# Patient Record
Sex: FEMALE | Race: WHITE | NOT HISPANIC OR LATINO | Employment: FULL TIME | URBAN - METROPOLITAN AREA
[De-identification: names, ages, dates, MRNs, and addresses within clinical notes are randomized per-mention and may not be internally consistent; named-entity substitution may affect disease eponyms.]

---

## 2017-01-23 ENCOUNTER — OFFICE VISIT (OUTPATIENT)
Dept: INTERNAL MEDICINE | Facility: CLINIC | Age: 57
End: 2017-01-23
Attending: INTERNAL MEDICINE
Payer: COMMERCIAL

## 2017-01-23 VITALS
DIASTOLIC BLOOD PRESSURE: 72 MMHG | SYSTOLIC BLOOD PRESSURE: 100 MMHG | OXYGEN SATURATION: 99 % | WEIGHT: 138 LBS | HEART RATE: 75 BPM | HEIGHT: 63 IN | BODY MASS INDEX: 24.45 KG/M2

## 2017-01-23 DIAGNOSIS — J30.9 ALLERGIC RHINITIS, UNSPECIFIED ALLERGIC RHINITIS TRIGGER, UNSPECIFIED RHINITIS SEASONALITY: Primary | ICD-10-CM

## 2017-01-23 DIAGNOSIS — M25.811 SHOULDER IMPINGEMENT, RIGHT: ICD-10-CM

## 2017-01-23 DIAGNOSIS — E78.5 HYPERLIPIDEMIA, UNSPECIFIED HYPERLIPIDEMIA TYPE: ICD-10-CM

## 2017-01-23 PROCEDURE — G0442 ANNUAL ALCOHOL SCREEN 15 MIN: HCPCS | Mod: S$GLB,,, | Performed by: INTERNAL MEDICINE

## 2017-01-23 PROCEDURE — G0444 DEPRESSION SCREEN ANNUAL: HCPCS | Mod: S$GLB,,, | Performed by: INTERNAL MEDICINE

## 2017-01-23 PROCEDURE — 99205 OFFICE O/P NEW HI 60 MIN: CPT | Mod: 25,S$GLB,, | Performed by: INTERNAL MEDICINE

## 2017-01-23 PROCEDURE — 1159F MED LIST DOCD IN RCRD: CPT | Mod: S$GLB,,, | Performed by: INTERNAL MEDICINE

## 2017-01-23 NOTE — MR AVS SNAPSHOT
"[facility]  [address] [place] [address], [address]  [facility] [address]  Phone: [phone]  Fax: [phone]                  Quyen Moody   2017 10:00 AM   Office Visit    Description:  Female : 1960   Provider:  LORIN Rodríguez MD   Department:  [facility]           Reason for Visit     Annual Exam                To Do List           Future Appointments        Provider Department Dept Phone    2018 9:00 AM MD [facility] Tolbert [phone]      Goals (5 Years of Data)     None      Follow-Up and Disposition     Return in about 1 year (around 2018).      [facility] On Call     [facility] On Call Nurse Care Line -  Assistance  Registered nurses in the [facility] On Call Center provide clinical advisement, health education, appointment booking, and other advisory services.  Call for this free service at [phone].             Medications           Message regarding Medications     Verify the changes and/or additions to your medication regime listed below are the same as discussed with your clinician today.  If any of these changes or additions are incorrect, please notify your healthcare provider.             Verify that the below list of medications is an accurate representation of the medications you are currently taking.  If none reported, the list may be blank. If incorrect, please contact your healthcare provider. Carry this list with you in case of emergency.           Current Medications     fenofibrate 160 MG Tab Take 1 tablet (160 mg total) by mouth once daily.    MIMVEY 1-0.5 mg per tablet            Clinical Reference Information           Vital Signs - Last Recorded  Most recent update: 2017  9:58 AM by Carmina Richards MA    BP Pulse Ht Wt SpO2 BMI    100/72 75 5' 2.5" (1.588 m) 62.6 kg (138 lb) 99% 24.84 kg/m2      Blood Pressure          Most Recent Value    BP  100/72      Allergies as of 2017     Sulfa (Sulfonamide Antibiotics)      Immunizations Administered on " Date of Encounter - 1/23/2017     None      Instructions    Tips for Healthy Living and Routine Preventative Care - 2017                                                             (These guidelines are intended for healthy adults)      1. Exercise  Exercise aerobically with a target heart rate of (220-age) x 0.8  Exercise 30-45 minutes on most days of the week    2. Diet and Supplements- All supplements can be obtained through a varied, healthy diet   Calcium: 1,000 - 1,200 mg each day   8 oz milk, Calcium fortified O.J., or Yogurt = 300 mg, 1oz of cheese =100-200 mg  Vitamin D: 1,000 iu each day- Can probably be obtained by 30 min. of direct sunlight    each day             3 oz. [place] = 800 iu,  3 oz. Tuna =150 iu, Milk or fortified O.J. = 120 iu  Fish oil: 1-2 grams each day or about 840 mg of EPA and DHA (Omega-3 fatty acids) each day             3 oz. [place]=2 grams,  3 oz. Tuna=1.3 grams,  3 oz. drained light Tuna= 0.25 grams  Folic acid 800 mcg each day for all women planning or capable of pregnancy  Fat intake: Not to exceed 30% of total daily calories    3. Lifestyle  Alcohol: 1 drink = 12 oz. domestic beer, 4 oz. wine, or 1 oz. hard (80 proof) liquor             Males: </= 14 drinks per week with no more than 4 in any one day             Females: </= 7 drinks per week with no more than 3 in any one day  Salt: 1.2 - 3 grams of Sodium each day.  Tobacco: Dont smoke, or quit smoking (discuss with your doctor)  Depression: If you feel depressed discuss with your doctor  Weight: Maintain a healthy body weight. Stay within 10% of:             Males: 106 lbs. + 6 lbs per inch height above 5 feet             Females: 100 lbs + 5 lbs per inch height above 5 feet    4. Routine tests  Blood pressure check at each visit, or at least once each year  HIV screening (one time) if less than 65 years old  Hepatitis C screen (one time) if born between 1945 and 1965  Cholesterol screening every 3 years starting at age  21  Glucose check every 2-3 years starting at age 45  TSH (thyroid screen) every 2 years starting at age 50  Colonoscopy at age 50, and repeat every 10 years until age 75  Vision screen at age 65    Females:  Pap smear every three years starting at age 21                  Stop screening at age 65 if past 3 pap smears were normal                  No screening for women who have had a hysterectomy with removal of cervix  Mammogram every 1-2 years starting at age 40 until age 75 (yearly from age 45-54).  Bone density scan at about age 65      Males:  Consider PSA screening annually at age 50, age 45 for  Americans, until age 75                 5. Immunizations  Influenza vaccine every year in the fall, especially if >50 or with a chronic disease  Tetnus/Diptheria/Pertusis (Tdap) vaccine once, then Tetnus/Diptheria (Td) vaccine every 10 years  Shingles (Zoster) vaccine at age 60  Pneumonia vaccine at age 65. 2nd Pneumonia vaccine at least 1 year later (1st should be Prevnar-13, and 2nd should be Pneumovax-23).

## 2017-01-23 NOTE — PROGRESS NOTES
Subjective:       Patient ID: Quyen Moody is a 56 y.o. female.    Chief Complaint: Annual Exam    HPI Comments: Establish. Prev Dr. Richards.  Takes Finofibrate for high cholest.  See Dr. Carney for shoulder pain.    Review of Systems   Constitutional: Negative.    HENT: Positive for congestion and postnasal drip.    Eyes: Negative.    Respiratory: Negative.    Cardiovascular: Negative.    Gastrointestinal: Negative.    Musculoskeletal: Positive for arthralgias.   Skin: Negative.    Neurological: Negative.    Psychiatric/Behavioral: Negative.  Negative for dysphoric mood.       Objective:      Physical Exam   Constitutional: She is oriented to person, place, and time. She appears well-developed and well-nourished.   HENT:   Head: Normocephalic and atraumatic.   Right Ear: External ear normal.   Left Ear: External ear normal.   Nose: Nose normal.   Mouth/Throat: Oropharynx is clear and moist. No oropharyngeal exudate.   Eyes: EOM are normal. Pupils are equal, round, and reactive to light. Right eye exhibits no discharge. Left eye exhibits no discharge.   Neck: Normal range of motion. Neck supple. No JVD present. No thyromegaly present.   Cardiovascular: Normal rate, regular rhythm and intact distal pulses.  Exam reveals no gallop and no friction rub.    No murmur heard.  Pulmonary/Chest: Effort normal and breath sounds normal. No respiratory distress. She has no rales. She exhibits no tenderness.   Abdominal: Soft. Bowel sounds are normal. There is no tenderness. There is no rebound.   Musculoskeletal: Normal range of motion. She exhibits no edema.   Neurological: She is alert and oriented to person, place, and time.   Skin: Skin is warm. She is not diaphoretic.   Psychiatric: She has a normal mood and affect.       Assessment:       No diagnosis found.    Plan:       Per orders and D/C instructions.  Continue meds/diet for AR, high cholest., and shoulder pain, which are stable.    Screening: The patient was screened for  depression with the PHQ2 questionnaire and possible health consequences were discussed with the patient, who understands (15 minutes spent). The patient was screened for the misuse of alcohol, by asking the number of drinks per average week, and if pt has had more than 4 drinks (more than 3 for women and elderly) in 1 day within the past year. The health and legal consequences of misuse were discussed (15 minutes spent). The patient was screened for obesity (BMI>30), If the current BMI > 30, then the possible consequences of obesity, as well as the benefits of diet, exercise, and weight loss were discussed (15 minutes spent).

## 2017-01-23 NOTE — PATIENT INSTRUCTIONS
Tips for Healthy Living and Routine Preventative Care - 2017                                                             (These guidelines are intended for healthy adults)      1. Exercise  Exercise aerobically with a target heart rate of (220-age) x 0.8  Exercise 30-45 minutes on most days of the week    2. Diet and Supplements- All supplements can be obtained through a varied, healthy diet   Calcium: 1,000 - 1,200 mg each day   8 oz milk, Calcium fortified O.J., or Yogurt = 300 mg, 1oz of cheese =100-200 mg  Vitamin D: 1,000 iu each day- Can probably be obtained by 30 min. of direct sunlight    each day             3 oz. [place] = 800 iu,  3 oz. Tuna =150 iu, Milk or fortified O.J. = 120 iu  Fish oil: 1-2 grams each day or about 840 mg of EPA and DHA (Omega-3 fatty acids) each day             3 oz. [place]=2 grams,  3 oz. Tuna=1.3 grams,  3 oz. drained light Tuna= 0.25 grams  Folic acid 800 mcg each day for all women planning or capable of pregnancy  Fat intake: Not to exceed 30% of total daily calories    3. Lifestyle  Alcohol: 1 drink = 12 oz. domestic beer, 4 oz. wine, or 1 oz. hard (80 proof) liquor             Males: </= 14 drinks per week with no more than 4 in any one day             Females: </= 7 drinks per week with no more than 3 in any one day  Salt: 1.2 - 3 grams of Sodium each day.  Tobacco: Dont smoke, or quit smoking (discuss with your doctor)  Depression: If you feel depressed discuss with your doctor  Weight: Maintain a healthy body weight. Stay within 10% of:             Males: 106 lbs. + 6 lbs per inch height above 5 feet             Females: 100 lbs + 5 lbs per inch height above 5 feet    4. Routine tests  Blood pressure check at each visit, or at least once each year  HIV screening (one time) if less than 65 years old  Hepatitis C screen (one time) if born between 1945 and 1965  Cholesterol screening every 3 years starting at age 21  Glucose check every 2-3 years starting at age 45  TSH  (thyroid screen) every 2 years starting at age 50  Colonoscopy at age 50, and repeat every 10 years until age 75  Vision screen at age 65    Females:  Pap smear every three years starting at age 21                  Stop screening at age 65 if past 3 pap smears were normal                  No screening for women who have had a hysterectomy with removal of cervix  Mammogram every 1-2 years starting at age 40 until age 75 (yearly from age 45-54).  Bone density scan at about age 65      Males:  Consider PSA screening annually at age 50, age 45 for  Americans, until age 75                 5. Immunizations  Influenza vaccine every year in the fall, especially if >50 or with a chronic disease  Tetnus/Diptheria/Pertusis (Tdap) vaccine once, then Tetnus/Diptheria (Td) vaccine every 10 years  Shingles (Zoster) vaccine at age 60  Pneumonia vaccine at age 65. 2nd Pneumonia vaccine at least 1 year later (1st should be Prevnar-13, and 2nd should be Pneumovax-23).

## 2017-01-23 NOTE — LETTER
January 23, 2017      Bernadette Nelson, NINO  [address] [address]           [facility]  [address] [place] [address]  Phone: [phone]  Fax: [phone]          Patient: Quyen Moody   MR Number: [id number]   YOB: 1960   Date of Visit: 1/23/2017       Dear Bernadette Nelson:    Thank you for referring Quyen Moody to me for evaluation. Attached you will find relevant portions of my assessment and plan of care.    If you have questions, please do not hesitate to call me. I look forward to following Quyen Moody along with you.    Sincerely,    LORIN Rodríguez MD    Enclosure  CC:  No Recipients    If you would like to receive this communication electronically, please contact [email or web address] or [phone] to request more information on [employer] access.    For providers and/or their staff who would like to refer a patient to [facility], please contact us through our one-stop-shop provider referral line, [facility], at [phone].    If you feel you have received this communication in error or would no longer like to receive these types of communications, please e-mail [email or web address]

## 2018-01-22 ENCOUNTER — OFFICE VISIT (OUTPATIENT)
Dept: INTERNAL MEDICINE | Facility: CLINIC | Age: 58
End: 2018-01-22
Attending: INTERNAL MEDICINE
Payer: COMMERCIAL

## 2018-01-22 ENCOUNTER — PATIENT MESSAGE (OUTPATIENT)
Dept: INTERNAL MEDICINE | Facility: CLINIC | Age: 58
End: 2018-01-22

## 2018-01-22 ENCOUNTER — LAB VISIT (OUTPATIENT)
Dept: LAB | Facility: OTHER | Age: 58
End: 2018-01-22
Attending: INTERNAL MEDICINE
Payer: COMMERCIAL

## 2018-01-22 VITALS
HEART RATE: 80 BPM | HEIGHT: 63 IN | BODY MASS INDEX: 24.8 KG/M2 | DIASTOLIC BLOOD PRESSURE: 64 MMHG | WEIGHT: 140 LBS | OXYGEN SATURATION: 99 % | SYSTOLIC BLOOD PRESSURE: 100 MMHG

## 2018-01-22 DIAGNOSIS — Z00.00 ROUTINE ADULT HEALTH MAINTENANCE: ICD-10-CM

## 2018-01-22 DIAGNOSIS — E78.9 DISORDER OF LIPID METABOLISM: ICD-10-CM

## 2018-01-22 DIAGNOSIS — E78.5 HYPERLIPIDEMIA, UNSPECIFIED HYPERLIPIDEMIA TYPE: Primary | ICD-10-CM

## 2018-01-22 DIAGNOSIS — D51.0 PERNICIOUS ANEMIA: ICD-10-CM

## 2018-01-22 DIAGNOSIS — D50.8 OTHER IRON DEFICIENCY ANEMIA: ICD-10-CM

## 2018-01-22 DIAGNOSIS — Z00.00 ROUTINE ADULT HEALTH MAINTENANCE: Primary | ICD-10-CM

## 2018-01-22 DIAGNOSIS — R79.89 OTHER SPECIFIED ABNORMAL FINDINGS OF BLOOD CHEMISTRY: ICD-10-CM

## 2018-01-22 DIAGNOSIS — E03.9 HYPOTHYROIDISM, UNSPECIFIED TYPE: ICD-10-CM

## 2018-01-22 DIAGNOSIS — E55.9 VITAMIN D DEFICIENCY: ICD-10-CM

## 2018-01-22 LAB
ALBUMIN SERPL BCP-MCNC: 3.8 G/DL
ALP SERPL-CCNC: 50 U/L
ALT SERPL W/O P-5'-P-CCNC: 27 U/L
ANION GAP SERPL CALC-SCNC: 12 MMOL/L
AST SERPL-CCNC: 29 U/L
BASOPHILS # BLD AUTO: 0.03 K/UL
BASOPHILS NFR BLD: 0.4 %
BILIRUB SERPL-MCNC: 0.6 MG/DL
BUN SERPL-MCNC: 17 MG/DL
CALCIUM SERPL-MCNC: 10.5 MG/DL
CHLORIDE SERPL-SCNC: 99 MMOL/L
CHOLEST SERPL-MCNC: 169 MG/DL
CHOLEST/HDLC SERPL: 2.3 {RATIO}
CO2 SERPL-SCNC: 27 MMOL/L
CREAT SERPL-MCNC: 1.2 MG/DL
DIFFERENTIAL METHOD: NORMAL
EOSINOPHIL # BLD AUTO: 0.3 K/UL
EOSINOPHIL NFR BLD: 3.5 %
ERYTHROCYTE [DISTWIDTH] IN BLOOD BY AUTOMATED COUNT: 12.9 %
EST. GFR  (AFRICAN AMERICAN): 58 ML/MIN/1.73 M^2
EST. GFR  (NON AFRICAN AMERICAN): 50 ML/MIN/1.73 M^2
ESTIMATED AVG GLUCOSE: 97 MG/DL
GLUCOSE SERPL-MCNC: 87 MG/DL
HBA1C MFR BLD HPLC: 5 %
HCT VFR BLD AUTO: 40 %
HDLC SERPL-MCNC: 72 MG/DL
HDLC SERPL: 42.6 %
HGB BLD-MCNC: 13.2 G/DL
LDLC SERPL CALC-MCNC: 73.6 MG/DL
LYMPHOCYTES # BLD AUTO: 2.3 K/UL
LYMPHOCYTES NFR BLD: 32.2 %
MCH RBC QN AUTO: 28.8 PG
MCHC RBC AUTO-ENTMCNC: 33 G/DL
MCV RBC AUTO: 87 FL
MONOCYTES # BLD AUTO: 0.6 K/UL
MONOCYTES NFR BLD: 8.6 %
NEUTROPHILS # BLD AUTO: 3.9 K/UL
NEUTROPHILS NFR BLD: 55.2 %
NONHDLC SERPL-MCNC: 97 MG/DL
PLATELET # BLD AUTO: 227 K/UL
PMV BLD AUTO: 10.7 FL
POTASSIUM SERPL-SCNC: 4.2 MMOL/L
PROT SERPL-MCNC: 7.6 G/DL
RBC # BLD AUTO: 4.58 M/UL
SODIUM SERPL-SCNC: 138 MMOL/L
TRIGL SERPL-MCNC: 117 MG/DL
TSH SERPL DL<=0.005 MIU/L-ACNC: 0.86 UIU/ML
VIT B12 SERPL-MCNC: >2000 PG/ML
WBC # BLD AUTO: 7.08 K/UL

## 2018-01-22 PROCEDURE — 80061 LIPID PANEL: CPT

## 2018-01-22 PROCEDURE — 99396 PREV VISIT EST AGE 40-64: CPT | Mod: 25,S$GLB,, | Performed by: INTERNAL MEDICINE

## 2018-01-22 PROCEDURE — 82607 VITAMIN B-12: CPT

## 2018-01-22 PROCEDURE — G0442 ANNUAL ALCOHOL SCREEN 15 MIN: HCPCS | Mod: S$GLB,,, | Performed by: INTERNAL MEDICINE

## 2018-01-22 PROCEDURE — 36415 COLL VENOUS BLD VENIPUNCTURE: CPT

## 2018-01-22 PROCEDURE — 84443 ASSAY THYROID STIM HORMONE: CPT

## 2018-01-22 PROCEDURE — 85025 COMPLETE CBC W/AUTO DIFF WBC: CPT

## 2018-01-22 PROCEDURE — 83036 HEMOGLOBIN GLYCOSYLATED A1C: CPT

## 2018-01-22 PROCEDURE — 86803 HEPATITIS C AB TEST: CPT

## 2018-01-22 PROCEDURE — G0444 DEPRESSION SCREEN ANNUAL: HCPCS | Mod: S$GLB,,, | Performed by: INTERNAL MEDICINE

## 2018-01-22 PROCEDURE — 80053 COMPREHEN METABOLIC PANEL: CPT

## 2018-01-22 RX ORDER — LANOLIN ALCOHOL/MO/W.PET/CERES
1000 CREAM (GRAM) TOPICAL EVERY OTHER DAY
COMMUNITY
End: 2019-07-15

## 2018-01-22 RX ORDER — MULTIVITAMIN
1 TABLET ORAL DAILY
COMMUNITY
End: 2019-07-15

## 2018-01-22 RX ORDER — GLUCOSAMINE/CHONDRO SU A 500-400 MG
1 TABLET ORAL 3 TIMES DAILY
COMMUNITY
End: 2019-07-15

## 2018-01-22 RX ORDER — AMOXICILLIN 500 MG
2 CAPSULE ORAL DAILY
COMMUNITY
End: 2019-07-15

## 2018-01-22 NOTE — PROGRESS NOTES
Subjective:       Patient ID: Quyen Moody is a 57 y.o. female.    Chief Complaint: Annual Exam    No complaints.      Review of Systems   Constitutional: Negative.    Respiratory: Negative for shortness of breath.    Cardiovascular: Negative for chest pain.   Psychiatric/Behavioral: Negative for dysphoric mood.       Objective:      Physical Exam   Constitutional: She appears well-developed and well-nourished.   HENT:   Head: Normocephalic.   Eyes: Pupils are equal, round, and reactive to light.   Cardiovascular: Normal rate, regular rhythm and normal heart sounds.  Exam reveals no friction rub.    Pulmonary/Chest: Effort normal.   Neurological: She is alert.       Assessment:       1. Hyperlipidemia, unspecified hyperlipidemia type    2. Routine adult health maintenance        Plan:       Per orders and D/C instructions.  Check labs.  Consider stopping Fenofibrate.    Screening: The patient was screened for depression with the PHQ2 questionnaire and possible health consequences were discussed with the patient, who understands (15 minutes spent). The patient was screened for the misuse of alcohol, by asking the number of drinks per average week, and if pt has had more than 4 drinks (more than 3 for women and elderly) in 1 day within the past year. The health and legal consequences of misuse were discussed (15 minutes spent). The patient was screened for obesity (BMI>30), If the current BMI > 30, then the possible consequences of obesity, as well as the benefits of diet, exercise, and weight loss were discussed (15 minutes spent).

## 2018-01-23 LAB — HCV AB SERPL QL IA: NEGATIVE

## 2018-01-24 ENCOUNTER — PATIENT MESSAGE (OUTPATIENT)
Dept: INTERNAL MEDICINE | Facility: CLINIC | Age: 58
End: 2018-01-24

## 2019-01-28 ENCOUNTER — LAB VISIT (OUTPATIENT)
Dept: LAB | Facility: OTHER | Age: 59
End: 2019-01-28
Attending: INTERNAL MEDICINE
Payer: COMMERCIAL

## 2019-01-28 ENCOUNTER — OFFICE VISIT (OUTPATIENT)
Dept: INTERNAL MEDICINE | Facility: CLINIC | Age: 59
End: 2019-01-28
Attending: INTERNAL MEDICINE
Payer: COMMERCIAL

## 2019-01-28 VITALS
OXYGEN SATURATION: 99 % | BODY MASS INDEX: 24.98 KG/M2 | HEART RATE: 91 BPM | SYSTOLIC BLOOD PRESSURE: 110 MMHG | HEIGHT: 63 IN | DIASTOLIC BLOOD PRESSURE: 68 MMHG | WEIGHT: 141 LBS

## 2019-01-28 DIAGNOSIS — Z00.00 ROUTINE ADULT HEALTH MAINTENANCE: Primary | ICD-10-CM

## 2019-01-28 DIAGNOSIS — D51.0 PERNICIOUS ANEMIA: ICD-10-CM

## 2019-01-28 DIAGNOSIS — E78.9 DISORDER OF LIPID METABOLISM: ICD-10-CM

## 2019-01-28 DIAGNOSIS — Z13.31 SCREENING FOR DEPRESSION: ICD-10-CM

## 2019-01-28 DIAGNOSIS — R79.89 OTHER SPECIFIED ABNORMAL FINDINGS OF BLOOD CHEMISTRY: ICD-10-CM

## 2019-01-28 DIAGNOSIS — Z00.00 ROUTINE ADULT HEALTH MAINTENANCE: ICD-10-CM

## 2019-01-28 DIAGNOSIS — D50.8 OTHER IRON DEFICIENCY ANEMIA: ICD-10-CM

## 2019-01-28 DIAGNOSIS — E55.9 VITAMIN D DEFICIENCY: ICD-10-CM

## 2019-01-28 DIAGNOSIS — E03.9 HYPOTHYROIDISM, UNSPECIFIED TYPE: ICD-10-CM

## 2019-01-28 DIAGNOSIS — E78.5 HYPERLIPIDEMIA, UNSPECIFIED HYPERLIPIDEMIA TYPE: Primary | ICD-10-CM

## 2019-01-28 DIAGNOSIS — Z13.39 SCREENING FOR ALCOHOLISM: ICD-10-CM

## 2019-01-28 LAB
25(OH)D3+25(OH)D2 SERPL-MCNC: 60 NG/ML
ALBUMIN SERPL BCP-MCNC: 3.6 G/DL
ALP SERPL-CCNC: 59 U/L
ALT SERPL W/O P-5'-P-CCNC: 28 U/L
ANION GAP SERPL CALC-SCNC: 10 MMOL/L
AST SERPL-CCNC: 28 U/L
BASOPHILS # BLD AUTO: 0.04 K/UL
BASOPHILS NFR BLD: 0.5 %
BILIRUB SERPL-MCNC: 0.8 MG/DL
BUN SERPL-MCNC: 13 MG/DL
CALCIUM SERPL-MCNC: 10.3 MG/DL
CHLORIDE SERPL-SCNC: 99 MMOL/L
CHOLEST SERPL-MCNC: 219 MG/DL
CHOLEST/HDLC SERPL: 3 {RATIO}
CO2 SERPL-SCNC: 29 MMOL/L
CREAT SERPL-MCNC: 0.9 MG/DL
DIFFERENTIAL METHOD: NORMAL
EOSINOPHIL # BLD AUTO: 0.2 K/UL
EOSINOPHIL NFR BLD: 2.2 %
ERYTHROCYTE [DISTWIDTH] IN BLOOD BY AUTOMATED COUNT: 13.4 %
EST. GFR  (AFRICAN AMERICAN): >60 ML/MIN/1.73 M^2
EST. GFR  (NON AFRICAN AMERICAN): >60 ML/MIN/1.73 M^2
ESTIMATED AVG GLUCOSE: 100 MG/DL
GLUCOSE SERPL-MCNC: 84 MG/DL
HBA1C MFR BLD HPLC: 5.1 %
HCT VFR BLD AUTO: 42.6 %
HDLC SERPL-MCNC: 72 MG/DL
HDLC SERPL: 32.9 %
HGB BLD-MCNC: 14 G/DL
LDLC SERPL CALC-MCNC: 97.8 MG/DL
LYMPHOCYTES # BLD AUTO: 2.4 K/UL
LYMPHOCYTES NFR BLD: 28.7 %
MCH RBC QN AUTO: 28.6 PG
MCHC RBC AUTO-ENTMCNC: 32.9 G/DL
MCV RBC AUTO: 87 FL
MONOCYTES # BLD AUTO: 0.7 K/UL
MONOCYTES NFR BLD: 7.8 %
NEUTROPHILS # BLD AUTO: 5.2 K/UL
NEUTROPHILS NFR BLD: 60.7 %
NONHDLC SERPL-MCNC: 147 MG/DL
PLATELET # BLD AUTO: 253 K/UL
PMV BLD AUTO: 11.2 FL
POTASSIUM SERPL-SCNC: 3.5 MMOL/L
PROT SERPL-MCNC: 7.2 G/DL
RBC # BLD AUTO: 4.89 M/UL
SODIUM SERPL-SCNC: 138 MMOL/L
TRIGL SERPL-MCNC: 246 MG/DL
TSH SERPL DL<=0.005 MIU/L-ACNC: 0.85 UIU/ML
VIT B12 SERPL-MCNC: 1504 PG/ML
WBC # BLD AUTO: 8.5 K/UL

## 2019-01-28 PROCEDURE — 36415 COLL VENOUS BLD VENIPUNCTURE: CPT

## 2019-01-28 PROCEDURE — 80053 COMPREHEN METABOLIC PANEL: CPT

## 2019-01-28 PROCEDURE — G0444 PR DEPRESSION SCREENING: ICD-10-PCS | Mod: 59,S$GLB,, | Performed by: INTERNAL MEDICINE

## 2019-01-28 PROCEDURE — 80061 LIPID PANEL: CPT

## 2019-01-28 PROCEDURE — 84443 ASSAY THYROID STIM HORMONE: CPT

## 2019-01-28 PROCEDURE — 85025 COMPLETE CBC W/AUTO DIFF WBC: CPT

## 2019-01-28 PROCEDURE — G0442 PR  ALCOHOL SCREENING: ICD-10-PCS | Mod: 59,S$GLB,, | Performed by: INTERNAL MEDICINE

## 2019-01-28 PROCEDURE — G0444 DEPRESSION SCREEN ANNUAL: HCPCS | Mod: 59,S$GLB,, | Performed by: INTERNAL MEDICINE

## 2019-01-28 PROCEDURE — 83036 HEMOGLOBIN GLYCOSYLATED A1C: CPT

## 2019-01-28 PROCEDURE — G0442 ANNUAL ALCOHOL SCREEN 15 MIN: HCPCS | Mod: 59,S$GLB,, | Performed by: INTERNAL MEDICINE

## 2019-01-28 PROCEDURE — 82607 VITAMIN B-12: CPT

## 2019-01-28 PROCEDURE — 82306 VITAMIN D 25 HYDROXY: CPT

## 2019-01-28 PROCEDURE — 99396 PREV VISIT EST AGE 40-64: CPT | Mod: 25,S$GLB,, | Performed by: INTERNAL MEDICINE

## 2019-01-28 PROCEDURE — 99396 PR PREVENTIVE VISIT,EST,40-64: ICD-10-PCS | Mod: 25,S$GLB,, | Performed by: INTERNAL MEDICINE

## 2019-01-28 NOTE — PROGRESS NOTES
Subjective:       Patient ID: Quyen Moody is a 58 y.o. female.    Chief Complaint: Annual Exam      Adult Wellness Exam:    Mental Conditions: None  Depression Risk Annual Factors: None  BMI: See Vital signs   Colon screen:    See Health Maintenance Report      Females: Mammogram and PAP: per Gyn                                 Vaccines (Flu, Adacel, Shingrix): See Health Maintenance Report  Routine labs (Cholesterol, Glucose/Hgb A1C, and TSH): Done or ordered.     The patient's current health status is: Good   Patient was educated on all medical problems and routine health maintanence. See Patient Instructions.                               Review of Systems   Constitutional: Negative.    Respiratory: Negative for shortness of breath.    Cardiovascular: Negative for chest pain.   Psychiatric/Behavioral: Negative for dysphoric mood.       Objective:      Physical Exam   Constitutional: She appears well-developed and well-nourished.   HENT:   Head: Normocephalic.   Eyes: Pupils are equal, round, and reactive to light.   Cardiovascular: Normal rate, regular rhythm and normal heart sounds. Exam reveals no friction rub.   Pulmonary/Chest: Effort normal.   Neurological: She is alert.       Assessment:       1. Hyperlipidemia, unspecified hyperlipidemia type    2. Routine adult health maintenance    3. Screening for depression    4. Screening for alcoholism        Plan:       Per orders and D/C instructions.  Check labs for high cholesterol.    Screening: The patient was screened for depression with the PHQ2 questionnaire and possible health consequences were discussed with the patient, who understands (15 minutes spent). The patient was screened for the misuse of alcohol, by asking the number of drinks per average week, and if pt has had more than 4 drinks (more than 3 for women and elderly) in 1 day within the past year. The health and legal consequences of misuse were discussed (15 minutes spent). The patient was  screened for obesity (BMI>30), If the current BMI > 30, then the possible consequences of obesity, as well as the benefits of diet, exercise, and weight loss were discussed. Any behavioral risks were identified, and methods to achieve appropriate treatment goals were discussed (15 minutes spent).

## 2019-01-28 NOTE — PATIENT INSTRUCTIONS
Tips for Healthy Living and Routine Preventative Care - 2019                                                            (These guidelines are intended for healthy adults)      1. Exercise  Exercise aerobically with a target heart rate of (220-age) x 0.8  Exercise 30-45 minutes on most days of the week    2. Diet and Supplements- All supplements can be obtained through a varied, healthy diet   Calcium: 1,000 - 1,200 mg each day   8 oz milk, Calcium fortified O.J., or Yogurt = 300 mg, 1oz of cheese =100-200 mg  Vitamin D: 800 iu each day- Can probably be obtained by 30 min. of direct sunlight    each day             3 oz. [place] = 800 iu,  3 oz. Tuna =150 iu, Milk or fortified O.J. = 120 iu  Fish oil: 1-2 grams each day or about 840 mg of EPA and DHA (Omega-3 fatty acids) each day             3 oz. [place]=2 grams,  3 oz. Tuna=1.3 grams,  3 oz. drained light Tuna= 0.25 grams  Folic acid 800 mcg each day for all women planning or capable of pregnancy    3. Lifestyle  Alcohol: 1 drink = 12 oz. domestic beer, 4 oz. wine, or 1 oz. hard (80 proof) liquor             Males: </= 14 drinks per week with no more than 4 in any one day             Females: </= 7 drinks per week with no more than 3 in any one day  Salt: 1.2 - 3 grams of Sodium each day.  Tobacco: Dont smoke, or quit smoking (discuss with your doctor)  Depression: If you feel depressed discuss with your doctor  Weight: Maintain a healthy body weight. Stay within 10% of:             Males: 106 lbs. + 6 lbs per inch height above 5 feet             Females: 100 lbs + 5 lbs per inch height above 5 feet    4. Routine tests  Blood pressure check at each visit, or at least once each year  HIV screening (one time) if less than 65 years old  Hepatitis C screen (one time) if born between 1945 and 1965  Cholesterol screening every 3 years starting at age 21  Glucose check every 2-3 years starting at age 45  TSH (thyroid screen) every 2 years starting at age 50  Colonoscopy  at age 50, and repeat every 10 years until age 75  Vision screen at age 65    Females:  Pap smear every three years starting at age 21                  Stop screening at age 65 if past 3 pap smears were normal                  No screening for women who have had a hysterectomy with removal of cervix  Mammogram every 1-2 years starting at age 40 until age 75 (yearly from age 45-54).  Bone density scan at about age 65      Males:  Consider PSA screening annually at age 50, age 45 for  Americans, until age 75                 5. Immunizations  Influenza vaccine every year in the fall, especially if >50 or with a chronic disease  Tetnus/Diptheria/Pertusis (Tdap) vaccine once (after the age of 18), then Tetnus/Diptheria (Td) vaccine every 10 years  Shingles (Shingrix) vaccine after age 50 and get a 2nd dose after 2-6 months  Pneumonia vaccine at age 65. 2nd Pneumonia vaccine at least 1 year later (1st should be Prevnar-13, and 2nd should be Pneumovax-23).

## 2019-01-30 ENCOUNTER — PATIENT MESSAGE (OUTPATIENT)
Dept: INTERNAL MEDICINE | Facility: CLINIC | Age: 59
End: 2019-01-30

## 2019-04-10 RX ORDER — DIAZEPAM 5 MG/1
TABLET ORAL
Qty: 30 TABLET | Refills: 0 | Status: SHIPPED | OUTPATIENT
Start: 2019-04-10 | End: 2019-07-15

## 2019-06-03 ENCOUNTER — PATIENT MESSAGE (OUTPATIENT)
Dept: INTERNAL MEDICINE | Facility: CLINIC | Age: 59
End: 2019-06-03

## 2019-06-03 ENCOUNTER — OFFICE VISIT (OUTPATIENT)
Dept: INTERNAL MEDICINE | Facility: CLINIC | Age: 59
End: 2019-06-03
Attending: INTERNAL MEDICINE
Payer: COMMERCIAL

## 2019-06-03 VITALS
DIASTOLIC BLOOD PRESSURE: 70 MMHG | HEIGHT: 63 IN | HEART RATE: 77 BPM | BODY MASS INDEX: 24.63 KG/M2 | WEIGHT: 139 LBS | SYSTOLIC BLOOD PRESSURE: 118 MMHG | OXYGEN SATURATION: 99 %

## 2019-06-03 DIAGNOSIS — R10.13 EPIGASTRIC PAIN: Primary | ICD-10-CM

## 2019-06-03 PROCEDURE — 99213 OFFICE O/P EST LOW 20 MIN: CPT | Mod: S$GLB,,, | Performed by: INTERNAL MEDICINE

## 2019-06-03 PROCEDURE — 99213 PR OFFICE/OUTPT VISIT, EST, LEVL III, 20-29 MIN: ICD-10-PCS | Mod: S$GLB,,, | Performed by: INTERNAL MEDICINE

## 2019-06-03 PROCEDURE — 3008F BODY MASS INDEX DOCD: CPT | Mod: CPTII,S$GLB,, | Performed by: INTERNAL MEDICINE

## 2019-06-03 PROCEDURE — 3008F PR BODY MASS INDEX (BMI) DOCUMENTED: ICD-10-PCS | Mod: CPTII,S$GLB,, | Performed by: INTERNAL MEDICINE

## 2019-06-03 NOTE — PROGRESS NOTES
Subjective:       Patient ID: Quyen Moody is a 58 y.o. female.    Chief Complaint: Abdominal Pain (started in Feb./ upper abdomial,center (getting worse) OTC not working)    Crampy epigastrum, aman at night. Reilieved by occasionally watery diarrhea .    Abdominal Pain   This is a recurrent problem. The current episode started more than 1 month ago. The onset quality is gradual. The problem occurs daily. The problem has been gradually worsening. The pain is located in the epigastric region. The pain is mild. The quality of the pain is cramping. The abdominal pain does not radiate. Associated symptoms include diarrhea. Pertinent negatives include no anorexia, belching, constipation, fever, nausea, vomiting or weight loss. The pain is relieved by bowel movements. She has tried antacids for the symptoms. The treatment provided mild relief.     Review of Systems   Constitutional: Negative for fever and weight loss.   Gastrointestinal: Positive for abdominal pain and diarrhea. Negative for anorexia, constipation, nausea and vomiting.       Objective:      Physical Exam    Assessment:       1. Epigastric pain        Plan:       Per orders and D/C instructions.  Try FDguard.  US to evaluate epigastric pain.

## 2019-06-03 NOTE — PATIENT INSTRUCTIONS
Take FDgard twice each day with food.    Radiology should call you to schedule your tests. If you do not hear from them within 2 business days, then Please call [facility] radiology at [phone] to schedule your radiology test(s).  Abdominal Ultrasound.

## 2019-06-05 ENCOUNTER — PATIENT MESSAGE (OUTPATIENT)
Dept: INTERNAL MEDICINE | Facility: CLINIC | Age: 59
End: 2019-06-05

## 2019-06-05 ENCOUNTER — HOSPITAL ENCOUNTER (OUTPATIENT)
Dept: RADIOLOGY | Facility: OTHER | Age: 59
Discharge: HOME | End: 2019-06-05
Attending: INTERNAL MEDICINE
Payer: COMMERCIAL

## 2019-06-05 PROCEDURE — 76700 US EXAM ABDOM COMPLETE: CPT | Mod: TC

## 2019-06-05 PROCEDURE — 76700 US EXAM ABDOM COMPLETE: CPT | Mod: 26,,, | Performed by: RADIOLOGY

## 2019-06-05 PROCEDURE — 76700 US ABDOMEN COMPLETE: ICD-10-PCS | Mod: 26,,, | Performed by: RADIOLOGY

## 2019-06-07 DIAGNOSIS — K58.0 IRRITABLE BOWEL SYNDROME WITH DIARRHEA: Primary | ICD-10-CM

## 2019-06-07 RX ORDER — ESOMEPRAZOLE MAGNESIUM 40 MG/1
40 CAPSULE, DELAYED RELEASE ORAL
Qty: 30 CAPSULE | Refills: 11 | Status: SHIPPED | OUTPATIENT
Start: 2019-06-07 | End: 2019-07-15

## 2019-06-12 DIAGNOSIS — E78.9 DISORDER OF LIPID METABOLISM: ICD-10-CM

## 2019-06-12 DIAGNOSIS — D50.8 OTHER IRON DEFICIENCY ANEMIA: ICD-10-CM

## 2019-06-12 DIAGNOSIS — R79.89 OTHER SPECIFIED ABNORMAL FINDINGS OF BLOOD CHEMISTRY: ICD-10-CM

## 2019-06-12 DIAGNOSIS — E55.9 VITAMIN D DEFICIENCY: ICD-10-CM

## 2019-06-12 DIAGNOSIS — R10.84 GENERALIZED ABDOMINAL PAIN: ICD-10-CM

## 2019-06-12 DIAGNOSIS — R10.13 EPIGASTRIC PAIN: Primary | ICD-10-CM

## 2019-06-12 DIAGNOSIS — D51.0 PERNICIOUS ANEMIA: ICD-10-CM

## 2019-06-13 ENCOUNTER — LAB VISIT (OUTPATIENT)
Dept: LAB | Facility: OTHER | Age: 59
End: 2019-06-13
Attending: INTERNAL MEDICINE
Payer: COMMERCIAL

## 2019-06-13 DIAGNOSIS — D50.8 OTHER IRON DEFICIENCY ANEMIA: ICD-10-CM

## 2019-06-13 DIAGNOSIS — R10.13 EPIGASTRIC PAIN: ICD-10-CM

## 2019-06-13 DIAGNOSIS — E78.9 DISORDER OF LIPID METABOLISM: ICD-10-CM

## 2019-06-13 LAB
ALBUMIN SERPL BCP-MCNC: 4 G/DL (ref 3.5–5.2)
ALP SERPL-CCNC: 66 U/L (ref 55–135)
ALT SERPL W/O P-5'-P-CCNC: 19 U/L (ref 10–44)
AMYLASE SERPL-CCNC: 147 U/L (ref 20–110)
ANION GAP SERPL CALC-SCNC: 9 MMOL/L (ref 8–16)
AST SERPL-CCNC: 19 U/L (ref 10–40)
BASOPHILS # BLD AUTO: 0.06 K/UL (ref 0–0.2)
BASOPHILS NFR BLD: 0.8 % (ref 0–1.9)
BILIRUB SERPL-MCNC: 0.7 MG/DL (ref 0.1–1)
BUN SERPL-MCNC: 14 MG/DL (ref 6–20)
CALCIUM SERPL-MCNC: 9 MG/DL (ref 8.7–10.5)
CHLORIDE SERPL-SCNC: 107 MMOL/L (ref 95–110)
CHOLEST SERPL-MCNC: 191 MG/DL (ref 120–199)
CHOLEST/HDLC SERPL: 2.7 {RATIO} (ref 2–5)
CO2 SERPL-SCNC: 24 MMOL/L (ref 23–29)
CREAT SERPL-MCNC: 0.9 MG/DL (ref 0.5–1.4)
DIFFERENTIAL METHOD: NORMAL
EOSINOPHIL # BLD AUTO: 0.4 K/UL (ref 0–0.5)
EOSINOPHIL NFR BLD: 5.7 % (ref 0–8)
ERYTHROCYTE [DISTWIDTH] IN BLOOD BY AUTOMATED COUNT: 13.2 % (ref 11.5–14.5)
EST. GFR  (AFRICAN AMERICAN): >60 ML/MIN/1.73 M^2
EST. GFR  (NON AFRICAN AMERICAN): >60 ML/MIN/1.73 M^2
GLUCOSE SERPL-MCNC: 88 MG/DL (ref 70–110)
HCT VFR BLD AUTO: 42.2 % (ref 37–48.5)
HDLC SERPL-MCNC: 70 MG/DL (ref 40–75)
HDLC SERPL: 36.6 % (ref 20–50)
HGB BLD-MCNC: 13.9 G/DL (ref 12–16)
LDLC SERPL CALC-MCNC: 91.8 MG/DL (ref 63–159)
LYMPHOCYTES # BLD AUTO: 2.5 K/UL (ref 1–4.8)
LYMPHOCYTES NFR BLD: 34.1 % (ref 18–48)
MCH RBC QN AUTO: 28.8 PG (ref 27–31)
MCHC RBC AUTO-ENTMCNC: 32.9 G/DL (ref 32–36)
MCV RBC AUTO: 87 FL (ref 82–98)
MONOCYTES # BLD AUTO: 0.8 K/UL (ref 0.3–1)
MONOCYTES NFR BLD: 10.4 % (ref 4–15)
NEUTROPHILS # BLD AUTO: 3.5 K/UL (ref 1.8–7.7)
NEUTROPHILS NFR BLD: 48.9 % (ref 38–73)
NONHDLC SERPL-MCNC: 121 MG/DL
PLATELET # BLD AUTO: 259 K/UL (ref 150–350)
PMV BLD AUTO: 10.5 FL (ref 9.2–12.9)
POTASSIUM SERPL-SCNC: 4.9 MMOL/L (ref 3.5–5.1)
PROT SERPL-MCNC: 7.6 G/DL (ref 6–8.4)
RBC # BLD AUTO: 4.83 M/UL (ref 4–5.4)
SODIUM SERPL-SCNC: 140 MMOL/L (ref 136–145)
TRIGL SERPL-MCNC: 146 MG/DL (ref 30–150)
WBC # BLD AUTO: 7.19 K/UL (ref 3.9–12.7)

## 2019-06-13 PROCEDURE — 82150 ASSAY OF AMYLASE: CPT

## 2019-06-13 PROCEDURE — 36415 COLL VENOUS BLD VENIPUNCTURE: CPT

## 2019-06-13 PROCEDURE — 80053 COMPREHEN METABOLIC PANEL: CPT

## 2019-06-13 PROCEDURE — 85025 COMPLETE CBC W/AUTO DIFF WBC: CPT

## 2019-06-13 PROCEDURE — 80061 LIPID PANEL: CPT

## 2019-06-17 ENCOUNTER — PATIENT MESSAGE (OUTPATIENT)
Dept: INTERNAL MEDICINE | Facility: CLINIC | Age: 59
End: 2019-06-17

## 2019-06-17 ENCOUNTER — HOSPITAL ENCOUNTER (OUTPATIENT)
Dept: RADIOLOGY | Facility: OTHER | Age: 59
Discharge: HOME | End: 2019-06-17
Attending: INTERNAL MEDICINE
Payer: COMMERCIAL

## 2019-06-17 DIAGNOSIS — R10.84 GENERALIZED ABDOMINAL PAIN: ICD-10-CM

## 2019-06-17 PROCEDURE — 74177 CT ABD & PELVIS W/CONTRAST: CPT | Mod: 26,,, | Performed by: RADIOLOGY

## 2019-06-17 PROCEDURE — 74177 CT ABDOMEN PELVIS WITH CONTRAST: ICD-10-PCS | Mod: 26,,, | Performed by: RADIOLOGY

## 2019-06-17 PROCEDURE — 25500020 PHARM REV CODE 255: Performed by: INTERNAL MEDICINE

## 2019-06-17 PROCEDURE — 74177 CT ABD & PELVIS W/CONTRAST: CPT | Mod: TC

## 2019-06-17 RX ADMIN — IOHEXOL 75 ML: 350 INJECTION, SOLUTION INTRAVENOUS at 03:06

## 2019-06-17 RX ADMIN — IOHEXOL 30 ML: 350 INJECTION, SOLUTION INTRAVENOUS at 02:06

## 2019-07-15 ENCOUNTER — TELEPHONE (OUTPATIENT)
Dept: ENDOSCOPY | Facility: HOSPITAL | Age: 59
End: 2019-07-15

## 2019-07-15 ENCOUNTER — INITIAL CONSULT (OUTPATIENT)
Dept: SURGERY | Facility: CLINIC | Age: 59
End: 2019-07-15
Payer: COMMERCIAL

## 2019-07-15 ENCOUNTER — TELEPHONE (OUTPATIENT)
Dept: HEMATOLOGY/ONCOLOGY | Facility: CLINIC | Age: 59
End: 2019-07-15

## 2019-07-15 ENCOUNTER — HOSPITAL ENCOUNTER (OUTPATIENT)
Dept: RADIOLOGY | Facility: HOSPITAL | Age: 59
Discharge: HOME | End: 2019-07-15
Attending: SURGERY
Payer: COMMERCIAL

## 2019-07-15 VITALS
BODY MASS INDEX: 23.83 KG/M2 | TEMPERATURE: 97 F | HEIGHT: 63 IN | SYSTOLIC BLOOD PRESSURE: 127 MMHG | DIASTOLIC BLOOD PRESSURE: 74 MMHG | WEIGHT: 134.5 LBS | HEART RATE: 76 BPM

## 2019-07-15 DIAGNOSIS — C25.0 CANCER OF HEAD OF PANCREAS: ICD-10-CM

## 2019-07-15 DIAGNOSIS — C80.1 MALIGNANT OBSTRUCTIVE JAUNDICE: ICD-10-CM

## 2019-07-15 DIAGNOSIS — K83.1 MALIGNANT OBSTRUCTIVE JAUNDICE: ICD-10-CM

## 2019-07-15 DIAGNOSIS — R17 JAUNDICE: Primary | ICD-10-CM

## 2019-07-15 DIAGNOSIS — C25.0 CANCER OF HEAD OF PANCREAS: Primary | ICD-10-CM

## 2019-07-15 PROCEDURE — 3008F PR BODY MASS INDEX (BMI) DOCUMENTED: ICD-10-PCS | Mod: CPTII,S$GLB,, | Performed by: SURGERY

## 2019-07-15 PROCEDURE — 99999 PR PBB SHADOW E&M-EST. PATIENT-LVL III: CPT | Mod: PBBFAC,,, | Performed by: SURGERY

## 2019-07-15 PROCEDURE — 74177 CT ABD & PELVIS W/CONTRAST: CPT | Mod: TC

## 2019-07-15 PROCEDURE — 99999 PR PBB SHADOW E&M-EST. PATIENT-LVL III: ICD-10-PCS | Mod: PBBFAC,,, | Performed by: SURGERY

## 2019-07-15 PROCEDURE — 74177 CT CHEST ABDOMEN PELVIS WITH CONTRAST (XPD): ICD-10-PCS | Mod: 26,,, | Performed by: RADIOLOGY

## 2019-07-15 PROCEDURE — 25500020 PHARM REV CODE 255: Performed by: SURGERY

## 2019-07-15 PROCEDURE — 71260 CT THORAX DX C+: CPT | Mod: 26,,, | Performed by: RADIOLOGY

## 2019-07-15 PROCEDURE — 74177 CT ABD & PELVIS W/CONTRAST: CPT | Mod: 26,,, | Performed by: RADIOLOGY

## 2019-07-15 PROCEDURE — 99204 OFFICE O/P NEW MOD 45 MIN: CPT | Mod: S$GLB,,, | Performed by: SURGERY

## 2019-07-15 PROCEDURE — 71260 CT CHEST ABDOMEN PELVIS WITH CONTRAST (XPD): ICD-10-PCS | Mod: 26,,, | Performed by: RADIOLOGY

## 2019-07-15 PROCEDURE — 99204 PR OFFICE/OUTPT VISIT, NEW, LEVL IV, 45-59 MIN: ICD-10-PCS | Mod: S$GLB,,, | Performed by: SURGERY

## 2019-07-15 PROCEDURE — 3008F BODY MASS INDEX DOCD: CPT | Mod: CPTII,S$GLB,, | Performed by: SURGERY

## 2019-07-15 RX ORDER — ALPRAZOLAM 0.5 MG/1
TABLET ORAL
Refills: 2 | COMMUNITY
Start: 2019-07-11 | End: 2019-09-19

## 2019-07-15 RX ORDER — ONDANSETRON 4 MG/1
TABLET, FILM COATED ORAL
Refills: 0 | COMMUNITY
Start: 2019-07-06 | End: 2019-07-22

## 2019-07-15 RX ORDER — HYDROCODONE BITARTRATE AND ACETAMINOPHEN 10; 325 MG/1; MG/1
TABLET ORAL
Refills: 0 | COMMUNITY
Start: 2019-07-11 | End: 2019-09-05 | Stop reason: SDUPTHER

## 2019-07-15 RX ORDER — ESTRADIOL AND NORETHINDRONE ACETATE 1; .5 MG/1; MG/1
1 TABLET ORAL
Status: ON HOLD | COMMUNITY
End: 2019-12-24 | Stop reason: HOSPADM

## 2019-07-15 RX ORDER — HYDROCODONE BITARTRATE AND ACETAMINOPHEN 10; 325 MG/1; MG/1
1 TABLET ORAL EVERY 6 HOURS PRN
Qty: 30 TABLET | Refills: 0 | Status: SHIPPED | OUTPATIENT
Start: 2019-07-15 | End: 2019-07-29 | Stop reason: SDUPTHER

## 2019-07-15 RX ORDER — ONDANSETRON 8 MG/1
8 TABLET, ORALLY DISINTEGRATING ORAL EVERY 8 HOURS PRN
Qty: 30 TABLET | Refills: 1 | Status: SHIPPED | OUTPATIENT
Start: 2019-07-15 | End: 2019-07-22

## 2019-07-15 RX ADMIN — IOHEXOL 75 ML: 350 INJECTION, SOLUTION INTRAVENOUS at 04:07

## 2019-07-15 NOTE — H&P (VIEW-ONLY)
SURGICAL ONCOLOGY CLINIC  HISTORY AND PHYSICAL    CC:  Pancreatic Mass    HPI:  Quyen Moody is a 58 y.o.  female who presents to clinic for evaluation of newly diagnosed pancreatic head mass.  She has had intermittent upper abdominal pain since early June.  She presented to her PCP who originally ordered an US and CT.  US was negative and CT showed some haziness at the pancreatic head.  She saw GI who performed EUS.  On EUS, a 3x4cm pancreatic head mass was seen with possible invasion into the SMA and portal vein.  Path pending from [facility].  CA 19-9 level at outside hospital was >1000 per the patient.  She endorses nausea and inability to tolerate much PO.  She has lost about 10lbs over the last few weeks and is noticing her skin turning yellow.  Denies pruritis.  She is passing gas but has not had a BM in a few days, she attributes this to narcotic use.  She recently started taking stool softeners.  No previous cardiac or stroke history.  Surgical history significant for open appendectomy when she was in her 20s      ROS: + weight loss, nausea, jaundice, abdominal pain, constipation.  All other systems negative     Past Medical History:   Diagnosis Date    Allergic rhinitis 3/3/2014    CKD (chronic kidney disease) stage 3, GFR 30-59 ml/min 12/26/2015    Hyperlipidemia 3/3/2014       Past Surgical History:   Procedure Laterality Date    Exporatory lap         Social History     Socioeconomic History    Marital status:      Spouse name: Not on file    Number of children: 0    Years of education: Not on file    Highest education level: Not on file   Occupational History    Occupation:      Employer: [employer]   Social Needs    Financial resource strain: Not on file    Food insecurity:     Worry: Not on file     Inability: Not on file    Transportation needs:     Medical: Not on file     Non-medical: Not on file   Tobacco Use    Smoking status: Former Smoker     Start date:  1999    Smokeless tobacco: Never Used   Substance and Sexual Activity    Alcohol use: Yes     Alcohol/week: 6.0 oz     Types: 10 Glasses of wine per week     Frequency: 4 or more times a week     Drinks per session: 1 or 2     Binge frequency: Never     Comment: patient states she has had more than 3 glasses of wine on  more than 5 occasions in a year    Drug use: No    Sexual activity: Yes   Lifestyle    Physical activity:     Days per week: Not on file     Minutes per session: Not on file    Stress: Not on file   Relationships    Social connections:     Talks on phone: Not on file     Gets together: Not on file     Attends Jew service: Not on file     Active member of club or organization: Not on file     Attends meetings of clubs or organizations: Not on file     Relationship status: Not on file   Other Topics Concern    Not on file   Social History Narrative    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      of Cancer in        Review of patient's allergies indicates:   Allergen Reactions    Sulfa (sulfonamide antibiotics) Swelling and Hives     tongue         Current Outpatient Medications:     ALPRAZolam (XANAX) 0.5 MG tablet, TK 1 T PO BID, Disp: , Rfl: 2    estradiol-norethindrone (ACTIVELLA) 1-0.5 mg per tablet, Take 1 tablet by mouth., Disp: , Rfl:     HYDROcodone-acetaminophen (NORCO)  mg per tablet, , Disp: , Rfl: 0    ondansetron (ZOFRAN) 4 MG tablet, TK 1 T PO Q 8 H PRF NAUSEA AND VOMITING, Disp: , Rfl: 0    ranitidine (ZANTAC) 150 MG tablet, Take 150 mg by mouth 2 (two) times daily., Disp: , Rfl:       PHYSICAL EXAM:  Vitals:    07/15/19 1000   BP: 127/74   Pulse: 76   Temp: 97.3 °F (36.3 °C)       General: NAD  Neuro: AAOx3  Cardio: RRR  Resp: Breathing even and unlabored  Abd: Soft, ND, NT, no palpable mass, BS+, paramedian RLQ incision well healed   Ext: Warm and well perfused  Skin:  Jaundice       PERTINENT LABS:  Reviewed.      PERTINENT  IMAGING:  EXAMINATION:  CT ABDOMEN PELVIS WITH CONTRAST    CLINICAL HISTORY:  Abdominal pain, unspecified; Generalized abdominal pain    TECHNIQUE:  Low dose axial images, sagittal and coronal reformations were obtained from the lung bases to the pubic symphysis following the IV administration of 75 mL of Omnipaque 350 and the oral administration of 25 mL of Omnipaque 350.    COMPARISON:  Ultrasound of the abdomen dated 06/05/2019.    FINDINGS:  There are no pleural effusions.  There is no evidence of a pneumothorax.  No airspace opacity is identified.  No discrete pulmonary nodule is identified.    The heart is unremarkable.  There is normal tapering of the abdominal aorta.  There are extensive calcifications along the course of the abdominal aorta and its branch vessels.  The portal veins are unremarkable.  The mesenteric vessels are within normal limits.  The IVC and the remainder of the venous structures are unremarkable.  There is no evidence of lymphadenopathy in the abdomen or pelvis.    The esophagus is unremarkable.  There is mild distention of the stomach.  There is wall thickening of the duodenum.  The small bowel loops are unremarkable.  The appendix is not consistently identified.  There are no secondary findings of acute appendicitis.  There is scattered colonic diverticula without evidence of acute diverticulitis.    The liver is normal in size.  There are subcentimeter hypodensities in the liver that are too small for complete characterization.  The gallbladder is unremarkable.  The biliary tree is within normal limits.  The spleen is unremarkable.  There are some hazy changes surrounding the pancreas predominantly involving the pancreatic head.    The adrenal glands are unremarkable.  The kidneys are within normal limits.  The ureters and urinary bladder are unremarkable.  The uterus and the right-sided adnexal structures are unremarkable.  There is a 1.6 cm left-sided ovarian follicle.    There is  small amount of free fluid in the pelvis.  There is no evidence of free air.  There is no evidence of pneumatosis.  No portal venous air is identified.    The psoas margins are unremarkable.  The abdominal wall is within normal limits.  The osseous structures are unremarkable.  There is no evidence of a fracture.      Impression       Mild distention of the stomach with wall thickening of the duodenum.  The findings most likely represent gastroduodenitis.  Follow-up with gastroenterology services is suggested.    Hazy changes of the pancreatic head.  Some component of subacute pancreatitis may be present.  Correlation with amylase and lipase levels is suggested.    Small amount of free fluid in the pelvis.    Scattered colonic diverticula without evidence of acute diverticulitis.     EUS    3.5x4cm pancreatic head mass with suggested invasion of SMA and portal vein      ASSESSMENT/PLAN:  Quyen Moody is a 58 y.o. female with locally advanced pancreatic head adenocarcinoma      - CBC, CMP, CA 19-9 today  - Referral to [facility] for biliary stent     - Will obtain CT panc protocol  - Referral to Dr Nena Pak M.D.  General Surgery PGY4  [phone]

## 2019-07-15 NOTE — PROGRESS NOTES
SURGICAL ONCOLOGY CLINIC  HISTORY AND PHYSICAL    CC:  Pancreatic Mass    HPI:  Quyen Moody is a 58 y.o.  female who presents to clinic for evaluation of newly diagnosed pancreatic head mass.  She has had intermittent upper abdominal pain since early June.  She presented to her PCP who originally ordered an US and CT.  US was negative and CT showed some haziness at the pancreatic head.  She saw GI who performed EUS.  On EUS, a 3x4cm pancreatic head mass was seen with possible invasion into the SMA and portal vein.  Path pending from [facility].  CA 19-9 level at outside hospital was >1000 per the patient.  She endorses nausea and inability to tolerate much PO.  She has lost about 10lbs over the last few weeks and is noticing her skin turning yellow.  Denies pruritis.  She is passing gas but has not had a BM in a few days, she attributes this to narcotic use.  She recently started taking stool softeners.  No previous cardiac or stroke history.  Surgical history significant for open appendectomy when she was in her 20s      ROS: + weight loss, nausea, jaundice, abdominal pain, constipation.  All other systems negative     Past Medical History:   Diagnosis Date    Allergic rhinitis 3/3/2014    CKD (chronic kidney disease) stage 3, GFR 30-59 ml/min 12/26/2015    Hyperlipidemia 3/3/2014       Past Surgical History:   Procedure Laterality Date    Exporatory lap         Social History     Socioeconomic History    Marital status:      Spouse name: Not on file    Number of children: 0    Years of education: Not on file    Highest education level: Not on file   Occupational History    Occupation:      Employer: [employer]   Social Needs    Financial resource strain: Not on file    Food insecurity:     Worry: Not on file     Inability: Not on file    Transportation needs:     Medical: Not on file     Non-medical: Not on file   Tobacco Use    Smoking status: Former Smoker     Start date:  1999    Smokeless tobacco: Never Used   Substance and Sexual Activity    Alcohol use: Yes     Alcohol/week: 6.0 oz     Types: 10 Glasses of wine per week     Frequency: 4 or more times a week     Drinks per session: 1 or 2     Binge frequency: Never     Comment: patient states she has had more than 3 glasses of wine on  more than 5 occasions in a year    Drug use: No    Sexual activity: Yes   Lifestyle    Physical activity:     Days per week: Not on file     Minutes per session: Not on file    Stress: Not on file   Relationships    Social connections:     Talks on phone: Not on file     Gets together: Not on file     Attends Synagogue service: Not on file     Active member of club or organization: Not on file     Attends meetings of clubs or organizations: Not on file     Relationship status: Not on file   Other Topics Concern    Not on file   Social History Narrative    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      of Cancer in        Review of patient's allergies indicates:   Allergen Reactions    Sulfa (sulfonamide antibiotics) Swelling and Hives     tongue         Current Outpatient Medications:     ALPRAZolam (XANAX) 0.5 MG tablet, TK 1 T PO BID, Disp: , Rfl: 2    estradiol-norethindrone (ACTIVELLA) 1-0.5 mg per tablet, Take 1 tablet by mouth., Disp: , Rfl:     HYDROcodone-acetaminophen (NORCO)  mg per tablet, , Disp: , Rfl: 0    ondansetron (ZOFRAN) 4 MG tablet, TK 1 T PO Q 8 H PRF NAUSEA AND VOMITING, Disp: , Rfl: 0    ranitidine (ZANTAC) 150 MG tablet, Take 150 mg by mouth 2 (two) times daily., Disp: , Rfl:       PHYSICAL EXAM:  Vitals:    07/15/19 1000   BP: 127/74   Pulse: 76   Temp: 97.3 °F (36.3 °C)       General: NAD  Neuro: AAOx3  Cardio: RRR  Resp: Breathing even and unlabored  Abd: Soft, ND, NT, no palpable mass, BS+, paramedian RLQ incision well healed   Ext: Warm and well perfused  Skin:  Jaundice       PERTINENT LABS:  Reviewed.      PERTINENT  IMAGING:  EXAMINATION:  CT ABDOMEN PELVIS WITH CONTRAST    CLINICAL HISTORY:  Abdominal pain, unspecified; Generalized abdominal pain    TECHNIQUE:  Low dose axial images, sagittal and coronal reformations were obtained from the lung bases to the pubic symphysis following the IV administration of 75 mL of Omnipaque 350 and the oral administration of 25 mL of Omnipaque 350.    COMPARISON:  Ultrasound of the abdomen dated 06/05/2019.    FINDINGS:  There are no pleural effusions.  There is no evidence of a pneumothorax.  No airspace opacity is identified.  No discrete pulmonary nodule is identified.    The heart is unremarkable.  There is normal tapering of the abdominal aorta.  There are extensive calcifications along the course of the abdominal aorta and its branch vessels.  The portal veins are unremarkable.  The mesenteric vessels are within normal limits.  The IVC and the remainder of the venous structures are unremarkable.  There is no evidence of lymphadenopathy in the abdomen or pelvis.    The esophagus is unremarkable.  There is mild distention of the stomach.  There is wall thickening of the duodenum.  The small bowel loops are unremarkable.  The appendix is not consistently identified.  There are no secondary findings of acute appendicitis.  There is scattered colonic diverticula without evidence of acute diverticulitis.    The liver is normal in size.  There are subcentimeter hypodensities in the liver that are too small for complete characterization.  The gallbladder is unremarkable.  The biliary tree is within normal limits.  The spleen is unremarkable.  There are some hazy changes surrounding the pancreas predominantly involving the pancreatic head.    The adrenal glands are unremarkable.  The kidneys are within normal limits.  The ureters and urinary bladder are unremarkable.  The uterus and the right-sided adnexal structures are unremarkable.  There is a 1.6 cm left-sided ovarian follicle.    There is  small amount of free fluid in the pelvis.  There is no evidence of free air.  There is no evidence of pneumatosis.  No portal venous air is identified.    The psoas margins are unremarkable.  The abdominal wall is within normal limits.  The osseous structures are unremarkable.  There is no evidence of a fracture.      Impression       Mild distention of the stomach with wall thickening of the duodenum.  The findings most likely represent gastroduodenitis.  Follow-up with gastroenterology services is suggested.    Hazy changes of the pancreatic head.  Some component of subacute pancreatitis may be present.  Correlation with amylase and lipase levels is suggested.    Small amount of free fluid in the pelvis.    Scattered colonic diverticula without evidence of acute diverticulitis.     EUS    3.5x4cm pancreatic head mass with suggested invasion of SMA and portal vein      ASSESSMENT/PLAN:  Quyen Moody is a 58 y.o. female with locally advanced pancreatic head adenocarcinoma      - CBC, CMP, CA 19-9 today  - Referral to [facility] for biliary stent     - Will obtain CT panc protocol  - Referral to Dr Nena Pak M.D.  General Surgery PGY4  [phone]

## 2019-07-15 NOTE — TELEPHONE ENCOUNTER
----- Message from Chema Harris MD sent at 7/15/2019 11:29 AM CDT -----  Yes. Ok to add ERCP for me tomorrow.      ----- Message -----  From: Gricel Britton MA  Sent: 7/15/2019  11:17 AM  To: Chema Harris MD    Please review and advise if I can add tomorrow. They are getting labs now  ----- Message -----  From: Marie Foreman RN  Sent: 7/15/2019  11:08 AM  To: Gricel Britton MA    New pancreas cancer patient.  Path in care everywhere.    She needs ERCP and stent soon.    Getting labs today.    Thanks,,  [place]

## 2019-07-15 NOTE — LETTER
July 15, 2019      Moncho Bhatt MD  [address]  [facility] [address]           [facility] S[facility]  [address]  [facility] [address]  Phone: [phone]          Patient: Quyen Moody   MR Number: [id number]   YOB: 1960   Date of Visit: 7/15/2019       Dear Dr. Moncho Bhatt:    Thank you for referring Quyen Moody to me for evaluation. Attached you will find relevant portions of my assessment and plan of care.    If you have questions, please do not hesitate to call me. I look forward to following Quyen Moody along with you.    Sincerely,    Tono Hallman MD    Enclosure  CC:  No Recipients    If you would like to receive this communication electronically, please contact [email or web address] or [phone] to request more information on [employer] Link access.    For providers and/or their staff who would like to refer a patient to [facility], please contact us through our one-stop-shop provider referral line, [facility] [place] operated [facility], at [phone].    If you feel you have received this communication in error or would no longer like to receive these types of communications, please e-mail [email or web address]

## 2019-07-15 NOTE — PROGRESS NOTES
Patient seen with Dr Pak; history obtained, patient examined, extensive outside records reviewed.   After her visit, I was able to obtain her EUS path report which is positive for carcinoma, and her MRI disc and report which reports the small focal liver lesions to be cysts with no evidence of metastatic cancer to the liver.   She presents with several month history of abdominal discomfort which has intensified since early June. While she downplays to pain to some extent, she is requiring narcotics and the pain is interfering with her sleep pattern.   She just noted her urine becoming dark and her eyes yellow a few days ago    On exam, she is mildly jaundiced  No SCV adenopathy  Mildly tender epigastric mass    CT of 6/19 personally reviewed. This is a suboptimal study but shows a large mass of the head of the pancreas with almost complete occlusion of the PV/SMV and with > 180 degrees abutment of the SMA. By CT criteria, this is locally advanced.     Rec:  Long talk with Quyen. We have discussed the implication of locally advanced cancer of the head of the pancreas. Neoadjuvant therapy will be required to have any chance of successful resection and, even with NACR, there is no guarantee that we will be able to resect the cancer. I have recommended  Labs today  [facility] consult for biliary stent  Pancreas tumor protocol CT scan  Consultation with Dr Barrett for neoadjuvant chemo and chemoRT  Will re-assess after for possible resection.

## 2019-07-15 NOTE — TELEPHONE ENCOUNTER
Spoke with patient. ERCP scheduled for 7/16 at 11a. Reviewed prep instructions. Ms Moody verbalized understanding.

## 2019-07-16 ENCOUNTER — HOSPITAL ENCOUNTER (OUTPATIENT)
Facility: HOSPITAL | Age: 59
Discharge: HOME | End: 2019-07-16
Attending: INTERNAL MEDICINE | Admitting: INTERNAL MEDICINE
Payer: COMMERCIAL

## 2019-07-16 ENCOUNTER — ANESTHESIA EVENT (OUTPATIENT)
Dept: ENDOSCOPY | Facility: HOSPITAL | Age: 59
End: 2019-07-16
Payer: COMMERCIAL

## 2019-07-16 ENCOUNTER — ANESTHESIA (OUTPATIENT)
Dept: ENDOSCOPY | Facility: HOSPITAL | Age: 59
End: 2019-07-16
Payer: COMMERCIAL

## 2019-07-16 VITALS
BODY MASS INDEX: 23.92 KG/M2 | HEART RATE: 71 BPM | WEIGHT: 130 LBS | OXYGEN SATURATION: 97 % | TEMPERATURE: 98 F | HEIGHT: 62 IN | DIASTOLIC BLOOD PRESSURE: 78 MMHG | RESPIRATION RATE: 16 BRPM | SYSTOLIC BLOOD PRESSURE: 146 MMHG

## 2019-07-16 DIAGNOSIS — K83.1 MALIGNANT OBSTRUCTIVE JAUNDICE: Primary | ICD-10-CM

## 2019-07-16 DIAGNOSIS — K83.1 BILIARY OBSTRUCTION: ICD-10-CM

## 2019-07-16 DIAGNOSIS — C80.1 MALIGNANT OBSTRUCTIVE JAUNDICE: Primary | ICD-10-CM

## 2019-07-16 PROCEDURE — 25000003 PHARM REV CODE 250: Performed by: INTERNAL MEDICINE

## 2019-07-16 PROCEDURE — D9220A PRA ANESTHESIA: Mod: ANES,,, | Performed by: ANESTHESIOLOGY

## 2019-07-16 PROCEDURE — 74328 X-RAY BILE DUCT ENDOSCOPY: CPT | Performed by: INTERNAL MEDICINE

## 2019-07-16 PROCEDURE — C1769 GUIDE WIRE: HCPCS | Performed by: INTERNAL MEDICINE

## 2019-07-16 PROCEDURE — D9220A PRA ANESTHESIA: ICD-10-PCS | Mod: ANES,,, | Performed by: ANESTHESIOLOGY

## 2019-07-16 PROCEDURE — D9220A PRA ANESTHESIA: ICD-10-PCS | Mod: CRNA,,, | Performed by: NURSE ANESTHETIST, CERTIFIED REGISTERED

## 2019-07-16 PROCEDURE — D9220A PRA ANESTHESIA: Mod: CRNA,,, | Performed by: NURSE ANESTHETIST, CERTIFIED REGISTERED

## 2019-07-16 PROCEDURE — 74328 X-RAY BILE DUCT ENDOSCOPY: CPT | Mod: 26,,, | Performed by: INTERNAL MEDICINE

## 2019-07-16 PROCEDURE — 43274 PR ERCP W/STENT PLCMNT BILIARY/PANCREATIC DUCT: ICD-10-PCS | Mod: ,,, | Performed by: INTERNAL MEDICINE

## 2019-07-16 PROCEDURE — C1876 STENT, NON-COA/NON-COV W/DEL: HCPCS | Performed by: INTERNAL MEDICINE

## 2019-07-16 PROCEDURE — 27201674 HC SPHINCTERTOME: Performed by: INTERNAL MEDICINE

## 2019-07-16 PROCEDURE — 37000009 HC ANESTHESIA EA ADD 15 MINS: Performed by: INTERNAL MEDICINE

## 2019-07-16 PROCEDURE — 63600175 PHARM REV CODE 636 W HCPCS: Performed by: NURSE ANESTHETIST, CERTIFIED REGISTERED

## 2019-07-16 PROCEDURE — 94761 N-INVAS EAR/PLS OXIMETRY MLT: CPT

## 2019-07-16 PROCEDURE — 63600175 PHARM REV CODE 636 W HCPCS: Performed by: ANESTHESIOLOGY

## 2019-07-16 PROCEDURE — 27202304 HC CANNULA, ERCP: Performed by: INTERNAL MEDICINE

## 2019-07-16 PROCEDURE — 43274 ERCP DUCT STENT PLACEMENT: CPT | Performed by: INTERNAL MEDICINE

## 2019-07-16 PROCEDURE — 43274 ERCP DUCT STENT PLACEMENT: CPT | Mod: ,,, | Performed by: INTERNAL MEDICINE

## 2019-07-16 PROCEDURE — 25500020 PHARM REV CODE 255: Performed by: INTERNAL MEDICINE

## 2019-07-16 PROCEDURE — 37000008 HC ANESTHESIA 1ST 15 MINUTES: Performed by: INTERNAL MEDICINE

## 2019-07-16 PROCEDURE — 74328 PR  X-RAY FOR BILE DUCT ENDOSCOPY: ICD-10-PCS | Mod: 26,,, | Performed by: INTERNAL MEDICINE

## 2019-07-16 PROCEDURE — 63600175 PHARM REV CODE 636 W HCPCS

## 2019-07-16 DEVICE — STENT SYSTEM
Type: IMPLANTABLE DEVICE | Site: BILE DUCT | Status: FUNCTIONAL
Brand: WALLFLEX™ BILIARY

## 2019-07-16 RX ORDER — ONDANSETRON 2 MG/ML
4 INJECTION INTRAMUSCULAR; INTRAVENOUS DAILY PRN
Status: DISCONTINUED | OUTPATIENT
Start: 2019-07-16 | End: 2019-07-16 | Stop reason: HOSPADM

## 2019-07-16 RX ORDER — HYDROMORPHONE HYDROCHLORIDE 1 MG/ML
INJECTION, SOLUTION INTRAMUSCULAR; INTRAVENOUS; SUBCUTANEOUS
Status: COMPLETED
Start: 2019-07-16 | End: 2019-07-16

## 2019-07-16 RX ORDER — SODIUM CHLORIDE 9 MG/ML
INJECTION, SOLUTION INTRAVENOUS CONTINUOUS
Status: DISCONTINUED | OUTPATIENT
Start: 2019-07-16 | End: 2019-07-16 | Stop reason: HOSPADM

## 2019-07-16 RX ORDER — HYDROMORPHONE HYDROCHLORIDE 2 MG/ML
INJECTION, SOLUTION INTRAMUSCULAR; INTRAVENOUS; SUBCUTANEOUS
Status: DISCONTINUED | OUTPATIENT
Start: 2019-07-16 | End: 2019-07-16

## 2019-07-16 RX ORDER — INDOMETHACIN 50 MG/1
SUPPOSITORY RECTAL
Status: COMPLETED | OUTPATIENT
Start: 2019-07-16 | End: 2019-07-16

## 2019-07-16 RX ORDER — HYDROMORPHONE HYDROCHLORIDE 1 MG/ML
0.2 INJECTION, SOLUTION INTRAMUSCULAR; INTRAVENOUS; SUBCUTANEOUS EVERY 5 MIN PRN
Status: DISCONTINUED | OUTPATIENT
Start: 2019-07-16 | End: 2019-07-16 | Stop reason: HOSPADM

## 2019-07-16 RX ORDER — MIDAZOLAM HYDROCHLORIDE 1 MG/ML
INJECTION, SOLUTION INTRAMUSCULAR; INTRAVENOUS
Status: DISCONTINUED | OUTPATIENT
Start: 2019-07-16 | End: 2019-07-16

## 2019-07-16 RX ORDER — SODIUM CHLORIDE 0.9 % (FLUSH) 0.9 %
10 SYRINGE (ML) INJECTION
Status: DISCONTINUED | OUTPATIENT
Start: 2019-07-16 | End: 2019-07-16 | Stop reason: HOSPADM

## 2019-07-16 RX ORDER — PROPOFOL 10 MG/ML
VIAL (ML) INTRAVENOUS
Status: DISCONTINUED | OUTPATIENT
Start: 2019-07-16 | End: 2019-07-16

## 2019-07-16 RX ORDER — PROPOFOL 10 MG/ML
VIAL (ML) INTRAVENOUS CONTINUOUS PRN
Status: DISCONTINUED | OUTPATIENT
Start: 2019-07-16 | End: 2019-07-16

## 2019-07-16 RX ORDER — LIDOCAINE HCL/PF 100 MG/5ML
SYRINGE (ML) INTRAVENOUS
Status: DISCONTINUED | OUTPATIENT
Start: 2019-07-16 | End: 2019-07-16

## 2019-07-16 RX ADMIN — INDOMETHACIN 100 MG: 50 SUPPOSITORY RECTAL at 11:07

## 2019-07-16 RX ADMIN — HYDROMORPHONE HYDROCHLORIDE 0.5 MG: 2 INJECTION, SOLUTION INTRAMUSCULAR; INTRAVENOUS; SUBCUTANEOUS at 11:07

## 2019-07-16 RX ADMIN — PROPOFOL 200 MCG/KG/MIN: 10 INJECTION, EMULSION INTRAVENOUS at 11:07

## 2019-07-16 RX ADMIN — PROPOFOL 50 MG: 10 INJECTION, EMULSION INTRAVENOUS at 11:07

## 2019-07-16 RX ADMIN — HYDROMORPHONE HYDROCHLORIDE 0.2 MG: 1 INJECTION, SOLUTION INTRAMUSCULAR; INTRAVENOUS; SUBCUTANEOUS at 12:07

## 2019-07-16 RX ADMIN — HYDROMORPHONE HYDROCHLORIDE 0.2 MG: 2 INJECTION, SOLUTION INTRAMUSCULAR; INTRAVENOUS; SUBCUTANEOUS at 11:07

## 2019-07-16 RX ADMIN — MIDAZOLAM HYDROCHLORIDE 2 MG: 1 INJECTION, SOLUTION INTRAMUSCULAR; INTRAVENOUS at 11:07

## 2019-07-16 RX ADMIN — HYDROMORPHONE HYDROCHLORIDE 0.3 MG: 2 INJECTION, SOLUTION INTRAMUSCULAR; INTRAVENOUS; SUBCUTANEOUS at 11:07

## 2019-07-16 RX ADMIN — ONDANSETRON 4 MG: 2 INJECTION INTRAMUSCULAR; INTRAVENOUS at 12:07

## 2019-07-16 RX ADMIN — LIDOCAINE HYDROCHLORIDE 60 MG: 20 INJECTION, SOLUTION INTRAVENOUS at 11:07

## 2019-07-16 RX ADMIN — IOHEXOL 4 ML: 300 INJECTION, SOLUTION INTRAVENOUS at 11:07

## 2019-07-16 NOTE — DISCHARGE INSTRUCTIONS
Discharge Instructions for ERCP (Endoscopic Retrograde Cholangiopancreatography)  You had a procedure known as an ERCP. Your healthcare provider performed the ERCP to look at your bile or pancreatic ducts, and to locate and treat blockages in the ducts. This procedure is used to diagnose diseases of the pancreas, bile ducts, and pancreatic duct, liver, and gallbladder. Heres what you need to do following your ERCP.  Home care  · Dont take aspirin or any other blood-thinning medicines (anticoagulants) until your provider says its OK.  · Your provider may prescribe an antibiotic, depending on what was done during the ERCP.  · You may have a sore throat for 1 to 2 days after the procedure. Use lozenges or gargle with salt water for your sore throat. If you're not better in a few days, call your provider.  · Rest, drink fluids, and eat light meals. If you feel bloated or have too much gas, use a heating pad on your belly to help reduce the discomfort. This should help you feel better. But if it doesn't, call your provider.  · Dont drink alcohol for 2 days after the procedure.  Follow-up care  Make a follow-up appointment as directed by our staff.     When to seek medical care  Call your provider right away if you have any of the following:  · Trouble swallowing or throat pain that gets worse   · Chest pain or severe belly or abdominal pain  · Fever above 100°F (37.7°C) or chills  · Upset stomach (nausea) and vomiting  · Black or tarry stools           Recovery After Procedural Sedation (Adult)  You have been given medicine by vein to make you sleep during your surgery. This may have included both a pain medicine and sleeping medicine. Most of the effects have worn off. But you may still have some drowsiness for the next 6 to 8 hours.  Home care  Follow these guidelines when you get home:  · For the next 8 hours, you should be watched by a responsible adult. This person should make sure your condition is not  getting worse.  · Don't drink any alcohol for the next 24 hours.  · Don't drive, operate dangerous machinery, or make important business or personal decisions during the next 24 hours.  Note: Your healthcare provider may tell you not to take any medicine by mouth for pain or sleep in the next 4 hours. These medicines may react with the medicines you were given in the hospital. This could cause a much stronger response than usual.  Follow-up care  Follow up with your healthcare provider if you are not alert and back to your usual level of activity within 12 hours.  When to seek medical advice  Call your healthcare provider right away if any of these occur:  · Drowsiness gets worse  · Weakness or dizziness gets worse  · Repeated vomiting  · You can't be awakened

## 2019-07-16 NOTE — H&P
Short Stay Endoscopy History and Physical    PCP - MUKUND Rodríguez MD  Referring Physician - Cheam Harris MD  [address] [place] [address]    Procedure - ERCP  ASA - per anesthesia  Mallampati - per anesthesia  History of Anesthesia problems - no  Family history Anesthesia problems -  no   Plan of anesthesia - General    HPI:  This is a 58 y.o. female here for evaluation of: biliary obstruction    Reflux - no  Dysphagia - no  Abdominal pain - no  Diarrhea - no    ROS:  Constitutional: No fevers, chills, No weight loss  CV: No chest pain  Pulm: No cough, No shortness of breath  GI: see HPI    Medical History:  has a past medical history of Allergic rhinitis (3/3/2014), CKD (chronic kidney disease) stage 3, GFR 30-59 ml/min (12/26/2015), Hyperlipidemia (3/3/2014), and Pancreas cancer.    Surgical History:  has a past surgical history that includes Exporatory lap.    Family History: family history includes Diabetes in her brother, father, and mother..    Social History:  reports that she has quit smoking. She started smoking about 19 years ago. She has never used smokeless tobacco. She reports that she drinks about 0.6 oz of alcohol per week. She reports that she does not use drugs.    Review of patient's allergies indicates:   Allergen Reactions    Sulfa (sulfonamide antibiotics) Swelling and Hives     tongue         Medications:   Medications Prior to Admission   Medication Sig Dispense Refill Last Dose    ALPRAZolam (XANAX) 0.5 MG tablet TK 1 T PO BID  2 7/16/2019 at Unknown time    estradiol-norethindrone (ACTIVELLA) 1-0.5 mg per tablet Take 1 tablet by mouth.   7/15/2019 at Unknown time    HYDROcodone-acetaminophen (NORCO)  mg per tablet   0 7/16/2019 at Unknown time    HYDROcodone-acetaminophen (NORCO)  mg per tablet Take 1 tablet by mouth every 6 (six) hours as needed for Pain. 30 tablet 0 7/16/2019 at Unknown time    ondansetron (ZOFRAN) 4 MG tablet TK 1 T PO Q 8 H PRF NAUSEA  AND VOMITING  0 7/15/2019 at Unknown time    ondansetron (ZOFRAN-ODT) 8 MG TbDL Take 1 tablet (8 mg total) by mouth every 8 (eight) hours as needed (nausea). 30 tablet 1 Past Month at Unknown time    ranitidine (ZANTAC) 150 MG tablet Take 150 mg by mouth 2 (two) times daily.   7/16/2019 at Unknown time       Physical Exam:    Vital Signs:   Vitals:    07/16/19 0950   BP: 132/65   Pulse: 80   Resp: 17   Temp: 98.2 °F (36.8 °C)       General Appearance: Well appearing in no acute distress  Eyes:    POS scleral icterus  ENT: Neck supple  Lungs: CTA anteriorly  Heart:  Regular  Abdomen: Soft, non tender, non distended with normal bowel sounds.    Labs:  Lab Results   Component Value Date    WBC 6.74 07/15/2019    HGB 13.5 07/15/2019    HCT 41.2 07/15/2019     07/15/2019    CHOL 191 06/13/2019    TRIG 146 06/13/2019    HDL 70 06/13/2019    ALT 1,100 (H) 07/15/2019     (H) 07/15/2019     07/15/2019    K 3.5 07/15/2019     07/15/2019    CREATININE 0.8 07/15/2019    BUN 9 07/15/2019    CO2 24 07/15/2019    TSH 0.854 01/28/2019    HGBA1C 4.8 07/15/2019       I have explained the risks and benefits of this endoscopic procedure to the patient including but not limited to bleeding, inflammation, infection, perforation, and death.      Chema Harris MD

## 2019-07-16 NOTE — ANESTHESIA PREPROCEDURE EVALUATION
07/16/2019  Quyen Moody is a 58 y.o., female. With recent diagnosis of pancreatic cancer, jaundice who is scheduled for ERCP       Anesthesia Evaluation    I have reviewed the Patient Summary Reports.     I have reviewed the Medications.     Review of Systems  Anesthesia Hx:  No problems with previous Anesthesia  History of prior surgery of interest to airway management or planning: Previous anesthesia: General, MAC Denies Family Hx of Anesthesia complications.   Denies Personal Hx of Anesthesia complications.   Social:  Non-Smoker, Social Alcohol Use    Cardiovascular:  Cardiovascular Normal     Pulmonary:  Pulmonary Normal  Denies COPD.  Denies Asthma.  Denies Shortness of breath.  Denies Recent URI.  Denies Sleep Apnea.    Renal/:   Chronic Renal Disease, CRI    Neurological:  Neurology Normal        Physical Exam  General:  Well nourished    Airway/Jaw/Neck:  Airway Findings: Mouth Opening: Normal Tongue: Normal  General Airway Assessment: Adult  Mallampati: II        Eyes/Ears/Nose:  EYES/EARS/NOSE FINDINGS: Normal   Dental:  DENTAL FINDINGS: Normal   Chest/Lungs:  Chest/Lungs Findings: Clear to auscultation, Normal Respiratory Rate     Heart/Vascular:  Heart Findings: Rate: Normal  Rhythm: Regular Rhythm        Mental Status:  Mental Status Findings:  Cooperative, Alert and Oriented         Anesthesia Plan  Type of Anesthesia, risks & benefits discussed:  Anesthesia Type:  general, MAC  Patient's Preference:   Intra-op Monitoring Plan: standard ASA monitors  Intra-op Monitoring Plan Comments:   Post Op Pain Control Plan:   Post Op Pain Control Plan Comments:   Induction:   IV  Beta Blocker:  Patient is not currently on a Beta-Blocker (No further documentation required).       Informed Consent: Patient understands risks and agrees with Anesthesia plan.  Questions answered. Anesthesia consent signed  with patient.  ASA Score: 3     Day of Surgery Review of History & Physical:    H&P update referred to the surgeon.         Ready For Surgery From Anesthesia Perspective.

## 2019-07-16 NOTE — ANESTHESIA POSTPROCEDURE EVALUATION
Anesthesia Post Evaluation    Patient: Quyen Moody    Procedure(s) Performed: Procedure(s) (LRB):  ERCP (ENDOSCOPIC RETROGRADE CHOLANGIOPANCREATOGRAPHY) (N/A)    Final Anesthesia Type: general  Patient location during evaluation: PACU  Patient participation: Yes- Able to Participate  Level of consciousness: awake and alert  Post-procedure vital signs: reviewed and stable  Pain management: adequate  Airway patency: patent  PONV status at discharge: No PONV  Anesthetic complications: no      Cardiovascular status: hemodynamically stable  Respiratory status: unassisted, spontaneous ventilation and room air  Hydration status: euvolemic  Follow-up not needed.          Vitals Value Taken Time   /78 7/16/2019  2:12 PM   Temp 36.6 °C (97.9 °F) 7/16/2019  2:10 PM   Pulse 73 7/16/2019  2:13 PM   Resp 16 7/16/2019  2:10 PM   SpO2 98 % 7/16/2019  2:13 PM   Vitals shown include unvalidated device data.      Event Time     Out of Recovery 13:33:42          Pain/Moncho Score: Pain Rating Prior to Med Admin: 5 (7/16/2019 12:10 PM)  Moncho Score: 10 (7/16/2019  1:35 PM)

## 2019-07-16 NOTE — TRANSFER OF CARE
"Anesthesia Transfer of Care Note    Patient: Quyen Moody    Procedure(s) Performed: Procedure(s) (LRB):  ERCP (ENDOSCOPIC RETROGRADE CHOLANGIOPANCREATOGRAPHY) (N/A)    Patient location: PACU    Anesthesia Type: general    Transport from OR: Transported from OR on 2-3 L/min O2 by NC with adequate spontaneous ventilation    Post pain: adequate analgesia    Post assessment: no apparent anesthetic complications and tolerated procedure well    Post vital signs: stable    Level of consciousness: awake    Nausea/Vomiting: no nausea/vomiting    Complications: none    Transfer of care protocol was followed      Last vitals:   Visit Vitals  /65 (BP Location: Left arm, Patient Position: Lying)   Pulse 80   Temp 36.8 °C (98.2 °F) (Temporal)   Resp 17   Ht 5' 2" (1.575 m)   Wt 59 kg (130 lb)   SpO2 99%   Breastfeeding? No   BMI 23.78 kg/m²     "

## 2019-07-16 NOTE — PROVATION PATIENT INSTRUCTIONS
Discharge Summary/Instructions after an Endoscopic Procedure  Patient Name: Quyen Moody  Patient MRN: [id number]  Patient YOB: 1960 Tuesday, July 16, 2019  Chema Harris MD  RESTRICTIONS:  During your procedure today, you received medications for sedation.  These   medications may affect your judgment, balance and coordination.  Therefore,   for 24 hours, you have the following restrictions:   - DO NOT drive a car, operate machinery, make legal/financial decisions,   sign important papers or drink alcohol.    ACTIVITY:  Today: no heavy lifting, straining or running due to procedural   sedation/anesthesia.  The following day: return to full activity including work.  DIET:  Eat and drink normally unless instructed otherwise.     TREATMENT FOR COMMON SIDE EFFECTS:  - Mild abdominal pain, nausea, belching, bloating or excessive gas:  rest,   eat lightly and use a heating pad.  - Sore Throat: treat with throat lozenges and/or gargle with warm salt   water.  - Because air was used during the procedure, expelling large amounts of air   from your rectum or belching is normal.  - If a bowel prep was taken, you may not have a bowel movement for 1-3 days.    This is normal.  SYMPTOMS TO WATCH FOR AND REPORT TO YOUR PHYSICIAN:  1. Abdominal pain or bloating, other than gas cramps.  2. Chest pain.  3. Back pain.  4. Signs of infection such as: chills or fever occurring within 24 hours   after the procedure.  5. Rectal bleeding, which would show as bright red, maroon, or black stools.   (A tablespoon of blood from the rectum is not serious, especially if   hemorrhoids are present.)  6. Vomiting.  7. Weakness or dizziness.  GO DIRECTLY TO THE NEAREST EMERGENCY ROOM IF YOU HAVE ANY OF THE FOLLOWING:      Difficulty breathing              Chills and/or fever over 101 F   Persistent vomiting and/or vomiting blood   Severe abdominal pain   Severe chest pain   Black, tarry stools   Bleeding- more than one tablespoon   Any  other symptom or condition that you feel may need urgent attention  Your doctor recommends these additional instructions:  If any biopsies were taken, your doctors clinic will contact you in 1 to 2   weeks with any results.  - Discharge patient to home (ambulatory).   - Resume previous diet; Discharge to home (ambulatory); Resume outpatient   medications  - Return to referring physician as previously scheduled.   - Follow-up with medical oncology, as scheduled.  For questions, problems or results please call your physician - Chema Harris MD at Work:  [phone].  [facility], EMERGENCY ROOM PHONE NUMBER: [phone]  IF A COMPLICATION OR EMERGENCY SITUATION ARISES AND YOU ARE UNABLE TO REACH   YOUR PHYSICIAN - GO DIRECTLY TO THE EMERGENCY ROOM.  Chema Harris MD  7/16/2019 12:07:46 PM  This report has been verified and signed electronically.  [employer]

## 2019-07-16 NOTE — PLAN OF CARE
Pt AAOX4 Pt remained stable during pacu stay. VSS. Patient states pain is tolerable. Received IV pain medication. No N&V. Teds/SCD's in place throughout duration in PACU. Transferred to the next Phase of Care.

## 2019-07-22 ENCOUNTER — RESEARCH ENCOUNTER (OUTPATIENT)
Dept: RESEARCH | Facility: HOSPITAL | Age: 59
End: 2019-07-22

## 2019-07-22 ENCOUNTER — LAB VISIT (OUTPATIENT)
Dept: LAB | Facility: HOSPITAL | Age: 59
End: 2019-07-22
Attending: INTERNAL MEDICINE
Payer: COMMERCIAL

## 2019-07-22 ENCOUNTER — INITIAL CONSULT (OUTPATIENT)
Dept: HEMATOLOGY/ONCOLOGY | Facility: CLINIC | Age: 59
End: 2019-07-22
Payer: COMMERCIAL

## 2019-07-22 VITALS
DIASTOLIC BLOOD PRESSURE: 58 MMHG | BODY MASS INDEX: 23.09 KG/M2 | TEMPERATURE: 98 F | RESPIRATION RATE: 18 BRPM | SYSTOLIC BLOOD PRESSURE: 117 MMHG | WEIGHT: 130.31 LBS | HEIGHT: 63 IN | HEART RATE: 84 BPM

## 2019-07-22 DIAGNOSIS — C25.0 CANCER OF HEAD OF PANCREAS: ICD-10-CM

## 2019-07-22 DIAGNOSIS — C25.0 CANCER OF HEAD OF PANCREAS: Primary | ICD-10-CM

## 2019-07-22 LAB
ALBUMIN SERPL BCP-MCNC: 3.4 G/DL (ref 3.5–5.2)
ALP SERPL-CCNC: 294 U/L (ref 55–135)
ALT SERPL W/O P-5'-P-CCNC: 314 U/L (ref 10–44)
ANION GAP SERPL CALC-SCNC: 9 MMOL/L (ref 8–16)
APTT BLDCRRT: 24.6 SEC (ref 21–32)
AST SERPL-CCNC: 62 U/L (ref 10–40)
BILIRUB SERPL-MCNC: 2.1 MG/DL (ref 0.1–1)
BUN SERPL-MCNC: 12 MG/DL (ref 6–20)
CALCIUM SERPL-MCNC: 10.4 MG/DL (ref 8.7–10.5)
CANCER AG19-9 SERPL-ACNC: 8699 U/ML (ref 2–40)
CHLORIDE SERPL-SCNC: 105 MMOL/L (ref 95–110)
CO2 SERPL-SCNC: 26 MMOL/L (ref 23–29)
CREAT SERPL-MCNC: 0.8 MG/DL (ref 0.5–1.4)
ERYTHROCYTE [DISTWIDTH] IN BLOOD BY AUTOMATED COUNT: 13.4 % (ref 11.5–14.5)
EST. GFR  (AFRICAN AMERICAN): >60 ML/MIN/1.73 M^2
EST. GFR  (NON AFRICAN AMERICAN): >60 ML/MIN/1.73 M^2
GLUCOSE SERPL-MCNC: 93 MG/DL (ref 70–110)
HCT VFR BLD AUTO: 41.3 % (ref 37–48.5)
HGB BLD-MCNC: 13 G/DL (ref 12–16)
IMM GRANULOCYTES # BLD AUTO: 0.02 K/UL (ref 0–0.04)
INR PPP: 1.1 (ref 0.8–1.2)
MCH RBC QN AUTO: 28.2 PG (ref 27–31)
MCHC RBC AUTO-ENTMCNC: 31.5 G/DL (ref 32–36)
MCV RBC AUTO: 90 FL (ref 82–98)
NEUTROPHILS # BLD AUTO: 5.6 K/UL (ref 1.8–7.7)
PLATELET # BLD AUTO: 321 K/UL (ref 150–350)
PMV BLD AUTO: 10.5 FL (ref 9.2–12.9)
POTASSIUM SERPL-SCNC: 3.6 MMOL/L (ref 3.5–5.1)
PROT SERPL-MCNC: 7.3 G/DL (ref 6–8.4)
PROTHROMBIN TIME: 11.2 SEC (ref 9–12.5)
RBC # BLD AUTO: 4.61 M/UL (ref 4–5.4)
SODIUM SERPL-SCNC: 140 MMOL/L (ref 136–145)
WBC # BLD AUTO: 8.4 K/UL (ref 3.9–12.7)

## 2019-07-22 PROCEDURE — 99205 PR OFFICE/OUTPT VISIT, NEW, LEVL V, 60-74 MIN: ICD-10-PCS | Mod: S$GLB,,, | Performed by: INTERNAL MEDICINE

## 2019-07-22 PROCEDURE — 85730 THROMBOPLASTIN TIME PARTIAL: CPT

## 2019-07-22 PROCEDURE — 3008F PR BODY MASS INDEX (BMI) DOCUMENTED: ICD-10-PCS | Mod: CPTII,S$GLB,, | Performed by: INTERNAL MEDICINE

## 2019-07-22 PROCEDURE — 99999 PR PBB SHADOW E&M-EST. PATIENT-LVL III: ICD-10-PCS | Mod: PBBFAC,,, | Performed by: INTERNAL MEDICINE

## 2019-07-22 PROCEDURE — 3008F BODY MASS INDEX DOCD: CPT | Mod: CPTII,S$GLB,, | Performed by: INTERNAL MEDICINE

## 2019-07-22 PROCEDURE — 99354 PR PROLONGED SVC, OUPT, 1ST HR: ICD-10-PCS | Mod: S$GLB,,, | Performed by: INTERNAL MEDICINE

## 2019-07-22 PROCEDURE — 99354 PR PROLONGED SVC, OUPT, 1ST HR: CPT | Mod: S$GLB,,, | Performed by: INTERNAL MEDICINE

## 2019-07-22 PROCEDURE — 80053 COMPREHEN METABOLIC PANEL: CPT

## 2019-07-22 PROCEDURE — 99999 PR PBB SHADOW E&M-EST. PATIENT-LVL III: CPT | Mod: PBBFAC,,, | Performed by: INTERNAL MEDICINE

## 2019-07-22 PROCEDURE — 85610 PROTHROMBIN TIME: CPT

## 2019-07-22 PROCEDURE — 99205 OFFICE O/P NEW HI 60 MIN: CPT | Mod: S$GLB,,, | Performed by: INTERNAL MEDICINE

## 2019-07-22 PROCEDURE — 36415 COLL VENOUS BLD VENIPUNCTURE: CPT

## 2019-07-22 PROCEDURE — 86301 IMMUNOASSAY TUMOR CA 19-9: CPT

## 2019-07-22 PROCEDURE — 85027 COMPLETE CBC AUTOMATED: CPT

## 2019-07-22 RX ORDER — DOCUSATE SODIUM 100 MG/1
100 CAPSULE, LIQUID FILLED ORAL 2 TIMES DAILY
COMMUNITY
End: 2019-12-23

## 2019-07-22 RX ORDER — POLYETHYLENE GLYCOL 3350 17 G/17G
1 POWDER, FOR SOLUTION ORAL DAILY
COMMUNITY
End: 2019-12-23

## 2019-07-22 RX ORDER — ONDANSETRON HYDROCHLORIDE 8 MG/1
8 TABLET, FILM COATED ORAL EVERY 8 HOURS PRN
Qty: 120 TABLET | Refills: 2 | Status: ON HOLD | OUTPATIENT
Start: 2019-07-22 | End: 2020-06-20 | Stop reason: HOSPADM

## 2019-07-22 NOTE — PROGRESS NOTES
PANOVA-3: Pivotal, randomized, open-label study of Tumor Treating Fields (TTFields, 150kHz) concomitant with gemcitabine and nab-paclitaxel for front-line treatment of locally-advanced pancreatic adenocarcinoma  Study: EF 27  [facility]: #[id number]  PI: Eliot Barrett MD  Version date: 6/14/2018    Research RN met with patient to discuss consent. Patient awake and alert. Oriented x3. Mood and affect are appropriate to situation. RN reviewed consent introduction, study requirements, treatment plan, study procedures, anticipated benefits, potential risks and inconveniences, Gemcitabine, nab-paclitaxel, TTFields, Blood draws, CT's, bone scans, IV infusions, radiation risks, unforeseeable risks, inconveniences, alternatives, http://www.ClinicalTrials.gov, confidentiality, costs of participation, payment for participation, treatment of research related injury, contact persons, voluntary participation and withdrawal, employees in research, follow up after there stop of study treatment and consent to participate in this study.    Patient agreeable to participate. Patient's labs out of range. Per Dr Barrett, patient needs updated labs prior to obtaining consent for research study. Patient will have labs today and will come back later in week for rest of screening procedures and to sign consent. All questions answered. Dr Barrett's and research RN's contact information given to patient and her friend.

## 2019-07-22 NOTE — PROGRESS NOTES
Distress Screening Results: Psychosocial Distress screening score of Distress Score: 6 noted and reviewed. No intervention indicated.

## 2019-07-22 NOTE — LETTER
July 23, 2019      Tono Hallman MD  [address]  [facility] [address]           [facility] Hematology Oncology  [address]  [facility] [address]  Phone: [phone]          Patient: Quyen Moody   MR Number: [id number]   YOB: 1960   Date of Visit: 7/22/2019       Dear Dr. Tono Hallman:    Thank you for referring Quyen Moody to me for evaluation. Attached you will find relevant portions of my assessment and plan of care.    If you have questions, please do not hesitate to call me. I look forward to following Quyen Moody along with you.    Sincerely,    Eliot Barrett MD    Enclosure  CC:  No Recipients    If you would like to receive this communication electronically, please contact [email or web address] or [phone] to request more information on [facility] Link access.    For providers and/or their staff who would like to refer a patient to [facility], please contact us through our one-stop-shop provider referral line, [facility] , at [phone].    If you feel you have received this communication in error or would no longer like to receive these types of communications, please e-mail [email or web address]

## 2019-07-22 NOTE — PROGRESS NOTES
Subjective:       Patient ID: Quyen Moody    Chief Complaint: Pancreatic Cancer (consult)    HPI     Quyen Moody is a 58 y.o. female, referred by Tono Hallman MD, to clinic for evaluation and management of locally advanced pancreatic cancer.     Oncologic History:     She has had intermittent upper abdominal pain since early June.  She presented to her PCP who originally ordered an US and CT.  US was negative and CT showed some haziness at the pancreatic head.  She saw GI who performed EUS.  On EUS, a 3x4cm pancreatic head mass was seen with possible invasion into the SMA and portal vein.  Path pending from FNA.  CA 19-9 level at outside hospital was >1000 per the patient.  She endorses nausea and inability to tolerate much PO.  She has lost about 10lbs over the last few weeks and is noticing her skin turning yellow.  Denies pruritis.  She is passing gas but has not had a BM in a few days, she attributes this to narcotic use.  She recently started taking stool softeners.  No previous cardiac or stroke history.  Surgical history significant for open appendectomy when she was in her 20s      Review of Systems   Constitutional: Positive for appetite change and unexpected weight change. Negative for activity change, chills, fatigue and fever.   HENT: Negative for congestion, hearing loss, mouth sores, sore throat, tinnitus and voice change.    Eyes: Negative for pain and visual disturbance.   Respiratory: Positive for shortness of breath. Negative for cough and wheezing.    Cardiovascular: Negative for chest pain, palpitations and leg swelling.   Gastrointestinal: Positive for abdominal pain. Negative for constipation, diarrhea, nausea and vomiting.   Endocrine: Negative for cold intolerance and heat intolerance.   Genitourinary: Negative for difficulty urinating, dyspareunia, dysuria, frequency, menstrual problem, urgency, vaginal bleeding, vaginal discharge and vaginal pain.   Musculoskeletal: Negative for  arthralgias, back pain and myalgias.   Skin: Negative for color change, rash and wound.   Allergic/Immunologic: Negative for environmental allergies and food allergies.   Neurological: Negative for weakness, numbness and headaches.   Hematological: Negative for adenopathy. Does not bruise/bleed easily.   Psychiatric/Behavioral: Negative for agitation, confusion, hallucinations and sleep disturbance. The patient is nervous/anxious.    All other systems reviewed and are negative.        Allergies:  Review of patient's allergies indicates:   Allergen Reactions    Sulfa (sulfonamide antibiotics) Swelling and Hives     tongue         Medications:  Current Outpatient Medications   Medication Sig Dispense Refill    ALPRAZolam (XANAX) 0.5 MG tablet TK 1 T PO BID  2    docusate sodium (COLACE) 100 MG capsule Take 100 mg by mouth 2 (two) times daily.      estradiol-norethindrone (ACTIVELLA) 1-0.5 mg per tablet Take 1 tablet by mouth.      HYDROcodone-acetaminophen (NORCO)  mg per tablet Take 1 tablet by mouth every 6 (six) hours as needed for Pain. 30 tablet 0    polyethylene glycol (GLYCOLAX) 17 gram PwPk Take 1 g by mouth once daily.      ranitidine (ZANTAC) 150 MG tablet Take 150 mg by mouth 2 (two) times daily.      HYDROcodone-acetaminophen (NORCO)  mg per tablet   0    ondansetron (ZOFRAN) 8 MG tablet Take 1 tablet (8 mg total) by mouth every 8 (eight) hours as needed for Nausea. 120 tablet 2     No current facility-administered medications for this visit.        PMH:  Past Medical History:   Diagnosis Date    Allergic rhinitis 3/3/2014    CKD (chronic kidney disease) stage 3, GFR 30-59 ml/min 12/26/2015    Hyperlipidemia 3/3/2014    Pancreas cancer        PSH:  Past Surgical History:   Procedure Laterality Date    ERCP (ENDOSCOPIC RETROGRADE CHOLANGIOPANCREATOGRAPHY) N/A 7/16/2019    Performed by Chema Harris MD at [facility] ([address] [place] [address])    Exporatory lap         FamHx:  Family History    Problem Relation Age of Onset    Diabetes Mother     Diabetes Father     Diabetes Brother     Heart disease Neg Hx        SocHx:  Social History     Socioeconomic History    Marital status:      Spouse name: Not on file    Number of children: 0    Years of education: Not on file    Highest education level: Not on file   Occupational History    Occupation:      Employer: [employer]   Social Needs    Financial resource strain: Not on file    Food insecurity:     Worry: Not on file     Inability: Not on file    Transportation needs:     Medical: Not on file     Non-medical: Not on file   Tobacco Use    Smoking status: Former Smoker     Start date: 1999    Smokeless tobacco: Never Used   Substance and Sexual Activity    Alcohol use: Yes     Alcohol/week: 0.6 oz     Types: 1 Glasses of wine per week     Frequency: 4 or more times a week     Drinks per session: 1 or 2     Binge frequency: Never     Comment: last drink a month ago     Drug use: No    Sexual activity: Yes   Lifestyle    Physical activity:     Days per week: Not on file     Minutes per session: Not on file    Stress: Not on file   Relationships    Social connections:     Talks on phone: Not on file     Gets together: Not on file     Attends Shinto service: Not on file     Active member of club or organization: Not on file     Attends meetings of clubs or organizations: Not on file     Relationship status: Not on file   Other Topics Concern    Not on file   Social History Narrative    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      of Cancer in 2016       Distress Score    Distress Score: 6        Practical Problems Physical Problems   : No Appearance: No   Housing: No Bathing / Dressing: No   Insurance / Financial: No Breathing: No    Transportation: No  Changes in Urination: No    Work / School: No  Constipation: No   Treatment Decisions: Yes  Diarrhea: No     Eating: No    Family Problems  Fatigue: No    Dealing with Children: No Feeling Swollen: No    Dealing with Partner: No Fevers: No    Ability to Have Children: No  Getting Around: No    Family Health Issues: No  Indigestion: No     Memory / Concentration: No   Emotional Problems Mouth Sores: No    Depression: No  Nausea: No    Fears: No  Nose Dry / Congested: No    Nervousness: No  Pain: Yes    Sadness: No Sexual: No    Worry: No Skin Dry / Itchy: No    Loss of Interest in Usual Activities: No Sleep: No     Substance Abuse: No    Spiritual/Religions Concerns Tingling in Hands / Feet: No   Spritual / Hindu Concerns: No         Other Problems                Objective:      Physical Exam   Constitutional: She is oriented to person, place, and time. She appears well-developed and well-nourished. No distress.   HENT:   Head: Normocephalic and atraumatic.   Mouth/Throat: No oropharyngeal exudate.   Eyes: Right eye exhibits no discharge. Left eye exhibits no discharge. No scleral icterus.   Neck: Normal range of motion. Neck supple. No tracheal deviation present. No thyromegaly present.   Musculoskeletal: Normal range of motion. She exhibits no edema, tenderness or deformity.   Neurological: She is alert and oriented to person, place, and time. No cranial nerve deficit. Coordination normal.   Skin: Skin is warm and dry. No rash noted. She is not diaphoretic. No pallor.   Psychiatric: She has a normal mood and affect. Her behavior is normal. Judgment and thought content normal.         LABS:  WBC   Date Value Ref Range Status   07/15/2019 6.74 3.90 - 12.70 K/uL Final     Hemoglobin   Date Value Ref Range Status   07/15/2019 13.5 12.0 - 16.0 g/dL Final     Hematocrit   Date Value Ref Range Status   07/15/2019 41.2 37.0 - 48.5 % Final     Platelets   Date Value Ref Range Status   07/15/2019 249 150 - 350 K/uL Final       Chemistry        Component Value Date/Time     07/15/2019 1119    K 3.5 07/15/2019 1119     07/15/2019 1119    CO2 24  07/15/2019 1119    BUN 9 07/15/2019 1119    CREATININE 0.8 07/15/2019 1119     (H) 07/15/2019 1119        Component Value Date/Time    CALCIUM 9.7 07/15/2019 1119    ALKPHOS 486 (H) 07/15/2019 1119     (H) 07/15/2019 1119    ALT 1,100 (H) 07/15/2019 1119    BILITOT 10.0 (H) 07/15/2019 1119    ESTGFRAFRICA >60.0 07/15/2019 1119    EGFRNONAA >60.0 07/15/2019 1119              Assessment:       1. Cancer of head of pancreas          Plan:         1. Cancer at the head of the pancreas:    Discussed SOC chemo and chemoXRT with FOLFIRINOX vs our PANOVA-3 trial with [place]-Abraxane +/- TTFields.  Extensively reviewed the rationale of the study and reviewed her eligibility criteria with the research nurse and discussed case with Dr. Hallman as well.  She is interested in this study, and signed consent with our research nurse.     Begin creon (prescirbed by outside doc) for stomach upset/diarrhea after meals.       RTC per research RN.     The patient agrees with the plan, and all questions have been answered to their satisfaction.      More than 90 mins were spent during this encounter, greater than 50% was spent in direct counseling and/or coordination of care.     Eliot Barrett M.D., M.S., F.A.C.P.  Hematology and Oncology Attending  [facility] and [facility]

## 2019-07-25 ENCOUNTER — LAB VISIT (OUTPATIENT)
Dept: LAB | Facility: HOSPITAL | Age: 59
End: 2019-07-25
Attending: INTERNAL MEDICINE
Payer: COMMERCIAL

## 2019-07-25 DIAGNOSIS — C25.0 CANCER OF HEAD OF PANCREAS: ICD-10-CM

## 2019-07-25 LAB
ALBUMIN SERPL BCP-MCNC: 3.5 G/DL (ref 3.5–5.2)
ALP SERPL-CCNC: 215 U/L (ref 55–135)
ALT SERPL W/O P-5'-P-CCNC: 150 U/L (ref 10–44)
ANION GAP SERPL CALC-SCNC: 9 MMOL/L (ref 8–16)
AST SERPL-CCNC: 41 U/L (ref 10–40)
BILIRUB SERPL-MCNC: 1.7 MG/DL (ref 0.1–1)
BUN SERPL-MCNC: 10 MG/DL (ref 6–20)
CALCIUM SERPL-MCNC: 10.1 MG/DL (ref 8.7–10.5)
CHLORIDE SERPL-SCNC: 102 MMOL/L (ref 95–110)
CO2 SERPL-SCNC: 27 MMOL/L (ref 23–29)
CREAT SERPL-MCNC: 0.8 MG/DL (ref 0.5–1.4)
EST. GFR  (AFRICAN AMERICAN): >60 ML/MIN/1.73 M^2
EST. GFR  (NON AFRICAN AMERICAN): >60 ML/MIN/1.73 M^2
GLUCOSE SERPL-MCNC: 90 MG/DL (ref 70–110)
POTASSIUM SERPL-SCNC: 3.9 MMOL/L (ref 3.5–5.1)
PROT SERPL-MCNC: 7.4 G/DL (ref 6–8.4)
SODIUM SERPL-SCNC: 138 MMOL/L (ref 136–145)

## 2019-07-25 PROCEDURE — 36415 COLL VENOUS BLD VENIPUNCTURE: CPT

## 2019-07-25 PROCEDURE — 80053 COMPREHEN METABOLIC PANEL: CPT

## 2019-07-26 ENCOUNTER — PATIENT MESSAGE (OUTPATIENT)
Dept: HEMATOLOGY/ONCOLOGY | Facility: CLINIC | Age: 59
End: 2019-07-26

## 2019-07-29 ENCOUNTER — RESEARCH ENCOUNTER (OUTPATIENT)
Dept: RESEARCH | Facility: HOSPITAL | Age: 59
End: 2019-07-29

## 2019-07-29 ENCOUNTER — LAB VISIT (OUTPATIENT)
Dept: LAB | Facility: HOSPITAL | Age: 59
End: 2019-07-29
Attending: INTERNAL MEDICINE
Payer: COMMERCIAL

## 2019-07-29 ENCOUNTER — PATIENT MESSAGE (OUTPATIENT)
Dept: HEMATOLOGY/ONCOLOGY | Facility: CLINIC | Age: 59
End: 2019-07-29

## 2019-07-29 DIAGNOSIS — C25.0 CANCER OF HEAD OF PANCREAS: ICD-10-CM

## 2019-07-29 DIAGNOSIS — C25.0 CANCER OF HEAD OF PANCREAS: Primary | ICD-10-CM

## 2019-07-29 DIAGNOSIS — G89.3 NEOPLASM RELATED PAIN (ACUTE) (CHRONIC): ICD-10-CM

## 2019-07-29 LAB
ALBUMIN SERPL BCP-MCNC: 3.4 G/DL (ref 3.5–5.2)
ALP SERPL-CCNC: 191 U/L (ref 55–135)
ALT SERPL W/O P-5'-P-CCNC: 96 U/L (ref 10–44)
ANION GAP SERPL CALC-SCNC: 8 MMOL/L (ref 8–16)
AST SERPL-CCNC: 44 U/L (ref 10–40)
BILIRUB SERPL-MCNC: 1.2 MG/DL (ref 0.1–1)
BUN SERPL-MCNC: 8 MG/DL (ref 6–20)
CALCIUM SERPL-MCNC: 9.9 MG/DL (ref 8.7–10.5)
CHLORIDE SERPL-SCNC: 105 MMOL/L (ref 95–110)
CO2 SERPL-SCNC: 25 MMOL/L (ref 23–29)
CREAT SERPL-MCNC: 0.8 MG/DL (ref 0.5–1.4)
EST. GFR  (AFRICAN AMERICAN): >60 ML/MIN/1.73 M^2
EST. GFR  (NON AFRICAN AMERICAN): >60 ML/MIN/1.73 M^2
GLUCOSE SERPL-MCNC: 118 MG/DL (ref 70–110)
POTASSIUM SERPL-SCNC: 4 MMOL/L (ref 3.5–5.1)
PROT SERPL-MCNC: 7.2 G/DL (ref 6–8.4)
SODIUM SERPL-SCNC: 138 MMOL/L (ref 136–145)

## 2019-07-29 PROCEDURE — 80053 COMPREHEN METABOLIC PANEL: CPT

## 2019-07-29 PROCEDURE — 36415 COLL VENOUS BLD VENIPUNCTURE: CPT

## 2019-07-29 RX ORDER — HYDROCODONE BITARTRATE AND ACETAMINOPHEN 10; 325 MG/1; MG/1
1 TABLET ORAL EVERY 6 HOURS PRN
Qty: 60 TABLET | Refills: 0 | Status: SHIPPED | OUTPATIENT
Start: 2019-07-29 | End: 2019-09-16 | Stop reason: SDUPTHER

## 2019-07-29 NOTE — PROGRESS NOTES
PANOVA-3: Pivotal, randomized, open-label study of Tumor Treating Fields (TTFields, 150kHz) concomitant with gemcitabine and nab-paclitaxel for front-line treatment of locally-advanced pancreatic adenocarcinoma  Study #: EF-27  Sponsor: [employer]  PI: Eliot Barrett MD  [facility]: [id number]    7/29/2019    Informed Consent note    Research RN met with patient in [address] [place] [address] conference room. Patient awake and alert. Oriented x3. Mood and affect are appropriate to situation. Patient stated she is not participating in any other research study. Research RN reviewed consent introduction, study requirements, treatment plan, study procedures, anticipated benefits, potential risks and inconveniences, Gemcitabine, nab-paclitaxel, TTFields, Blood draws, CT's, bone scans, IV infusions, radiation risks, unforeseeable risks, inconveniences, alternatives, http://www.ClinicalTrials.gov, confidentiality, costs of participation, payment for participation, treatment of research related injury, contact persons, voluntary participation and withdrawal, employees in research, follow up after there stop of study treatment and consent to participate in this study.    Patient agreeable to participate. Patient signed and dated consent. Research RN gave patient signed copy of consent. Questionnaires completed per protocol. Research RN reviewed con meds and history. Patient scheduled to be randomized on Monday 8/5/19. Patient scheduled to start day 1 on 8/7/19. Patient repeated back all instructions, dates and times. Patient instructed to call research RN or Dr Barrett with any questions or concerns.

## 2019-07-30 DIAGNOSIS — Z00.6 RESEARCH STUDY PATIENT: ICD-10-CM

## 2019-07-30 DIAGNOSIS — C25.0 CANCER OF HEAD OF PANCREAS: Primary | ICD-10-CM

## 2019-08-01 ENCOUNTER — PATIENT MESSAGE (OUTPATIENT)
Dept: HEMATOLOGY/ONCOLOGY | Facility: CLINIC | Age: 59
End: 2019-08-01

## 2019-08-01 ENCOUNTER — LAB VISIT (OUTPATIENT)
Dept: LAB | Facility: HOSPITAL | Age: 59
End: 2019-08-01
Attending: INTERNAL MEDICINE
Payer: COMMERCIAL

## 2019-08-01 DIAGNOSIS — C25.0 CANCER OF HEAD OF PANCREAS: Primary | ICD-10-CM

## 2019-08-01 DIAGNOSIS — Z00.6 RESEARCH STUDY PATIENT: ICD-10-CM

## 2019-08-01 DIAGNOSIS — C25.0 CANCER OF HEAD OF PANCREAS: ICD-10-CM

## 2019-08-01 DIAGNOSIS — G89.3 NEOPLASM RELATED PAIN (ACUTE) (CHRONIC): ICD-10-CM

## 2019-08-01 LAB
APTT BLDCRRT: 23.8 SEC (ref 21–32)
BASOPHILS # BLD AUTO: 0.07 K/UL (ref 0–0.2)
BASOPHILS NFR BLD: 0.9 % (ref 0–1.9)
CANCER AG19-9 SERPL-ACNC: 7933 U/ML (ref 2–40)
DIFFERENTIAL METHOD: ABNORMAL
EOSINOPHIL # BLD AUTO: 0.2 K/UL (ref 0–0.5)
EOSINOPHIL NFR BLD: 2.9 % (ref 0–8)
ERYTHROCYTE [DISTWIDTH] IN BLOOD BY AUTOMATED COUNT: 12.8 % (ref 11.5–14.5)
GGT SERPL-CCNC: 134 U/L (ref 8–55)
HCT VFR BLD AUTO: 39.3 % (ref 37–48.5)
HGB BLD-MCNC: 12.4 G/DL (ref 12–16)
IMM GRANULOCYTES # BLD AUTO: 0.02 K/UL (ref 0–0.04)
IMM GRANULOCYTES NFR BLD AUTO: 0.3 % (ref 0–0.5)
INR PPP: 1 (ref 0.8–1.2)
LDH SERPL L TO P-CCNC: 199 U/L (ref 110–260)
LYMPHOCYTES # BLD AUTO: 1.1 K/UL (ref 1–4.8)
LYMPHOCYTES NFR BLD: 14.7 % (ref 18–48)
MCH RBC QN AUTO: 28.4 PG (ref 27–31)
MCHC RBC AUTO-ENTMCNC: 31.6 G/DL (ref 32–36)
MCV RBC AUTO: 90 FL (ref 82–98)
MONOCYTES # BLD AUTO: 0.6 K/UL (ref 0.3–1)
MONOCYTES NFR BLD: 7.9 % (ref 4–15)
NEUTROPHILS # BLD AUTO: 5.6 K/UL (ref 1.8–7.7)
NEUTROPHILS NFR BLD: 73.3 % (ref 38–73)
NRBC BLD-RTO: 0 /100 WBC
PLATELET # BLD AUTO: 326 K/UL (ref 150–350)
PMV BLD AUTO: 10.2 FL (ref 9.2–12.9)
PROTHROMBIN TIME: 10.7 SEC (ref 9–12.5)
RBC # BLD AUTO: 4.37 M/UL (ref 4–5.4)
WBC # BLD AUTO: 7.69 K/UL (ref 3.9–12.7)

## 2019-08-01 PROCEDURE — 85610 PROTHROMBIN TIME: CPT

## 2019-08-01 PROCEDURE — 86301 IMMUNOASSAY TUMOR CA 19-9: CPT

## 2019-08-01 PROCEDURE — 85730 THROMBOPLASTIN TIME PARTIAL: CPT

## 2019-08-01 PROCEDURE — 36415 COLL VENOUS BLD VENIPUNCTURE: CPT

## 2019-08-01 PROCEDURE — 82977 ASSAY OF GGT: CPT

## 2019-08-01 PROCEDURE — 85025 COMPLETE CBC W/AUTO DIFF WBC: CPT

## 2019-08-01 PROCEDURE — 83615 LACTATE (LD) (LDH) ENZYME: CPT

## 2019-08-01 RX ORDER — OXYCODONE HYDROCHLORIDE 15 MG/1
15 TABLET ORAL EVERY 4 HOURS PRN
Qty: 60 TABLET | Refills: 0 | Status: SHIPPED | OUTPATIENT
Start: 2019-08-01 | End: 2019-10-03

## 2019-08-07 ENCOUNTER — OFFICE VISIT (OUTPATIENT)
Dept: HEMATOLOGY/ONCOLOGY | Facility: CLINIC | Age: 59
End: 2019-08-07
Payer: COMMERCIAL

## 2019-08-07 ENCOUNTER — INFUSION (OUTPATIENT)
Dept: INFUSION THERAPY | Facility: HOSPITAL | Age: 59
End: 2019-08-07
Attending: INTERNAL MEDICINE
Payer: COMMERCIAL

## 2019-08-07 ENCOUNTER — PATIENT MESSAGE (OUTPATIENT)
Dept: HEMATOLOGY/ONCOLOGY | Facility: CLINIC | Age: 59
End: 2019-08-07

## 2019-08-07 ENCOUNTER — RESEARCH ENCOUNTER (OUTPATIENT)
Dept: RESEARCH | Facility: HOSPITAL | Age: 59
End: 2019-08-07

## 2019-08-07 ENCOUNTER — PATIENT MESSAGE (OUTPATIENT)
Dept: GYNECOLOGIC ONCOLOGY | Facility: CLINIC | Age: 59
End: 2019-08-07

## 2019-08-07 VITALS
DIASTOLIC BLOOD PRESSURE: 66 MMHG | BODY MASS INDEX: 23.04 KG/M2 | RESPIRATION RATE: 18 BRPM | OXYGEN SATURATION: 100 % | HEIGHT: 63 IN | SYSTOLIC BLOOD PRESSURE: 136 MMHG | TEMPERATURE: 99 F | HEART RATE: 84 BPM | WEIGHT: 130.06 LBS

## 2019-08-07 VITALS
SYSTOLIC BLOOD PRESSURE: 120 MMHG | DIASTOLIC BLOOD PRESSURE: 63 MMHG | TEMPERATURE: 98 F | RESPIRATION RATE: 18 BRPM | HEART RATE: 75 BPM

## 2019-08-07 DIAGNOSIS — C25.0 CANCER OF HEAD OF PANCREAS: Primary | ICD-10-CM

## 2019-08-07 DIAGNOSIS — G89.3 NEOPLASM RELATED PAIN (ACUTE) (CHRONIC): ICD-10-CM

## 2019-08-07 PROCEDURE — 63600175 PHARM REV CODE 636 W HCPCS: Performed by: INTERNAL MEDICINE

## 2019-08-07 PROCEDURE — 96417 CHEMO IV INFUS EACH ADDL SEQ: CPT

## 2019-08-07 PROCEDURE — 99999 PR PBB SHADOW E&M-EST. PATIENT-LVL IV: CPT | Mod: PBBFAC,,,

## 2019-08-07 PROCEDURE — 3008F BODY MASS INDEX DOCD: CPT | Mod: CPTII,S$GLB,, | Performed by: INTERNAL MEDICINE

## 2019-08-07 PROCEDURE — 96372 PR INJECTION,THERAP/PROPH/DIAG2ST, IM OR SUBCUT: ICD-10-PCS | Mod: S$GLB,,, | Performed by: INTERNAL MEDICINE

## 2019-08-07 PROCEDURE — 96413 CHEMO IV INFUSION 1 HR: CPT

## 2019-08-07 PROCEDURE — 3008F PR BODY MASS INDEX (BMI) DOCUMENTED: ICD-10-PCS | Mod: CPTII,S$GLB,, | Performed by: INTERNAL MEDICINE

## 2019-08-07 PROCEDURE — 99215 PR OFFICE/OUTPT VISIT, EST, LEVL V, 40-54 MIN: ICD-10-PCS | Mod: 25,S$GLB,, | Performed by: INTERNAL MEDICINE

## 2019-08-07 PROCEDURE — 96372 THER/PROPH/DIAG INJ SC/IM: CPT | Mod: S$GLB,,, | Performed by: INTERNAL MEDICINE

## 2019-08-07 PROCEDURE — 99215 OFFICE O/P EST HI 40 MIN: CPT | Mod: 25,S$GLB,, | Performed by: INTERNAL MEDICINE

## 2019-08-07 PROCEDURE — 96367 TX/PROPH/DG ADDL SEQ IV INF: CPT

## 2019-08-07 PROCEDURE — 99999 PR PBB SHADOW E&M-EST. PATIENT-LVL IV: ICD-10-PCS | Mod: PBBFAC,,,

## 2019-08-07 RX ORDER — HEPARIN 100 UNIT/ML
500 SYRINGE INTRAVENOUS
Status: CANCELLED | OUTPATIENT
Start: 2019-08-21

## 2019-08-07 RX ORDER — PALONOSETRON 0.05 MG/ML
0.25 INJECTION, SOLUTION INTRAVENOUS
Status: DISCONTINUED | OUTPATIENT
Start: 2019-08-07 | End: 2019-08-07

## 2019-08-07 RX ORDER — PALONOSETRON 0.05 MG/ML
0.25 INJECTION, SOLUTION INTRAVENOUS
Status: CANCELLED | OUTPATIENT
Start: 2019-08-07

## 2019-08-07 RX ORDER — MULTIVITAMIN
1 TABLET ORAL DAILY
COMMUNITY
End: 2020-08-03 | Stop reason: CLARIF

## 2019-08-07 RX ORDER — HEPARIN 100 UNIT/ML
500 SYRINGE INTRAVENOUS
Status: CANCELLED | OUTPATIENT
Start: 2019-08-07

## 2019-08-07 RX ORDER — SODIUM CHLORIDE 0.9 % (FLUSH) 0.9 %
10 SYRINGE (ML) INJECTION
Status: CANCELLED | OUTPATIENT
Start: 2019-08-07

## 2019-08-07 RX ORDER — SODIUM CHLORIDE 0.9 % (FLUSH) 0.9 %
10 SYRINGE (ML) INJECTION
Status: CANCELLED | OUTPATIENT
Start: 2019-08-21

## 2019-08-07 RX ORDER — HEPARIN 100 UNIT/ML
500 SYRINGE INTRAVENOUS
Status: CANCELLED | OUTPATIENT
Start: 2019-08-14

## 2019-08-07 RX ORDER — SODIUM CHLORIDE 0.9 % (FLUSH) 0.9 %
10 SYRINGE (ML) INJECTION
Status: CANCELLED | OUTPATIENT
Start: 2019-08-14

## 2019-08-07 RX ORDER — PALONOSETRON 0.05 MG/ML
0.25 INJECTION, SOLUTION INTRAVENOUS
Status: CANCELLED | OUTPATIENT
Start: 2019-08-21

## 2019-08-07 RX ORDER — PALONOSETRON 0.05 MG/ML
0.25 INJECTION, SOLUTION INTRAVENOUS
Status: CANCELLED | OUTPATIENT
Start: 2019-08-14

## 2019-08-07 RX ORDER — HYDROMORPHONE HYDROCHLORIDE 2 MG/ML
1 INJECTION, SOLUTION INTRAMUSCULAR; INTRAVENOUS; SUBCUTANEOUS ONCE
Status: COMPLETED | OUTPATIENT
Start: 2019-08-07 | End: 2019-08-07

## 2019-08-07 RX ADMIN — GEMCITABINE HYDROCHLORIDE 1610 MG: 200 INJECTION, POWDER, LYOPHILIZED, FOR SOLUTION INTRAVENOUS at 11:08

## 2019-08-07 RX ADMIN — SODIUM CHLORIDE: 0.9 INJECTION, SOLUTION INTRAVENOUS at 10:08

## 2019-08-07 RX ADMIN — HYDROMORPHONE HYDROCHLORIDE 1 MG: 2 INJECTION, SOLUTION INTRAMUSCULAR; INTRAVENOUS; SUBCUTANEOUS at 10:08

## 2019-08-07 RX ADMIN — PALONOSETRON 0.25 MG: 0.05 INJECTION, SOLUTION INTRAVENOUS at 10:08

## 2019-08-07 RX ADMIN — PACLITAXEL 200 MG: 100 INJECTION, POWDER, LYOPHILIZED, FOR SUSPENSION INTRAVENOUS at 11:08

## 2019-08-07 NOTE — PROGRESS NOTES
Subjective:       Patient ID: Quyen Moody    Chief Complaint: Cancer of head of pancreas    HPI     Quyen Moody is a 58 y.o. female, to clinic for evaluation and management of locally advanced pancreatic cancer.     ECOG 0.    Oncologic History:     She has had intermittent upper abdominal pain since early June.  She presented to her PCP who originally ordered an US and CT.  US was negative and CT showed some haziness at the pancreatic head.  She saw GI who performed EUS.  On EUS, a 3x4cm pancreatic head mass was seen with possible invasion into the SMA and portal vein.  FNA path s/w pancreatic adenocarcinoma.  CA 19-9 level at outside hospital was >1000 per the patient.  She endorses nausea and inability to tolerate much PO.  She has lost about 10lbs over the last few weeks and is noticing her skin turning yellow.  Denies pruritis.  She is passing gas but has not had a BM in a few days, she attributes this to narcotic use.  She recently started taking stool softeners.  No previous cardiac or stroke history.  Surgical history significant for open appendectomy when she was in her 20s      Review of Systems   Constitutional: Positive for appetite change and unexpected weight change. Negative for activity change, chills, fatigue and fever.   HENT: Negative for congestion, hearing loss, mouth sores, sore throat, tinnitus and voice change.    Eyes: Negative for pain and visual disturbance.   Respiratory: Positive for shortness of breath. Negative for cough and wheezing.    Cardiovascular: Negative for chest pain, palpitations and leg swelling.   Gastrointestinal: Positive for abdominal pain. Negative for constipation, diarrhea, nausea and vomiting.   Endocrine: Negative for cold intolerance and heat intolerance.   Genitourinary: Negative for difficulty urinating, dyspareunia, dysuria, frequency, menstrual problem, urgency, vaginal bleeding, vaginal discharge and vaginal pain.   Musculoskeletal: Negative for  arthralgias, back pain and myalgias.   Skin: Negative for color change, rash and wound.   Allergic/Immunologic: Negative for environmental allergies and food allergies.   Neurological: Negative for weakness, numbness and headaches.   Hematological: Negative for adenopathy. Does not bruise/bleed easily.   Psychiatric/Behavioral: Negative for agitation, confusion, hallucinations and sleep disturbance. The patient is nervous/anxious.    All other systems reviewed and are negative.        Allergies:  Review of patient's allergies indicates:   Allergen Reactions    Sulfa (sulfonamide antibiotics) Swelling and Hives     tongue         Medications:  Current Outpatient Medications   Medication Sig Dispense Refill    ALPRAZolam (XANAX) 0.5 MG tablet TK 1 T PO BID  2    docusate sodium (COLACE) 100 MG capsule Take 100 mg by mouth 2 (two) times daily.      estradiol-norethindrone (ACTIVELLA) 1-0.5 mg per tablet Take 1 tablet by mouth.      HYDROcodone-acetaminophen (NORCO)  mg per tablet   0    HYDROcodone-acetaminophen (NORCO)  mg per tablet Take 1 tablet by mouth every 6 (six) hours as needed for Pain. 60 tablet 0    multivitamin (THERAGRAN) per tablet Take 1 tablet by mouth once daily.      ondansetron (ZOFRAN) 8 MG tablet Take 1 tablet (8 mg total) by mouth every 8 (eight) hours as needed for Nausea. 120 tablet 2    oxyCODONE (ROXICODONE) 15 MG Tab Take 1 tablet (15 mg total) by mouth every 4 (four) hours as needed for Pain. 60 tablet 0    polyethylene glycol (GLYCOLAX) 17 gram PwPk Take 1 g by mouth once daily.      ranitidine (ZANTAC) 150 MG tablet Take 150 mg by mouth 2 (two) times daily.      lipase-protease-amylase (CREON) 36,000-114,000- 180,000 unit CpDR take 1 capsule by mouth 4 times daily 120 capsule 6     No current facility-administered medications for this visit.        PMH:  Past Medical History:   Diagnosis Date    Allergic rhinitis 3/3/2014    CKD (chronic kidney disease) stage 3,  GFR 30-59 ml/min 2015    Hyperlipidemia 3/3/2014    Pancreas cancer        PSH:  Past Surgical History:   Procedure Laterality Date    ERCP (ENDOSCOPIC RETROGRADE CHOLANGIOPANCREATOGRAPHY) N/A 2019    Performed by Chema Harris MD at [facility] (2ND FLR)    Exporatory lap         FamHx:  Family History   Problem Relation Age of Onset    Diabetes Mother     Diabetes Father     Diabetes Brother     Heart disease Neg Hx        SocHx:  Social History     Socioeconomic History    Marital status:      Spouse name: Not on file    Number of children: 0    Years of education: Not on file    Highest education level: Not on file   Occupational History    Occupation:      Employer: [employer]   Social Needs    Financial resource strain: Not on file    Food insecurity:     Worry: Not on file     Inability: Not on file    Transportation needs:     Medical: Not on file     Non-medical: Not on file   Tobacco Use    Smoking status: Former Smoker     Start date: 1999    Smokeless tobacco: Never Used   Substance and Sexual Activity    Alcohol use: Yes     Alcohol/week: 0.6 oz     Types: 1 Glasses of wine per week     Frequency: 4 or more times a week     Drinks per session: 1 or 2     Binge frequency: Never     Comment: last drink a month ago     Drug use: No    Sexual activity: Yes   Lifestyle    Physical activity:     Days per week: Not on file     Minutes per session: Not on file    Stress: Not on file   Relationships    Social connections:     Talks on phone: Not on file     Gets together: Not on file     Attends Sikh service: Not on file     Active member of club or organization: Not on file     Attends meetings of clubs or organizations: Not on file     Relationship status: Not on file   Other Topics Concern    Not on file   Social History Narrative    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      of Cancer in          Objective:      Physical Exam    Constitutional: She is oriented to person, place, and time. She appears well-developed and well-nourished. No distress.   HENT:   Head: Normocephalic and atraumatic.   Mouth/Throat: No oropharyngeal exudate.   Eyes: Right eye exhibits no discharge. Left eye exhibits no discharge. No scleral icterus.   Neck: Normal range of motion. Neck supple. No tracheal deviation present. No thyromegaly present.   Musculoskeletal: Normal range of motion. She exhibits no edema, tenderness or deformity.   Neurological: She is alert and oriented to person, place, and time. No cranial nerve deficit. Coordination normal.   Skin: Skin is warm and dry. No rash noted. She is not diaphoretic. No pallor.   Psychiatric: She has a normal mood and affect. Her behavior is normal. Judgment and thought content normal.         LABS:  WBC   Date Value Ref Range Status   08/01/2019 7.69 3.90 - 12.70 K/uL Final     Hemoglobin   Date Value Ref Range Status   08/01/2019 12.4 12.0 - 16.0 g/dL Final     Hematocrit   Date Value Ref Range Status   08/01/2019 39.3 37.0 - 48.5 % Final     Platelets   Date Value Ref Range Status   08/01/2019 326 150 - 350 K/uL Final       Chemistry        Component Value Date/Time     07/29/2019 1028    K 4.0 07/29/2019 1028     07/29/2019 1028    CO2 25 07/29/2019 1028    BUN 8 07/29/2019 1028    CREATININE 0.8 07/29/2019 1028     (H) 07/29/2019 1028        Component Value Date/Time    CALCIUM 9.9 07/29/2019 1028    ALKPHOS 191 (H) 07/29/2019 1028    AST 44 (H) 07/29/2019 1028    ALT 96 (H) 07/29/2019 1028    BILITOT 1.2 (H) 07/29/2019 1028    ESTGFRAFRICA >60.0 07/29/2019 1028    EGFRNONAA >60.0 07/29/2019 1028              Assessment:       1. Cancer of head of pancreas    2. Neoplasm related pain (acute) (chronic)          Plan:         1. Cancer at the head of the pancreas:    Discussed SOC chemo and chemoXRT with FOLFIRINOX vs our PANOVA-3 trial with [place]-Abraxane +/- TTFields.  Extensively reviewed  the rationale of the study and reviewed her eligibility criteria with the research nurse and discussed case with Dr. Hallman as well.  She is interested in this study, and signed consent with our research nurse.     Here today to begin treatment with [place]+Abraxane and TTF on PANOVA-3    RTC per research RN.     The patient agrees with the plan, and all questions have been answered to their satisfaction.      More than 40 mins were spent during this encounter, greater than 50% was spent in direct counseling and/or coordination of care.     Eliot Barrett M.D., M.S., F.A.C.P.  Hematology and Oncology Attending  [facility]

## 2019-08-08 ENCOUNTER — TELEPHONE (OUTPATIENT)
Dept: SURGERY | Facility: CLINIC | Age: 59
End: 2019-08-08

## 2019-08-08 ENCOUNTER — PATIENT MESSAGE (OUTPATIENT)
Dept: HEMATOLOGY/ONCOLOGY | Facility: CLINIC | Age: 59
End: 2019-08-08

## 2019-08-08 DIAGNOSIS — C78.7 LIVER METASTASES: ICD-10-CM

## 2019-08-08 DIAGNOSIS — G89.3 NEOPLASM RELATED PAIN (ACUTE) (CHRONIC): ICD-10-CM

## 2019-08-08 DIAGNOSIS — C25.0 CANCER OF HEAD OF PANCREAS: Primary | ICD-10-CM

## 2019-08-08 RX ORDER — MORPHINE SULFATE 15 MG/1
15 TABLET, FILM COATED, EXTENDED RELEASE ORAL 2 TIMES DAILY
Qty: 60 TABLET | Refills: 0 | Status: SHIPPED | OUTPATIENT
Start: 2019-08-08 | End: 2019-09-05

## 2019-08-08 RX ORDER — SODIUM CHLORIDE 9 MG/ML
INJECTION, SOLUTION INTRAVENOUS CONTINUOUS
Status: CANCELLED | OUTPATIENT
Start: 2019-08-08

## 2019-08-08 RX ORDER — LIDOCAINE HYDROCHLORIDE 10 MG/ML
1 INJECTION, SOLUTION EPIDURAL; INFILTRATION; INTRACAUDAL; PERINEURAL ONCE
Status: CANCELLED | OUTPATIENT
Start: 2019-08-08 | End: 2019-08-08

## 2019-08-09 ENCOUNTER — TELEPHONE (OUTPATIENT)
Dept: SURGERY | Facility: CLINIC | Age: 59
End: 2019-08-09

## 2019-08-09 ENCOUNTER — PATIENT MESSAGE (OUTPATIENT)
Dept: HEMATOLOGY/ONCOLOGY | Facility: CLINIC | Age: 59
End: 2019-08-09

## 2019-08-09 DIAGNOSIS — G89.3 NEOPLASM RELATED PAIN (ACUTE) (CHRONIC): Primary | ICD-10-CM

## 2019-08-09 RX ORDER — FENTANYL 25 UG/1
1 PATCH TRANSDERMAL
Qty: 10 PATCH | Refills: 0 | Status: SHIPPED | OUTPATIENT
Start: 2019-08-09 | End: 2019-08-29 | Stop reason: SDUPTHER

## 2019-08-09 NOTE — TELEPHONE ENCOUNTER
Instructed to shower with antibacerial soap the day of the surgery, no food after midnight but okay to have clear liquids including 10am.      Patient is on research trial with device attached.  Will remove the device and leave at home and replace once she goes home.    Instructed to take Zantac, Xanax, pain meds as scheduled.

## 2019-08-10 ENCOUNTER — ANESTHESIA EVENT (OUTPATIENT)
Dept: SURGERY | Facility: HOSPITAL | Age: 59
End: 2019-08-10
Payer: COMMERCIAL

## 2019-08-10 DIAGNOSIS — R21 SKIN RASH: ICD-10-CM

## 2019-08-10 RX ORDER — HYDROCORTISONE 25 MG/G
CREAM TOPICAL 2 TIMES DAILY
Qty: 28 TUBE | Refills: 1 | Status: SHIPPED | OUTPATIENT
Start: 2019-08-10 | End: 2019-10-03 | Stop reason: SDUPTHER

## 2019-08-10 NOTE — ANESTHESIA PREPROCEDURE EVALUATION
[facility]  Anesthesia Pre-Operative Evaluation         Patient Name: Quyen Moody  YOB: 1960  MRN: [id number]    SUBJECTIVE:     Pre-operative evaluation for Procedure(s) (LRB):  [id number]-CATH (N/A)     08/10/2019    Quyen Moody is a 58 y.o. female w/ no significant PMHx. After having several weeks of abdominal pain and weight loss, she was found to have a 3x4cm pancreatic head mass was seen with possible invasion into the SMA and portal vein. FNA path s/w pancreatic adenocarcinoma.   Patient now presents for the above procedure(s).      LDA: None documented.       Prev airway: None documented.    Drips: None documented.      Patient Active Problem List   Diagnosis    Allergic rhinitis    Shoulder impingement    CKD (chronic kidney disease) stage 3, GFR 30-59 ml/min    Cancer of head of pancreas    Malignant obstructive jaundice    Biliary obstruction       Review of patient's allergies indicates:   Allergen Reactions    Sulfa (sulfonamide antibiotics) Swelling and Hives     tongue         Current Inpatient Medications:      No current facility-administered medications on file prior to encounter.      Current Outpatient Medications on File Prior to Encounter   Medication Sig Dispense Refill    ALPRAZolam (XANAX) 0.5 MG tablet TK 1 T PO BID  2    docusate sodium (COLACE) 100 MG capsule Take 100 mg by mouth 2 (two) times daily.      estradiol-norethindrone (ACTIVELLA) 1-0.5 mg per tablet Take 1 tablet by mouth.      HYDROcodone-acetaminophen (NORCO)  mg per tablet   0    HYDROcodone-acetaminophen (NORCO)  mg per tablet Take 1 tablet by mouth every 6 (six) hours as needed for Pain. 60 tablet 0    lipase-protease-amylase (CREON) 36,000-114,000- 180,000 unit CpDR take 1 capsule by mouth 4 times daily 120 capsule 6    multivitamin (THERAGRAN) per tablet Take 1 tablet by mouth once daily.      ondansetron (ZOFRAN) 8 MG tablet Take 1 tablet (8 mg total) by  mouth every 8 (eight) hours as needed for Nausea. 120 tablet 2    oxyCODONE (ROXICODONE) 15 MG Tab Take 1 tablet (15 mg total) by mouth every 4 (four) hours as needed for Pain. 60 tablet 0    polyethylene glycol (GLYCOLAX) 17 gram PwPk Take 1 g by mouth once daily.      ranitidine (ZANTAC) 150 MG tablet Take 150 mg by mouth 2 (two) times daily.         Past Surgical History:   Procedure Laterality Date    ERCP (ENDOSCOPIC RETROGRADE CHOLANGIOPANCREATOGRAPHY) N/A 7/16/2019    Performed by Chema Harris MD at [facility] ([address] [place] [address])    Exporatory lap         Social History     Socioeconomic History    Marital status:      Spouse name: Not on file    Number of children: 0    Years of education: Not on file    Highest education level: Not on file   Occupational History    Occupation:      Employer: [employer]   Social Needs    Financial resource strain: Not on file    Food insecurity:     Worry: Not on file     Inability: Not on file    Transportation needs:     Medical: Not on file     Non-medical: Not on file   Tobacco Use    Smoking status: Former Smoker     Start date: 12/11/1999    Smokeless tobacco: Never Used   Substance and Sexual Activity    Alcohol use: Yes     Alcohol/week: 0.6 oz     Types: 1 Glasses of wine per week     Frequency: 4 or more times a week     Drinks per session: 1 or 2     Binge frequency: Never     Comment: last drink a month ago     Drug use: No    Sexual activity: Yes   Lifestyle    Physical activity:     Days per week: Not on file     Minutes per session: Not on file    Stress: Not on file   Relationships    Social connections:     Talks on phone: Not on file     Gets together: Not on file     Attends Tenriism service: Not on file     Active member of club or organization: Not on file     Attends meetings of clubs or organizations: Not on file     Relationship status: Not on file   Other Topics Concern    Not on file   Social History Narrative     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      of Cancer in 2016       OBJECTIVE:     Vital Signs Range (Last 24H):         Significant Labs:  Lab Results   Component Value Date    WBC 7.69 2019    HGB 12.4 2019    HCT 39.3 2019     2019    CHOL 191 2019    TRIG 146 2019    HDL 70 2019    ALT 96 (H) 2019    AST 44 (H) 2019     2019    K 4.0 2019     2019    CREATININE 0.8 2019    BUN 8 2019    CO2 25 2019    TSH 0.854 2019    INR 1.0 2019    HGBA1C 4.8 07/15/2019       Diagnostic Studies: No relevant studies.    EKG:   No results found for this or any previous visit.    2D ECHO:  TTE:  No results found for this or any previous visit.    MARILOU:  No results found for this or any previous visit.    ASSESSMENT/PLAN:                                                                                                                  08/10/2019  Quyen Moody is a 58 y.o., female.    Anesthesia Evaluation    I have reviewed the Patient Summary Reports.     I have reviewed the Medications.     Review of Systems  Anesthesia Hx:  No problems with previous Anesthesia  History of prior surgery of interest to airway management or planning: Previous anesthesia: General   Social:  Former Smoker, Social Alcohol Use    Hematology/Oncology:  Hematology Normal   Oncology Normal     EENT/Dental:EENT/Dental Normal   Cardiovascular:  Cardiovascular Normal Exercise tolerance: good     Pulmonary:  Pulmonary Normal    Renal/:   Chronic Renal Disease, CRI    Hepatic/GI:  Hepatic/GI Normal    Musculoskeletal:  Musculoskeletal Normal    Neurological:  Neurology Normal    Endocrine:  Endocrine Normal    Dermatological:  Skin Normal    Psych:  Psychiatric Normal           Physical Exam  General:  Well nourished    Airway/Jaw/Neck:  Airway Findings: Mouth Opening: Normal Tongue: Normal  General Airway Assessment: Adult   Mallampati: II  TM Distance: Normal, at least 6 cm  Jaw/Neck Findings:      Dental:  Dental Findings: In tact        Mental Status:  Mental Status Findings:  Cooperative, Alert and Oriented         Anesthesia Plan  Type of Anesthesia, risks & benefits discussed:  Anesthesia Type:  general  Patient's Preference: GA  Intra-op Monitoring Plan: standard ASA monitors  Intra-op Monitoring Plan Comments:   Post Op Pain Control Plan: multimodal analgesia, IV/PO Opioids PRN and per primary service following discharge from PACU  Post Op Pain Control Plan Comments:   Induction:   IV  Beta Blocker:  Patient is not currently on a Beta-Blocker (No further documentation required).       Informed Consent: Patient understands risks and agrees with Anesthesia plan.  Questions answered. Anesthesia consent signed with patient.  ASA Score: 3     Day of Surgery Review of History & Physical:  There are no significant changes.  H&P update referred to the surgeon.         Ready For Surgery From Anesthesia Perspective.

## 2019-08-12 ENCOUNTER — HOSPITAL ENCOUNTER (OUTPATIENT)
Facility: HOSPITAL | Age: 59
Discharge: HOME | End: 2019-08-12
Attending: SURGERY | Admitting: SURGERY
Payer: COMMERCIAL

## 2019-08-12 ENCOUNTER — ANESTHESIA (OUTPATIENT)
Dept: SURGERY | Facility: HOSPITAL | Age: 59
End: 2019-08-12
Payer: COMMERCIAL

## 2019-08-12 VITALS
SYSTOLIC BLOOD PRESSURE: 121 MMHG | RESPIRATION RATE: 20 BRPM | OXYGEN SATURATION: 100 % | HEIGHT: 62 IN | TEMPERATURE: 97 F | HEART RATE: 89 BPM | BODY MASS INDEX: 23.93 KG/M2 | DIASTOLIC BLOOD PRESSURE: 60 MMHG | WEIGHT: 130.06 LBS

## 2019-08-12 DIAGNOSIS — C25.0 CANCER OF HEAD OF PANCREAS: Primary | ICD-10-CM

## 2019-08-12 PROCEDURE — 63600175 PHARM REV CODE 636 W HCPCS: Performed by: SURGERY

## 2019-08-12 PROCEDURE — 63600175 PHARM REV CODE 636 W HCPCS: Performed by: NURSE ANESTHETIST, CERTIFIED REGISTERED

## 2019-08-12 PROCEDURE — C1788 PORT, INDWELLING, IMP: HCPCS | Performed by: SURGERY

## 2019-08-12 PROCEDURE — 36561 PR INSERT TUNNELED CV CATH WITH PORT: ICD-10-PCS | Mod: RT,,, | Performed by: SURGERY

## 2019-08-12 PROCEDURE — D9220A PRA ANESTHESIA: Mod: CRNA,,, | Performed by: NURSE ANESTHETIST, CERTIFIED REGISTERED

## 2019-08-12 PROCEDURE — 71000016 HC POSTOP RECOV ADDL HR: Performed by: SURGERY

## 2019-08-12 PROCEDURE — 77001 CHG FLUOROGUIDE CNTRL VEN ACCESS,PLACE,REPLACE,REMOVE: ICD-10-PCS | Mod: 26,,, | Performed by: SURGERY

## 2019-08-12 PROCEDURE — 36561 INSERT TUNNELED CV CATH: CPT | Mod: RT,,, | Performed by: SURGERY

## 2019-08-12 PROCEDURE — 71000044 HC DOSC ROUTINE RECOVERY FIRST HOUR: Performed by: SURGERY

## 2019-08-12 PROCEDURE — 25000003 PHARM REV CODE 250: Performed by: SURGERY

## 2019-08-12 PROCEDURE — 36000706: Performed by: SURGERY

## 2019-08-12 PROCEDURE — 37000008 HC ANESTHESIA 1ST 15 MINUTES: Performed by: SURGERY

## 2019-08-12 PROCEDURE — D9220A PRA ANESTHESIA: ICD-10-PCS | Mod: CRNA,,, | Performed by: NURSE ANESTHETIST, CERTIFIED REGISTERED

## 2019-08-12 PROCEDURE — 36000707: Performed by: SURGERY

## 2019-08-12 PROCEDURE — 71000015 HC POSTOP RECOV 1ST HR: Performed by: SURGERY

## 2019-08-12 PROCEDURE — 25000003 PHARM REV CODE 250: Performed by: NURSE ANESTHETIST, CERTIFIED REGISTERED

## 2019-08-12 PROCEDURE — 77001 FLUOROGUIDE FOR VEIN DEVICE: CPT | Mod: 26,,, | Performed by: SURGERY

## 2019-08-12 PROCEDURE — D9220A PRA ANESTHESIA: Mod: ANES,,, | Performed by: ANESTHESIOLOGY

## 2019-08-12 PROCEDURE — D9220A PRA ANESTHESIA: ICD-10-PCS | Mod: ANES,,, | Performed by: ANESTHESIOLOGY

## 2019-08-12 PROCEDURE — 63600175 PHARM REV CODE 636 W HCPCS: Performed by: ANESTHESIOLOGY

## 2019-08-12 PROCEDURE — 37000009 HC ANESTHESIA EA ADD 15 MINS: Performed by: SURGERY

## 2019-08-12 DEVICE — KIT POWERPORT SINGLE 8FR: Type: IMPLANTABLE DEVICE | Site: OTHER (ADD COMMENT) | Status: FUNCTIONAL

## 2019-08-12 RX ORDER — FENTANYL CITRATE 50 UG/ML
INJECTION, SOLUTION INTRAMUSCULAR; INTRAVENOUS
Status: DISCONTINUED | OUTPATIENT
Start: 2019-08-12 | End: 2019-08-12

## 2019-08-12 RX ORDER — PROPOFOL 10 MG/ML
VIAL (ML) INTRAVENOUS CONTINUOUS PRN
Status: DISCONTINUED | OUTPATIENT
Start: 2019-08-12 | End: 2019-08-12

## 2019-08-12 RX ORDER — PROPOFOL 10 MG/ML
VIAL (ML) INTRAVENOUS
Status: DISCONTINUED | OUTPATIENT
Start: 2019-08-12 | End: 2019-08-12

## 2019-08-12 RX ORDER — MEPERIDINE HYDROCHLORIDE 50 MG/ML
12.5 INJECTION INTRAMUSCULAR; INTRAVENOUS; SUBCUTANEOUS ONCE AS NEEDED
Status: DISCONTINUED | OUTPATIENT
Start: 2019-08-12 | End: 2019-08-12 | Stop reason: HOSPADM

## 2019-08-12 RX ORDER — SODIUM CHLORIDE 9 MG/ML
INJECTION, SOLUTION INTRAVENOUS CONTINUOUS
Status: DISCONTINUED | OUTPATIENT
Start: 2019-08-12 | End: 2019-08-12 | Stop reason: HOSPADM

## 2019-08-12 RX ORDER — HYDROMORPHONE HYDROCHLORIDE 1 MG/ML
0.2 INJECTION, SOLUTION INTRAMUSCULAR; INTRAVENOUS; SUBCUTANEOUS EVERY 5 MIN PRN
Status: DISCONTINUED | OUTPATIENT
Start: 2019-08-12 | End: 2019-08-12 | Stop reason: HOSPADM

## 2019-08-12 RX ORDER — LIDOCAINE HCL/PF 100 MG/5ML
SYRINGE (ML) INTRAVENOUS
Status: DISCONTINUED | OUTPATIENT
Start: 2019-08-12 | End: 2019-08-12

## 2019-08-12 RX ORDER — LIDOCAINE HYDROCHLORIDE 10 MG/ML
INJECTION, SOLUTION EPIDURAL; INFILTRATION; INTRACAUDAL; PERINEURAL
Status: DISCONTINUED | OUTPATIENT
Start: 2019-08-12 | End: 2019-08-12 | Stop reason: HOSPADM

## 2019-08-12 RX ORDER — LIDOCAINE HYDROCHLORIDE 10 MG/ML
1 INJECTION, SOLUTION EPIDURAL; INFILTRATION; INTRACAUDAL; PERINEURAL ONCE
Status: COMPLETED | OUTPATIENT
Start: 2019-08-12 | End: 2019-08-12

## 2019-08-12 RX ORDER — HEPARIN SODIUM (PORCINE) LOCK FLUSH IV SOLN 100 UNIT/ML 100 UNIT/ML
SOLUTION INTRAVENOUS
Status: DISCONTINUED | OUTPATIENT
Start: 2019-08-12 | End: 2019-08-12 | Stop reason: HOSPADM

## 2019-08-12 RX ORDER — MIDAZOLAM HYDROCHLORIDE 1 MG/ML
INJECTION, SOLUTION INTRAMUSCULAR; INTRAVENOUS
Status: DISCONTINUED | OUTPATIENT
Start: 2019-08-12 | End: 2019-08-12

## 2019-08-12 RX ORDER — CEFAZOLIN SODIUM 1 G/3ML
2 INJECTION, POWDER, FOR SOLUTION INTRAMUSCULAR; INTRAVENOUS
Status: COMPLETED | OUTPATIENT
Start: 2019-08-12 | End: 2019-08-12

## 2019-08-12 RX ORDER — PHENYLEPHRINE HYDROCHLORIDE 10 MG/ML
INJECTION INTRAVENOUS
Status: DISCONTINUED | OUTPATIENT
Start: 2019-08-12 | End: 2019-08-12

## 2019-08-12 RX ORDER — ONDANSETRON 2 MG/ML
4 INJECTION INTRAMUSCULAR; INTRAVENOUS DAILY PRN
Status: DISCONTINUED | OUTPATIENT
Start: 2019-08-12 | End: 2019-08-12 | Stop reason: HOSPADM

## 2019-08-12 RX ORDER — SODIUM CHLORIDE 0.9 % (FLUSH) 0.9 %
3 SYRINGE (ML) INJECTION
Status: DISCONTINUED | OUTPATIENT
Start: 2019-08-12 | End: 2019-08-12 | Stop reason: HOSPADM

## 2019-08-12 RX ADMIN — PHENYLEPHRINE HYDROCHLORIDE 100 MCG: 10 INJECTION INTRAVENOUS at 04:08

## 2019-08-12 RX ADMIN — CEFAZOLIN 2 G: 330 INJECTION, POWDER, FOR SOLUTION INTRAMUSCULAR; INTRAVENOUS at 03:08

## 2019-08-12 RX ADMIN — MIDAZOLAM HYDROCHLORIDE 2 MG: 1 INJECTION, SOLUTION INTRAMUSCULAR; INTRAVENOUS at 03:08

## 2019-08-12 RX ADMIN — HYDROMORPHONE HYDROCHLORIDE 0.2 MG: 1 INJECTION, SOLUTION INTRAMUSCULAR; INTRAVENOUS; SUBCUTANEOUS at 03:08

## 2019-08-12 RX ADMIN — LIDOCAINE HYDROCHLORIDE 60 MG: 20 INJECTION, SOLUTION INTRAVENOUS at 03:08

## 2019-08-12 RX ADMIN — SODIUM CHLORIDE, SODIUM GLUCONATE, SODIUM ACETATE, POTASSIUM CHLORIDE, MAGNESIUM CHLORIDE, SODIUM PHOSPHATE, DIBASIC, AND POTASSIUM PHOSPHATE: .53; .5; .37; .037; .03; .012; .00082 INJECTION, SOLUTION INTRAVENOUS at 03:08

## 2019-08-12 RX ADMIN — FENTANYL CITRATE 25 MCG: 50 INJECTION, SOLUTION INTRAMUSCULAR; INTRAVENOUS at 03:08

## 2019-08-12 RX ADMIN — SODIUM CHLORIDE: 0.9 INJECTION, SOLUTION INTRAVENOUS at 01:08

## 2019-08-12 RX ADMIN — HYDROMORPHONE HYDROCHLORIDE 0.2 MG: 1 INJECTION, SOLUTION INTRAMUSCULAR; INTRAVENOUS; SUBCUTANEOUS at 02:08

## 2019-08-12 RX ADMIN — PROPOFOL 100 MCG/KG/MIN: 10 INJECTION, EMULSION INTRAVENOUS at 03:08

## 2019-08-12 RX ADMIN — FENTANYL CITRATE 50 MCG: 50 INJECTION, SOLUTION INTRAMUSCULAR; INTRAVENOUS at 04:08

## 2019-08-12 RX ADMIN — PROPOFOL 40 MG: 10 INJECTION, EMULSION INTRAVENOUS at 03:08

## 2019-08-12 RX ADMIN — LIDOCAINE HYDROCHLORIDE 10 MG: 10 INJECTION, SOLUTION EPIDURAL; INFILTRATION; INTRACAUDAL; PERINEURAL at 01:08

## 2019-08-12 RX ADMIN — HYDROMORPHONE HYDROCHLORIDE 0.2 MG: 1 INJECTION, SOLUTION INTRAMUSCULAR; INTRAVENOUS; SUBCUTANEOUS at 01:08

## 2019-08-12 NOTE — TRANSFER OF CARE
"Anesthesia Transfer of Care Note    Patient: Quyen Moody    Procedure(s) Performed: Procedure(s) (LRB):  [id number]-X-CATH (Right)    Patient location: [facility]    Anesthesia Type: general    Transport from OR: Transported from OR on room air with adequate spontaneous ventilation    Post pain: adequate analgesia    Post assessment: no apparent anesthetic complications and tolerated procedure well    Post vital signs: stable    Level of consciousness: awake and alert    Nausea/Vomiting: no nausea/vomiting    Complications: none    Transfer of care protocol was followed      Last vitals:   Visit Vitals  /63   Pulse 101   Temp 36.7 °C (98 °F) (Oral)   Resp 20   Ht 5' 2" (1.575 m)   Wt 59 kg (130 lb 1.1 oz)   Breastfeeding? No   BMI 23.79 kg/m²     "

## 2019-08-12 NOTE — PLAN OF CARE
Discharge instructions given and explained to patient and family with verbalization of understanding all instructions. MD Melissa verified CXR and okay for discharge. Patients v/s stable, denies n/v and tolerating po, rates pain level tolerable, IV removed, and brother at bedside for patient discharge home.

## 2019-08-12 NOTE — ANESTHESIA POSTPROCEDURE EVALUATION
Anesthesia Post Evaluation    Patient: Quyen Moody    Procedure(s) Performed: Procedure(s) (LRB):  [id number]-F-CATH (Right)    Final Anesthesia Type: general  Patient location during evaluation: PACU  Patient participation: Yes- Able to Participate  Level of consciousness: awake and alert  Post-procedure vital signs: reviewed and stable  Pain management: adequate  Airway patency: patent  PONV status at discharge: No PONV  Anesthetic complications: no      Cardiovascular status: blood pressure returned to baseline and hemodynamically stable  Respiratory status: unassisted, spontaneous ventilation and room air  Hydration status: euvolemic  Follow-up not needed.          Vitals Value Taken Time   /58 8/12/2019  5:31 PM   Temp 36.4 °C (97.5 °F) 8/12/2019  4:45 PM   Pulse 87 8/12/2019  5:34 PM   Resp 45 8/12/2019  5:34 PM   SpO2 100 % 8/12/2019  5:34 PM   Vitals shown include unvalidated device data.      No case tracking events are documented in the log.      Pain/Moncho Score: Pain Rating Prior to Med Admin: 3 (8/12/2019  3:18 PM)  Moncho Score: 10 (8/12/2019  4:50 PM)

## 2019-08-12 NOTE — OP NOTE
DATE: 08/12/2019    PREOPERATIVE DIAGNOSIS: pancreatic adenocarcinoma    POSTOPERATIVE DIAGNOSIS: pancreatic adenocarcinoma    PROCEDURE PERFORMED: Right IJ port placement.     ATTENDING SURGEON: Cesar Hallman M.D.     HOUSESTAFF SURGEON: [place]    ANESTHESIA: Monitored anesthesia care plus local.     ESTIMATED BLOOD LOSS: 10 mL     FINDINGS: An 8F port placed with tip at junction of superior vena cava and right atrium.     SPECIMEN: None.     DRAINS: None.     COMPLICATIONS: None.     INDICATIONS: Ms. Moody is a 57 y/o F with new diagnosis of pancreatic adenocarcinoma scheduled to begin neoadjuvant chemotherapy. The patient did sign informed consent and expressed understanding of the risks and benefits of surgery.    Ms. Moody was taken to the operating room and placed under general anesthesia. The skin was prepped and draped in the usual sterile fashion. After appropriate time out local anesthesia was infiltrated into the skin and an 18g needle was used to access to the right internal jugular vein. A wire was fed easily and viewed to be in good position on fluoroscopy. A pocket was made inferiorly and the catheter tunneled to the wire insertion site. The port was secured with vicryl sutures into the pocket. The catheter was measured under fluoroscopic guidance and cut. A dilator and sheet were then inserted over the wire under fluoroscopy. The dilator and wire were removed and the catheter inserted through the sheath. A final fluoro shot was performed and the catheter was viewed to be in correct position without kinks. The port was aspirated easily and flushed with saline and hep locked. The incision was closed in layers with vicryl and monocryl sutures and dressed with dermabond. Patient tolerated the procedure without apparent complication and was transported to the recovery room in stable condition. A post procedure chest film will be performed.

## 2019-08-12 NOTE — BRIEF OP NOTE
[facility]  Brief Operative Note     SUMMARY     Surgery Date: 8/12/2019     Surgeon(s) and Role:     * Cesar Hallman MD - Primary     * Cameron Hills MD - Resident - Assisting        Pre-op Diagnosis:  Cancer of head of pancreas [C25.0]    Post-op Diagnosis:  Post-Op Diagnosis Codes:     * Cancer of head of pancreas [C25.0]    Procedure(s) (LRB):  [id number] (Right)    Anesthesia: General/MAC    Description of the findings of the procedure: Right IJ port placement under flouroscopic guidance    Findings/Key Components: Right IJ port placement    Estimated Blood Loss: 10cc         Specimens:   Specimen (12h ago, onward)    None          Discharge Note    SUMMARY     Admit Date: 8/12/2019    Discharge Date and Time:  08/12/2019    Hospital Course (synopsis of major diagnoses, care, treatment, and services provided during the course of the hospital stay): Patient was taken to the OR on 8/12/2019 for placement of a right IJ port.  The patient tolerated the procedure well and was transferred to the PACU in stable condition.  A follow up CXR was performed.  Patients pain was controlled.  Moving all extremities without issues.  No shortness of breath.  Vital signs stable. Afebrile.     Final Diagnosis: Post-Op Diagnosis Codes:     * Cancer of head of pancreas [C25.0]    Disposition: Home or Self Care    Follow Up/Patient Instructions:     Medications:  Reconciled Home Medications:      Medication List      CONTINUE taking these medications    ALPRAZolam 0.5 MG tablet  Commonly known as:  XANAX  TK 1 T PO BID     docusate sodium 100 MG capsule  Commonly known as:  COLACE  Take 100 mg by mouth 2 (two) times daily.     estradiol-norethindrone 1-0.5 mg per tablet  Commonly known as:  ACTIVELLA  Take 1 tablet by mouth.     fentaNYL 25 mcg/hr  Commonly known as:  DURAGESIC  Place 1 patch onto the skin every 72 hours.     * HYDROcodone-acetaminophen  mg per tablet  Commonly known as:   NORCO     * HYDROcodone-acetaminophen  mg per tablet  Commonly known as:  NORCO  Take 1 tablet by mouth every 6 (six) hours as needed for Pain.     hydrocortisone 2.5 % cream  Apply topically 2 (two) times daily.     lipase-protease-amylase 36,000-114,000- 180,000 unit Cpdr  Commonly known as:  CREON  take 1 capsule by mouth 4 times daily     morphine 15 MG 12 hr tablet  Commonly known as:  MS CONTIN  Take 1 tablet (15 mg total) by mouth 2 (two) times daily.     multivitamin per tablet  Commonly known as:  THERAGRAN  Take 1 tablet by mouth once daily.     ondansetron 8 MG tablet  Commonly known as:  ZOFRAN  Take 1 tablet (8 mg total) by mouth every 8 (eight) hours as needed for Nausea.     oxyCODONE 15 MG Tab  Commonly known as:  ROXICODONE  Take 1 tablet (15 mg total) by mouth every 4 (four) hours as needed for Pain.     polyethylene glycol 17 gram Pwpk  Commonly known as:  GLYCOLAX  Take 1 g by mouth once daily.     ranitidine 150 MG tablet  Commonly known as:  ZANTAC  Take 150 mg by mouth 2 (two) times daily.         * This list has 2 medication(s) that are the same as other medications prescribed for you. Read the directions carefully, and ask your doctor or other care provider to review them with you.              Discharge Procedure Orders   Diet Adult Regular     Notify your health care provider if you experience any of the following:  temperature >100.4     Notify your health care provider if you experience any of the following:  persistent nausea and vomiting or diarrhea     Notify your health care provider if you experience any of the following:  severe uncontrolled pain     Notify your health care provider if you experience any of the following:  redness, tenderness, or signs of infection (pain, swelling, redness, odor or green/yellow discharge around incision site)     Notify your health care provider if you experience any of the following:  difficulty breathing or increased cough     Notify your  health care provider if you experience any of the following:  severe persistent headache     Notify your health care provider if you experience any of the following:  worsening rash     Notify your health care provider if you experience any of the following:  persistent dizziness, light-headedness, or visual disturbances     Notify your health care provider if you experience any of the following:  increased confusion or weakness     No dressing needed     Activity as tolerated     Shower on day dressing removed (No bath)   Order Comments: May allow water to run over incision site 48hours after surgery.  Do not soak for 2 weeks.       Cameron Hills, PGY 1  General Surgery/Surgical Oncology  Pager: [phone]

## 2019-08-12 NOTE — DISCHARGE INSTRUCTIONS
Vascular Access Port Implantation   Port implantation is surgery to place (implant) a port under the skin. For vascular access, it is placed into a vein. The port allows medicines or nutrition to be sent right into your bloodstream. Blood can also be taken or given through the port. During the procedure, a long, thin tube called a catheter is threaded into one of your large veins. The tube is then attached to the port. This usually sits under the skin of your chest and causes a small bump. To use the port, a special needle is passed through your skin and into the port. The needle can stay in your skin for up to 7 days, if needed. A port can stay in place for weeks or months or longer.    Why is a vascular access port needed?  A vascular access port may allow healthcare providers to give you:  · Chemotherapy or other cancer-fighting drugs  · IV treatments, such as antibiotics or nutrition  · Hemodialysis (for kidney failure)  The port may also be used to draw blood.  Before the procedure  Follow any instructions you are given on how to prepare.  Tell your provider about any medicines you are taking. This includes:  · All prescription medicines  · Over-the-counter medicines such as aspirin or ibuprofen  · Herbs, vitamins, and other supplements  Also be sure your provider knows:  · If you are pregnant or think you may be pregnant  · If you are allergic to any medicines or substances, especially local anesthetics or iodine  · Your full medical history, including why you will need the port  · If you plan on doing any contact sports  During the procedure  · Before the procedure, an IV may be put into a vein in your arm or hand. This gives you fluids and medicines. You may be given medicine through the IV to help you relax during the procedure. This is called sedation. But some surgeons place ports using general anesthesia.  · The chest is used most often for the port. In some cases, your belly (abdomen) or arm will be  used instead.  · The skin over the insertion area is numbed with local anesthetic.  · Ultrasound or X-rays are used to help the healthcare provider guide the catheter into the proper location during the procedure.  · A cut (incision) is made in the skin where the port will be placed. A small pocket for the port is formed under the skin.  · A second small incision is made in the skin near the first incision. A tunnel under the skin is created. The catheter is put through the tunnel and into the blood vessel.  · The skin is closed over the port. It is held shut with stitches (sutures) or surgical glue or tape. The second small incision is also closed.  · A chest X-ray may be done to make sure the port is placed properly.  After the procedure  You may be taken to a recovery room where youll recover from the sedation. Nurses will check on you as you rest. If you have pain, nurses can give you medicine. If you are not staying in the hospital overnight, you will be sent home a few hours after the procedure is done. A healthcare provider will tell you when you can go home. An adult family member or friend will need to drive you home.  Recovering at home  · Take pain medicine as directed by your healthcare provider.  · Take it easy for 24 hours after the procedure. Avoid physical activity and heavy lifting until your healthcare provider says its OK.  · Keep the port clean and dry. Ask when you can shower again. You will need to keep the port dry by covering it when you shower.  · Care for the insertion site as you are directed.  · Dont swim, bathe, or do other activities that cause water to cover the insertion site.  · To keep the port from getting blocked with blood clots, flush it as often as directed. You should be shown the proper way to flush the port before you go home. It is important to follow these directions.     Risks and possible complications of implantation  · Bleeding  · Infection of the insertion  site  · Damage to a blood vessel  · Nerve injury or irritation  · Collapsed lung (for chest port placements)  · Skin breakdown over the port  Risks and possible complications of having a port  · Blocked  port or catheter  · Leakage or breakage of the port or catheter  · The port moves out of position  · Blood clot  · Skin or bloodstream infection  · Skin breakdown over the port      When to seek medical care  Call your healthcare provider right away if you have any of the following:  · A fever of 100.4°F (38.0°C) or higher  · You can't access or use the port properly  · You can't flush the port or get a blood return  · The skin near the port is red, warm, swollen, or broken  · You have shoulder pain on the side where the port is located  · You feel a heart flutter or racing heart   · Swollen arm, if the port is placed in your arm   Date Last Reviewed: 7/1/2016  © 7944-0793 [employer]. [address] [place] [address]. All rights reserved. This information is not intended as a substitute for professional medical care. Always follow your healthcare professional's instructions.            Recovery After Procedural Sedation (Adult)  You have been given medicine by vein to make you sleep during your surgery. This may have included both a pain medicine and sleeping medicine. Most of the effects have worn off. But you may still have some drowsiness for the next 6 to 8 hours.  Home care  Follow these guidelines when you get home:  · For the next 8 hours, you should be watched by a responsible adult. This person should make sure your condition is not getting worse.  · Don't drink any alcohol for the next 24 hours.  · Don't drive, operate dangerous machinery, or make important business or personal decisions during the next 24 hours.  Note: Your healthcare provider may tell you not to take any medicine by mouth for pain or sleep in the next 4 hours. These medicines may react with the medicines you were  given in the hospital. This could cause a much stronger response than usual.  Follow-up care  Follow up with your healthcare provider if you are not alert and back to your usual level of activity within 12 hours.  When to seek medical advice  Call your healthcare provider right away if any of these occur:  · Drowsiness gets worse  · Weakness or dizziness gets worse  · Repeated vomiting  · You can't be awakened   Date Last Reviewed: 10/18/2016  © 3922-1961 [employer]. [address] [place] [address], [place] [address]. All rights reserved. This information is not intended as a substitute for professional medical care. Always follow your healthcare professional's instructions.

## 2019-08-12 NOTE — INTERVAL H&P NOTE
The patient has been examined and the H&P has been reviewed:    I concur with the findings and changes have been noted since the H&P was written: the patient has started chemotherapy through a peripheral site but has had issues using a peripheral vein.  Port placement has since been requested with the patient coming in today for placement.  She denies any fevers or chills.  Endorses some pain that is managed with her fentanyl patch but it fell off this morning so she is having some discomfort today.    Anesthesia/Surgery risks, benefits and alternative options discussed and understood by patient/family.          Active Hospital Problems    Diagnosis  POA    Cancer of head of pancreas [C25.0]  Yes      Resolved Hospital Problems   No resolved problems to display.

## 2019-08-13 ENCOUNTER — PATIENT MESSAGE (OUTPATIENT)
Dept: HEMATOLOGY/ONCOLOGY | Facility: CLINIC | Age: 59
End: 2019-08-13

## 2019-08-13 DIAGNOSIS — Z00.6 RESEARCH STUDY PATIENT: ICD-10-CM

## 2019-08-13 DIAGNOSIS — G89.3 NEOPLASM RELATED PAIN (ACUTE) (CHRONIC): ICD-10-CM

## 2019-08-13 DIAGNOSIS — C25.0 CANCER OF HEAD OF PANCREAS: Primary | ICD-10-CM

## 2019-08-14 ENCOUNTER — INFUSION (OUTPATIENT)
Dept: INFUSION THERAPY | Facility: HOSPITAL | Age: 59
End: 2019-08-14
Attending: INTERNAL MEDICINE
Payer: COMMERCIAL

## 2019-08-14 ENCOUNTER — LAB VISIT (OUTPATIENT)
Dept: LAB | Facility: HOSPITAL | Age: 59
End: 2019-08-14
Attending: INTERNAL MEDICINE
Payer: COMMERCIAL

## 2019-08-14 VITALS
HEIGHT: 62 IN | WEIGHT: 130.06 LBS | TEMPERATURE: 98 F | DIASTOLIC BLOOD PRESSURE: 56 MMHG | SYSTOLIC BLOOD PRESSURE: 108 MMHG | BODY MASS INDEX: 23.93 KG/M2 | HEART RATE: 81 BPM | RESPIRATION RATE: 18 BRPM

## 2019-08-14 DIAGNOSIS — C25.0 CANCER OF HEAD OF PANCREAS: Primary | ICD-10-CM

## 2019-08-14 DIAGNOSIS — Z00.6 RESEARCH STUDY PATIENT: ICD-10-CM

## 2019-08-14 DIAGNOSIS — D49.0 PANCREAS NEOPLASM: ICD-10-CM

## 2019-08-14 DIAGNOSIS — C25.0 CANCER OF HEAD OF PANCREAS: ICD-10-CM

## 2019-08-14 DIAGNOSIS — G89.3 NEOPLASM RELATED PAIN (ACUTE) (CHRONIC): ICD-10-CM

## 2019-08-14 LAB
ALBUMIN SERPL BCP-MCNC: 3 G/DL (ref 3.5–5.2)
ALP SERPL-CCNC: 93 U/L (ref 55–135)
ALT SERPL W/O P-5'-P-CCNC: 51 U/L (ref 10–44)
ANION GAP SERPL CALC-SCNC: 7 MMOL/L (ref 8–16)
AST SERPL-CCNC: 30 U/L (ref 10–40)
BASOPHILS # BLD AUTO: 0.04 K/UL (ref 0–0.2)
BASOPHILS NFR BLD: 1.3 % (ref 0–1.9)
BILIRUB SERPL-MCNC: 0.6 MG/DL (ref 0.1–1)
BUN SERPL-MCNC: 15 MG/DL (ref 6–20)
CALCIUM SERPL-MCNC: 9 MG/DL (ref 8.7–10.5)
CHLORIDE SERPL-SCNC: 109 MMOL/L (ref 95–110)
CO2 SERPL-SCNC: 26 MMOL/L (ref 23–29)
CREAT SERPL-MCNC: 0.7 MG/DL (ref 0.5–1.4)
DIFFERENTIAL METHOD: ABNORMAL
EOSINOPHIL # BLD AUTO: 0.2 K/UL (ref 0–0.5)
EOSINOPHIL NFR BLD: 4.7 % (ref 0–8)
ERYTHROCYTE [DISTWIDTH] IN BLOOD BY AUTOMATED COUNT: 12.8 % (ref 11.5–14.5)
EST. GFR  (AFRICAN AMERICAN): >60 ML/MIN/1.73 M^2
EST. GFR  (NON AFRICAN AMERICAN): >60 ML/MIN/1.73 M^2
GLUCOSE SERPL-MCNC: 90 MG/DL (ref 70–110)
HCT VFR BLD AUTO: 34.4 % (ref 37–48.5)
HGB BLD-MCNC: 10.6 G/DL (ref 12–16)
IMM GRANULOCYTES # BLD AUTO: 0.01 K/UL (ref 0–0.04)
IMM GRANULOCYTES NFR BLD AUTO: 0.3 % (ref 0–0.5)
LYMPHOCYTES # BLD AUTO: 0.8 K/UL (ref 1–4.8)
LYMPHOCYTES NFR BLD: 25.9 % (ref 18–48)
MCH RBC QN AUTO: 28.1 PG (ref 27–31)
MCHC RBC AUTO-ENTMCNC: 30.8 G/DL (ref 32–36)
MCV RBC AUTO: 91 FL (ref 82–98)
MONOCYTES # BLD AUTO: 0.5 K/UL (ref 0.3–1)
MONOCYTES NFR BLD: 14.6 % (ref 4–15)
NEUTROPHILS # BLD AUTO: 1.7 K/UL (ref 1.8–7.7)
NEUTROPHILS NFR BLD: 53.2 % (ref 38–73)
NRBC BLD-RTO: 0 /100 WBC
PLATELET # BLD AUTO: 151 K/UL (ref 150–350)
PMV BLD AUTO: 10.6 FL (ref 9.2–12.9)
POTASSIUM SERPL-SCNC: 3.6 MMOL/L (ref 3.5–5.1)
PROT SERPL-MCNC: 6.6 G/DL (ref 6–8.4)
RBC # BLD AUTO: 3.77 M/UL (ref 4–5.4)
SODIUM SERPL-SCNC: 142 MMOL/L (ref 136–145)
WBC # BLD AUTO: 3.16 K/UL (ref 3.9–12.7)

## 2019-08-14 PROCEDURE — 96367 TX/PROPH/DG ADDL SEQ IV INF: CPT

## 2019-08-14 PROCEDURE — A4216 STERILE WATER/SALINE, 10 ML: HCPCS | Performed by: INTERNAL MEDICINE

## 2019-08-14 PROCEDURE — 85025 COMPLETE CBC W/AUTO DIFF WBC: CPT

## 2019-08-14 PROCEDURE — 36415 COLL VENOUS BLD VENIPUNCTURE: CPT

## 2019-08-14 PROCEDURE — 25000003 PHARM REV CODE 250: Performed by: INTERNAL MEDICINE

## 2019-08-14 PROCEDURE — 96417 CHEMO IV INFUS EACH ADDL SEQ: CPT

## 2019-08-14 PROCEDURE — 96413 CHEMO IV INFUSION 1 HR: CPT

## 2019-08-14 PROCEDURE — 63600175 PHARM REV CODE 636 W HCPCS: Mod: JG | Performed by: INTERNAL MEDICINE

## 2019-08-14 PROCEDURE — 80053 COMPREHEN METABOLIC PANEL: CPT

## 2019-08-14 RX ORDER — HEPARIN 100 UNIT/ML
500 SYRINGE INTRAVENOUS
Status: DISCONTINUED | OUTPATIENT
Start: 2019-08-14 | End: 2019-08-14 | Stop reason: HOSPADM

## 2019-08-14 RX ORDER — SODIUM CHLORIDE 0.9 % (FLUSH) 0.9 %
10 SYRINGE (ML) INJECTION
Status: DISCONTINUED | OUTPATIENT
Start: 2019-08-14 | End: 2019-08-14 | Stop reason: HOSPADM

## 2019-08-14 RX ORDER — PALONOSETRON 0.05 MG/ML
0.25 INJECTION, SOLUTION INTRAVENOUS
Status: DISCONTINUED | OUTPATIENT
Start: 2019-08-14 | End: 2019-08-14

## 2019-08-14 RX ADMIN — HEPARIN 500 UNITS: 100 SYRINGE at 11:08

## 2019-08-14 RX ADMIN — PACLITAXEL 200 MG: 100 INJECTION, POWDER, LYOPHILIZED, FOR SUSPENSION INTRAVENOUS at 10:08

## 2019-08-14 RX ADMIN — PALONOSETRON 0.25 MG: 0.05 INJECTION, SOLUTION INTRAVENOUS at 09:08

## 2019-08-14 RX ADMIN — SODIUM CHLORIDE: 0.9 INJECTION, SOLUTION INTRAVENOUS at 09:08

## 2019-08-14 RX ADMIN — Medication 10 ML: at 11:08

## 2019-08-14 RX ADMIN — GEMCITABINE HYDROCHLORIDE 1610 MG: 200 INJECTION, SOLUTION INTRAVENOUS at 11:08

## 2019-08-14 NOTE — PLAN OF CARE
Problem: Adult Inpatient Plan of Care  Goal: Plan of Care Review  Outcome: Ongoing (interventions implemented as appropriate)  Pt tolerated abraxane/gemzar without complications. VSS. No s/s of reaction. Instructed to contact MD with any questions. PAC heparin locked and de-accessed. AVS given to patient.

## 2019-08-19 ENCOUNTER — PATIENT MESSAGE (OUTPATIENT)
Dept: HEMATOLOGY/ONCOLOGY | Facility: CLINIC | Age: 59
End: 2019-08-19

## 2019-08-19 DIAGNOSIS — D49.0 PANCREAS NEOPLASM: Primary | ICD-10-CM

## 2019-08-19 DIAGNOSIS — C78.7 LIVER METASTASES: ICD-10-CM

## 2019-08-19 RX ORDER — LIDOCAINE AND PRILOCAINE 25; 25 MG/G; MG/G
CREAM TOPICAL
Qty: 30 G | Refills: 0 | Status: SHIPPED | OUTPATIENT
Start: 2019-08-19

## 2019-08-21 ENCOUNTER — INFUSION (OUTPATIENT)
Dept: INFUSION THERAPY | Facility: HOSPITAL | Age: 59
End: 2019-08-21
Attending: INTERNAL MEDICINE
Payer: COMMERCIAL

## 2019-08-21 ENCOUNTER — LAB VISIT (OUTPATIENT)
Dept: LAB | Facility: HOSPITAL | Age: 59
End: 2019-08-21
Attending: INTERNAL MEDICINE
Payer: COMMERCIAL

## 2019-08-21 VITALS
RESPIRATION RATE: 18 BRPM | SYSTOLIC BLOOD PRESSURE: 109 MMHG | HEART RATE: 79 BPM | DIASTOLIC BLOOD PRESSURE: 63 MMHG | TEMPERATURE: 98 F

## 2019-08-21 DIAGNOSIS — C25.0 CANCER OF HEAD OF PANCREAS: Primary | ICD-10-CM

## 2019-08-21 DIAGNOSIS — G89.3 NEOPLASM RELATED PAIN (ACUTE) (CHRONIC): ICD-10-CM

## 2019-08-21 DIAGNOSIS — C25.0 CANCER OF HEAD OF PANCREAS: ICD-10-CM

## 2019-08-21 DIAGNOSIS — Z00.6 RESEARCH STUDY PATIENT: ICD-10-CM

## 2019-08-21 DIAGNOSIS — D49.0 PANCREAS NEOPLASM: Primary | ICD-10-CM

## 2019-08-21 LAB
ALBUMIN SERPL BCP-MCNC: 3 G/DL (ref 3.5–5.2)
ALP SERPL-CCNC: 81 U/L (ref 55–135)
ALT SERPL W/O P-5'-P-CCNC: 41 U/L (ref 10–44)
ANION GAP SERPL CALC-SCNC: 8 MMOL/L (ref 8–16)
ANISOCYTOSIS BLD QL SMEAR: SLIGHT
AST SERPL-CCNC: 28 U/L (ref 10–40)
BASOPHILS # BLD AUTO: 0.04 K/UL (ref 0–0.2)
BASOPHILS NFR BLD: 1.1 % (ref 0–1.9)
BILIRUB SERPL-MCNC: 0.6 MG/DL (ref 0.1–1)
BUN SERPL-MCNC: 15 MG/DL (ref 6–20)
CALCIUM SERPL-MCNC: 9.9 MG/DL (ref 8.7–10.5)
CHLORIDE SERPL-SCNC: 109 MMOL/L (ref 95–110)
CO2 SERPL-SCNC: 24 MMOL/L (ref 23–29)
CREAT SERPL-MCNC: 0.7 MG/DL (ref 0.5–1.4)
DACRYOCYTES BLD QL SMEAR: ABNORMAL
DIFFERENTIAL METHOD: ABNORMAL
EOSINOPHIL # BLD AUTO: 0.1 K/UL (ref 0–0.5)
EOSINOPHIL NFR BLD: 1.4 % (ref 0–8)
ERYTHROCYTE [DISTWIDTH] IN BLOOD BY AUTOMATED COUNT: 12.8 % (ref 11.5–14.5)
EST. GFR  (AFRICAN AMERICAN): >60 ML/MIN/1.73 M^2
EST. GFR  (NON AFRICAN AMERICAN): >60 ML/MIN/1.73 M^2
GLUCOSE SERPL-MCNC: 107 MG/DL (ref 70–110)
HCT VFR BLD AUTO: 34.6 % (ref 37–48.5)
HGB BLD-MCNC: 10.7 G/DL (ref 12–16)
HYPOCHROMIA BLD QL SMEAR: ABNORMAL
IMM GRANULOCYTES # BLD AUTO: 0.02 K/UL (ref 0–0.04)
IMM GRANULOCYTES NFR BLD AUTO: 0.6 % (ref 0–0.5)
LYMPHOCYTES # BLD AUTO: 0.8 K/UL (ref 1–4.8)
LYMPHOCYTES NFR BLD: 22.8 % (ref 18–48)
MCH RBC QN AUTO: 27.9 PG (ref 27–31)
MCHC RBC AUTO-ENTMCNC: 30.9 G/DL (ref 32–36)
MCV RBC AUTO: 90 FL (ref 82–98)
MONOCYTES # BLD AUTO: 0.5 K/UL (ref 0.3–1)
MONOCYTES NFR BLD: 13.9 % (ref 4–15)
NEUTROPHILS # BLD AUTO: 2.2 K/UL (ref 1.8–7.7)
NEUTROPHILS NFR BLD: 60.2 % (ref 38–73)
NRBC BLD-RTO: 0 /100 WBC
OVALOCYTES BLD QL SMEAR: ABNORMAL
PLATELET # BLD AUTO: 150 K/UL (ref 150–350)
PMV BLD AUTO: 10.4 FL (ref 9.2–12.9)
POIKILOCYTOSIS BLD QL SMEAR: SLIGHT
POLYCHROMASIA BLD QL SMEAR: ABNORMAL
POTASSIUM SERPL-SCNC: 4 MMOL/L (ref 3.5–5.1)
PROT SERPL-MCNC: 6.8 G/DL (ref 6–8.4)
RBC # BLD AUTO: 3.84 M/UL (ref 4–5.4)
SODIUM SERPL-SCNC: 141 MMOL/L (ref 136–145)
WBC # BLD AUTO: 3.59 K/UL (ref 3.9–12.7)

## 2019-08-21 PROCEDURE — 63600175 PHARM REV CODE 636 W HCPCS: Performed by: INTERNAL MEDICINE

## 2019-08-21 PROCEDURE — 36415 COLL VENOUS BLD VENIPUNCTURE: CPT

## 2019-08-21 PROCEDURE — 96417 CHEMO IV INFUS EACH ADDL SEQ: CPT

## 2019-08-21 PROCEDURE — 96413 CHEMO IV INFUSION 1 HR: CPT

## 2019-08-21 PROCEDURE — 96367 TX/PROPH/DG ADDL SEQ IV INF: CPT

## 2019-08-21 PROCEDURE — A4216 STERILE WATER/SALINE, 10 ML: HCPCS | Performed by: INTERNAL MEDICINE

## 2019-08-21 PROCEDURE — 85025 COMPLETE CBC W/AUTO DIFF WBC: CPT

## 2019-08-21 PROCEDURE — 80053 COMPREHEN METABOLIC PANEL: CPT

## 2019-08-21 PROCEDURE — 25000003 PHARM REV CODE 250: Performed by: INTERNAL MEDICINE

## 2019-08-21 RX ORDER — SODIUM CHLORIDE 0.9 % (FLUSH) 0.9 %
10 SYRINGE (ML) INJECTION
Status: DISCONTINUED | OUTPATIENT
Start: 2019-08-21 | End: 2019-08-21 | Stop reason: HOSPADM

## 2019-08-21 RX ORDER — PALONOSETRON 0.05 MG/ML
0.25 INJECTION, SOLUTION INTRAVENOUS
Status: DISCONTINUED | OUTPATIENT
Start: 2019-08-21 | End: 2019-08-21 | Stop reason: SDUPTHER

## 2019-08-21 RX ORDER — HEPARIN 100 UNIT/ML
500 SYRINGE INTRAVENOUS
Status: DISCONTINUED | OUTPATIENT
Start: 2019-08-21 | End: 2019-08-21 | Stop reason: HOSPADM

## 2019-08-21 RX ADMIN — SODIUM CHLORIDE: 9 INJECTION, SOLUTION INTRAVENOUS at 11:08

## 2019-08-21 RX ADMIN — HEPARIN 500 UNITS: 100 SYRINGE at 12:08

## 2019-08-21 RX ADMIN — PACLITAXEL 200 MG: 100 INJECTION, POWDER, LYOPHILIZED, FOR SUSPENSION INTRAVENOUS at 11:08

## 2019-08-21 RX ADMIN — GEMCITABINE HYDROCHLORIDE 1610 MG: 200 INJECTION, SOLUTION INTRAVENOUS at 12:08

## 2019-08-21 RX ADMIN — Medication 10 ML: at 12:08

## 2019-08-21 RX ADMIN — PALONOSETRON HYDROCHLORIDE 0.25 MG: 0.25 INJECTION, SOLUTION INTRAVENOUS at 10:08

## 2019-08-21 NOTE — PLAN OF CARE
Problem: Adult Inpatient Plan of Care  Goal: Plan of Care Review  Tolerated infusion well. Vs stable and discharged to home

## 2019-08-23 NOTE — PROGRESS NOTES
"PANOVA-3: Pivotal, randomized, open-label study of Tumor Treating Fields (TTFields, 150kHz) concomitant with gemcitabine and nab-paclitaxel for front-line treatment of locally-advanced pancreatic adenocarcinoma  Study #: EF-[id number]  Sponsor: [employer]  PI: Eliot Barrett MD  WIRB: [id number]    8/7/2019    Cycle 1    Patient arrived to [facility] for labs, appointment with Dr. Barrett and to start cycle 1 on above study. Patient states her willingness to start on above treatment trial. Patient accompanied by her friend. Patient awake and alert. Mood and affect are appropriate to situation. Patient denies fever, chills, shortness of breath, mouth sores nausea, vomiting and diarrhea. Patient complains of abdominal pain rated a 7 on pain scale. While having her pads placed patient received 1 mg dilaudid IM in clinic per Dr Barrett. Patient's lab work reviewed by Dr. Barrett. Labs out of range = not clinically significant. Per Dr. Barrett no clinically significant findings on physical exam. ECOG 0 per Dr. Barrett. Research RN reviewed and confirmed which con medications patient is taking. See concomitant medication worksheet.    David Chilel and his coworker in with patient doing device teaching. David stated that he will have another [employer] rep. come teach the patient's family tomorrow, Thursday so she will have help in applying device leads. Patient stated she bathes every day and will need to change the leads everyday. David stated he will have "a giant box of supplies" mailed to patient so she can have leads changed daily. Both [employer] reps. assisted patient's friend with loading the car with the supplies. 45 minutes were spent teaching patient about device.    Baseline History:    Abdominal pain: Grade 1: patient reports the pain started 6/1/019. She is currently taking oral pain medications for this. Ongoing. Will monitor.   Insomnia: Grade 1: patient reports this started 6/22/2019 and she is on medication for " this. Ongoing. Will monitor.   Constipation: Grade 2: patient reports this started 7/1/2019 and is on medication for this. Ongoing. Will monitor.   Nausea: Grade 2: patient reports this started on 7/17/2019 and is on medication for this. Ongoing. Will monitor.   Reflux: Garde 2: patient reports this started on 7/21/2019 and is on medication for this. Ongoing. Will monitor.   Allergic rhinitis: Grade 1: patient reports this started on 7/15/2019 and is not taking anything for this. Ongoing. Will monitor.   Former smoker: patient reports smoking 1.5 packs per day for 20 years. She reports quitting on 5/6/1999.     Reviewed with patient and friend chemotherapy agents and pre-meds. Patient repeated back instructions. Patient will return in 1 week, on 8/14/19 for labs and day 8 of chemo. Patient instructed to call research RN or Dr Barrett with any questions or concerns. Both [facility] gave patient their contact info.  All questions answered per Dr Barrett and this research RN.

## 2019-08-29 ENCOUNTER — PATIENT MESSAGE (OUTPATIENT)
Dept: HEMATOLOGY/ONCOLOGY | Facility: CLINIC | Age: 59
End: 2019-08-29

## 2019-08-29 DIAGNOSIS — G89.3 NEOPLASM RELATED PAIN (ACUTE) (CHRONIC): ICD-10-CM

## 2019-08-30 RX ORDER — FENTANYL 25 UG/1
1 PATCH TRANSDERMAL
Qty: 10 PATCH | Refills: 0 | Status: SHIPPED | OUTPATIENT
Start: 2019-08-30 | End: 2019-10-03 | Stop reason: SDUPTHER

## 2019-09-03 ENCOUNTER — PATIENT MESSAGE (OUTPATIENT)
Dept: HEMATOLOGY/ONCOLOGY | Facility: CLINIC | Age: 59
End: 2019-09-03

## 2019-09-04 ENCOUNTER — PATIENT MESSAGE (OUTPATIENT)
Dept: HEMATOLOGY/ONCOLOGY | Facility: CLINIC | Age: 59
End: 2019-09-04

## 2019-09-04 NOTE — PROGRESS NOTES
Subjective:       Patient ID: Quyen Moody    Chief Complaint:  Pancreatic cancer    HPI     Quyen Moody is a 59 y.o. female, to clinic for evaluation and management of locally advanced pancreatic cancer.  Currently, on treatment with Crawford+Abraxane and TTF on PANOVA-3.      Feeling fatigued and general malaise.  Working full time everyday.      ECOG 1.    Oncologic History:     She has had intermittent upper abdominal pain since early June.  She presented to her PCP who originally ordered an US and CT.  US was negative and CT showed some haziness at the pancreatic head.  She saw GI who performed EUS.  On EUS, a 3x4cm pancreatic head mass was seen with possible invasion into the SMA and portal vein.  FNA path s/w pancreatic adenocarcinoma.  CA 19-9 level at outside hospital was >1000 per the patient.  She endorses nausea and inability to tolerate much PO.  She has lost about 10lbs over the last few weeks and is noticing her skin turning yellow.  Denies pruritis.  She is passing gas but has not had a BM in a few days, she attributes this to narcotic use.  She recently started taking stool softeners.  No previous cardiac or stroke history.  Surgical history significant for open appendectomy when she was in her 20s      Review of Systems   Constitutional: Positive for appetite change, fatigue and unexpected weight change. Negative for activity change, chills and fever.   HENT: Negative for congestion, hearing loss, mouth sores, sore throat, tinnitus and voice change.    Eyes: Negative for pain and visual disturbance.   Respiratory: Positive for shortness of breath. Negative for cough and wheezing.    Cardiovascular: Negative for chest pain, palpitations and leg swelling.   Gastrointestinal: Positive for abdominal pain. Negative for constipation, diarrhea, nausea and vomiting.   Endocrine: Negative for cold intolerance and heat intolerance.   Genitourinary: Negative for difficulty urinating, dyspareunia, dysuria,  frequency, menstrual problem, urgency, vaginal bleeding, vaginal discharge and vaginal pain.   Musculoskeletal: Negative for arthralgias, back pain and myalgias.   Skin: Negative for color change, rash and wound.   Allergic/Immunologic: Negative for environmental allergies and food allergies.   Neurological: Negative for weakness, numbness and headaches.   Hematological: Negative for adenopathy. Does not bruise/bleed easily.   Psychiatric/Behavioral: Negative for agitation, confusion, hallucinations and sleep disturbance. The patient is nervous/anxious.    All other systems reviewed and are negative.        Allergies:  Review of patient's allergies indicates:   Allergen Reactions    Sulfa (sulfonamide antibiotics) Swelling and Hives     tongue         Medications:  Current Outpatient Medications   Medication Sig Dispense Refill    ALPRAZolam (XANAX) 0.5 MG tablet TK 1 T PO BID  2    docusate sodium (COLACE) 100 MG capsule Take 100 mg by mouth 2 (two) times daily.      estradiol-norethindrone (ACTIVELLA) 1-0.5 mg per tablet Take 1 tablet by mouth.      fentaNYL (DURAGESIC) 25 mcg/hr Place 1 patch onto the skin every 72 hours. 10 patch 0    HYDROcodone-acetaminophen (NORCO)  mg per tablet   0    HYDROcodone-acetaminophen (NORCO)  mg per tablet Take 1 tablet by mouth every 6 (six) hours as needed for Pain. 60 tablet 0    hydrocortisone 2.5 % cream Apply topically 2 (two) times daily. 28 Tube 1    lidocaine-prilocaine (EMLA) cream Apply to port 45 minutes prior to access. 30 g 0    lipase-protease-amylase (CREON) 36,000-114,000- 180,000 unit CpDR take 1 capsule by mouth 4 times daily 120 capsule 6    morphine (MS CONTIN) 15 MG 12 hr tablet Take 1 tablet (15 mg total) by mouth 2 (two) times daily. 60 tablet 0    multivitamin (THERAGRAN) per tablet Take 1 tablet by mouth once daily.      ondansetron (ZOFRAN) 8 MG tablet Take 1 tablet (8 mg total) by mouth every 8 (eight) hours as needed for  Nausea. 120 tablet 2    oxyCODONE (ROXICODONE) 15 MG Tab Take 1 tablet (15 mg total) by mouth every 4 (four) hours as needed for Pain. 60 tablet 0    polyethylene glycol (GLYCOLAX) 17 gram PwPk Take 1 g by mouth once daily.      ranitidine (ZANTAC) 150 MG tablet Take 150 mg by mouth 2 (two) times daily.       No current facility-administered medications for this visit.        PMH:  Past Medical History:   Diagnosis Date    Allergic rhinitis 3/3/2014    CKD (chronic kidney disease) stage 3, GFR 30-59 ml/min 12/26/2015    Hyperlipidemia 3/3/2014    Pancreas cancer     Skin rash 8/10/2019       PSH:  Past Surgical History:   Procedure Laterality Date    ERCP (ENDOSCOPIC RETROGRADE CHOLANGIOPANCREATOGRAPHY) N/A 7/16/2019    Performed by Chema Harris MD at [facility] ENDO (2ND FLR)    Exporatory lap      [id number]-I-CATH Right 8/12/2019    Performed by Cesar Hallman MD at [facility] OR 2ND FLR       FamHx:  Family History   Problem Relation Age of Onset    Diabetes Mother     Diabetes Father     Diabetes Brother     Heart disease Neg Hx        SocHx:  Social History     Socioeconomic History    Marital status:      Spouse name: Not on file    Number of children: 0    Years of education: Not on file    Highest education level: Not on file   Occupational History    Occupation:      Employer: [employer]   Social Needs    Financial resource strain: Not on file    Food insecurity:     Worry: Not on file     Inability: Not on file    Transportation needs:     Medical: Not on file     Non-medical: Not on file   Tobacco Use    Smoking status: Former Smoker     Start date: 12/11/1999    Smokeless tobacco: Never Used   Substance and Sexual Activity    Alcohol use: Yes     Alcohol/week: 0.6 oz     Types: 1 Glasses of wine per week     Frequency: 4 or more times a week     Drinks per session: 1 or 2     Binge frequency: Never     Comment: last drink a month ago     Drug use: No     Sexual activity: Yes   Lifestyle    Physical activity:     Days per week: Not on file     Minutes per session: Not on file    Stress: Not on file   Relationships    Social connections:     Talks on phone: Not on file     Gets together: Not on file     Attends Hoahaoism service: Not on file     Active member of club or organization: Not on file     Attends meetings of clubs or organizations: Not on file     Relationship status: Not on file   Other Topics Concern    Not on file   Social History Narrative    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      of Cancer in 2016         Objective:      Physical Exam   Constitutional: She is oriented to person, place, and time. She appears well-developed and well-nourished. No distress.   HENT:   Head: Normocephalic and atraumatic.   Mouth/Throat: No oropharyngeal exudate.   Eyes: Right eye exhibits no discharge. Left eye exhibits no discharge. No scleral icterus.   Neck: Normal range of motion. Neck supple. No tracheal deviation present. No thyromegaly present.   Musculoskeletal: Normal range of motion. She exhibits no edema, tenderness or deformity.   Neurological: She is alert and oriented to person, place, and time. No cranial nerve deficit. Coordination normal.   Skin: Skin is warm and dry. No rash noted. She is not diaphoretic. No pallor.   Psychiatric: She has a normal mood and affect. Her behavior is normal. Judgment and thought content normal.         LABS:  WBC   Date Value Ref Range Status   2019 3.59 (L) 3.90 - 12.70 K/uL Final     Hemoglobin   Date Value Ref Range Status   2019 10.7 (L) 12.0 - 16.0 g/dL Final     Hematocrit   Date Value Ref Range Status   2019 34.6 (L) 37.0 - 48.5 % Final     Platelets   Date Value Ref Range Status   2019 150 150 - 350 K/uL Final       Chemistry        Component Value Date/Time     2019 0806    K 4.0 2019 0806     2019 0806    CO2 24 2019 0806    BUN 15 2019  0806    CREATININE 0.7 08/21/2019 0806     08/21/2019 0806        Component Value Date/Time    CALCIUM 9.9 08/21/2019 0806    ALKPHOS 81 08/21/2019 0806    AST 28 08/21/2019 0806    ALT 41 08/21/2019 0806    BILITOT 0.6 08/21/2019 0806    ESTGFRAFRICA >60.0 08/21/2019 0806    EGFRNONAA >60.0 08/21/2019 0806              Assessment:       1. Neoplasm related pain (acute) (chronic)    2. Pancreas neoplasm    3. Research study patient    4. Liver metastases          Plan:         1. Cancer at the head of the pancreas:    Discussed SOC chemo and chemoXRT with FOLFIRINOX vs our PANOVA-3 trial with Caldwell-Abraxane +/- TTFields.  Extensively reviewed the rationale of the study and reviewed her eligibility criteria with the research nurse and discussed case with Dr. Hallman as well.  She is interested in this study, and signed consent with our research nurse.     Here today to con't treatment with Caldwell+Abraxane and TTF on PANOVA-3, but LFTs elevated. Will hold chemo and repeat labs on  Monday, if LFTs continue to rise, will need to repeat imaging and consider stent evaluation.      Repeat CBC, CMP on Monday, RTC per research RN.     The patient agrees with the plan, and all questions have been answered to their satisfaction.      More than 25 mins were spent during this encounter, greater than 50% was spent in direct counseling and/or coordination of care.     Eliot Barrett M.D., M.S., F.A.C.P.  Hematology and Oncology Attending  [facility]

## 2019-09-05 ENCOUNTER — PATIENT MESSAGE (OUTPATIENT)
Dept: HEMATOLOGY/ONCOLOGY | Facility: CLINIC | Age: 59
End: 2019-09-05

## 2019-09-05 ENCOUNTER — OFFICE VISIT (OUTPATIENT)
Dept: HEMATOLOGY/ONCOLOGY | Facility: CLINIC | Age: 59
End: 2019-09-05
Payer: COMMERCIAL

## 2019-09-05 ENCOUNTER — RESEARCH ENCOUNTER (OUTPATIENT)
Dept: RESEARCH | Facility: HOSPITAL | Age: 59
End: 2019-09-05

## 2019-09-05 ENCOUNTER — INFUSION (OUTPATIENT)
Dept: INFUSION THERAPY | Facility: HOSPITAL | Age: 59
End: 2019-09-05
Attending: INTERNAL MEDICINE
Payer: COMMERCIAL

## 2019-09-05 VITALS
OXYGEN SATURATION: 100 % | RESPIRATION RATE: 20 BRPM | HEART RATE: 77 BPM | TEMPERATURE: 98 F | DIASTOLIC BLOOD PRESSURE: 59 MMHG | WEIGHT: 125 LBS | BODY MASS INDEX: 22.15 KG/M2 | SYSTOLIC BLOOD PRESSURE: 113 MMHG | HEIGHT: 63 IN

## 2019-09-05 DIAGNOSIS — D49.0 PANCREAS NEOPLASM: Primary | ICD-10-CM

## 2019-09-05 DIAGNOSIS — D49.0 PANCREAS NEOPLASM: ICD-10-CM

## 2019-09-05 DIAGNOSIS — C25.0 CANCER OF HEAD OF PANCREAS: ICD-10-CM

## 2019-09-05 DIAGNOSIS — C78.7 LIVER METASTASES: ICD-10-CM

## 2019-09-05 DIAGNOSIS — G89.3 NEOPLASM RELATED PAIN (ACUTE) (CHRONIC): Primary | ICD-10-CM

## 2019-09-05 DIAGNOSIS — Z00.6 RESEARCH STUDY PATIENT: ICD-10-CM

## 2019-09-05 LAB
ALBUMIN SERPL BCP-MCNC: 2.9 G/DL (ref 3.5–5.2)
ALP SERPL-CCNC: 671 U/L (ref 55–135)
ALT SERPL W/O P-5'-P-CCNC: 569 U/L (ref 10–44)
ANION GAP SERPL CALC-SCNC: 9 MMOL/L (ref 8–16)
AST SERPL-CCNC: 302 U/L (ref 10–40)
BASOPHILS # BLD AUTO: 0.04 K/UL (ref 0–0.2)
BASOPHILS NFR BLD: 0.4 % (ref 0–1.9)
BILIRUB SERPL-MCNC: 1.6 MG/DL (ref 0.1–1)
BUN SERPL-MCNC: 13 MG/DL (ref 6–20)
CALCIUM SERPL-MCNC: 9 MG/DL (ref 8.7–10.5)
CANCER AG19-9 SERPL-ACNC: 1056 U/ML (ref 2–40)
CHLORIDE SERPL-SCNC: 106 MMOL/L (ref 95–110)
CO2 SERPL-SCNC: 22 MMOL/L (ref 23–29)
CREAT SERPL-MCNC: 0.7 MG/DL (ref 0.5–1.4)
DIFFERENTIAL METHOD: ABNORMAL
EOSINOPHIL # BLD AUTO: 0.2 K/UL (ref 0–0.5)
EOSINOPHIL NFR BLD: 2.4 % (ref 0–8)
ERYTHROCYTE [DISTWIDTH] IN BLOOD BY AUTOMATED COUNT: 14.5 % (ref 11.5–14.5)
EST. GFR  (AFRICAN AMERICAN): >60 ML/MIN/1.73 M^2
EST. GFR  (NON AFRICAN AMERICAN): >60 ML/MIN/1.73 M^2
GLUCOSE SERPL-MCNC: 115 MG/DL (ref 70–110)
HCT VFR BLD AUTO: 34.2 % (ref 37–48.5)
HGB BLD-MCNC: 10.9 G/DL (ref 12–16)
IMM GRANULOCYTES # BLD AUTO: 0.04 K/UL (ref 0–0.04)
IMM GRANULOCYTES NFR BLD AUTO: 0.4 % (ref 0–0.5)
LYMPHOCYTES # BLD AUTO: 0.8 K/UL (ref 1–4.8)
LYMPHOCYTES NFR BLD: 8.5 % (ref 18–48)
MCH RBC QN AUTO: 28.6 PG (ref 27–31)
MCHC RBC AUTO-ENTMCNC: 31.9 G/DL (ref 32–36)
MCV RBC AUTO: 90 FL (ref 82–98)
MONOCYTES # BLD AUTO: 1.6 K/UL (ref 0.3–1)
MONOCYTES NFR BLD: 17 % (ref 4–15)
NEUTROPHILS # BLD AUTO: 6.6 K/UL (ref 1.8–7.7)
NEUTROPHILS NFR BLD: 71.3 % (ref 38–73)
NRBC BLD-RTO: 0 /100 WBC
PLATELET # BLD AUTO: 324 K/UL (ref 150–350)
PMV BLD AUTO: 11 FL (ref 9.2–12.9)
POTASSIUM SERPL-SCNC: 4.2 MMOL/L (ref 3.5–5.1)
PROT SERPL-MCNC: 6.9 G/DL (ref 6–8.4)
RBC # BLD AUTO: 3.81 M/UL (ref 4–5.4)
SODIUM SERPL-SCNC: 137 MMOL/L (ref 136–145)
WBC # BLD AUTO: 9.19 K/UL (ref 3.9–12.7)

## 2019-09-05 PROCEDURE — 63600175 PHARM REV CODE 636 W HCPCS: Performed by: INTERNAL MEDICINE

## 2019-09-05 PROCEDURE — 3008F BODY MASS INDEX DOCD: CPT | Mod: CPTII,S$GLB,, | Performed by: INTERNAL MEDICINE

## 2019-09-05 PROCEDURE — A4216 STERILE WATER/SALINE, 10 ML: HCPCS | Performed by: INTERNAL MEDICINE

## 2019-09-05 PROCEDURE — 36591 DRAW BLOOD OFF VENOUS DEVICE: CPT

## 2019-09-05 PROCEDURE — 80053 COMPREHEN METABOLIC PANEL: CPT

## 2019-09-05 PROCEDURE — 25000003 PHARM REV CODE 250: Performed by: INTERNAL MEDICINE

## 2019-09-05 PROCEDURE — 85025 COMPLETE CBC W/AUTO DIFF WBC: CPT

## 2019-09-05 PROCEDURE — 99999 PR PBB SHADOW E&M-EST. PATIENT-LVL IV: ICD-10-PCS | Mod: PBBFAC,,,

## 2019-09-05 PROCEDURE — 99214 PR OFFICE/OUTPT VISIT, EST, LEVL IV, 30-39 MIN: ICD-10-PCS | Mod: S$GLB,,, | Performed by: INTERNAL MEDICINE

## 2019-09-05 PROCEDURE — 86301 IMMUNOASSAY TUMOR CA 19-9: CPT

## 2019-09-05 PROCEDURE — 99214 OFFICE O/P EST MOD 30 MIN: CPT | Mod: S$GLB,,, | Performed by: INTERNAL MEDICINE

## 2019-09-05 PROCEDURE — 3008F PR BODY MASS INDEX (BMI) DOCUMENTED: ICD-10-PCS | Mod: CPTII,S$GLB,, | Performed by: INTERNAL MEDICINE

## 2019-09-05 PROCEDURE — 99999 PR PBB SHADOW E&M-EST. PATIENT-LVL IV: CPT | Mod: PBBFAC,,,

## 2019-09-05 RX ORDER — HEPARIN 100 UNIT/ML
500 SYRINGE INTRAVENOUS
Status: CANCELLED | OUTPATIENT
Start: 2019-09-05

## 2019-09-05 RX ORDER — SODIUM CHLORIDE 0.9 % (FLUSH) 0.9 %
10 SYRINGE (ML) INJECTION
Status: CANCELLED | OUTPATIENT
Start: 2019-09-05

## 2019-09-05 RX ORDER — SODIUM CHLORIDE 0.9 % (FLUSH) 0.9 %
10 SYRINGE (ML) INJECTION
Status: COMPLETED | OUTPATIENT
Start: 2019-09-05 | End: 2019-09-05

## 2019-09-05 RX ORDER — HEPARIN 100 UNIT/ML
500 SYRINGE INTRAVENOUS
Status: COMPLETED | OUTPATIENT
Start: 2019-09-05 | End: 2019-09-05

## 2019-09-05 RX ADMIN — Medication 10 ML: at 08:09

## 2019-09-05 RX ADMIN — HEPARIN SODIUM (PORCINE) LOCK FLUSH IV SOLN 100 UNIT/ML 500 UNITS: 100 SOLUTION at 08:09

## 2019-09-06 NOTE — PROGRESS NOTES
"PANOVA-3: Pivotal, randomized, open-label study of Tumor Treating Fields (TTFields, 150kHz) concomitant with gemcitabine and nab-paclitaxel for front-line treatment of locally-advanced pancreatic adenocarcinoma  Study #: EF-27  Sponsor: [employer]  PI: Eliot Barrett MD  WIRB: [id number]    9/5/2019    Cycle 2-Hold    Patient arrived to [facility] for labs, appointment with Dr. Barrett and to continue on above study. Patient states her willingness to continue on above treatment trial. Patient awake and alert. Mood and affect are appropriate to situation. Patient denies fever, chills, shortness of breath, mouth sores nausea, vomiting and diarrhea. Patient abdominal "has greatly improved." Pain rated a 0 on pain scale today.  Patient's lab work reviewed by Dr. Barrett. Labs out of range = clinically significant.Patient will have chemotherapy held today for elevated LFTs. Per Dr. Barrett no clinically significant findings on physical exam. ECOG 1 per Dr. Barrett. Research RN reviewed and confirmed which con medications patient is taking. See concomitant medication worksheet.    Baseline History:    Abdominal pain: Grade 1: patient reports the pain started 6/1/019. She is currently taking oral pain medications for this. Ongoing. Will monitor.   Insomnia: Grade 1: patient reports this started 6/22/2019 and she is on medication for this. Ongoing. Will monitor.   Constipation: Grade 2: patient reports this started 7/1/2019 and is on medication for this. Ongoing. Will monitor.   Nausea: Grade 2: patient reports this started on 7/17/2019 and is on medication for this. Ongoing. Will monitor.   Reflux: Garde 2: patient reports this started on 7/21/2019 and is on medication for this. Ongoing. Will monitor.   Allergic rhinitis: Grade 1: patient reports this started on 7/15/2019 and is not taking anything for this. Ongoing. Will monitor.   Former smoker: patient reports smoking 1.5 packs per day for 20 years. She reports " quitting on 5/6/1999.     Most up to date adverse events log is in shadow research chart.    Anemia: Grade 1: AE ongoing. Will monitor.  Elevated AST: Grade 3: AE ongoing. Will monitor.  Elevated ALT: Grade 3: AE ongoing. Will monitor.  Alkaline Phosphatase Increased: Grade 2: AE ongoing. Will monitor.  Increased Bilirubin: Grade 2: AE ongoing. Will monitor.      Reviewed with patient and friend chemotherapy agents and pre-meds. Patient repeated back instructions. Patient will return on Monday 9/9/19 for labs. Patient instructed to call research RN or Dr Barrett with any questions or concerns. All questions answered per Dr Barrett and this research RN.

## 2019-09-09 ENCOUNTER — LAB VISIT (OUTPATIENT)
Dept: LAB | Facility: HOSPITAL | Age: 59
End: 2019-09-09
Attending: INTERNAL MEDICINE
Payer: COMMERCIAL

## 2019-09-09 ENCOUNTER — PATIENT MESSAGE (OUTPATIENT)
Dept: HEMATOLOGY/ONCOLOGY | Facility: CLINIC | Age: 59
End: 2019-09-09

## 2019-09-09 DIAGNOSIS — C25.0 CANCER OF HEAD OF PANCREAS: ICD-10-CM

## 2019-09-09 LAB
ALBUMIN SERPL BCP-MCNC: 3.3 G/DL (ref 3.5–5.2)
ALP SERPL-CCNC: 576 U/L (ref 55–135)
ALT SERPL W/O P-5'-P-CCNC: 222 U/L (ref 10–44)
ANION GAP SERPL CALC-SCNC: 10 MMOL/L (ref 8–16)
AST SERPL-CCNC: 67 U/L (ref 10–40)
BILIRUB SERPL-MCNC: 0.6 MG/DL (ref 0.1–1)
BUN SERPL-MCNC: 18 MG/DL (ref 6–20)
CALCIUM SERPL-MCNC: 9.8 MG/DL (ref 8.7–10.5)
CHLORIDE SERPL-SCNC: 106 MMOL/L (ref 95–110)
CO2 SERPL-SCNC: 23 MMOL/L (ref 23–29)
CREAT SERPL-MCNC: 0.8 MG/DL (ref 0.5–1.4)
ERYTHROCYTE [DISTWIDTH] IN BLOOD BY AUTOMATED COUNT: 15.1 % (ref 11.5–14.5)
EST. GFR  (AFRICAN AMERICAN): >60 ML/MIN/1.73 M^2
EST. GFR  (NON AFRICAN AMERICAN): >60 ML/MIN/1.73 M^2
GLUCOSE SERPL-MCNC: 104 MG/DL (ref 70–110)
HCT VFR BLD AUTO: 37.9 % (ref 37–48.5)
HGB BLD-MCNC: 11.7 G/DL (ref 12–16)
IMM GRANULOCYTES # BLD AUTO: 0.06 K/UL (ref 0–0.04)
MCH RBC QN AUTO: 27.9 PG (ref 27–31)
MCHC RBC AUTO-ENTMCNC: 30.9 G/DL (ref 32–36)
MCV RBC AUTO: 91 FL (ref 82–98)
NEUTROPHILS # BLD AUTO: 5.9 K/UL (ref 1.8–7.7)
PLATELET # BLD AUTO: 541 K/UL (ref 150–350)
PMV BLD AUTO: 10.5 FL (ref 9.2–12.9)
POTASSIUM SERPL-SCNC: 4.8 MMOL/L (ref 3.5–5.1)
PROT SERPL-MCNC: 7.6 G/DL (ref 6–8.4)
RBC # BLD AUTO: 4.19 M/UL (ref 4–5.4)
SODIUM SERPL-SCNC: 139 MMOL/L (ref 136–145)
WBC # BLD AUTO: 8.95 K/UL (ref 3.9–12.7)

## 2019-09-09 PROCEDURE — 36415 COLL VENOUS BLD VENIPUNCTURE: CPT

## 2019-09-09 PROCEDURE — 85027 COMPLETE CBC AUTOMATED: CPT

## 2019-09-09 PROCEDURE — 80053 COMPREHEN METABOLIC PANEL: CPT

## 2019-09-12 ENCOUNTER — RESEARCH ENCOUNTER (OUTPATIENT)
Dept: RESEARCH | Facility: HOSPITAL | Age: 59
End: 2019-09-12

## 2019-09-12 ENCOUNTER — INFUSION (OUTPATIENT)
Dept: INFUSION THERAPY | Facility: HOSPITAL | Age: 59
End: 2019-09-12
Attending: INTERNAL MEDICINE
Payer: COMMERCIAL

## 2019-09-12 VITALS
DIASTOLIC BLOOD PRESSURE: 59 MMHG | HEART RATE: 70 BPM | SYSTOLIC BLOOD PRESSURE: 109 MMHG | HEIGHT: 63 IN | WEIGHT: 125 LBS | TEMPERATURE: 98 F | RESPIRATION RATE: 18 BRPM | BODY MASS INDEX: 22.15 KG/M2

## 2019-09-12 DIAGNOSIS — C25.0 CANCER OF HEAD OF PANCREAS: ICD-10-CM

## 2019-09-12 DIAGNOSIS — Z00.6 RESEARCH STUDY PATIENT: ICD-10-CM

## 2019-09-12 DIAGNOSIS — D49.0 PANCREAS NEOPLASM: Primary | ICD-10-CM

## 2019-09-12 DIAGNOSIS — C25.0 CANCER OF HEAD OF PANCREAS: Primary | ICD-10-CM

## 2019-09-12 DIAGNOSIS — G89.3 NEOPLASM RELATED PAIN (ACUTE) (CHRONIC): ICD-10-CM

## 2019-09-12 LAB
ALBUMIN SERPL BCP-MCNC: 3.1 G/DL (ref 3.5–5.2)
ALP SERPL-CCNC: 493 U/L (ref 55–135)
ALT SERPL W/O P-5'-P-CCNC: 122 U/L (ref 10–44)
ANION GAP SERPL CALC-SCNC: 8 MMOL/L (ref 8–16)
AST SERPL-CCNC: 44 U/L (ref 10–40)
BASOPHILS # BLD AUTO: 0.1 K/UL (ref 0–0.2)
BASOPHILS NFR BLD: 1.1 % (ref 0–1.9)
BILIRUB SERPL-MCNC: 0.4 MG/DL (ref 0.1–1)
BUN SERPL-MCNC: 17 MG/DL (ref 6–20)
CALCIUM SERPL-MCNC: 9.7 MG/DL (ref 8.7–10.5)
CHLORIDE SERPL-SCNC: 106 MMOL/L (ref 95–110)
CO2 SERPL-SCNC: 23 MMOL/L (ref 23–29)
CREAT SERPL-MCNC: 0.7 MG/DL (ref 0.5–1.4)
DIFFERENTIAL METHOD: ABNORMAL
EOSINOPHIL # BLD AUTO: 0.4 K/UL (ref 0–0.5)
EOSINOPHIL NFR BLD: 3.8 % (ref 0–8)
ERYTHROCYTE [DISTWIDTH] IN BLOOD BY AUTOMATED COUNT: 15.2 % (ref 11.5–14.5)
EST. GFR  (AFRICAN AMERICAN): >60 ML/MIN/1.73 M^2
EST. GFR  (NON AFRICAN AMERICAN): >60 ML/MIN/1.73 M^2
GLUCOSE SERPL-MCNC: 92 MG/DL (ref 70–110)
HCT VFR BLD AUTO: 36.8 % (ref 37–48.5)
HGB BLD-MCNC: 11.3 G/DL (ref 12–16)
IMM GRANULOCYTES # BLD AUTO: 0.05 K/UL (ref 0–0.04)
IMM GRANULOCYTES NFR BLD AUTO: 0.5 % (ref 0–0.5)
LYMPHOCYTES # BLD AUTO: 1.1 K/UL (ref 1–4.8)
LYMPHOCYTES NFR BLD: 12.5 % (ref 18–48)
MCH RBC QN AUTO: 27.9 PG (ref 27–31)
MCHC RBC AUTO-ENTMCNC: 30.7 G/DL (ref 32–36)
MCV RBC AUTO: 91 FL (ref 82–98)
MONOCYTES # BLD AUTO: 1.3 K/UL (ref 0.3–1)
MONOCYTES NFR BLD: 13.9 % (ref 4–15)
NEUTROPHILS # BLD AUTO: 6.2 K/UL (ref 1.8–7.7)
NEUTROPHILS NFR BLD: 68.2 % (ref 38–73)
NRBC BLD-RTO: 0 /100 WBC
PLATELET # BLD AUTO: 504 K/UL (ref 150–350)
PMV BLD AUTO: 10.9 FL (ref 9.2–12.9)
POTASSIUM SERPL-SCNC: 4.9 MMOL/L (ref 3.5–5.1)
PROT SERPL-MCNC: 7 G/DL (ref 6–8.4)
RBC # BLD AUTO: 4.05 M/UL (ref 4–5.4)
SODIUM SERPL-SCNC: 137 MMOL/L (ref 136–145)
WBC # BLD AUTO: 9.13 K/UL (ref 3.9–12.7)

## 2019-09-12 PROCEDURE — A4216 STERILE WATER/SALINE, 10 ML: HCPCS | Performed by: INTERNAL MEDICINE

## 2019-09-12 PROCEDURE — 85025 COMPLETE CBC W/AUTO DIFF WBC: CPT

## 2019-09-12 PROCEDURE — 63600175 PHARM REV CODE 636 W HCPCS: Performed by: INTERNAL MEDICINE

## 2019-09-12 PROCEDURE — 25000003 PHARM REV CODE 250: Performed by: INTERNAL MEDICINE

## 2019-09-12 PROCEDURE — 96413 CHEMO IV INFUSION 1 HR: CPT

## 2019-09-12 PROCEDURE — 80053 COMPREHEN METABOLIC PANEL: CPT

## 2019-09-12 PROCEDURE — 96417 CHEMO IV INFUS EACH ADDL SEQ: CPT

## 2019-09-12 PROCEDURE — 96367 TX/PROPH/DG ADDL SEQ IV INF: CPT

## 2019-09-12 RX ORDER — HEPARIN 100 UNIT/ML
500 SYRINGE INTRAVENOUS
Status: CANCELLED | OUTPATIENT
Start: 2019-09-12

## 2019-09-12 RX ORDER — SODIUM CHLORIDE 0.9 % (FLUSH) 0.9 %
10 SYRINGE (ML) INJECTION
Status: COMPLETED | OUTPATIENT
Start: 2019-09-12 | End: 2019-09-12

## 2019-09-12 RX ORDER — SODIUM CHLORIDE 0.9 % (FLUSH) 0.9 %
10 SYRINGE (ML) INJECTION
Status: CANCELLED | OUTPATIENT
Start: 2019-09-12

## 2019-09-12 RX ORDER — PALONOSETRON 0.05 MG/ML
0.25 INJECTION, SOLUTION INTRAVENOUS
Status: CANCELLED | OUTPATIENT
Start: 2019-09-12

## 2019-09-12 RX ORDER — SODIUM CHLORIDE 0.9 % (FLUSH) 0.9 %
10 SYRINGE (ML) INJECTION
Status: DISCONTINUED | OUTPATIENT
Start: 2019-09-12 | End: 2019-09-12 | Stop reason: HOSPADM

## 2019-09-12 RX ORDER — HEPARIN 100 UNIT/ML
500 SYRINGE INTRAVENOUS
Status: COMPLETED | OUTPATIENT
Start: 2019-09-12 | End: 2019-09-12

## 2019-09-12 RX ORDER — HEPARIN 100 UNIT/ML
500 SYRINGE INTRAVENOUS
Status: DISCONTINUED | OUTPATIENT
Start: 2019-09-12 | End: 2019-09-12 | Stop reason: HOSPADM

## 2019-09-12 RX ADMIN — Medication 10 ML: at 12:09

## 2019-09-12 RX ADMIN — PALONOSETRON 0.25 MG: 0.25 INJECTION, SOLUTION INTRAVENOUS at 10:09

## 2019-09-12 RX ADMIN — HEPARIN SODIUM (PORCINE) LOCK FLUSH IV SOLN 100 UNIT/ML 500 UNITS: 100 SOLUTION at 12:09

## 2019-09-12 RX ADMIN — PACLITAXEL 200 MG: 100 INJECTION, POWDER, LYOPHILIZED, FOR SUSPENSION INTRAVENOUS at 11:09

## 2019-09-12 RX ADMIN — GEMCITABINE HYDROCHLORIDE 1610 MG: 200 INJECTION, POWDER, LYOPHILIZED, FOR SOLUTION INTRAVENOUS at 11:09

## 2019-09-12 RX ADMIN — SODIUM CHLORIDE: 0.9 INJECTION, SOLUTION INTRAVENOUS at 10:09

## 2019-09-12 RX ADMIN — Medication 10 ML: at 08:09

## 2019-09-12 RX ADMIN — HEPARIN SODIUM (PORCINE) LOCK FLUSH IV SOLN 100 UNIT/ML 500 UNITS: 100 SOLUTION at 08:09

## 2019-09-12 NOTE — PROGRESS NOTES
"PANOVA-3: Pivotal, randomized, open-label study of Tumor Treating Fields (TTFields, 150kHz) concomitant with gemcitabine and nab-paclitaxel for front-line treatment of locally-advanced pancreatic adenocarcinoma  Study #: EF-27  Sponsor: [employer]  PI: Eliot Barrett MD  WIRB: [id number]    9/12/2019    Cycle 2    Patient arrived to [facility] for labs and to continue on above study. Patient states her willingness to continue on above treatment trial. Patient awake and alert. Mood and affect are appropriate to situation. Patient denies fever, chills, shortness of breath, mouth sores nausea, vomiting and diarrhea. Patient abdominal "has greatly improved." Pain rated a 0 on pain scale today.  Patient's lab work reviewed by Dr. Park. Labs out of range = not clinically significant. Per Dr Park, liver enzymes are improving and patient is able to restart chemotherapy at standard doses. Research RN reviewed and confirmed which con medications patient is taking. See concomitant medication worksheet.    Baseline History:    Abdominal pain: Grade 1: patient reports the pain started 6/1/019. She is currently taking oral pain medications for this. Ongoing. Will monitor.   Insomnia: Grade 1: patient reports this started 6/22/2019 and she is on medication for this. Ongoing. Will monitor.   Constipation: Grade 2: patient reports this started 7/1/2019 and is on medication for this. Ongoing. Will monitor.   Nausea: Grade 2: patient reports this started on 7/17/2019 and is on medication for this. Ongoing. Will monitor.   Reflux: Garde 2: patient reports this started on 7/21/2019 and is on medication for this. Ongoing. Will monitor.   Allergic rhinitis: Grade 1: patient reports this started on 7/15/2019 and is not taking anything for this. Ongoing. Will monitor.   Former smoker: patient reports smoking 1.5 packs per day for 20 years. She reports quitting on 5/6/1999.     Most up to date adverse events log is in shadow research " chart.    Anemia: Grade 1: AE ongoing. Will monitor.  Elevated AST: Grade 1: AE ongoing. Will monitor.  Elevated ALT: Grade 1: AE ongoing. Will monitor.  Alkaline Phosphatase Increased: Grade 2: AE ongoing. Will monitor.  Increased Bilirubin: Grade 2: AE resolved.      Reviewed with patient chemotherapy agents and pre-meds. Patient repeated back instructions. Patient instructed to call research RN or Dr Barrett with any questions or concerns. All questions answered to patient's satisfaction.

## 2019-09-12 NOTE — PLAN OF CARE
Problem: Adult Inpatient Plan of Care  Goal: Plan of Care Review  Outcome: Ongoing (interventions implemented as appropriate)  Pt tolerated Panova 3 trail today. Met with Research RN April prior to infusion. NAD. Port flushed + blood return present, flushed. Hep lock and deaccesed. delcined AVS. Uses my Ochnser. Discharged home. Ambulated independently.

## 2019-09-13 ENCOUNTER — PATIENT MESSAGE (OUTPATIENT)
Dept: HEMATOLOGY/ONCOLOGY | Facility: CLINIC | Age: 59
End: 2019-09-13

## 2019-09-16 ENCOUNTER — PATIENT MESSAGE (OUTPATIENT)
Dept: HEMATOLOGY/ONCOLOGY | Facility: CLINIC | Age: 59
End: 2019-09-16

## 2019-09-16 DIAGNOSIS — C25.0 CANCER OF HEAD OF PANCREAS: ICD-10-CM

## 2019-09-16 DIAGNOSIS — G89.3 NEOPLASM RELATED PAIN (ACUTE) (CHRONIC): ICD-10-CM

## 2019-09-16 RX ORDER — HYDROCODONE BITARTRATE AND ACETAMINOPHEN 10; 325 MG/1; MG/1
1 TABLET ORAL EVERY 6 HOURS PRN
Qty: 100 TABLET | Refills: 0 | Status: SHIPPED | OUTPATIENT
Start: 2019-09-16 | End: 2019-11-25

## 2019-09-18 ENCOUNTER — PATIENT MESSAGE (OUTPATIENT)
Dept: HEMATOLOGY/ONCOLOGY | Facility: CLINIC | Age: 59
End: 2019-09-18

## 2019-09-19 ENCOUNTER — INFUSION (OUTPATIENT)
Dept: INFUSION THERAPY | Facility: HOSPITAL | Age: 59
End: 2019-09-19
Attending: INTERNAL MEDICINE
Payer: COMMERCIAL

## 2019-09-19 ENCOUNTER — RESEARCH ENCOUNTER (OUTPATIENT)
Dept: RESEARCH | Facility: HOSPITAL | Age: 59
End: 2019-09-19

## 2019-09-19 DIAGNOSIS — C25.0 CANCER OF HEAD OF PANCREAS: Primary | ICD-10-CM

## 2019-09-19 DIAGNOSIS — T45.1X5A CHEMOTHERAPY INDUCED NAUSEA AND VOMITING: Primary | ICD-10-CM

## 2019-09-19 DIAGNOSIS — Z00.6 EXAMINATION OF PARTICIPANT IN CLINICAL TRIAL: ICD-10-CM

## 2019-09-19 DIAGNOSIS — C25.9 PANCREATIC CANCER: Primary | ICD-10-CM

## 2019-09-19 DIAGNOSIS — G89.3 NEOPLASM RELATED PAIN (ACUTE) (CHRONIC): ICD-10-CM

## 2019-09-19 DIAGNOSIS — Z00.6 RESEARCH STUDY PATIENT: ICD-10-CM

## 2019-09-19 DIAGNOSIS — D49.0 PANCREAS NEOPLASM: ICD-10-CM

## 2019-09-19 DIAGNOSIS — R11.2 CHEMOTHERAPY INDUCED NAUSEA AND VOMITING: Primary | ICD-10-CM

## 2019-09-19 LAB
ALBUMIN SERPL BCP-MCNC: 2.6 G/DL (ref 3.5–5.2)
ALP SERPL-CCNC: 637 U/L (ref 55–135)
ALT SERPL W/O P-5'-P-CCNC: 202 U/L (ref 10–44)
ANION GAP SERPL CALC-SCNC: 5 MMOL/L (ref 8–16)
AST SERPL-CCNC: 126 U/L (ref 10–40)
BASOPHILS # BLD AUTO: 0.09 K/UL (ref 0–0.2)
BASOPHILS NFR BLD: 1.3 % (ref 0–1.9)
BILIRUB SERPL-MCNC: 0.8 MG/DL (ref 0.1–1)
BUN SERPL-MCNC: 17 MG/DL (ref 6–20)
CALCIUM SERPL-MCNC: 8.8 MG/DL (ref 8.7–10.5)
CANCER AG19-9 SERPL-ACNC: 620 U/ML (ref 2–40)
CHLORIDE SERPL-SCNC: 108 MMOL/L (ref 95–110)
CO2 SERPL-SCNC: 24 MMOL/L (ref 23–29)
CREAT SERPL-MCNC: 0.7 MG/DL (ref 0.5–1.4)
DIFFERENTIAL METHOD: ABNORMAL
EOSINOPHIL # BLD AUTO: 0.2 K/UL (ref 0–0.5)
EOSINOPHIL NFR BLD: 3.4 % (ref 0–8)
ERYTHROCYTE [DISTWIDTH] IN BLOOD BY AUTOMATED COUNT: 15.1 % (ref 11.5–14.5)
EST. GFR  (AFRICAN AMERICAN): >60 ML/MIN/1.73 M^2
EST. GFR  (NON AFRICAN AMERICAN): >60 ML/MIN/1.73 M^2
GGT SERPL-CCNC: 902 U/L (ref 8–55)
GLUCOSE SERPL-MCNC: 127 MG/DL (ref 70–110)
HCT VFR BLD AUTO: 33.4 % (ref 37–48.5)
HGB BLD-MCNC: 10.5 G/DL (ref 12–16)
IMM GRANULOCYTES # BLD AUTO: 0.04 K/UL (ref 0–0.04)
IMM GRANULOCYTES NFR BLD AUTO: 0.6 % (ref 0–0.5)
LDH SERPL L TO P-CCNC: 221 U/L (ref 110–260)
LYMPHOCYTES # BLD AUTO: 1.1 K/UL (ref 1–4.8)
LYMPHOCYTES NFR BLD: 16.1 % (ref 18–48)
MCH RBC QN AUTO: 28.3 PG (ref 27–31)
MCHC RBC AUTO-ENTMCNC: 31.4 G/DL (ref 32–36)
MCV RBC AUTO: 90 FL (ref 82–98)
MONOCYTES # BLD AUTO: 0.7 K/UL (ref 0.3–1)
MONOCYTES NFR BLD: 10.7 % (ref 4–15)
NEUTROPHILS # BLD AUTO: 4.6 K/UL (ref 1.8–7.7)
NEUTROPHILS NFR BLD: 67.9 % (ref 38–73)
NRBC BLD-RTO: 0 /100 WBC
PLATELET # BLD AUTO: 211 K/UL (ref 150–350)
PMV BLD AUTO: 11.7 FL (ref 9.2–12.9)
POTASSIUM SERPL-SCNC: 4.3 MMOL/L (ref 3.5–5.1)
PROT SERPL-MCNC: 6.6 G/DL (ref 6–8.4)
RBC # BLD AUTO: 3.71 M/UL (ref 4–5.4)
SODIUM SERPL-SCNC: 137 MMOL/L (ref 136–145)
WBC # BLD AUTO: 6.76 K/UL (ref 3.9–12.7)

## 2019-09-19 PROCEDURE — A4216 STERILE WATER/SALINE, 10 ML: HCPCS | Performed by: INTERNAL MEDICINE

## 2019-09-19 PROCEDURE — 80053 COMPREHEN METABOLIC PANEL: CPT

## 2019-09-19 PROCEDURE — 82977 ASSAY OF GGT: CPT

## 2019-09-19 PROCEDURE — 25000003 PHARM REV CODE 250: Performed by: INTERNAL MEDICINE

## 2019-09-19 PROCEDURE — 36415 COLL VENOUS BLD VENIPUNCTURE: CPT

## 2019-09-19 PROCEDURE — 85025 COMPLETE CBC W/AUTO DIFF WBC: CPT

## 2019-09-19 PROCEDURE — 83615 LACTATE (LD) (LDH) ENZYME: CPT

## 2019-09-19 PROCEDURE — 86301 IMMUNOASSAY TUMOR CA 19-9: CPT

## 2019-09-19 PROCEDURE — 63600175 PHARM REV CODE 636 W HCPCS: Performed by: INTERNAL MEDICINE

## 2019-09-19 PROCEDURE — 36591 DRAW BLOOD OFF VENOUS DEVICE: CPT

## 2019-09-19 RX ORDER — HEPARIN 100 UNIT/ML
500 SYRINGE INTRAVENOUS
Status: CANCELLED | OUTPATIENT
Start: 2019-09-19

## 2019-09-19 RX ORDER — SODIUM CHLORIDE 0.9 % (FLUSH) 0.9 %
10 SYRINGE (ML) INJECTION
Status: CANCELLED | OUTPATIENT
Start: 2019-09-19

## 2019-09-19 RX ORDER — PROMETHAZINE HYDROCHLORIDE 25 MG/1
25 TABLET ORAL EVERY 6 HOURS PRN
Qty: 60 TABLET | Refills: 1 | Status: SHIPPED | OUTPATIENT
Start: 2019-09-19 | End: 2019-09-26

## 2019-09-19 RX ORDER — LORAZEPAM 0.5 MG/1
0.5 TABLET ORAL EVERY 6 HOURS PRN
Qty: 60 TABLET | Refills: 0 | Status: SHIPPED | OUTPATIENT
Start: 2019-09-19 | End: 2020-01-27 | Stop reason: SDUPTHER

## 2019-09-19 RX ORDER — SODIUM CHLORIDE 0.9 % (FLUSH) 0.9 %
10 SYRINGE (ML) INJECTION
Status: COMPLETED | OUTPATIENT
Start: 2019-09-19 | End: 2019-09-19

## 2019-09-19 RX ORDER — HEPARIN 100 UNIT/ML
500 SYRINGE INTRAVENOUS
Status: COMPLETED | OUTPATIENT
Start: 2019-09-19 | End: 2019-09-19

## 2019-09-19 RX ADMIN — HEPARIN SODIUM (PORCINE) LOCK FLUSH IV SOLN 100 UNIT/ML 500 UNITS: 100 SOLUTION at 08:09

## 2019-09-19 RX ADMIN — Medication 10 ML: at 08:09

## 2019-09-19 NOTE — NURSING
Arrived for labs via port. Denies any complaints. Tolerated well. DC to chemo for infusion. Verbalized understanding of s/s to report to MD.

## 2019-09-19 NOTE — PROGRESS NOTES
"PANOVA-3: Pivotal, randomized, open-label study of Tumor Treating Fields (TTFields, 150kHz) concomitant with gemcitabine and nab-paclitaxel for front-line treatment of locally-advanced pancreatic adenocarcinoma  Study #: EF-27  Sponsor: [employer]  PI: Eliot Barrett MD  WIRB: [id number]    Cycle 2 D8 Delayed   9/19/19    Patient arrived [facility] for Cycle 2 Day 8 of the above named trial. Pt is AAO x's 3 with appropriate mood and affect. Patient states her willingness to continue on above treatment trial. Pt states that on the night after her chemo, she was vomiting. She said that this lasted about 2 days. Pt also states that she had "sharp pains that would come and go and affect her in random spots." Pt states that this stopped about 3 days after chemo.  Patient denies fever, chills, shortness of breath, mouth sores, and diarrhea. Inspected pt's abdomen and lower back where the pads are. Skin around the pads are is dry and intact. Skin under pads on back is slightly pink, but it is dry and intact along with the rest of her skin. Pt is applying lotion and aloe when the pads are not on her.  Labs were reviewed by Karen Hightower NP. Liver enzymes are high again and C2 D8 will be delayed times one week. Pt to repeat labs on Monday and then RTC in one week for labs, see Nnea and potentially receive C2D8 chemo. Pt to also switch Xanax to Ativan and to add Phenergan to help with vomiting after chemo.  Pt is in agreement with this plan. Pt knows to call me or Dr. Barrett for any issues or concerns. All of her questions were answered to her satisfaction.    Baseline History:    Abdominal pain: Grade 1: patient reports the pain started 6/1/019. She is currently taking oral pain medications for this. Ongoing. Will monitor.   Insomnia: Grade 1: patient reports this started 6/22/2019 and she is on medication for this. Ongoing. Will monitor.   Constipation: Grade 2: patient reports this started 7/1/2019 and is on " medication for this. Ongoing. Will monitor.   Nausea: Grade 2: patient reports this started on 7/17/2019 and is on medication for this. Ongoing. Will monitor.   Reflux: Grade 2: patient reports this started on 7/21/2019 and is on medication for this. Ongoing. Will monitor.   Allergic rhinitis: Grade 1: patient reports this started on 7/15/2019 and is not taking anything for this. Ongoing. Will monitor.   Former smoker: patient reports smoking 1.5 packs per day for 20 years. She reports quitting on 5/6/1999.     Most up to date adverse events log is in shadow research chart.    Ongoing AE's  Anemia: Grade 1:  Elevated AST: Grade 2: .  Elevated ALT: Grade 2:   Alkaline Phosphatase Increased: Grade 2: .  GGT Increased, Grade 3:   Hypoalbuminemia, Grade 2:  Vomiting, intermittent, Grade 1:

## 2019-09-23 ENCOUNTER — LAB VISIT (OUTPATIENT)
Dept: LAB | Facility: HOSPITAL | Age: 59
End: 2019-09-23
Payer: COMMERCIAL

## 2019-09-23 DIAGNOSIS — C25.0 CANCER OF HEAD OF PANCREAS: ICD-10-CM

## 2019-09-23 LAB
ALBUMIN SERPL BCP-MCNC: 3 G/DL (ref 3.5–5.2)
ALP SERPL-CCNC: 634 U/L (ref 55–135)
ALT SERPL W/O P-5'-P-CCNC: 160 U/L (ref 10–44)
ANION GAP SERPL CALC-SCNC: 6 MMOL/L (ref 8–16)
AST SERPL-CCNC: 80 U/L (ref 10–40)
BILIRUB SERPL-MCNC: 0.7 MG/DL (ref 0.1–1)
BUN SERPL-MCNC: 20 MG/DL (ref 6–20)
CALCIUM SERPL-MCNC: 9 MG/DL (ref 8.7–10.5)
CHLORIDE SERPL-SCNC: 108 MMOL/L (ref 95–110)
CO2 SERPL-SCNC: 25 MMOL/L (ref 23–29)
CREAT SERPL-MCNC: 0.7 MG/DL (ref 0.5–1.4)
EST. GFR  (AFRICAN AMERICAN): >60 ML/MIN/1.73 M^2
EST. GFR  (NON AFRICAN AMERICAN): >60 ML/MIN/1.73 M^2
GLUCOSE SERPL-MCNC: 77 MG/DL (ref 70–110)
POTASSIUM SERPL-SCNC: 4.6 MMOL/L (ref 3.5–5.1)
PROT SERPL-MCNC: 7 G/DL (ref 6–8.4)
SODIUM SERPL-SCNC: 139 MMOL/L (ref 136–145)

## 2019-09-23 PROCEDURE — 80053 COMPREHEN METABOLIC PANEL: CPT

## 2019-09-23 PROCEDURE — 36415 COLL VENOUS BLD VENIPUNCTURE: CPT

## 2019-09-26 ENCOUNTER — RESEARCH ENCOUNTER (OUTPATIENT)
Dept: RESEARCH | Facility: HOSPITAL | Age: 59
End: 2019-09-26

## 2019-09-26 ENCOUNTER — INFUSION (OUTPATIENT)
Dept: INFUSION THERAPY | Facility: HOSPITAL | Age: 59
End: 2019-09-26
Attending: INTERNAL MEDICINE
Payer: COMMERCIAL

## 2019-09-26 ENCOUNTER — LAB VISIT (OUTPATIENT)
Dept: LAB | Facility: HOSPITAL | Age: 59
End: 2019-09-26
Payer: COMMERCIAL

## 2019-09-26 ENCOUNTER — OFFICE VISIT (OUTPATIENT)
Dept: HEMATOLOGY/ONCOLOGY | Facility: CLINIC | Age: 59
End: 2019-09-26
Payer: COMMERCIAL

## 2019-09-26 VITALS
SYSTOLIC BLOOD PRESSURE: 114 MMHG | HEIGHT: 63 IN | WEIGHT: 125 LBS | OXYGEN SATURATION: 100 % | DIASTOLIC BLOOD PRESSURE: 56 MMHG | TEMPERATURE: 99 F | BODY MASS INDEX: 22.15 KG/M2 | HEART RATE: 74 BPM | RESPIRATION RATE: 16 BRPM

## 2019-09-26 VITALS
HEART RATE: 75 BPM | TEMPERATURE: 99 F | RESPIRATION RATE: 18 BRPM | SYSTOLIC BLOOD PRESSURE: 118 MMHG | DIASTOLIC BLOOD PRESSURE: 67 MMHG

## 2019-09-26 DIAGNOSIS — G89.3 NEOPLASM RELATED PAIN (ACUTE) (CHRONIC): ICD-10-CM

## 2019-09-26 DIAGNOSIS — C25.0 CANCER OF HEAD OF PANCREAS: ICD-10-CM

## 2019-09-26 DIAGNOSIS — C25.0 CANCER OF HEAD OF PANCREAS: Primary | ICD-10-CM

## 2019-09-26 DIAGNOSIS — D49.0 PANCREAS NEOPLASM: ICD-10-CM

## 2019-09-26 DIAGNOSIS — Z00.6 RESEARCH STUDY PATIENT: ICD-10-CM

## 2019-09-26 DIAGNOSIS — R11.2 CHEMOTHERAPY INDUCED NAUSEA AND VOMITING: ICD-10-CM

## 2019-09-26 DIAGNOSIS — T45.1X5A CHEMOTHERAPY INDUCED NAUSEA AND VOMITING: ICD-10-CM

## 2019-09-26 LAB
ALBUMIN SERPL BCP-MCNC: 3.1 G/DL (ref 3.5–5.2)
ALP SERPL-CCNC: 685 U/L (ref 55–135)
ALT SERPL W/O P-5'-P-CCNC: 136 U/L (ref 10–44)
ANION GAP SERPL CALC-SCNC: 7 MMOL/L (ref 8–16)
AST SERPL-CCNC: 70 U/L (ref 10–40)
BASOPHILS # BLD AUTO: 0.05 K/UL (ref 0–0.2)
BASOPHILS NFR BLD: 0.6 % (ref 0–1.9)
BILIRUB SERPL-MCNC: 0.7 MG/DL (ref 0.1–1)
BUN SERPL-MCNC: 22 MG/DL (ref 6–20)
CALCIUM SERPL-MCNC: 9.3 MG/DL (ref 8.7–10.5)
CHLORIDE SERPL-SCNC: 109 MMOL/L (ref 95–110)
CO2 SERPL-SCNC: 25 MMOL/L (ref 23–29)
CREAT SERPL-MCNC: 0.8 MG/DL (ref 0.5–1.4)
DIFFERENTIAL METHOD: ABNORMAL
EOSINOPHIL # BLD AUTO: 0.5 K/UL (ref 0–0.5)
EOSINOPHIL NFR BLD: 5.6 % (ref 0–8)
ERYTHROCYTE [DISTWIDTH] IN BLOOD BY AUTOMATED COUNT: 16.4 % (ref 11.5–14.5)
EST. GFR  (AFRICAN AMERICAN): >60 ML/MIN/1.73 M^2
EST. GFR  (NON AFRICAN AMERICAN): >60 ML/MIN/1.73 M^2
GGT SERPL-CCNC: 1031 U/L (ref 8–55)
GLUCOSE SERPL-MCNC: 93 MG/DL (ref 70–110)
HCT VFR BLD AUTO: 38.3 % (ref 37–48.5)
HGB BLD-MCNC: 11.9 G/DL (ref 12–16)
IMM GRANULOCYTES # BLD AUTO: 0.02 K/UL (ref 0–0.04)
IMM GRANULOCYTES NFR BLD AUTO: 0.2 % (ref 0–0.5)
LDH SERPL L TO P-CCNC: 193 U/L (ref 110–260)
LYMPHOCYTES # BLD AUTO: 1.2 K/UL (ref 1–4.8)
LYMPHOCYTES NFR BLD: 13.6 % (ref 18–48)
MCH RBC QN AUTO: 28.5 PG (ref 27–31)
MCHC RBC AUTO-ENTMCNC: 31.1 G/DL (ref 32–36)
MCV RBC AUTO: 92 FL (ref 82–98)
MONOCYTES # BLD AUTO: 1.1 K/UL (ref 0.3–1)
MONOCYTES NFR BLD: 12.2 % (ref 4–15)
NEUTROPHILS # BLD AUTO: 5.9 K/UL (ref 1.8–7.7)
NEUTROPHILS NFR BLD: 67.8 % (ref 38–73)
NRBC BLD-RTO: 0 /100 WBC
PLATELET # BLD AUTO: 285 K/UL (ref 150–350)
PMV BLD AUTO: 10.7 FL (ref 9.2–12.9)
POTASSIUM SERPL-SCNC: 4.7 MMOL/L (ref 3.5–5.1)
PROT SERPL-MCNC: 7.2 G/DL (ref 6–8.4)
RBC # BLD AUTO: 4.18 M/UL (ref 4–5.4)
SODIUM SERPL-SCNC: 141 MMOL/L (ref 136–145)
WBC # BLD AUTO: 8.74 K/UL (ref 3.9–12.7)

## 2019-09-26 PROCEDURE — 63600175 PHARM REV CODE 636 W HCPCS: Mod: JG | Performed by: INTERNAL MEDICINE

## 2019-09-26 PROCEDURE — 99999 PR PBB SHADOW E&M-EST. PATIENT-LVL IV: ICD-10-PCS | Mod: PBBFAC,,,

## 2019-09-26 PROCEDURE — 82977 ASSAY OF GGT: CPT

## 2019-09-26 PROCEDURE — 99215 PR OFFICE/OUTPT VISIT, EST, LEVL V, 40-54 MIN: ICD-10-PCS | Mod: S$GLB,,, | Performed by: INTERNAL MEDICINE

## 2019-09-26 PROCEDURE — 36415 COLL VENOUS BLD VENIPUNCTURE: CPT

## 2019-09-26 PROCEDURE — 96417 CHEMO IV INFUS EACH ADDL SEQ: CPT

## 2019-09-26 PROCEDURE — 96413 CHEMO IV INFUSION 1 HR: CPT

## 2019-09-26 PROCEDURE — 80053 COMPREHEN METABOLIC PANEL: CPT

## 2019-09-26 PROCEDURE — 85025 COMPLETE CBC W/AUTO DIFF WBC: CPT

## 2019-09-26 PROCEDURE — 3008F BODY MASS INDEX DOCD: CPT | Mod: CPTII,S$GLB,, | Performed by: INTERNAL MEDICINE

## 2019-09-26 PROCEDURE — 96367 TX/PROPH/DG ADDL SEQ IV INF: CPT

## 2019-09-26 PROCEDURE — 99999 PR PBB SHADOW E&M-EST. PATIENT-LVL IV: CPT | Mod: PBBFAC,,,

## 2019-09-26 PROCEDURE — 83615 LACTATE (LD) (LDH) ENZYME: CPT

## 2019-09-26 PROCEDURE — 3008F PR BODY MASS INDEX (BMI) DOCUMENTED: ICD-10-PCS | Mod: CPTII,S$GLB,, | Performed by: INTERNAL MEDICINE

## 2019-09-26 PROCEDURE — 99215 OFFICE O/P EST HI 40 MIN: CPT | Mod: S$GLB,,, | Performed by: INTERNAL MEDICINE

## 2019-09-26 RX ORDER — PALONOSETRON 0.05 MG/ML
0.25 INJECTION, SOLUTION INTRAVENOUS
Status: DISCONTINUED | OUTPATIENT
Start: 2019-09-26 | End: 2019-09-26

## 2019-09-26 RX ORDER — SODIUM CHLORIDE 0.9 % (FLUSH) 0.9 %
10 SYRINGE (ML) INJECTION
Status: CANCELLED | OUTPATIENT
Start: 2019-10-03

## 2019-09-26 RX ORDER — SODIUM CHLORIDE 0.9 % (FLUSH) 0.9 %
10 SYRINGE (ML) INJECTION
Status: CANCELLED | OUTPATIENT
Start: 2019-09-26

## 2019-09-26 RX ORDER — PALONOSETRON 0.05 MG/ML
0.25 INJECTION, SOLUTION INTRAVENOUS
Status: CANCELLED | OUTPATIENT
Start: 2019-09-26

## 2019-09-26 RX ORDER — HEPARIN 100 UNIT/ML
500 SYRINGE INTRAVENOUS
Status: CANCELLED | OUTPATIENT
Start: 2019-09-26

## 2019-09-26 RX ORDER — HEPARIN 100 UNIT/ML
500 SYRINGE INTRAVENOUS
Status: CANCELLED | OUTPATIENT
Start: 2019-10-03

## 2019-09-26 RX ORDER — HEPARIN 100 UNIT/ML
500 SYRINGE INTRAVENOUS
Status: DISCONTINUED | OUTPATIENT
Start: 2019-09-26 | End: 2019-09-26 | Stop reason: HOSPADM

## 2019-09-26 RX ORDER — SODIUM CHLORIDE 0.9 % (FLUSH) 0.9 %
10 SYRINGE (ML) INJECTION
Status: DISCONTINUED | OUTPATIENT
Start: 2019-09-26 | End: 2019-09-26 | Stop reason: HOSPADM

## 2019-09-26 RX ORDER — PALONOSETRON 0.05 MG/ML
0.25 INJECTION, SOLUTION INTRAVENOUS
Status: CANCELLED | OUTPATIENT
Start: 2019-10-03

## 2019-09-26 RX ADMIN — GEMCITABINE HYDROCHLORIDE 1290 MG: 200 INJECTION, SOLUTION INTRAVENOUS at 11:09

## 2019-09-26 RX ADMIN — PACLITAXEL 160 MG: 100 INJECTION, POWDER, LYOPHILIZED, FOR SUSPENSION INTRAVENOUS at 11:09

## 2019-09-26 RX ADMIN — PALONOSETRON HYDROCHLORIDE 0.25 MG: 0.25 INJECTION INTRAVENOUS at 10:09

## 2019-09-26 NOTE — PROGRESS NOTES
Subjective:       Patient ID: Quyen Moody    Chief Complaint:  Pancreatic cancer    HPI     Quyen Moody is a 59 y.o. female, to clinic for evaluation and management of locally advanced pancreatic cancer.  Currently, on treatment with [place]+Abraxane and TTF on PANOVA-3.      Feeling relatively well.  Working full time several days a week.      ECOG 1.    Oncologic History:     She has had intermittent upper abdominal pain since early June.  She presented to her PCP who originally ordered an US and CT.  US was negative and CT showed some haziness at the pancreatic head.  She saw GI who performed EUS.  On EUS, a 3x4cm pancreatic head mass was seen with possible invasion into the SMA and portal vein.  FNA path s/w pancreatic adenocarcinoma.  CA 19-9 level at outside hospital was >1000 per the patient.  She endorses nausea and inability to tolerate much PO.  She has lost about 10lbs over the last few weeks and is noticing her skin turning yellow.  Denies pruritis.  She is passing gas but has not had a BM in a few days, she attributes this to narcotic use.  She recently started taking stool softeners.  No previous cardiac or stroke history.  Surgical history significant for open appendectomy when she was in her 20s      Review of Systems   Constitutional: Positive for appetite change, fatigue and unexpected weight change. Negative for activity change, chills and fever.   HENT: Negative for congestion, hearing loss, mouth sores, sore throat, tinnitus and voice change.    Eyes: Negative for pain and visual disturbance.   Respiratory: Positive for shortness of breath. Negative for cough and wheezing.    Cardiovascular: Negative for chest pain, palpitations and leg swelling.   Gastrointestinal: Positive for abdominal pain. Negative for constipation, diarrhea, nausea and vomiting.   Endocrine: Negative for cold intolerance and heat intolerance.   Genitourinary: Negative for difficulty urinating, dyspareunia, dysuria,  frequency, menstrual problem, urgency, vaginal bleeding, vaginal discharge and vaginal pain.   Musculoskeletal: Negative for arthralgias, back pain and myalgias.   Skin: Negative for color change, rash and wound.   Allergic/Immunologic: Negative for environmental allergies and food allergies.   Neurological: Negative for weakness, numbness and headaches.   Hematological: Negative for adenopathy. Does not bruise/bleed easily.   Psychiatric/Behavioral: Negative for agitation, confusion, hallucinations and sleep disturbance. The patient is nervous/anxious.    All other systems reviewed and are negative.        Allergies:  Review of patient's allergies indicates:   Allergen Reactions    Sulfa (sulfonamide antibiotics) Swelling and Hives     tongue         Medications:  Current Outpatient Medications   Medication Sig Dispense Refill    docusate sodium (COLACE) 100 MG capsule Take 100 mg by mouth 2 (two) times daily.      estradiol-norethindrone (ACTIVELLA) 1-0.5 mg per tablet Take 1 tablet by mouth.      fentaNYL (DURAGESIC) 25 mcg/hr Place 1 patch onto the skin every 72 hours. 10 patch 0    HYDROcodone-acetaminophen (NORCO)  mg per tablet Take 1 tablet by mouth every 6 (six) hours as needed for Pain. 100 tablet 0    hydrocortisone 2.5 % cream Apply topically 2 (two) times daily. 28 Tube 1    lidocaine-prilocaine (EMLA) cream Apply to port 45 minutes prior to access. 30 g 0    multivitamin (THERAGRAN) per tablet Take 1 tablet by mouth once daily.      polyethylene glycol (GLYCOLAX) 17 gram PwPk Take 1 g by mouth once daily.      ranitidine (ZANTAC) 150 MG tablet Take 150 mg by mouth 2 (two) times daily.      lipase-protease-amylase (CREON) 36,000-114,000- 180,000 unit CpDR take 1 capsule by mouth 4 times daily 120 capsule 6    LORazepam (ATIVAN) 0.5 MG tablet Take 1 tablet (0.5 mg total) by mouth every 6 (six) hours as needed for Anxiety (nausea). (Patient not taking: Reported on 9/26/2019) 60 tablet 0     ondansetron (ZOFRAN) 8 MG tablet Take 1 tablet (8 mg total) by mouth every 8 (eight) hours as needed for Nausea. (Patient not taking: Reported on 9/26/2019) 120 tablet 2    oxyCODONE (ROXICODONE) 15 MG Tab Take 1 tablet (15 mg total) by mouth every 4 (four) hours as needed for Pain. 60 tablet 0    promethazine (PHENERGAN) 25 MG tablet Take 1 tablet (25 mg total) by mouth every 6 (six) hours as needed for Nausea. (Patient not taking: Reported on 9/26/2019) 60 tablet 1     No current facility-administered medications for this visit.        PMH:  Past Medical History:   Diagnosis Date    Allergic rhinitis 3/3/2014    CKD (chronic kidney disease) stage 3, GFR 30-59 ml/min 12/26/2015    Hyperlipidemia 3/3/2014    Pancreas cancer     Skin rash 8/10/2019       PSH:  Past Surgical History:   Procedure Laterality Date    ERCP N/A 7/16/2019    Procedure: ERCP (ENDOSCOPIC RETROGRADE CHOLANGIOPANCREATOGRAPHY);  Surgeon: Chema Harris MD;  Location: [facility] ([address] [place] [address]);  Service: Endoscopy;  Laterality: N/A;    Exporatory lap      INSERTION OF TUNNELED CENTRAL VENOUS CATHETER (CVC) WITH SUBCUTANEOUS PORT Right 8/12/2019    Procedure: [id number]-[id number];  Surgeon: Cesar Hallman MD;  Location: [facility] OR [address] [place] [address];  Service: General;  Laterality: Right;  right IJ       FamHx:  Family History   Problem Relation Age of Onset    Diabetes Mother     Diabetes Father     Diabetes Brother     Heart disease Neg Hx        SocHx:  Social History     Socioeconomic History    Marital status:      Spouse name: Not on file    Number of children: 0    Years of education: Not on file    Highest education level: Not on file   Occupational History    Occupation:      Employer: [employer]   Social Needs    Financial resource strain: Not on file    Food insecurity:     Worry: Not on file     Inability: Not on file    Transportation needs:     Medical: Not on file     Non-medical: Not on file   Tobacco  Use    Smoking status: Former Smoker     Start date: 1999    Smokeless tobacco: Never Used   Substance and Sexual Activity    Alcohol use: Yes     Alcohol/week: 1.0 standard drinks     Types: 1 Glasses of wine per week     Frequency: 4 or more times a week     Drinks per session: 1 or 2     Binge frequency: Never     Comment: last drink a month ago     Drug use: No    Sexual activity: Yes   Lifestyle    Physical activity:     Days per week: Not on file     Minutes per session: Not on file    Stress: Not on file   Relationships    Social connections:     Talks on phone: Not on file     Gets together: Not on file     Attends Taoism service: Not on file     Active member of club or organization: Not on file     Attends meetings of clubs or organizations: Not on file     Relationship status: Not on file   Other Topics Concern    Not on file   Social History Narrative    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      of Cancer in 2016         Objective:      Physical Exam   Constitutional: She is oriented to person, place, and time. She appears well-developed and well-nourished. No distress.   HENT:   Head: Normocephalic and atraumatic.   Mouth/Throat: No oropharyngeal exudate.   Eyes: Right eye exhibits no discharge. Left eye exhibits no discharge. No scleral icterus.   Neck: Normal range of motion. Neck supple. No tracheal deviation present. No thyromegaly present.   Musculoskeletal: Normal range of motion. She exhibits no edema, tenderness or deformity.   Neurological: She is alert and oriented to person, place, and time. No cranial nerve deficit. Coordination normal.   Skin: Skin is warm and dry. No rash noted. She is not diaphoretic. No pallor.   Psychiatric: She has a normal mood and affect. Her behavior is normal. Judgment and thought content normal.         LABS:  WBC   Date Value Ref Range Status   2019 8.74 3.90 - 12.70 K/uL Final     Hemoglobin   Date Value Ref Range Status    09/26/2019 11.9 (L) 12.0 - 16.0 g/dL Final     Hematocrit   Date Value Ref Range Status   09/26/2019 38.3 37.0 - 48.5 % Final     Platelets   Date Value Ref Range Status   09/26/2019 285 150 - 350 K/uL Final       Chemistry        Component Value Date/Time     09/26/2019 0813    K 4.7 09/26/2019 0813     09/26/2019 0813    CO2 25 09/26/2019 0813    BUN 22 (H) 09/26/2019 0813    CREATININE 0.8 09/26/2019 0813    GLU 93 09/26/2019 0813        Component Value Date/Time    CALCIUM 9.3 09/26/2019 0813    ALKPHOS 685 (H) 09/26/2019 0813    AST 70 (H) 09/26/2019 0813     (H) 09/26/2019 0813    BILITOT 0.7 09/26/2019 0813    ESTGFRAFRICA >60.0 09/26/2019 0813    EGFRNONAA >60.0 09/26/2019 0813            Assessment:       1. Cancer of head of pancreas    2. Neoplasm related pain (acute) (chronic)    3. Chemotherapy induced nausea and vomiting    4. Pancreas neoplasm    5. Research study patient            Plan:         1. Cancer at the head of the pancreas:    Discussed SOC chemo and chemoXRT with FOLFIRINOX vs our PANOVA-3 trial with [place]-Abraxane +/- TTFields.  Extensively reviewed the rationale of the study and reviewed her eligibility criteria with the research nurse and discussed case with Dr. Hallman as well.  She is interested in this study, and signed consent with our research nurse.     Here today to con't treatment with [place]+Abraxane and TTF on PANOVA-3, LFTs elevated, but improved. Will con't chemo today, but will dose reduce [place] to 800 and Abraxane to 100 per pt request due to chemo related AEs.      RTC per research RN.     The patient agrees with the plan, and all questions have been answered to their satisfaction.      More than 45 mins were spent during this encounter, greater than 50% was spent in direct counseling and/or coordination of care.     Eliot Barrett M.D., M.S., F.A.C.P.  Hematology and Oncology Attending  [facility]

## 2019-09-26 NOTE — PROGRESS NOTES
PANOVA-3: Pivotal, randomized, open-label study of Tumor Treating Fields (TTFields, 150kHz) concomitant with gemcitabine and nab-paclitaxel for front-line treatment of locally-advanced pancreatic adenocarcinoma  Study #: [id number]  Sponsor: [employer]  PI: Eliot Barrett MD  WIRB: [id number]    Cycle 2 D8 9/26/19    Patient arrived to [facility] for Cycle 2 Day 8 of the above named trial. Pt is AAO x's 3 with appropriate mood and affect. Patient states her willingness to continue on above treatment trial. Pt had local labs done. Pt was seen and examined by Dr. Barrett. Labs also reviewed by Dr. Barrett. Pt states that she is feeling good.  Patient denies n/v, constipation, fever, chills, shortness of breath, mouth sores, and diarrhea. Inspected pt's abdomen and lower back where the pads are. Skin around the pads are is dry and intact. Skin under pads on back is slightly pink, but it is dry and intact along with the rest of her skin. Pt is applying lotion and aloe when the pads are not on her. Labs were reviewed by Dr. Barrett, pt pt is ok to receive treatment since her bilirubin is not elevated. Pt's regimen will be dose reduced to pt feeling so sick after her last treatment. Gemzar at 800mg/m2 and Abraxane at 100mg/m2.  Pt is having scans on Monday the 30th and will then RTC on Oct 3rd for labs, see Karen, then C2D15 chemo. Pt is in agreement with this plan.  Pt knows to call me or Dr. Barrett for any issues or concerns. All of her questions were answered to her satisfaction by myself and Dr. Barrett.     Baseline History:    Abdominal pain: Grade 1: patient reports the pain started 6/1/019. She is currently taking oral pain medications for this. Ongoing. Will monitor.   Insomnia: Grade 1: patient reports this started 6/22/2019 and she is on medication for this. Ongoing. Will monitor.   Constipation: Grade 2: patient reports this started 7/1/2019 and is on medication for this. Ongoing. Will monitor.   Nausea: Grade  2: patient reports this started on 7/17/2019 and is on medication for this. Ongoing. Will monitor.   Reflux: Grade 2: patient reports this started on 7/21/2019 and is on medication for this. Ongoing. Will monitor.   Allergic rhinitis: Grade 1: patient reports this started on 7/15/2019 and is not taking anything for this. Ongoing. Will monitor.   Former smoker: patient reports smoking 1.5 packs per day for 20 years. She reports quitting on 5/6/1999.     Most up to date adverse events log is in shadow research chart.    Ongoing AE's  Anemia: Grade 1:  Elevated AST: Grade 1: .  Elevated ALT: Grade 2:   Alkaline Phosphatase Increased: Grade 3: .  GGT Increased, Grade 3:   Hypoalbuminemia, Grade 1:

## 2019-09-30 ENCOUNTER — TELEPHONE (OUTPATIENT)
Dept: HEMATOLOGY/ONCOLOGY | Facility: CLINIC | Age: 59
End: 2019-09-30

## 2019-09-30 ENCOUNTER — HOSPITAL ENCOUNTER (OUTPATIENT)
Dept: RADIOLOGY | Facility: HOSPITAL | Age: 59
Discharge: HOME | End: 2019-09-30
Attending: INTERNAL MEDICINE
Payer: COMMERCIAL

## 2019-09-30 DIAGNOSIS — D49.0 PANCREAS NEOPLASM: ICD-10-CM

## 2019-09-30 PROCEDURE — 74177 CT CHEST ABDOMEN PELVIS WITH CONTRAST (XPD): ICD-10-PCS | Mod: 26,,, | Performed by: INTERNAL MEDICINE

## 2019-09-30 PROCEDURE — 71260 CT THORAX DX C+: CPT | Mod: 26,,, | Performed by: INTERNAL MEDICINE

## 2019-09-30 PROCEDURE — 25500020 PHARM REV CODE 255: Performed by: INTERNAL MEDICINE

## 2019-09-30 PROCEDURE — 74177 CT ABD & PELVIS W/CONTRAST: CPT | Mod: 26,,, | Performed by: INTERNAL MEDICINE

## 2019-09-30 PROCEDURE — 74177 CT ABD & PELVIS W/CONTRAST: CPT | Mod: TC

## 2019-09-30 PROCEDURE — 71260 CT CHEST ABDOMEN PELVIS WITH CONTRAST (XPD): ICD-10-PCS | Mod: 26,,, | Performed by: INTERNAL MEDICINE

## 2019-09-30 RX ADMIN — IOHEXOL 75 ML: 350 INJECTION, SOLUTION INTRAVENOUS at 09:09

## 2019-09-30 NOTE — TELEPHONE ENCOUNTER
----- Message from Margi Acevedo sent at 9/30/2019  2:04 PM CDT -----  Contact: Pt   Type:  Patient Returning Call    Who Called Pt   Who Left Message for Patient: Dr Barrett   Does the patient know what this is regarding?: Pt not sure   Would the patient rather a call back or a response via [facility]? callback  Best Call Back Number [phone]  Additional Information:  Thank You  SAURABH Acevedo Patient Access Rep- Hematology/Oncology

## 2019-10-02 NOTE — PROGRESS NOTES
Subjective:       Patient ID: Quyen Moody    Chief Complaint:  Pancreatic cancer    HPI     Quyen Moody is a 59 y.o. female, patient of Dr. Barrett, to clinic for follow up and cycle 2, day 15 on PANOVA-3 trial. She is currently receiving [place]+Abraxane and TTF. Patient having difficulty with battery working. She unhooked them last so and removed the abdominal pads this morning. Working with the sponsor to get this fixed today. Pain is controlled with the fentanyl patch. Nausea improved with dose reduction during the last infusion. Appetite and weight improving.     Working two days a week. ECOG 0.    Oncologic History:  She has had intermittent upper abdominal pain since early June.  She presented to her PCP who originally ordered an US and CT.  US was negative and CT showed some haziness at the pancreatic head.  She saw GI who performed EUS.  On EUS, a 3x4cm pancreatic head mass was seen with possible invasion into the SMA and portal vein.  FNA path s/w pancreatic adenocarcinoma.  CA 19-9 level at outside hospital was >1000 per the patient.  She endorses nausea and inability to tolerate much PO.  She has lost about 10lbs over the last few weeks and is noticing her skin turning yellow.  Denies pruritis.  She is passing gas but has not had a BM in a few days, she attributes this to narcotic use.  She recently started taking stool softeners.  No previous cardiac or stroke history.  Surgical history significant for open appendectomy when she was in her 20s      Review of Systems   Constitutional: Positive for fatigue. Negative for activity change, appetite change, chills, fever and unexpected weight change (improving).   HENT: Negative for congestion, hearing loss, mouth sores, sore throat, tinnitus and voice change.    Eyes: Negative for pain and visual disturbance.   Respiratory: Positive for shortness of breath. Negative for cough and wheezing.    Cardiovascular: Negative for chest pain, palpitations and leg  swelling.   Gastrointestinal: Positive for abdominal pain. Negative for constipation, diarrhea, nausea and vomiting.   Endocrine: Negative for cold intolerance and heat intolerance.   Genitourinary: Negative for difficulty urinating, dyspareunia, dysuria, frequency, menstrual problem, urgency, vaginal bleeding, vaginal discharge and vaginal pain.   Musculoskeletal: Negative for arthralgias, back pain and myalgias.   Skin: Positive for rash. Negative for color change and wound.   Allergic/Immunologic: Negative for environmental allergies and food allergies.   Neurological: Negative for weakness, numbness and headaches.   Hematological: Negative for adenopathy. Does not bruise/bleed easily.   Psychiatric/Behavioral: Negative for agitation, confusion, hallucinations and sleep disturbance. The patient is nervous/anxious.    All other systems reviewed and are negative.        Allergies:  Review of patient's allergies indicates:   Allergen Reactions    Sulfa (sulfonamide antibiotics) Swelling and Hives     tongue         Medications:  Current Outpatient Medications   Medication Sig Dispense Refill    docusate sodium (COLACE) 100 MG capsule Take 100 mg by mouth 2 (two) times daily.      estradiol-norethindrone (ACTIVELLA) 1-0.5 mg per tablet Take 1 tablet by mouth.      fentaNYL (DURAGESIC) 25 mcg/hr Place 1 patch onto the skin every 72 hours. 10 patch 0    HYDROcodone-acetaminophen (NORCO)  mg per tablet Take 1 tablet by mouth every 6 (six) hours as needed for Pain. 100 tablet 0    hydrocortisone 2.5 % cream Apply topically 2 (two) times daily. 28 Tube 1    lidocaine-prilocaine (EMLA) cream Apply to port 45 minutes prior to access. 30 g 0    lipase-protease-amylase (CREON) 36,000-114,000- 180,000 unit CpDR take 1 capsule by mouth 4 times daily 120 capsule 6    LORazepam (ATIVAN) 0.5 MG tablet Take 1 tablet (0.5 mg total) by mouth every 6 (six) hours as needed for Anxiety (nausea). 60 tablet 0     multivitamin (THERAGRAN) per tablet Take 1 tablet by mouth once daily.      ondansetron (ZOFRAN) 8 MG tablet Take 1 tablet (8 mg total) by mouth every 8 (eight) hours as needed for Nausea. 120 tablet 2    oxyCODONE (ROXICODONE) 15 MG Tab Take 1 tablet (15 mg total) by mouth every 4 (four) hours as needed for Pain. 60 tablet 0    polyethylene glycol (GLYCOLAX) 17 gram PwPk Take 1 g by mouth once daily.      ranitidine (ZANTAC) 150 MG tablet Take 150 mg by mouth 2 (two) times daily.       No current facility-administered medications for this visit.        PMH:  Past Medical History:   Diagnosis Date    Allergic rhinitis 3/3/2014    CKD (chronic kidney disease) stage 3, GFR 30-59 ml/min 12/26/2015    Hyperlipidemia 3/3/2014    Pancreas cancer     Skin rash 8/10/2019       PSH:  Past Surgical History:   Procedure Laterality Date    ERCP N/A 7/16/2019    Procedure: ERCP (ENDOSCOPIC RETROGRADE CHOLANGIOPANCREATOGRAPHY);  Surgeon: Chema Harris MD;  Location: [facility] ([address] [place] [address]);  Service: Endoscopy;  Laterality: N/A;    Exporatory lap      INSERTION OF TUNNELED CENTRAL VENOUS CATHETER (CVC) WITH SUBCUTANEOUS PORT Right 8/12/2019    Procedure: [id number];  Surgeon: Cesar Hallman MD;  Location: [facility] OR [address] [place] [address];  Service: General;  Laterality: Right;  right IJ       FamHx:  Family History   Problem Relation Age of Onset    Diabetes Mother     Diabetes Father     Diabetes Brother     Heart disease Neg Hx        SocHx:  Social History     Socioeconomic History    Marital status:      Spouse name: Not on file    Number of children: 0    Years of education: Not on file    Highest education level: Not on file   Occupational History    Occupation:      Employer: [employer]   Social Needs    Financial resource strain: Not on file    Food insecurity:     Worry: Not on file     Inability: Not on file    Transportation needs:     Medical: Not on file     Non-medical: Not on  file   Tobacco Use    Smoking status: Former Smoker     Start date: 1999    Smokeless tobacco: Never Used   Substance and Sexual Activity    Alcohol use: Yes     Alcohol/week: 1.0 standard drinks     Types: 1 Glasses of wine per week     Frequency: 4 or more times a week     Drinks per session: 1 or 2     Binge frequency: Never     Comment: last drink a month ago     Drug use: No    Sexual activity: Yes   Lifestyle    Physical activity:     Days per week: Not on file     Minutes per session: Not on file    Stress: Not on file   Relationships    Social connections:     Talks on phone: Not on file     Gets together: Not on file     Attends Druze service: Not on file     Active member of club or organization: Not on file     Attends meetings of clubs or organizations: Not on file     Relationship status: Not on file   Other Topics Concern    Not on file   Social History Narrative    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      of Cancer in 2016         Objective:       Vitals:    10/03/19 1006   BP: (!) 142/67   Pulse: 79   Resp: 18   Temp: 98.3 °F (36.8 °C)       Physical Exam   Constitutional: She is oriented to person, place, and time. She appears well-developed and well-nourished. No distress.   HENT:   Head: Normocephalic and atraumatic.   Nose: Nose normal.   Mouth/Throat: Oropharynx is clear and moist. No oropharyngeal exudate.   Eyes: Pupils are equal, round, and reactive to light. Conjunctivae and EOM are normal. Right eye exhibits no discharge. Left eye exhibits no discharge. No scleral icterus.   Neck: Normal range of motion. Neck supple. No tracheal deviation present. No thyromegaly present.   Cardiovascular: Normal rate, regular rhythm, normal heart sounds and normal pulses.   No swelling to bilateral lower extremities.    Pulmonary/Chest: Effort normal and breath sounds normal. She has no wheezes. She has no rales.   Abdominal: Soft. Bowel sounds are normal. She exhibits no  distension. There is no tenderness. There is no guarding.   Musculoskeletal: Normal range of motion. She exhibits no edema, tenderness or deformity.   No spinal or paraspinal tenderness to palpation.       Lymphadenopathy:     She has no cervical adenopathy.   Neurological: She is alert and oriented to person, place, and time. No cranial nerve deficit. Coordination normal.   Skin: Skin is warm, dry and intact. Rash noted. She is not diaphoretic. No erythema. No pallor.   Psychiatric: She has a normal mood and affect. Her behavior is normal. Judgment and thought content normal.   Nursing note and vitals reviewed.        LABS:  WBC   Date Value Ref Range Status   10/03/2019 4.35 3.90 - 12.70 K/uL Final     Hemoglobin   Date Value Ref Range Status   10/03/2019 10.8 (L) 12.0 - 16.0 g/dL Final     Hematocrit   Date Value Ref Range Status   10/03/2019 35.8 (L) 37.0 - 48.5 % Final     Platelets   Date Value Ref Range Status   10/03/2019 254 150 - 350 K/uL Final       Chemistry        Component Value Date/Time     10/03/2019 0909    K 4.4 10/03/2019 0909     10/03/2019 0909    CO2 23 10/03/2019 0909    BUN 17 10/03/2019 0909    CREATININE 0.7 10/03/2019 0909    GLU 92 10/03/2019 0909        Component Value Date/Time    CALCIUM 9.1 10/03/2019 0909    ALKPHOS 376 (H) 10/03/2019 0909    AST 50 (H) 10/03/2019 0909    ALT 86 (H) 10/03/2019 0909    BILITOT 0.4 10/03/2019 0909    ESTGFRAFRICA >60.0 10/03/2019 0909    EGFRNONAA >60.0 10/03/2019 0909            Assessment:       1. Cancer of head of pancreas    2. Elevated LFTs    3. Research study patient    4. Liver lesion    5. Neoplasm related pain (acute) (chronic)    6. Antineoplastic chemotherapy induced anemia    7. Skin rash            Plan:         1,2,3- Cancer at the head of the pancreas:    Discussed SOC chemo and chemoXRT with FOLFIRINOX vs our PANOVA-3 trial with [place]-Abraxane +/- TTFields.  Extensively reviewed the rationale of the study and reviewed her  eligibility criteria with the research nurse and discussed case with Dr. Hallman as well.  She is interested in this study, and signed consent with our research nurse.     Here today to con't treatment with [place]+Abraxane and TTF on PANOVA-3, LFTs elevated, but improved. Will con't chemo today, but will dose reduce [place] to 800 and Abraxane to 100 per pt [id number] due to chemo related AEs.      Labs show improvement in LFTs. Will continue with dose reduction.   Acceptable to proceed with treatment.    RTC per research RN.     4- Too small to biopsy when treatment was started. Unclear if this is a hemangioma, met, etc. Currently monitoring on RECIST.    5- Controlled. Refill e-scribed.    6- Stable, will monitor.    7- No blistering. Continue current treatment.    Patient is in agreement with the proposed treatment plan. All questions were answered to the patient's satisfaction. Pt knows to call clinic if anything is needed before the next clinic visit.    Karen Hightower, JOSE ANTONIO-C  Hematology and Medical Oncology

## 2019-10-03 ENCOUNTER — INFUSION (OUTPATIENT)
Dept: INFUSION THERAPY | Facility: HOSPITAL | Age: 59
End: 2019-10-03
Attending: INTERNAL MEDICINE
Payer: COMMERCIAL

## 2019-10-03 ENCOUNTER — RESEARCH ENCOUNTER (OUTPATIENT)
Dept: RESEARCH | Facility: HOSPITAL | Age: 59
End: 2019-10-03

## 2019-10-03 ENCOUNTER — OFFICE VISIT (OUTPATIENT)
Dept: HEMATOLOGY/ONCOLOGY | Facility: CLINIC | Age: 59
End: 2019-10-03
Payer: COMMERCIAL

## 2019-10-03 VITALS
DIASTOLIC BLOOD PRESSURE: 67 MMHG | RESPIRATION RATE: 18 BRPM | HEART RATE: 77 BPM | TEMPERATURE: 98 F | SYSTOLIC BLOOD PRESSURE: 153 MMHG

## 2019-10-03 VITALS
HEART RATE: 79 BPM | HEIGHT: 63 IN | SYSTOLIC BLOOD PRESSURE: 142 MMHG | OXYGEN SATURATION: 100 % | TEMPERATURE: 98 F | BODY MASS INDEX: 23.04 KG/M2 | WEIGHT: 130.06 LBS | RESPIRATION RATE: 18 BRPM | DIASTOLIC BLOOD PRESSURE: 67 MMHG

## 2019-10-03 DIAGNOSIS — D64.81 ANTINEOPLASTIC CHEMOTHERAPY INDUCED ANEMIA: ICD-10-CM

## 2019-10-03 DIAGNOSIS — R79.89 ELEVATED LFTS: ICD-10-CM

## 2019-10-03 DIAGNOSIS — C25.0 CANCER OF HEAD OF PANCREAS: Primary | ICD-10-CM

## 2019-10-03 DIAGNOSIS — R21 SKIN RASH: ICD-10-CM

## 2019-10-03 DIAGNOSIS — Z00.6 RESEARCH STUDY PATIENT: ICD-10-CM

## 2019-10-03 DIAGNOSIS — G89.3 NEOPLASM RELATED PAIN (ACUTE) (CHRONIC): ICD-10-CM

## 2019-10-03 DIAGNOSIS — C25.0 CANCER OF HEAD OF PANCREAS: ICD-10-CM

## 2019-10-03 DIAGNOSIS — T45.1X5A ANTINEOPLASTIC CHEMOTHERAPY INDUCED ANEMIA: ICD-10-CM

## 2019-10-03 DIAGNOSIS — K76.9 LIVER LESION: ICD-10-CM

## 2019-10-03 PROCEDURE — 96417 CHEMO IV INFUS EACH ADDL SEQ: CPT

## 2019-10-03 PROCEDURE — 99999 PR PBB SHADOW E&M-EST. PATIENT-LVL IV: CPT | Mod: PBBFAC,,, | Performed by: NURSE PRACTITIONER

## 2019-10-03 PROCEDURE — 99214 OFFICE O/P EST MOD 30 MIN: CPT | Mod: S$GLB,,, | Performed by: NURSE PRACTITIONER

## 2019-10-03 PROCEDURE — 96367 TX/PROPH/DG ADDL SEQ IV INF: CPT

## 2019-10-03 PROCEDURE — 63600175 PHARM REV CODE 636 W HCPCS: Performed by: INTERNAL MEDICINE

## 2019-10-03 PROCEDURE — 99214 PR OFFICE/OUTPT VISIT, EST, LEVL IV, 30-39 MIN: ICD-10-PCS | Mod: S$GLB,,, | Performed by: NURSE PRACTITIONER

## 2019-10-03 PROCEDURE — 99999 PR PBB SHADOW E&M-EST. PATIENT-LVL IV: ICD-10-PCS | Mod: PBBFAC,,, | Performed by: NURSE PRACTITIONER

## 2019-10-03 PROCEDURE — 96413 CHEMO IV INFUSION 1 HR: CPT

## 2019-10-03 PROCEDURE — 3008F PR BODY MASS INDEX (BMI) DOCUMENTED: ICD-10-PCS | Mod: CPTII,S$GLB,, | Performed by: NURSE PRACTITIONER

## 2019-10-03 PROCEDURE — 3008F BODY MASS INDEX DOCD: CPT | Mod: CPTII,S$GLB,, | Performed by: NURSE PRACTITIONER

## 2019-10-03 RX ORDER — PALONOSETRON 0.05 MG/ML
0.25 INJECTION, SOLUTION INTRAVENOUS
Status: DISCONTINUED | OUTPATIENT
Start: 2019-10-03 | End: 2019-10-03

## 2019-10-03 RX ORDER — FENTANYL 25 UG/1
1 PATCH TRANSDERMAL
Qty: 10 PATCH | Refills: 0 | Status: SHIPPED | OUTPATIENT
Start: 2019-10-03 | End: 2019-10-31 | Stop reason: SDUPTHER

## 2019-10-03 RX ORDER — HYDROCORTISONE 25 MG/G
CREAM TOPICAL 2 TIMES DAILY
Qty: 28 TUBE | Refills: 1 | Status: SHIPPED | OUTPATIENT
Start: 2019-10-03 | End: 2019-10-17 | Stop reason: SDUPTHER

## 2019-10-03 RX ORDER — HEPARIN 100 UNIT/ML
500 SYRINGE INTRAVENOUS
Status: DISCONTINUED | OUTPATIENT
Start: 2019-10-03 | End: 2019-10-03 | Stop reason: HOSPADM

## 2019-10-03 RX ORDER — SODIUM CHLORIDE 0.9 % (FLUSH) 0.9 %
10 SYRINGE (ML) INJECTION
Status: DISCONTINUED | OUTPATIENT
Start: 2019-10-03 | End: 2019-10-03 | Stop reason: HOSPADM

## 2019-10-03 RX ADMIN — GEMCITABINE HYDROCHLORIDE 1290 MG: 200 INJECTION, SOLUTION INTRAVENOUS at 12:10

## 2019-10-03 RX ADMIN — PACLITAXEL 160 MG: 100 INJECTION, POWDER, LYOPHILIZED, FOR SUSPENSION INTRAVENOUS at 11:10

## 2019-10-03 RX ADMIN — SODIUM CHLORIDE: 0.9 INJECTION, SOLUTION INTRAVENOUS at 11:10

## 2019-10-03 RX ADMIN — PALONOSETRON 0.25 MG: 0.05 INJECTION, SOLUTION INTRAVENOUS at 11:10

## 2019-10-03 NOTE — PROGRESS NOTES
"PANOVA-3: Pivotal, randomized, open-label study of Tumor Treating Fields (TTFields, 150kHz) concomitant with gemcitabine and nab-paclitaxel for front-line treatment of locally-advanced pancreatic adenocarcinoma  Study #: EF-[id number]  Sponsor: [employer]  PI: Eliot Barrett MD  WIRB: [id number]     C2D15 - 10/04/19     Patient on site today for Cycle 2 Day 15 of the above named trial. Pt is AAO x's 3 with appropriate mood and affect. Patient states her willingness to continue on above treatment trial.   Pt queried & reports pain better controlled, nausea controlled with promethazine, thus her anorexia is also improving. Pt started promethazine & changed her alprazolam to lorazepam 2 weeks ago. Pt denies any other changes to her health or ConMeds.   Karen examined pt's skin where TTFields are placed, and the skin is dry & intact without evidence of breakdown. The areas have a pink, dermatitis-like appearance, and pt reports regular use of aloe vera & prescribed hydrocortisone.   Pt's labs collected today per protocol & SOC, and were reviewed by Karen with pt. Pt's first on-study scans were collected 9/30/19, and showed a 3.92% increase of pancreatic head lesion from 51 mm at baseline to 53 mm. CT report read by the radiologist incorrectly dictated that the "stable hypodensity in segment VII measuring 0.9 cm" as a RECIST lesion; however, this continues to be not clinically significant per PI, as it is too small to biopsy or characterize. Scan results were also reviewed with pt today by Karen.   Due to dosing delays at C2D1 & C2D8, today would have been pt's "off week" under the original schedule, but we will create a new schedule based on today's date. All assessments required Q4W will start a new cycle with C3D1 scheduled in 2 weeks, but all Q8W assessments (imaging, tumor assessment & QOLs) will continue to be collected based off of C1D1 on 8/7/2019. Pt completed QOLs per Q8W today.    Pt's CBC meets criteria to receive " treatment today per protocol. Karen also approved today's treatment based on pt's resolved and/or improving LFTs, CT results and improved overall health.   Pt received gemcitabine at 800 mg/m2 (total dose of 1290 mg) and nab-Paclitaxel at 100 mg/m2 (total dose of 160 mg). Per infusion RN, pt tolerated treatment well & was DC'd from clinic in stable condition.   Pt will RTC in 2 weeks for C3D1. Pt is in agreement with this plan.  Pt knows to call me or Dr. Barrett for any issues or concerns. All of her questions were answered to her satisfaction by myself and Dr. Barrett.      Baseline History:   Abdominal & back pain: Grade 1: patient reports the pain started 6/1/019. She is currently taking oral pain medications for this. Ongoing. Will monitor. Abdominal pain worsened to grade 2 8/8/19.  Insomnia: Grade 1: patient reports this started 6/22/2019 and she is on medication for this. Ongoing. Will monitor.   Constipation: Grade 2: patient reports this started 7/1/2019 and is on medication for this. Ongoing. Will monitor.   Nausea: Grade 2: patient reports this started on 7/17/2019 and is on medication for this. Ongoing. Will monitor.   Reflux: Grade 2: patient reports this started on 7/21/2019 and is on medication for this. Ongoing. Will monitor.   Allergic rhinitis: Grade 1: patient reports this started on 7/15/2019 and is not taking anything for this. Ongoing. Will monitor.   Former smoker: patient reports smoking 1.5 packs per day for 20 years. She reports quitting on 5/6/1999.     Most up to date adverse events log is in shadow research chart.     Ongoing AE's  Anemia: Grade 1 - ongoing from 8/14/19  Elevated AST: Grade 1 - ongoing from 9/26/19  Elevated ALT: Grade 2 - downgraded to Grade 1 on 10/4/19  Alkaline Phosphatase Increased: Grade 3 - downgraded to Grade 2 10/4/19  GGT Increased, Grade 3 - ongoing from 9/26/19  Hypoalbuminemia, Grade 1 - ongoing from 9/26/19    Added 10/4/19:  Contact dermatitis due to  [facility] starting 8/9/19 - ConMed of hydrocortisone 2.5%, 1 topical barahan. PRN from 8/10/19  Lorazepam 0.5 mg PRN & promethazine 25 mg PRN added starting 9/19/19

## 2019-10-17 ENCOUNTER — INFUSION (OUTPATIENT)
Dept: INFUSION THERAPY | Facility: HOSPITAL | Age: 59
End: 2019-10-17
Attending: INTERNAL MEDICINE
Payer: COMMERCIAL

## 2019-10-17 ENCOUNTER — RESEARCH ENCOUNTER (OUTPATIENT)
Dept: RESEARCH | Facility: HOSPITAL | Age: 59
End: 2019-10-17

## 2019-10-17 ENCOUNTER — OFFICE VISIT (OUTPATIENT)
Dept: HEMATOLOGY/ONCOLOGY | Facility: CLINIC | Age: 59
End: 2019-10-17
Payer: COMMERCIAL

## 2019-10-17 VITALS
SYSTOLIC BLOOD PRESSURE: 112 MMHG | RESPIRATION RATE: 18 BRPM | TEMPERATURE: 98 F | DIASTOLIC BLOOD PRESSURE: 57 MMHG | HEART RATE: 68 BPM

## 2019-10-17 VITALS
TEMPERATURE: 98 F | HEIGHT: 63 IN | OXYGEN SATURATION: 100 % | WEIGHT: 130.94 LBS | BODY MASS INDEX: 23.2 KG/M2 | DIASTOLIC BLOOD PRESSURE: 58 MMHG | HEART RATE: 71 BPM | SYSTOLIC BLOOD PRESSURE: 124 MMHG | RESPIRATION RATE: 20 BRPM

## 2019-10-17 DIAGNOSIS — D49.0 PANCREAS NEOPLASM: Primary | ICD-10-CM

## 2019-10-17 DIAGNOSIS — G89.3 NEOPLASM RELATED PAIN (ACUTE) (CHRONIC): ICD-10-CM

## 2019-10-17 DIAGNOSIS — K76.9 LIVER LESION: ICD-10-CM

## 2019-10-17 DIAGNOSIS — C25.0 CANCER OF HEAD OF PANCREAS: Primary | ICD-10-CM

## 2019-10-17 DIAGNOSIS — R21 SKIN RASH: ICD-10-CM

## 2019-10-17 DIAGNOSIS — Z00.6 RESEARCH STUDY PATIENT: ICD-10-CM

## 2019-10-17 DIAGNOSIS — C25.9 PANCREATIC CANCER: ICD-10-CM

## 2019-10-17 DIAGNOSIS — Z00.6 EXAMINATION OF PARTICIPANT IN CLINICAL TRIAL: ICD-10-CM

## 2019-10-17 DIAGNOSIS — C25.0 CANCER OF HEAD OF PANCREAS: ICD-10-CM

## 2019-10-17 LAB
ALBUMIN SERPL BCP-MCNC: 2.8 G/DL (ref 3.5–5.2)
ALP SERPL-CCNC: 343 U/L (ref 55–135)
ALT SERPL W/O P-5'-P-CCNC: 92 U/L (ref 10–44)
ANION GAP SERPL CALC-SCNC: 8 MMOL/L (ref 8–16)
AST SERPL-CCNC: 43 U/L (ref 10–40)
BASOPHILS # BLD AUTO: 0.03 K/UL (ref 0–0.2)
BASOPHILS NFR BLD: 0.5 % (ref 0–1.9)
BILIRUB SERPL-MCNC: 0.4 MG/DL (ref 0.1–1)
BUN SERPL-MCNC: 15 MG/DL (ref 6–20)
CALCIUM SERPL-MCNC: 9 MG/DL (ref 8.7–10.5)
CANCER AG19-9 SERPL-ACNC: 232 U/ML (ref 2–40)
CHLORIDE SERPL-SCNC: 108 MMOL/L (ref 95–110)
CO2 SERPL-SCNC: 24 MMOL/L (ref 23–29)
CREAT SERPL-MCNC: 0.7 MG/DL (ref 0.5–1.4)
DIFFERENTIAL METHOD: ABNORMAL
EOSINOPHIL # BLD AUTO: 0.2 K/UL (ref 0–0.5)
EOSINOPHIL NFR BLD: 2.9 % (ref 0–8)
ERYTHROCYTE [DISTWIDTH] IN BLOOD BY AUTOMATED COUNT: 16.2 % (ref 11.5–14.5)
EST. GFR  (AFRICAN AMERICAN): >60 ML/MIN/1.73 M^2
EST. GFR  (NON AFRICAN AMERICAN): >60 ML/MIN/1.73 M^2
GGT SERPL-CCNC: 384 U/L (ref 8–55)
GLUCOSE SERPL-MCNC: 98 MG/DL (ref 70–110)
HCT VFR BLD AUTO: 36.3 % (ref 37–48.5)
HGB BLD-MCNC: 10.9 G/DL (ref 12–16)
IMM GRANULOCYTES # BLD AUTO: 0.01 K/UL (ref 0–0.04)
IMM GRANULOCYTES NFR BLD AUTO: 0.2 % (ref 0–0.5)
LDH SERPL L TO P-CCNC: 195 U/L (ref 110–260)
LYMPHOCYTES # BLD AUTO: 0.9 K/UL (ref 1–4.8)
LYMPHOCYTES NFR BLD: 15.3 % (ref 18–48)
MCH RBC QN AUTO: 27.3 PG (ref 27–31)
MCHC RBC AUTO-ENTMCNC: 30 G/DL (ref 32–36)
MCV RBC AUTO: 91 FL (ref 82–98)
MONOCYTES # BLD AUTO: 0.8 K/UL (ref 0.3–1)
MONOCYTES NFR BLD: 14.2 % (ref 4–15)
NEUTROPHILS # BLD AUTO: 3.7 K/UL (ref 1.8–7.7)
NEUTROPHILS NFR BLD: 66.9 % (ref 38–73)
NRBC BLD-RTO: 0 /100 WBC
PLATELET # BLD AUTO: 321 K/UL (ref 150–350)
PMV BLD AUTO: 10.3 FL (ref 9.2–12.9)
POTASSIUM SERPL-SCNC: 4.7 MMOL/L (ref 3.5–5.1)
PROT SERPL-MCNC: 6.8 G/DL (ref 6–8.4)
RBC # BLD AUTO: 3.99 M/UL (ref 4–5.4)
SODIUM SERPL-SCNC: 140 MMOL/L (ref 136–145)
WBC # BLD AUTO: 5.56 K/UL (ref 3.9–12.7)

## 2019-10-17 PROCEDURE — 99214 OFFICE O/P EST MOD 30 MIN: CPT | Mod: S$GLB,,, | Performed by: INTERNAL MEDICINE

## 2019-10-17 PROCEDURE — 96413 CHEMO IV INFUSION 1 HR: CPT

## 2019-10-17 PROCEDURE — 96367 TX/PROPH/DG ADDL SEQ IV INF: CPT

## 2019-10-17 PROCEDURE — A4216 STERILE WATER/SALINE, 10 ML: HCPCS | Performed by: INTERNAL MEDICINE

## 2019-10-17 PROCEDURE — 36415 COLL VENOUS BLD VENIPUNCTURE: CPT

## 2019-10-17 PROCEDURE — 3008F BODY MASS INDEX DOCD: CPT | Mod: CPTII,S$GLB,, | Performed by: INTERNAL MEDICINE

## 2019-10-17 PROCEDURE — 3008F PR BODY MASS INDEX (BMI) DOCUMENTED: ICD-10-PCS | Mod: CPTII,S$GLB,, | Performed by: INTERNAL MEDICINE

## 2019-10-17 PROCEDURE — 99214 PR OFFICE/OUTPT VISIT, EST, LEVL IV, 30-39 MIN: ICD-10-PCS | Mod: S$GLB,,, | Performed by: INTERNAL MEDICINE

## 2019-10-17 PROCEDURE — 36593 DECLOT VASCULAR DEVICE: CPT

## 2019-10-17 PROCEDURE — 83615 LACTATE (LD) (LDH) ENZYME: CPT

## 2019-10-17 PROCEDURE — 63600175 PHARM REV CODE 636 W HCPCS: Mod: JG | Performed by: INTERNAL MEDICINE

## 2019-10-17 PROCEDURE — 86301 IMMUNOASSAY TUMOR CA 19-9: CPT

## 2019-10-17 PROCEDURE — 80053 COMPREHEN METABOLIC PANEL: CPT

## 2019-10-17 PROCEDURE — 82977 ASSAY OF GGT: CPT

## 2019-10-17 PROCEDURE — 96417 CHEMO IV INFUS EACH ADDL SEQ: CPT

## 2019-10-17 PROCEDURE — 25000003 PHARM REV CODE 250: Performed by: INTERNAL MEDICINE

## 2019-10-17 PROCEDURE — 63600175 PHARM REV CODE 636 W HCPCS: Performed by: INTERNAL MEDICINE

## 2019-10-17 PROCEDURE — 96523 IRRIG DRUG DELIVERY DEVICE: CPT

## 2019-10-17 PROCEDURE — 99999 PR PBB SHADOW E&M-EST. PATIENT-LVL IV: ICD-10-PCS | Mod: PBBFAC,,, | Performed by: INTERNAL MEDICINE

## 2019-10-17 PROCEDURE — 99999 PR PBB SHADOW E&M-EST. PATIENT-LVL IV: CPT | Mod: PBBFAC,,, | Performed by: INTERNAL MEDICINE

## 2019-10-17 PROCEDURE — 85025 COMPLETE CBC W/AUTO DIFF WBC: CPT

## 2019-10-17 RX ORDER — PALONOSETRON 0.05 MG/ML
0.25 INJECTION, SOLUTION INTRAVENOUS
Status: CANCELLED | OUTPATIENT
Start: 2019-10-24

## 2019-10-17 RX ORDER — SODIUM CHLORIDE 0.9 % (FLUSH) 0.9 %
10 SYRINGE (ML) INJECTION
Status: DISCONTINUED | OUTPATIENT
Start: 2019-10-17 | End: 2019-10-17 | Stop reason: HOSPADM

## 2019-10-17 RX ORDER — SODIUM CHLORIDE 0.9 % (FLUSH) 0.9 %
10 SYRINGE (ML) INJECTION
Status: CANCELLED | OUTPATIENT
Start: 2019-10-24

## 2019-10-17 RX ORDER — SODIUM CHLORIDE 0.9 % (FLUSH) 0.9 %
10 SYRINGE (ML) INJECTION
Status: CANCELLED | OUTPATIENT
Start: 2019-10-31

## 2019-10-17 RX ORDER — HEPARIN 100 UNIT/ML
500 SYRINGE INTRAVENOUS
Status: DISCONTINUED | OUTPATIENT
Start: 2019-10-17 | End: 2019-10-17 | Stop reason: HOSPADM

## 2019-10-17 RX ORDER — HEPARIN 100 UNIT/ML
500 SYRINGE INTRAVENOUS
Status: CANCELLED | OUTPATIENT
Start: 2019-10-31

## 2019-10-17 RX ORDER — HEPARIN 100 UNIT/ML
500 SYRINGE INTRAVENOUS
Status: CANCELLED | OUTPATIENT
Start: 2019-10-17

## 2019-10-17 RX ORDER — PALONOSETRON 0.05 MG/ML
0.25 INJECTION, SOLUTION INTRAVENOUS
Status: CANCELLED | OUTPATIENT
Start: 2019-10-31

## 2019-10-17 RX ORDER — PALONOSETRON 0.05 MG/ML
0.25 INJECTION, SOLUTION INTRAVENOUS
Status: CANCELLED | OUTPATIENT
Start: 2019-10-17

## 2019-10-17 RX ORDER — SODIUM CHLORIDE 0.9 % (FLUSH) 0.9 %
10 SYRINGE (ML) INJECTION
Status: CANCELLED | OUTPATIENT
Start: 2019-10-17

## 2019-10-17 RX ORDER — HEPARIN 100 UNIT/ML
500 SYRINGE INTRAVENOUS
Status: CANCELLED | OUTPATIENT
Start: 2019-10-24

## 2019-10-17 RX ORDER — MUPIROCIN 20 MG/G
OINTMENT TOPICAL 3 TIMES DAILY
Qty: 30 G | Refills: 1 | Status: SHIPPED | OUTPATIENT
Start: 2019-10-17 | End: 2020-01-09

## 2019-10-17 RX ORDER — HYDROCORTISONE 25 MG/G
CREAM TOPICAL 2 TIMES DAILY
Qty: 453.6 G | Refills: 1 | Status: SHIPPED | OUTPATIENT
Start: 2019-10-17 | End: 2020-05-25

## 2019-10-17 RX ADMIN — ALTEPLASE 2 MG: 2.2 INJECTION, POWDER, LYOPHILIZED, FOR SOLUTION INTRAVENOUS at 09:10

## 2019-10-17 RX ADMIN — SODIUM CHLORIDE: 0.9 INJECTION, SOLUTION INTRAVENOUS at 10:10

## 2019-10-17 RX ADMIN — PALONOSETRON HYDROCHLORIDE 0.25 MG: 0.25 INJECTION INTRAVENOUS at 10:10

## 2019-10-17 RX ADMIN — PACLITAXEL 160 MG: 100 INJECTION, POWDER, LYOPHILIZED, FOR SUSPENSION INTRAVENOUS at 10:10

## 2019-10-17 RX ADMIN — GEMCITABINE HYDROCHLORIDE 1290 MG: 200 INJECTION, SOLUTION INTRAVENOUS at 11:10

## 2019-10-17 RX ADMIN — HEPARIN SODIUM (PORCINE) LOCK FLUSH IV SOLN 100 UNIT/ML 500 UNITS: 100 SOLUTION at 12:10

## 2019-10-17 RX ADMIN — Medication 10 ML: at 12:10

## 2019-10-17 NOTE — NURSING
PAC accessed for lab draw.  No blood return noted.  Activase instilled for irrigation.  Labs drawn peripherally to RAC.  Tolerated well.  Patient to be seen by MD then return to clinic for infusion.  Infusion nurse notified of cathflow.

## 2019-10-17 NOTE — PROGRESS NOTES
Subjective:       Patient ID: Quyen Moody    Chief Complaint:  Pancreatic cancer    HPI     Quyen Moody is a 59 y.o. female, patient of Dr. Barrett, to clinic for follow up and cycle 2, day 15 on PANOVA-3 trial. She is currently receiving [place]+Abraxane and TTF.   Still working.  Notes some worsening itching and skin reaction to the area where her pads are placed.      Working two days a week. ECOG 0.    Oncologic History:  She has had intermittent upper abdominal pain since early June.  She presented to her PCP who originally ordered an US and CT.  US was negative and CT showed some haziness at the pancreatic head.  She saw GI who performed EUS.  On EUS, a 3x4cm pancreatic head mass was seen with possible invasion into the SMA and portal vein.  FNA path s/w pancreatic adenocarcinoma.  CA 19-9 level at outside hospital was >1000 per the patient.  She endorses nausea and inability to tolerate much PO.  She has lost about 10lbs over the last few weeks and is noticing her skin turning yellow.  Denies pruritis.  She is passing gas but has not had a BM in a few days, she attributes this to narcotic use.  She recently started taking stool softeners.  No previous cardiac or stroke history.  Surgical history significant for open appendectomy when she was in her 20s      Review of Systems   Constitutional: Positive for fatigue. Negative for activity change, appetite change, chills, fever and unexpected weight change (improving).   HENT: Negative for congestion, hearing loss, mouth sores, sore throat, tinnitus and voice change.    Eyes: Negative for pain and visual disturbance.   Respiratory: Positive for shortness of breath. Negative for cough and wheezing.    Cardiovascular: Negative for chest pain, palpitations and leg swelling.   Gastrointestinal: Positive for abdominal pain. Negative for constipation, diarrhea, nausea and vomiting.   Endocrine: Negative for cold intolerance and heat intolerance.   Genitourinary: Negative  for difficulty urinating, dyspareunia, dysuria, frequency, menstrual problem, urgency, vaginal bleeding, vaginal discharge and vaginal pain.   Musculoskeletal: Negative for arthralgias, back pain and myalgias.   Skin: Positive for rash. Negative for color change and wound.   Allergic/Immunologic: Negative for environmental allergies and food allergies.   Neurological: Negative for weakness, numbness and headaches.   Hematological: Negative for adenopathy. Does not bruise/bleed easily.   Psychiatric/Behavioral: Negative for agitation, confusion, hallucinations and sleep disturbance. The patient is nervous/anxious.    All other systems reviewed and are negative.        Allergies:  Review of patient's allergies indicates:   Allergen Reactions    Sulfa (sulfonamide antibiotics) Swelling and Hives     tongue         Medications:  Current Outpatient Medications   Medication Sig Dispense Refill    docusate sodium (COLACE) 100 MG capsule Take 100 mg by mouth 2 (two) times daily.      estradiol-norethindrone (ACTIVELLA) 1-0.5 mg per tablet Take 1 tablet by mouth.      fentaNYL (DURAGESIC) 25 mcg/hr Place 1 patch onto the skin every 72 hours. 10 patch 0    HYDROcodone-acetaminophen (NORCO)  mg per tablet Take 1 tablet by mouth every 6 (six) hours as needed for Pain. 100 tablet 0    hydrocortisone 2.5 % cream Apply topically 2 (two) times daily. 28 Tube 1    lidocaine-prilocaine (EMLA) cream Apply to port 45 minutes prior to access. 30 g 0    lipase-protease-amylase (CREON) 36,000-114,000- 180,000 unit CpDR take 1 capsule by mouth 4 times daily 120 capsule 6    LORazepam (ATIVAN) 0.5 MG tablet Take 1 tablet (0.5 mg total) by mouth every 6 (six) hours as needed for Anxiety (nausea). 60 tablet 0    multivitamin (THERAGRAN) per tablet Take 1 tablet by mouth once daily.      ondansetron (ZOFRAN) 8 MG tablet Take 1 tablet (8 mg total) by mouth every 8 (eight) hours as needed for Nausea. 120 tablet 2     polyethylene glycol (GLYCOLAX) 17 gram PwPk Take 1 g by mouth once daily.      ranitidine (ZANTAC) 150 MG tablet Take 150 mg by mouth 2 (two) times daily.       No current facility-administered medications for this visit.        PMH:  Past Medical History:   Diagnosis Date    Allergic rhinitis 3/3/2014    CKD (chronic kidney disease) stage 3, GFR 30-59 ml/min 12/26/2015    Hyperlipidemia 3/3/2014    Pancreas cancer     Skin rash 8/10/2019       PSH:  Past Surgical History:   Procedure Laterality Date    ERCP N/A 7/16/2019    Procedure: ERCP (ENDOSCOPIC RETROGRADE CHOLANGIOPANCREATOGRAPHY);  Surgeon: Chema Harris MD;  Location: [facility] ENDO ([address] [place] [address]);  Service: Endoscopy;  Laterality: N/A;    Exporatory lap      INSERTION OF TUNNELED CENTRAL VENOUS CATHETER (CVC) WITH SUBCUTANEOUS PORT Right 8/12/2019    Procedure: CGTNROBBI-FIXL-Q-CATH;  Surgeon: Cesar Hallman MD;  Location: [facility] OR [address] [place] [address];  Service: General;  Laterality: Right;  right IJ       FamHx:  Family History   Problem Relation Age of Onset    Diabetes Mother     Diabetes Father     Diabetes Brother     Heart disease Neg Hx        SocHx:  Social History     Socioeconomic History    Marital status:      Spouse name: Not on file    Number of children: 0    Years of education: Not on file    Highest education level: Not on file   Occupational History    Occupation:      Employer: [employer]   Social Needs    Financial resource strain: Not on file    Food insecurity:     Worry: Not on file     Inability: Not on file    Transportation needs:     Medical: Not on file     Non-medical: Not on file   Tobacco Use    Smoking status: Former Smoker     Start date: 12/11/1999    Smokeless tobacco: Never Used   Substance and Sexual Activity    Alcohol use: Yes     Alcohol/week: 1.0 standard drinks     Types: 1 Glasses of wine per week     Frequency: 4 or more times a week     Drinks per session: 1 or 2     Binge frequency:  Never     Comment: last drink a month ago     Drug use: No    Sexual activity: Yes   Lifestyle    Physical activity:     Days per week: Not on file     Minutes per session: Not on file    Stress: Not on file   Relationships    Social connections:     Talks on phone: Not on file     Gets together: Not on file     Attends Yarsanism service: Not on file     Active member of club or organization: Not on file     Attends meetings of clubs or organizations: Not on file     Relationship status: Not on file   Other Topics Concern    Not on file   Social History Narrative    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      of Cancer in 2016         Objective:       Vitals:    10/17/19 0940   BP: (!) 124/58   Pulse: 71   Resp: 20   Temp: 98 °F (36.7 °C)       Physical Exam   Constitutional: She is oriented to person, place, and time. She appears well-developed and well-nourished. No distress.   HENT:   Head: Normocephalic and atraumatic.   Nose: Nose normal.   Mouth/Throat: Oropharynx is clear and moist. No oropharyngeal exudate.   Eyes: Pupils are equal, round, and reactive to light. Conjunctivae and EOM are normal. Right eye exhibits no discharge. Left eye exhibits no discharge. No scleral icterus.   Neck: Normal range of motion. Neck supple. No tracheal deviation present. No thyromegaly present.   Cardiovascular: Normal rate, regular rhythm, normal heart sounds and normal pulses.   No swelling to bilateral lower extremities.    Pulmonary/Chest: Effort normal and breath sounds normal. She has no wheezes. She has no rales.   Abdominal: Soft. Bowel sounds are normal. She exhibits no distension. There is no tenderness. There is no guarding.   Musculoskeletal: Normal range of motion. She exhibits no edema, tenderness or deformity.   No spinal or paraspinal tenderness to palpation.       Lymphadenopathy:     She has no cervical adenopathy.   Neurological: She is alert and oriented to person, place, and time. No cranial  nerve deficit. Coordination normal.   Skin: Skin is warm, dry and intact. Rash noted. She is not diaphoretic. No erythema. No pallor.   Psychiatric: She has a normal mood and affect. Her behavior is normal. Judgment and thought content normal.   Nursing note and vitals reviewed.        LABS:  WBC   Date Value Ref Range Status   10/17/2019 5.56 3.90 - 12.70 K/uL Final     Hemoglobin   Date Value Ref Range Status   10/17/2019 10.9 (L) 12.0 - 16.0 g/dL Final     Hematocrit   Date Value Ref Range Status   10/17/2019 36.3 (L) 37.0 - 48.5 % Final     Platelets   Date Value Ref Range Status   10/17/2019 321 150 - 350 K/uL Final       Chemistry        Component Value Date/Time     10/03/2019 0909    K 4.4 10/03/2019 0909     10/03/2019 0909    CO2 23 10/03/2019 0909    BUN 17 10/03/2019 0909    CREATININE 0.7 10/03/2019 0909    GLU 92 10/03/2019 0909        Component Value Date/Time    CALCIUM 9.1 10/03/2019 0909    ALKPHOS 376 (H) 10/03/2019 0909    AST 50 (H) 10/03/2019 0909    ALT 86 (H) 10/03/2019 0909    BILITOT 0.4 10/03/2019 0909    ESTGFRAFRICA >60.0 10/03/2019 0909    EGFRNONAA >60.0 10/03/2019 0909            Assessment:       1. Cancer of head of pancreas    2. Research study patient    3. Liver lesion    4. Neoplasm related pain (acute) (chronic)          Plan:         1. Cancer at the head of the pancreas:    Discussed SOC chemo and chemoXRT with FOLFIRINOX vs our PANOVA-3 trial with [place]-Abraxane +/- TTFields.  Extensively reviewed the rationale of the study and reviewed her eligibility criteria with the research nurse and discussed case with Dr. Hallman as well.  She is interested in this study, and signed consent with our research nurse.     Here today to con't treatment with [place]+Abraxane and TTF on PANOVA-3, LFTs elevated, but stable. Will con't chemo today, but will dose reduce [place] to 800 and Abraxane to 100 per pt request due to chemo related AEs.      Time out preformed with research RN.      Labs show stgable LFTs. Will continue with dose reduction.   Acceptable to proceed with treatment.    RTC per research RN.     Patient is in agreement with the proposed treatment plan. All questions were answered to the patient's satisfaction. Pt knows to call clinic if anything is needed before the next clinic visit.    More than 25 mins were spent during this encounter, greater than 50% was spent in direct counseling and/or coordination of care.     Eliot Barrett M.D., M.S., F.A.C.P.  Hematology and Oncology Attending  [facility]ugenio [facility]

## 2019-10-17 NOTE — PROGRESS NOTES
PANOVA-3: Pivotal, randomized, open-label study of Tumor Treating Fields (TTFields, 150kHz) concomitant with gemcitabine and nab-paclitaxel for front-line treatment of locally-advanced pancreatic adenocarcinoma  Study #: EF-27  Sponsor: [employer]  PI: Eliot Barrett MD  WIRB: [id number]     C3D1 - 10/17/19     Pt on site today for Cycle 2 Day 15 of the above named trial. Pt is AAO x3 with appropriate mood and affect. Pt states her willingness to continue on above treatment trial.   Pt queried & reports pain better controlled in original location, but developed new epigastric pain ~1 week ago requiring more analgesics to control it. Pt also reports mild skin breakdown & continued pruritis in array placement areas. MD evaluated skin at TTField array placement, but evaluation was limited since pt was wearing device. MD prescribed mupirocin 2% to add to current hydrocortisone & aloe vera regimen. Otherwise, pt denies any other changes to her health or ConMeds.     Pt completed VAS QOL & pt's local labs, VS, PE & ECOG were all collected per protocol. Pt's labs were reviewed by MD with pt & CRC. While pt's GGT, ALP, AST & ALT were elevated as detailed below, these values are NCS for today's dosing per MD. Pt's ANC & PLTs meet protocol criteria to continue dosing at previous dose level & pt is eligible to receive study treatment today.  Time out performed with Dr. Barrett in exam room prior to discharging pt to infusion room. Together we confirmed pt's weight did not change by +/- 10% from baseline weight (BL = 59.0 kg, today = 59.4 kg), thus pt's BSA only changed from 1.61 m2 to 1.62 mg2. Based on pt's BL BSA, today's calculated Gemcitabine dose will continue at 1290 mg (@ 800 mg/m2) & Abraxane dose will continue at 160 mg (@ 100 mg/m2).   Per infusion RN, pt tolerated treatment well & was discharged in stable condition.  Pt will RTC on 10/24/19 for C3D8. Pt is in agreement with this plan.  Pt reminded to call me,   Nena or Karen for any issues or concerns. All of her questions were answered to her satisfaction by myself and Dr. Barrett.      Baseline History:   Abdominal & back pain: Grade 1: patient reports the pain started 6/1/019. She is currently taking oral pain medications for this. Abdominal pain worsened to grade 2 8/8/19.  Insomnia: Grade 1: patient reports this started 6/22/2019 and she is on medication for this. Ongoing. Will monitor.   Constipation: Grade 2: patient reports this started 7/1/2019 and is on medication for this. Ongoing. Will monitor.   Nausea: Grade 2: patient reports this started on 7/17/2019 and is on medication for this. Ongoing. Will monitor.   Reflux: Grade 2: patient reports this started on 7/21/2019 and is on medication for this. Ongoing. Will monitor.   Allergic rhinitis: Grade 1: patient reports this started on 7/15/2019 and is not taking anything for this. Ongoing. Will monitor.   Former smoker: patient reports smoking 1.5 packs per day for 20 years. She reports quitting on 5/6/1999.      Most up to date adverse events log is in research chart.     Ongoing AE's  Anemia: Grade 1 - ongoing from 8/14/19  Elevated AST: Grade 1 - ongoing from 9/26/19  Elevated ALT: Grade 1 - ongoing from 10/3/19  Alkaline Phosphatase Increased: Grade 2 - ongoing from 10/3/19 - downgraded to Grade 1 10/17/19  GGT Increased: Grade 3 - ongoing from 9/26/19  Hypoalbuminemia: Grade 1 - ongoing from 9/26/19 - upgraded to Grade 2 10/17/19  Abdominal pain: Grade 2 - starting 8/8/19. Extended to epigastric region ~10/10/19, no change to current AE term or grading.  Pt reported skin erosion, but unable to assess due to current array placement      Added 10/17/19:  Prescription for mupirocin 2% topical ointment for reported skin erosion.

## 2019-10-17 NOTE — PLAN OF CARE
Problem: Adult Inpatient Plan of Care  Goal: Optimal Comfort and Wellbeing  Intervention: Provide Person-Centered Care  Flowsheets (Taken 10/17/2019 1050)  Trust Relationship/Rapport: care explained; questions encouraged; choices provided; reassurance provided; emotional support provided; thoughts/feelings acknowledged; empathic listening provided; questions answered

## 2019-10-17 NOTE — PLAN OF CARE
Patient tolerated Panova trial (abraxane/gemzar) well today. Met with research RN prior to tx. Port + blood return present (after cath-morgan instilled), flushed, hep locked and deaccessed. Declined avs. Discharged home, ambulated independently.

## 2019-10-24 ENCOUNTER — INFUSION (OUTPATIENT)
Dept: INFUSION THERAPY | Facility: HOSPITAL | Age: 59
End: 2019-10-24
Attending: INTERNAL MEDICINE
Payer: COMMERCIAL

## 2019-10-24 ENCOUNTER — RESEARCH ENCOUNTER (OUTPATIENT)
Dept: RESEARCH | Facility: HOSPITAL | Age: 59
End: 2019-10-24

## 2019-10-24 VITALS
TEMPERATURE: 98 F | SYSTOLIC BLOOD PRESSURE: 106 MMHG | RESPIRATION RATE: 18 BRPM | DIASTOLIC BLOOD PRESSURE: 58 MMHG | HEART RATE: 66 BPM

## 2019-10-24 DIAGNOSIS — C25.0 CANCER OF HEAD OF PANCREAS: Primary | ICD-10-CM

## 2019-10-24 DIAGNOSIS — C25.0 CANCER OF HEAD OF PANCREAS: ICD-10-CM

## 2019-10-24 DIAGNOSIS — D49.0 PANCREAS NEOPLASM: ICD-10-CM

## 2019-10-24 DIAGNOSIS — Z00.6 RESEARCH STUDY PATIENT: ICD-10-CM

## 2019-10-24 DIAGNOSIS — C25.9 PANCREATIC CANCER: ICD-10-CM

## 2019-10-24 DIAGNOSIS — Z00.6 EXAMINATION OF PARTICIPANT IN CLINICAL TRIAL: ICD-10-CM

## 2019-10-24 DIAGNOSIS — D49.0 PANCREAS NEOPLASM: Primary | ICD-10-CM

## 2019-10-24 LAB
ALBUMIN SERPL BCP-MCNC: 2.8 G/DL (ref 3.5–5.2)
ALP SERPL-CCNC: 179 U/L (ref 55–135)
ALT SERPL W/O P-5'-P-CCNC: 48 U/L (ref 10–44)
ANION GAP SERPL CALC-SCNC: 7 MMOL/L (ref 8–16)
ANISOCYTOSIS BLD QL SMEAR: SLIGHT
AST SERPL-CCNC: 31 U/L (ref 10–40)
BASOPHILS # BLD AUTO: 0.05 K/UL (ref 0–0.2)
BASOPHILS NFR BLD: 1.2 % (ref 0–1.9)
BILIRUB SERPL-MCNC: 0.2 MG/DL (ref 0.1–1)
BUN SERPL-MCNC: 17 MG/DL (ref 6–20)
CALCIUM SERPL-MCNC: 8.8 MG/DL (ref 8.7–10.5)
CHLORIDE SERPL-SCNC: 109 MMOL/L (ref 95–110)
CO2 SERPL-SCNC: 23 MMOL/L (ref 23–29)
CREAT SERPL-MCNC: 0.8 MG/DL (ref 0.5–1.4)
DIFFERENTIAL METHOD: ABNORMAL
EOSINOPHIL # BLD AUTO: 0.1 K/UL (ref 0–0.5)
EOSINOPHIL NFR BLD: 1.9 % (ref 0–8)
ERYTHROCYTE [DISTWIDTH] IN BLOOD BY AUTOMATED COUNT: 16.1 % (ref 11.5–14.5)
EST. GFR  (AFRICAN AMERICAN): >60 ML/MIN/1.73 M^2
EST. GFR  (NON AFRICAN AMERICAN): >60 ML/MIN/1.73 M^2
GIANT PLATELETS BLD QL SMEAR: PRESENT
GLUCOSE SERPL-MCNC: 88 MG/DL (ref 70–110)
HCT VFR BLD AUTO: 31.6 % (ref 37–48.5)
HGB BLD-MCNC: 10.1 G/DL (ref 12–16)
IMM GRANULOCYTES # BLD AUTO: 0.02 K/UL (ref 0–0.04)
IMM GRANULOCYTES NFR BLD AUTO: 0.5 % (ref 0–0.5)
LYMPHOCYTES # BLD AUTO: 1.3 K/UL (ref 1–4.8)
LYMPHOCYTES NFR BLD: 31.4 % (ref 18–48)
MCH RBC QN AUTO: 28.2 PG (ref 27–31)
MCHC RBC AUTO-ENTMCNC: 32 G/DL (ref 32–36)
MCV RBC AUTO: 88 FL (ref 82–98)
MONOCYTES # BLD AUTO: 0.9 K/UL (ref 0.3–1)
MONOCYTES NFR BLD: 21.2 % (ref 4–15)
NEUTROPHILS # BLD AUTO: 1.8 K/UL (ref 1.8–7.7)
NEUTROPHILS NFR BLD: 43.8 % (ref 38–73)
NRBC BLD-RTO: 0 /100 WBC
OVALOCYTES BLD QL SMEAR: ABNORMAL
PLATELET # BLD AUTO: 302 K/UL (ref 150–350)
PLATELET BLD QL SMEAR: ABNORMAL
PMV BLD AUTO: 10.9 FL (ref 9.2–12.9)
POIKILOCYTOSIS BLD QL SMEAR: SLIGHT
POTASSIUM SERPL-SCNC: 4.6 MMOL/L (ref 3.5–5.1)
PROT SERPL-MCNC: 6.3 G/DL (ref 6–8.4)
RBC # BLD AUTO: 3.58 M/UL (ref 4–5.4)
SMUDGE CELLS BLD QL SMEAR: PRESENT
SODIUM SERPL-SCNC: 139 MMOL/L (ref 136–145)
SPHEROCYTES BLD QL SMEAR: ABNORMAL
WBC # BLD AUTO: 4.11 K/UL (ref 3.9–12.7)

## 2019-10-24 PROCEDURE — 80053 COMPREHEN METABOLIC PANEL: CPT

## 2019-10-24 PROCEDURE — 63600175 PHARM REV CODE 636 W HCPCS: Mod: JG | Performed by: INTERNAL MEDICINE

## 2019-10-24 PROCEDURE — A4216 STERILE WATER/SALINE, 10 ML: HCPCS | Performed by: INTERNAL MEDICINE

## 2019-10-24 PROCEDURE — 63600175 PHARM REV CODE 636 W HCPCS: Performed by: INTERNAL MEDICINE

## 2019-10-24 PROCEDURE — 25000003 PHARM REV CODE 250: Performed by: INTERNAL MEDICINE

## 2019-10-24 PROCEDURE — 85025 COMPLETE CBC W/AUTO DIFF WBC: CPT

## 2019-10-24 PROCEDURE — 96417 CHEMO IV INFUS EACH ADDL SEQ: CPT

## 2019-10-24 PROCEDURE — 96413 CHEMO IV INFUSION 1 HR: CPT

## 2019-10-24 PROCEDURE — 96367 TX/PROPH/DG ADDL SEQ IV INF: CPT

## 2019-10-24 RX ORDER — HEPARIN 100 UNIT/ML
500 SYRINGE INTRAVENOUS
Status: COMPLETED | OUTPATIENT
Start: 2019-10-24 | End: 2019-10-24

## 2019-10-24 RX ORDER — PALONOSETRON 0.05 MG/ML
0.25 INJECTION, SOLUTION INTRAVENOUS
Status: DISCONTINUED | OUTPATIENT
Start: 2019-10-24 | End: 2019-10-24

## 2019-10-24 RX ORDER — SODIUM CHLORIDE 0.9 % (FLUSH) 0.9 %
10 SYRINGE (ML) INJECTION
Status: CANCELLED | OUTPATIENT
Start: 2019-10-24

## 2019-10-24 RX ORDER — SODIUM CHLORIDE 0.9 % (FLUSH) 0.9 %
10 SYRINGE (ML) INJECTION
Status: COMPLETED | OUTPATIENT
Start: 2019-10-24 | End: 2019-10-24

## 2019-10-24 RX ORDER — HEPARIN 100 UNIT/ML
500 SYRINGE INTRAVENOUS
Status: DISCONTINUED | OUTPATIENT
Start: 2019-10-24 | End: 2019-10-24 | Stop reason: HOSPADM

## 2019-10-24 RX ORDER — SODIUM CHLORIDE 0.9 % (FLUSH) 0.9 %
10 SYRINGE (ML) INJECTION
Status: DISCONTINUED | OUTPATIENT
Start: 2019-10-24 | End: 2019-10-24 | Stop reason: HOSPADM

## 2019-10-24 RX ORDER — HEPARIN 100 UNIT/ML
500 SYRINGE INTRAVENOUS
Status: CANCELLED | OUTPATIENT
Start: 2019-10-24

## 2019-10-24 RX ADMIN — HEPARIN SODIUM (PORCINE) LOCK FLUSH IV SOLN 100 UNIT/ML 500 UNITS: 100 SOLUTION at 08:10

## 2019-10-24 RX ADMIN — SODIUM CHLORIDE: 9 INJECTION, SOLUTION INTRAVENOUS at 11:10

## 2019-10-24 RX ADMIN — HEPARIN SODIUM (PORCINE) LOCK FLUSH IV SOLN 100 UNIT/ML 500 UNITS: 100 SOLUTION at 01:10

## 2019-10-24 RX ADMIN — PALONOSETRON HYDROCHLORIDE 0.25 MG: 0.25 INJECTION INTRAVENOUS at 11:10

## 2019-10-24 RX ADMIN — Medication 10 ML: at 08:10

## 2019-10-24 RX ADMIN — Medication 10 ML: at 01:10

## 2019-10-24 RX ADMIN — GEMCITABINE HYDROCHLORIDE 1290 MG: 200 INJECTION, SOLUTION INTRAVENOUS at 01:10

## 2019-10-24 RX ADMIN — PACLITAXEL 160 MG: 100 INJECTION, POWDER, LYOPHILIZED, FOR SUSPENSION INTRAVENOUS at 12:10

## 2019-10-24 NOTE — PROGRESS NOTES
PANOVA-3: Pivotal, randomized, open-label study of Tumor Treating Fields (TTFields, 150kHz) concomitant with gemcitabine and nab-paclitaxel for front-line treatment of locally-advanced pancreatic adenocarcinoma  Study #: EF-27  Sponsor: [employer]  PI: Eliot Barrett MD  WIRB: [id number]     C3D8 - 10/24/19    Pt in today for C3D8 treatment on above-mentioned study. Pt is AAO x3 with appropriate mood & affect. Pt queried & reports some intermittent nausea post-dose last week that improved with medication. Pt reports feeling well this week - she states she is eating more & has a little less fatigue than last week. Pt denies any changes to her ConMeds.   Pt reports an area of soft tissue swelling on her right neck. Upon exam by CRC, area is ~4 cm x 2 cm in the area where the tip of her port-a-cath would end in her jugular vein. Pt reports it started a few days ago & was painful, but when she woke up today it was not. Location of mass is not red, warm or tender to touch. PI informed & has ordered a soft tissue US & veinous US tomorrow afternoon at the [facility]. Pt also informed of tests scheduled & was instructed to call me or Dr. Barrett if there are any worsening symptoms. Pt verbalized understanding of all instructions.  Pt's C3D8 CBC & CMP were reviewed & pt is eligible to receive treatment today per protocol. Pt was administered medications per C3D1 dose level, as she has no dose modifications required today: Gemzar @ 800 mg/m2 (1290 mg total) & Abraxane @ 100 mg/m2 (160 mg total). Per infusion RN note, pt tolerated treatment well & was DC's home in stable condition.  Pt will RTC for C3D15 on 10/31/19 @ 0900 portdraw & 1000 infusion. All procedures completed today were completed per protocol.

## 2019-10-29 ENCOUNTER — HOSPITAL ENCOUNTER (OUTPATIENT)
Dept: RADIOLOGY | Facility: HOSPITAL | Age: 59
Discharge: HOME | End: 2019-10-29
Attending: INTERNAL MEDICINE
Payer: COMMERCIAL

## 2019-10-29 DIAGNOSIS — C25.0 CANCER OF HEAD OF PANCREAS: ICD-10-CM

## 2019-10-29 DIAGNOSIS — I82.90 VTE (VENOUS THROMBOEMBOLISM): Primary | ICD-10-CM

## 2019-10-29 PROCEDURE — 76536 US EXAM OF HEAD AND NECK: CPT | Mod: TC

## 2019-10-29 PROCEDURE — 76536 US EXAM OF HEAD AND NECK: CPT | Mod: 26,,, | Performed by: RADIOLOGY

## 2019-10-29 PROCEDURE — 93971 EXTREMITY STUDY: CPT | Mod: 26,RT,, | Performed by: RADIOLOGY

## 2019-10-29 PROCEDURE — 93971 US UPPER EXTREMITY VEINS RIGHT: ICD-10-PCS | Mod: 26,RT,, | Performed by: RADIOLOGY

## 2019-10-29 PROCEDURE — 76536 US SOFT TISSUE HEAD NECK THYROID: ICD-10-PCS | Mod: 26,,, | Performed by: RADIOLOGY

## 2019-10-29 PROCEDURE — 93971 EXTREMITY STUDY: CPT | Mod: TC,RT

## 2019-10-29 NOTE — PROGRESS NOTES
Outpatient Consult Note    Patient of Dr. Barrett.    Hx of pancreatic cancer, on clinical trial.     Radiology called in RIJ VTE, does not appear to be associated with a port.      Called patient, did not answer. Left message regarding diagnosis and to call the clinic in the AM.     Will place Rx at our pharmacy.     Likely needs to stop her HRT (if taking, on her med list).    Plan  DOAC - will given apixiban  10 mg BID for 7 days followed by 5 mg bid for indefinite future  Follow up with Dr. Barrett    Discussed with Dr. Garcia.          ATTENDING NOTE, ONCOLOGY INPATIENT TEAM    As above; this case was discussed with Dr. Green, but I do not have a chance to personally evaluate the patient, nor was I asked to do so.    Miguel A Miles MD

## 2019-10-31 ENCOUNTER — INFUSION (OUTPATIENT)
Dept: INFUSION THERAPY | Facility: HOSPITAL | Age: 59
End: 2019-10-31
Attending: INTERNAL MEDICINE
Payer: COMMERCIAL

## 2019-10-31 ENCOUNTER — PATIENT MESSAGE (OUTPATIENT)
Dept: HEMATOLOGY/ONCOLOGY | Facility: CLINIC | Age: 59
End: 2019-10-31

## 2019-10-31 VITALS
SYSTOLIC BLOOD PRESSURE: 107 MMHG | WEIGHT: 130.94 LBS | DIASTOLIC BLOOD PRESSURE: 56 MMHG | HEART RATE: 61 BPM | HEIGHT: 63 IN | BODY MASS INDEX: 23.2 KG/M2 | RESPIRATION RATE: 18 BRPM

## 2019-10-31 DIAGNOSIS — Z00.6 RESEARCH STUDY PATIENT: ICD-10-CM

## 2019-10-31 DIAGNOSIS — C25.0 CANCER OF HEAD OF PANCREAS: Primary | ICD-10-CM

## 2019-10-31 DIAGNOSIS — C25.0 CANCER OF HEAD OF PANCREAS: ICD-10-CM

## 2019-10-31 DIAGNOSIS — D49.0 PANCREAS NEOPLASM: Primary | ICD-10-CM

## 2019-10-31 DIAGNOSIS — G89.3 NEOPLASM RELATED PAIN (ACUTE) (CHRONIC): ICD-10-CM

## 2019-10-31 DIAGNOSIS — C25.9 PANCREATIC CANCER: ICD-10-CM

## 2019-10-31 DIAGNOSIS — Z00.6 EXAMINATION OF PARTICIPANT IN CLINICAL TRIAL: ICD-10-CM

## 2019-10-31 LAB
ALBUMIN SERPL BCP-MCNC: 3 G/DL (ref 3.5–5.2)
ALP SERPL-CCNC: 169 U/L (ref 55–135)
ALT SERPL W/O P-5'-P-CCNC: 54 U/L (ref 10–44)
ANION GAP SERPL CALC-SCNC: 5 MMOL/L (ref 8–16)
AST SERPL-CCNC: 32 U/L (ref 10–40)
BASOPHILS # BLD AUTO: 0.03 K/UL (ref 0–0.2)
BASOPHILS NFR BLD: 0.9 % (ref 0–1.9)
BILIRUB SERPL-MCNC: 0.3 MG/DL (ref 0.1–1)
BUN SERPL-MCNC: 14 MG/DL (ref 6–20)
CALCIUM SERPL-MCNC: 8.8 MG/DL (ref 8.7–10.5)
CHLORIDE SERPL-SCNC: 109 MMOL/L (ref 95–110)
CO2 SERPL-SCNC: 25 MMOL/L (ref 23–29)
CREAT SERPL-MCNC: 0.8 MG/DL (ref 0.5–1.4)
DIFFERENTIAL METHOD: ABNORMAL
EOSINOPHIL # BLD AUTO: 0.1 K/UL (ref 0–0.5)
EOSINOPHIL NFR BLD: 1.5 % (ref 0–8)
ERYTHROCYTE [DISTWIDTH] IN BLOOD BY AUTOMATED COUNT: 16.3 % (ref 11.5–14.5)
EST. GFR  (AFRICAN AMERICAN): >60 ML/MIN/1.73 M^2
EST. GFR  (NON AFRICAN AMERICAN): >60 ML/MIN/1.73 M^2
GLUCOSE SERPL-MCNC: 99 MG/DL (ref 70–110)
HCT VFR BLD AUTO: 32.6 % (ref 37–48.5)
HGB BLD-MCNC: 10.3 G/DL (ref 12–16)
IMM GRANULOCYTES # BLD AUTO: 0.01 K/UL (ref 0–0.04)
IMM GRANULOCYTES NFR BLD AUTO: 0.3 % (ref 0–0.5)
LYMPHOCYTES # BLD AUTO: 1.2 K/UL (ref 1–4.8)
LYMPHOCYTES NFR BLD: 34.3 % (ref 18–48)
MCH RBC QN AUTO: 28.7 PG (ref 27–31)
MCHC RBC AUTO-ENTMCNC: 31.6 G/DL (ref 32–36)
MCV RBC AUTO: 91 FL (ref 82–98)
MONOCYTES # BLD AUTO: 0.6 K/UL (ref 0.3–1)
MONOCYTES NFR BLD: 18.3 % (ref 4–15)
NEUTROPHILS # BLD AUTO: 1.5 K/UL (ref 1.8–7.7)
NEUTROPHILS NFR BLD: 44.7 % (ref 38–73)
NRBC BLD-RTO: 0 /100 WBC
PLATELET # BLD AUTO: 149 K/UL (ref 150–350)
PMV BLD AUTO: 10.7 FL (ref 9.2–12.9)
POTASSIUM SERPL-SCNC: 5.3 MMOL/L (ref 3.5–5.1)
PROT SERPL-MCNC: 6.3 G/DL (ref 6–8.4)
RBC # BLD AUTO: 3.59 M/UL (ref 4–5.4)
SODIUM SERPL-SCNC: 139 MMOL/L (ref 136–145)
WBC # BLD AUTO: 3.38 K/UL (ref 3.9–12.7)

## 2019-10-31 PROCEDURE — 63600175 PHARM REV CODE 636 W HCPCS: Performed by: INTERNAL MEDICINE

## 2019-10-31 PROCEDURE — 96367 TX/PROPH/DG ADDL SEQ IV INF: CPT

## 2019-10-31 PROCEDURE — 25000003 PHARM REV CODE 250: Performed by: INTERNAL MEDICINE

## 2019-10-31 PROCEDURE — 63600175 PHARM REV CODE 636 W HCPCS: Mod: JG | Performed by: INTERNAL MEDICINE

## 2019-10-31 PROCEDURE — 96413 CHEMO IV INFUSION 1 HR: CPT

## 2019-10-31 PROCEDURE — 96417 CHEMO IV INFUS EACH ADDL SEQ: CPT

## 2019-10-31 PROCEDURE — 80053 COMPREHEN METABOLIC PANEL: CPT

## 2019-10-31 PROCEDURE — 85025 COMPLETE CBC W/AUTO DIFF WBC: CPT

## 2019-10-31 PROCEDURE — A4216 STERILE WATER/SALINE, 10 ML: HCPCS | Performed by: INTERNAL MEDICINE

## 2019-10-31 RX ORDER — FENTANYL 25 UG/1
1 PATCH TRANSDERMAL
Qty: 10 PATCH | Refills: 0 | Status: SHIPPED | OUTPATIENT
Start: 2019-10-31 | End: 2019-11-25 | Stop reason: SDUPTHER

## 2019-10-31 RX ORDER — SODIUM CHLORIDE 0.9 % (FLUSH) 0.9 %
10 SYRINGE (ML) INJECTION
Status: DISCONTINUED | OUTPATIENT
Start: 2019-10-31 | End: 2019-10-31 | Stop reason: HOSPADM

## 2019-10-31 RX ORDER — SODIUM CHLORIDE 0.9 % (FLUSH) 0.9 %
10 SYRINGE (ML) INJECTION
Status: CANCELLED | OUTPATIENT
Start: 2019-10-31

## 2019-10-31 RX ORDER — HEPARIN 100 UNIT/ML
500 SYRINGE INTRAVENOUS
Status: DISCONTINUED | OUTPATIENT
Start: 2019-10-31 | End: 2019-10-31 | Stop reason: HOSPADM

## 2019-10-31 RX ORDER — SODIUM CHLORIDE 0.9 % (FLUSH) 0.9 %
10 SYRINGE (ML) INJECTION
Status: COMPLETED | OUTPATIENT
Start: 2019-10-31 | End: 2019-10-31

## 2019-10-31 RX ORDER — HEPARIN 100 UNIT/ML
500 SYRINGE INTRAVENOUS
Status: CANCELLED | OUTPATIENT
Start: 2019-10-31

## 2019-10-31 RX ORDER — HEPARIN 100 UNIT/ML
500 SYRINGE INTRAVENOUS
Status: COMPLETED | OUTPATIENT
Start: 2019-10-31 | End: 2019-10-31

## 2019-10-31 RX ADMIN — SODIUM CHLORIDE: 0.9 INJECTION, SOLUTION INTRAVENOUS at 11:10

## 2019-10-31 RX ADMIN — GEMCITABINE HYDROCHLORIDE 1290 MG: 200 INJECTION, SOLUTION INTRAVENOUS at 12:10

## 2019-10-31 RX ADMIN — Medication 10 ML: at 09:10

## 2019-10-31 RX ADMIN — PACLITAXEL 160 MG: 100 INJECTION, POWDER, LYOPHILIZED, FOR SUSPENSION INTRAVENOUS at 11:10

## 2019-10-31 RX ADMIN — HEPARIN SODIUM (PORCINE) LOCK FLUSH IV SOLN 100 UNIT/ML 500 UNITS: 100 SOLUTION at 12:10

## 2019-10-31 RX ADMIN — HEPARIN SODIUM (PORCINE) LOCK FLUSH IV SOLN 100 UNIT/ML 500 UNITS: 100 SOLUTION at 09:10

## 2019-10-31 RX ADMIN — PALONOSETRON HYDROCHLORIDE 0.25 MG: 0.25 INJECTION INTRAVENOUS at 11:10

## 2019-10-31 RX ADMIN — Medication 10 ML: at 12:10

## 2019-11-05 ENCOUNTER — PATIENT MESSAGE (OUTPATIENT)
Dept: HEMATOLOGY/ONCOLOGY | Facility: CLINIC | Age: 59
End: 2019-11-05

## 2019-11-13 ENCOUNTER — TELEPHONE (OUTPATIENT)
Dept: HEMATOLOGY/ONCOLOGY | Facility: CLINIC | Age: 59
End: 2019-11-13

## 2019-11-13 NOTE — PROGRESS NOTES
Subjective:       Patient ID: Quyen Moody    Chief Complaint:  Pancreatic cancer    HPI     Quyen Moody is a 59 y.o. female, patient of Dr. Barrett, to clinic for follow up and cycle 4, day 1 on PANOVA-3 trial. She is currently receiving [place]+Abraxane and TTF. Patient took 2 days off from the TTF last week due to skin irritation. She is now taking Eliquis. Patient feeling well today, has nausea post chemo infusion.Pain is controlled with the fentanyl patch. Appetite and weight improving. She has been working and staying extremely active. Patient reports left lower extremity swelling over the weekend with tingling but swelling has slowly improved. Denies any pain to calf with walking or flexion.     ECOG 0.    Oncologic History:  She has had intermittent upper abdominal pain since early June.  She presented to her PCP who originally ordered an US and CT.  US was negative and CT showed some haziness at the pancreatic head.  She saw GI who performed EUS.  On EUS, a 3x4cm pancreatic head mass was seen with possible invasion into the SMA and portal vein.  FNA path s/w pancreatic adenocarcinoma.  CA 19-9 level at outside hospital was >1000 per the patient.  She endorses nausea and inability to tolerate much PO.  She has lost about 10lbs over the last few weeks and is noticing her skin turning yellow.  Denies pruritis.  She is passing gas but has not had a BM in a few days, she attributes this to narcotic use.  She recently started taking stool softeners.  No previous cardiac or stroke history.  Surgical history significant for open appendectomy when she was in her 20s    Review of Systems   Constitutional: Positive for fatigue. Negative for activity change, appetite change, chills, fever and unexpected weight change (improving).   HENT: Negative for congestion, hearing loss, mouth sores, sore throat, tinnitus and voice change.    Eyes: Negative for pain and visual disturbance.   Respiratory: Positive for shortness of  breath. Negative for cough and wheezing.    Cardiovascular: Positive for leg swelling (left). Negative for chest pain and palpitations.   Gastrointestinal: Positive for abdominal pain. Negative for constipation, diarrhea, nausea and vomiting.   Endocrine: Negative for cold intolerance and heat intolerance.   Genitourinary: Negative for difficulty urinating, dyspareunia, dysuria, frequency, menstrual problem, urgency, vaginal bleeding, vaginal discharge and vaginal pain.   Musculoskeletal: Negative for arthralgias, back pain and myalgias.   Skin: Positive for rash. Negative for color change and wound.   Allergic/Immunologic: Negative for environmental allergies and food allergies.   Neurological: Negative for weakness, numbness and headaches.   Hematological: Negative for adenopathy. Does not bruise/bleed easily.   Psychiatric/Behavioral: Negative for agitation, confusion, hallucinations and sleep disturbance. The patient is nervous/anxious.    All other systems reviewed and are negative.        Allergies:  Review of patient's allergies indicates:   Allergen Reactions    Sulfa (sulfonamide antibiotics) Swelling and Hives     tongue         Medications:  Current Outpatient Medications   Medication Sig Dispense Refill    apixaban (ELIQUIS) 5 mg (74 tabs) DsPk For the first 7 days take two 5 mg tablets twice daily.  After 7 days take one 5 mg tablet twice daily. 74 tablet 0    docusate sodium (COLACE) 100 MG capsule Take 100 mg by mouth 2 (two) times daily.      estradiol-norethindrone (ACTIVELLA) 1-0.5 mg per tablet Take 1 tablet by mouth.      fentaNYL (DURAGESIC) 25 mcg/hr Place 1 patch onto the skin every 72 hours. 10 patch 0    HYDROcodone-acetaminophen (NORCO)  mg per tablet Take 1 tablet by mouth every 6 (six) hours as needed for Pain. 100 tablet 0    hydrocortisone 2.5 % cream Apply topically 2 (two) times daily. 453.6 g 1    lidocaine-prilocaine (EMLA) cream Apply to port 45 minutes prior to  access. 30 g 0    lipase-protease-amylase (CREON) 36,000-114,000- 180,000 unit CpDR take 1 capsule by mouth 4 times daily 120 capsule 6    LORazepam (ATIVAN) 0.5 MG tablet Take 1 tablet (0.5 mg total) by mouth every 6 (six) hours as needed for Anxiety (nausea). 60 tablet 0    multivitamin (THERAGRAN) per tablet Take 1 tablet by mouth once daily.      mupirocin (BACTROBAN) 2 % ointment Apply topically 3 (three) times daily. 30 g 1    ondansetron (ZOFRAN) 8 MG tablet Take 1 tablet (8 mg total) by mouth every 8 (eight) hours as needed for Nausea. 120 tablet 2    polyethylene glycol (GLYCOLAX) 17 gram PwPk Take 1 g by mouth once daily.      ranitidine (ZANTAC) 150 MG tablet Take 150 mg by mouth 2 (two) times daily.       No current facility-administered medications for this visit.        PMH:  Past Medical History:   Diagnosis Date    Allergic rhinitis 3/3/2014    CKD (chronic kidney disease) stage 3, GFR 30-59 ml/min 12/26/2015    Hyperlipidemia 3/3/2014    Pancreas cancer     Skin rash 8/10/2019       PSH:  Past Surgical History:   Procedure Laterality Date    ERCP N/A 7/16/2019    Procedure: ERCP (ENDOSCOPIC RETROGRADE CHOLANGIOPANCREATOGRAPHY);  Surgeon: Chema Harris MD;  Location: [address]);  Service: Endoscopy;  Laterality: N/A;    Exporatory lap      INSERTION OF TUNNELED CENTRAL VENOUS CATHETER (CVC) WITH SUBCUTANEOUS PORT Right 8/12/2019    Procedure: [id number];  Surgeon: Cesar Hallman MD;  Location: [address];  Service: General;  Laterality: Right;  right IJ       FamHx:  Family History   Problem Relation Age of Onset    Diabetes Mother     Diabetes Father     Diabetes Brother     Heart disease Neg Hx        SocHx:  Social History     Socioeconomic History    Marital status:      Spouse name: Not on file    Number of children: 0    Years of education: Not on file    Highest education level: Not on file   Occupational History    Occupation: Hair  [place]     Employer: [employer]   Social Needs    Financial resource strain: Not on file    Food insecurity:     Worry: Not on file     Inability: Not on file    Transportation needs:     Medical: Not on file     Non-medical: Not on file   Tobacco Use    Smoking status: Former Smoker     Start date: 1999    Smokeless tobacco: Never Used   Substance and Sexual Activity    Alcohol use: Yes     Alcohol/week: 1.0 standard drinks     Types: 1 Glasses of wine per week     Frequency: 4 or more times a week     Drinks per session: 1 or 2     Binge frequency: Never     Comment: last drink a month ago     Drug use: No    Sexual activity: Yes   Lifestyle    Physical activity:     Days per week: Not on file     Minutes per session: Not on file    Stress: Not on file   Relationships    Social connections:     Talks on phone: Not on file     Gets together: Not on file     Attends Zoroastrian service: Not on file     Active member of club or organization: Not on file     Attends meetings of clubs or organizations: Not on file     Relationship status: Not on file   Other Topics Concern    Not on file   Social History Narrative    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      of Cancer in 2016         Objective:       Vitals:    19 0905   BP: (!) 122/59   Pulse: 81   Resp: 20   Temp: 97.7 °F (36.5 °C)       Physical Exam   Constitutional: She is oriented to person, place, and time. She appears well-developed and well-nourished. No distress.   HENT:   Head: Normocephalic and atraumatic.   Nose: Nose normal.   Mouth/Throat: Oropharynx is clear and moist. No oropharyngeal exudate.   Eyes: Pupils are equal, round, and reactive to light. Conjunctivae and EOM are normal. Right eye exhibits no discharge. Left eye exhibits no discharge. No scleral icterus.   Neck: Normal range of motion. Neck supple. No tracheal deviation present. No thyromegaly present.   Cardiovascular: Normal rate, regular rhythm, normal heart  sounds and normal pulses.   No swelling to bilateral lower extremities.    Pulmonary/Chest: Effort normal and breath sounds normal. She has no wheezes. She has no rales.   Abdominal: Soft. Bowel sounds are normal. She exhibits no distension. There is no tenderness. There is no guarding.   Musculoskeletal: Normal range of motion. She exhibits edema (left lower extremity +1 from ankle to knee). She exhibits no tenderness or deformity.   No spinal or paraspinal tenderness to palpation.       Lymphadenopathy:     She has no cervical adenopathy.   Neurological: She is alert and oriented to person, place, and time. No cranial nerve deficit. Coordination normal.   Skin: Skin is warm, dry and intact. Rash noted. She is not diaphoretic. No erythema. No pallor.   Psychiatric: She has a normal mood and affect. Her behavior is normal. Judgment and thought content normal.   Nursing note and vitals reviewed.        LABS:  WBC   Date Value Ref Range Status   11/14/2019 7.06 3.90 - 12.70 K/uL Final     Hemoglobin   Date Value Ref Range Status   11/14/2019 10.6 (L) 12.0 - 16.0 g/dL Final     Hematocrit   Date Value Ref Range Status   11/14/2019 33.0 (L) 37.0 - 48.5 % Final     Platelets   Date Value Ref Range Status   11/14/2019 338 150 - 350 K/uL Final       Chemistry        Component Value Date/Time     11/14/2019 0812    K 4.9 11/14/2019 0812     (H) 11/14/2019 0812    CO2 23 11/14/2019 0812    BUN 11 11/14/2019 0812    CREATININE 0.7 11/14/2019 0812    GLU 99 11/14/2019 0812        Component Value Date/Time    CALCIUM 8.5 (L) 11/14/2019 0812    ALKPHOS 183 (H) 11/14/2019 0812    AST 41 (H) 11/14/2019 0812    ALT 50 (H) 11/14/2019 0812    BILITOT 0.4 11/14/2019 0812    ESTGFRAFRICA >60.0 11/14/2019 0812    EGFRNONAA >60.0 11/14/2019 0812            Assessment:       1. Cancer of head of pancreas    2. Research study patient    3. Elevated LFTs    4. Liver lesion    5. Antineoplastic chemotherapy induced anemia    6.  Neoplasm related pain (acute) (chronic)    7. Skin rash    8. VTE (venous thromboembolism)    9. Swelling of lower extremity            Plan:         1,2,3- Cancer at the head of the pancreas:    Discussed SOC chemo and chemoXRT with FOLFIRINOX vs our PANOVA-3 trial with [place]-Abraxane +/- TTFields.  Extensively reviewed the rationale of the study and reviewed her eligibility criteria with the research nurse and discussed case with Dr. Hallman as well.  She is interested in this study, and signed consent with our research nurse.     Here today to con't treatment with [place]+Abraxane and TTF on PANOVA-3, LFTs elevated, but improved. Will con't chemo today, but will dose reduce [place] to 800 and Abraxane to 100 per pt request due to chemo related AEs.      Labs show improvement in LFTs. Will continue with dose reduction.   Acceptable to proceed with treatment. Time our performed between CRC and Sub-I.     RTC per research RN.     4- Too small to biopsy when treatment was started. Unclear if this is a hemangioma, met, etc. Currently monitoring on RECIST.    5- Stable, will monitor.    6-Controlled. Continue medications as is.     7- Improved. Continue current treatment.    8,9- On Xarelto. Discussed need for ultrasound to determine if this is a second blood clot. If it is, will need to switch to Lovenox. Patient aware.    More than 45 mins were spent during this encounter, greater than 50% was spent in direct counseling and/or coordination of care.     Patient was also seen and examined by Dr. Barrett. Patient is in agreement with the proposed treatment plan. All questions were answered to the patient's satisfaction. Pt knows to call clinic if anything is needed before the next clinic visit.    JOSE ANTONIO Doherty-DEIRDRE  Hematology and Medical Oncology

## 2019-11-14 ENCOUNTER — INFUSION (OUTPATIENT)
Dept: INFUSION THERAPY | Facility: HOSPITAL | Age: 59
End: 2019-11-14
Attending: INTERNAL MEDICINE
Payer: COMMERCIAL

## 2019-11-14 ENCOUNTER — RESEARCH ENCOUNTER (OUTPATIENT)
Dept: RESEARCH | Facility: HOSPITAL | Age: 59
End: 2019-11-14

## 2019-11-14 ENCOUNTER — TELEPHONE (OUTPATIENT)
Dept: HEMATOLOGY/ONCOLOGY | Facility: CLINIC | Age: 59
End: 2019-11-14

## 2019-11-14 ENCOUNTER — OFFICE VISIT (OUTPATIENT)
Dept: HEMATOLOGY/ONCOLOGY | Facility: CLINIC | Age: 59
End: 2019-11-14
Payer: COMMERCIAL

## 2019-11-14 VITALS
SYSTOLIC BLOOD PRESSURE: 122 MMHG | TEMPERATURE: 98 F | BODY MASS INDEX: 23.12 KG/M2 | HEART RATE: 81 BPM | HEIGHT: 63 IN | OXYGEN SATURATION: 100 % | DIASTOLIC BLOOD PRESSURE: 59 MMHG | WEIGHT: 130.5 LBS | RESPIRATION RATE: 20 BRPM

## 2019-11-14 VITALS
DIASTOLIC BLOOD PRESSURE: 63 MMHG | SYSTOLIC BLOOD PRESSURE: 134 MMHG | TEMPERATURE: 98 F | HEART RATE: 70 BPM | RESPIRATION RATE: 18 BRPM

## 2019-11-14 DIAGNOSIS — K76.9 LIVER LESION: ICD-10-CM

## 2019-11-14 DIAGNOSIS — G89.3 NEOPLASM RELATED PAIN (ACUTE) (CHRONIC): ICD-10-CM

## 2019-11-14 DIAGNOSIS — D64.81 ANTINEOPLASTIC CHEMOTHERAPY INDUCED ANEMIA: ICD-10-CM

## 2019-11-14 DIAGNOSIS — R21 SKIN RASH: ICD-10-CM

## 2019-11-14 DIAGNOSIS — C25.9 PANCREATIC CANCER: ICD-10-CM

## 2019-11-14 DIAGNOSIS — R79.89 ELEVATED LFTS: ICD-10-CM

## 2019-11-14 DIAGNOSIS — Z00.6 EXAMINATION OF PARTICIPANT IN CLINICAL TRIAL: ICD-10-CM

## 2019-11-14 DIAGNOSIS — C25.0 CANCER OF HEAD OF PANCREAS: ICD-10-CM

## 2019-11-14 DIAGNOSIS — Z00.6 RESEARCH STUDY PATIENT: ICD-10-CM

## 2019-11-14 DIAGNOSIS — C25.0 CANCER OF HEAD OF PANCREAS: Primary | ICD-10-CM

## 2019-11-14 DIAGNOSIS — M79.89 SWELLING OF LOWER EXTREMITY: ICD-10-CM

## 2019-11-14 DIAGNOSIS — T45.1X5A ANTINEOPLASTIC CHEMOTHERAPY INDUCED ANEMIA: ICD-10-CM

## 2019-11-14 DIAGNOSIS — I82.90 VTE (VENOUS THROMBOEMBOLISM): ICD-10-CM

## 2019-11-14 DIAGNOSIS — D49.0 PANCREAS NEOPLASM: Primary | ICD-10-CM

## 2019-11-14 LAB
ALBUMIN SERPL BCP-MCNC: 3 G/DL (ref 3.5–5.2)
ALP SERPL-CCNC: 183 U/L (ref 55–135)
ALT SERPL W/O P-5'-P-CCNC: 50 U/L (ref 10–44)
ANION GAP SERPL CALC-SCNC: 6 MMOL/L (ref 8–16)
AST SERPL-CCNC: 41 U/L (ref 10–40)
BASOPHILS # BLD AUTO: 0.02 K/UL (ref 0–0.2)
BASOPHILS NFR BLD: 0.3 % (ref 0–1.9)
BILIRUB SERPL-MCNC: 0.4 MG/DL (ref 0.1–1)
BUN SERPL-MCNC: 11 MG/DL (ref 6–20)
CALCIUM SERPL-MCNC: 8.5 MG/DL (ref 8.7–10.5)
CANCER AG19-9 SERPL-ACNC: 67 U/ML (ref 2–40)
CHLORIDE SERPL-SCNC: 112 MMOL/L (ref 95–110)
CO2 SERPL-SCNC: 23 MMOL/L (ref 23–29)
CREAT SERPL-MCNC: 0.7 MG/DL (ref 0.5–1.4)
DIFFERENTIAL METHOD: ABNORMAL
EOSINOPHIL # BLD AUTO: 0.2 K/UL (ref 0–0.5)
EOSINOPHIL NFR BLD: 2.7 % (ref 0–8)
ERYTHROCYTE [DISTWIDTH] IN BLOOD BY AUTOMATED COUNT: 18.6 % (ref 11.5–14.5)
EST. GFR  (AFRICAN AMERICAN): >60 ML/MIN/1.73 M^2
EST. GFR  (NON AFRICAN AMERICAN): >60 ML/MIN/1.73 M^2
GGT SERPL-CCNC: 176 U/L (ref 8–55)
GLUCOSE SERPL-MCNC: 99 MG/DL (ref 70–110)
HCT VFR BLD AUTO: 33 % (ref 37–48.5)
HGB BLD-MCNC: 10.6 G/DL (ref 12–16)
IMM GRANULOCYTES # BLD AUTO: 0.02 K/UL (ref 0–0.04)
IMM GRANULOCYTES NFR BLD AUTO: 0.3 % (ref 0–0.5)
LDH SERPL L TO P-CCNC: 195 U/L (ref 110–260)
LYMPHOCYTES # BLD AUTO: 1.1 K/UL (ref 1–4.8)
LYMPHOCYTES NFR BLD: 14.9 % (ref 18–48)
MCH RBC QN AUTO: 29.2 PG (ref 27–31)
MCHC RBC AUTO-ENTMCNC: 32.1 G/DL (ref 32–36)
MCV RBC AUTO: 91 FL (ref 82–98)
MONOCYTES # BLD AUTO: 1.2 K/UL (ref 0.3–1)
MONOCYTES NFR BLD: 17.6 % (ref 4–15)
NEUTROPHILS # BLD AUTO: 4.5 K/UL (ref 1.8–7.7)
NEUTROPHILS NFR BLD: 64.2 % (ref 38–73)
NRBC BLD-RTO: 0 /100 WBC
PLATELET # BLD AUTO: 338 K/UL (ref 150–350)
PMV BLD AUTO: 9.6 FL (ref 9.2–12.9)
POTASSIUM SERPL-SCNC: 4.9 MMOL/L (ref 3.5–5.1)
PROT SERPL-MCNC: 6.3 G/DL (ref 6–8.4)
RBC # BLD AUTO: 3.63 M/UL (ref 4–5.4)
SODIUM SERPL-SCNC: 141 MMOL/L (ref 136–145)
WBC # BLD AUTO: 7.06 K/UL (ref 3.9–12.7)

## 2019-11-14 PROCEDURE — 63600175 PHARM REV CODE 636 W HCPCS: Performed by: INTERNAL MEDICINE

## 2019-11-14 PROCEDURE — 25000003 PHARM REV CODE 250: Performed by: INTERNAL MEDICINE

## 2019-11-14 PROCEDURE — 85025 COMPLETE CBC W/AUTO DIFF WBC: CPT

## 2019-11-14 PROCEDURE — 96417 CHEMO IV INFUS EACH ADDL SEQ: CPT

## 2019-11-14 PROCEDURE — 96375 TX/PRO/DX INJ NEW DRUG ADDON: CPT

## 2019-11-14 PROCEDURE — A4216 STERILE WATER/SALINE, 10 ML: HCPCS | Performed by: INTERNAL MEDICINE

## 2019-11-14 PROCEDURE — 3008F BODY MASS INDEX DOCD: CPT | Mod: CPTII,S$GLB,, | Performed by: NURSE PRACTITIONER

## 2019-11-14 PROCEDURE — 96413 CHEMO IV INFUSION 1 HR: CPT

## 2019-11-14 PROCEDURE — 80053 COMPREHEN METABOLIC PANEL: CPT

## 2019-11-14 PROCEDURE — 3008F PR BODY MASS INDEX (BMI) DOCUMENTED: ICD-10-PCS | Mod: CPTII,S$GLB,, | Performed by: NURSE PRACTITIONER

## 2019-11-14 PROCEDURE — 99999 PR PBB SHADOW E&M-EST. PATIENT-LVL IV: CPT | Mod: PBBFAC,,, | Performed by: NURSE PRACTITIONER

## 2019-11-14 PROCEDURE — 99215 OFFICE O/P EST HI 40 MIN: CPT | Mod: S$GLB,,, | Performed by: NURSE PRACTITIONER

## 2019-11-14 PROCEDURE — 96367 TX/PROPH/DG ADDL SEQ IV INF: CPT

## 2019-11-14 PROCEDURE — 83615 LACTATE (LD) (LDH) ENZYME: CPT

## 2019-11-14 PROCEDURE — 99215 PR OFFICE/OUTPT VISIT, EST, LEVL V, 40-54 MIN: ICD-10-PCS | Mod: S$GLB,,, | Performed by: NURSE PRACTITIONER

## 2019-11-14 PROCEDURE — 36415 COLL VENOUS BLD VENIPUNCTURE: CPT

## 2019-11-14 PROCEDURE — 82977 ASSAY OF GGT: CPT

## 2019-11-14 PROCEDURE — 99999 PR PBB SHADOW E&M-EST. PATIENT-LVL IV: ICD-10-PCS | Mod: PBBFAC,,, | Performed by: NURSE PRACTITIONER

## 2019-11-14 PROCEDURE — 86301 IMMUNOASSAY TUMOR CA 19-9: CPT

## 2019-11-14 RX ORDER — HEPARIN 100 UNIT/ML
500 SYRINGE INTRAVENOUS
Status: CANCELLED | OUTPATIENT
Start: 2019-11-14

## 2019-11-14 RX ORDER — SODIUM CHLORIDE 0.9 % (FLUSH) 0.9 %
10 SYRINGE (ML) INJECTION
Status: CANCELLED | OUTPATIENT
Start: 2019-11-14

## 2019-11-14 RX ORDER — HEPARIN 100 UNIT/ML
500 SYRINGE INTRAVENOUS
Status: DISCONTINUED | OUTPATIENT
Start: 2019-11-14 | End: 2019-11-14 | Stop reason: HOSPADM

## 2019-11-14 RX ORDER — SODIUM CHLORIDE 0.9 % (FLUSH) 0.9 %
10 SYRINGE (ML) INJECTION
Status: COMPLETED | OUTPATIENT
Start: 2019-11-14 | End: 2019-11-14

## 2019-11-14 RX ORDER — PALONOSETRON 0.05 MG/ML
0.25 INJECTION, SOLUTION INTRAVENOUS
Status: CANCELLED | OUTPATIENT
Start: 2019-11-14

## 2019-11-14 RX ORDER — SODIUM CHLORIDE 0.9 % (FLUSH) 0.9 %
10 SYRINGE (ML) INJECTION
Status: DISCONTINUED | OUTPATIENT
Start: 2019-11-14 | End: 2019-11-14 | Stop reason: HOSPADM

## 2019-11-14 RX ORDER — HEPARIN 100 UNIT/ML
500 SYRINGE INTRAVENOUS
Status: COMPLETED | OUTPATIENT
Start: 2019-11-14 | End: 2019-11-14

## 2019-11-14 RX ADMIN — PACLITAXEL 160 MG: 100 INJECTION, POWDER, LYOPHILIZED, FOR SUSPENSION INTRAVENOUS at 10:11

## 2019-11-14 RX ADMIN — Medication 10 ML: at 11:11

## 2019-11-14 RX ADMIN — GEMCITABINE HYDROCHLORIDE 1290 MG: 200 INJECTION, SOLUTION INTRAVENOUS at 11:11

## 2019-11-14 RX ADMIN — HEPARIN 500 UNITS: 100 SYRINGE at 11:11

## 2019-11-14 RX ADMIN — PALONOSETRON 0.25 MG: 0.25 INJECTION, SOLUTION INTRAVENOUS at 09:11

## 2019-11-14 RX ADMIN — Medication 10 ML: at 08:11

## 2019-11-14 RX ADMIN — HEPARIN 500 UNITS: 100 SYRINGE at 08:11

## 2019-11-14 RX ADMIN — APREPITANT 130 MG: 130 INJECTION, EMULSION INTRAVENOUS at 10:11

## 2019-11-14 RX ADMIN — SODIUM CHLORIDE: 0.9 INJECTION, SOLUTION INTRAVENOUS at 09:11

## 2019-11-14 NOTE — TELEPHONE ENCOUNTER
----- Message from Laxmi Sexton sent at 11/14/2019 12:22 PM CST -----  Hey there - ultrasound wouldn't take her early & Quyen refuses to come back at 6:30.  I offered her the 1:15 appointment in [place], but she also refused. I scheduled her for 2:45 on Monday with the strict instruction to at least tell Dr. Bhatt if she develops any pain, redness, or the swelling gets worse.     I'll let you know if I hear anything from her! Thank you!

## 2019-11-14 NOTE — TELEPHONE ENCOUNTER
----- Message from Karen Hightower NP sent at 11/14/2019  9:25 AM CST -----  Schedule left lower extremity ultrasound ASAP.    RTC per research

## 2019-11-14 NOTE — PROGRESS NOTES
PANOVA-3: Pivotal, randomized, open-label study of Tumor Treating Fields (TTFields, 150kHz) concomitant with gemcitabine and nab-paclitaxel for front-line treatment of locally-advanced pancreatic adenocarcinoma  Study #: EF-[id number]  Sponsor: [employer]  PI: Eliot Barrett MD  WIRB: [id number]     C4D1 - 11/14/2019    Pt in today for C3D8 treatment on above-mentioned study. Pt is AAO x3 with appropriate mood & affect. Pt queried & reports new LLE swelling with numbness/tingleing in foot, without heat or pain since 11/8/19. Pt's venous & soft tissue ultrasounds from 10/29/19 showed an occlusive thrombus of the right internal jugular vein distal to the port catheter. Pt took apixaban 10 mg BID from 10/29 to 11/4 & continues 5 mg BID from 11/5 to present. NP has ordered LLE US to be completed Monday 11/18/19 to R/O clot. Pt continues to report some intermittent nausea post-dose improves with medication. Pt contacted site 11/5/19 to report skin breakdown and ulceration at array placement sites. Pt was instructed to take a break from the TTFields & continue on topical antibiotics. Pt reports that she only took 2 days off & her skin vastly improved.   Pt's VS, labs, PE, KPS & VAS completed per protocol.     Time out with Karen performed in exam room:  Pt's CBC & CMP are within eligibility range to continue previous dose level of both Gemcitabine and Abraxane.  Pt's actual weight is stable, thus will continue to calculate dosing for 1.61 m2.   Gemzar @ 800 mg/m2 (1290 mg total) & Abraxane @ 100 mg/m2 (160 mg total). Dose levels & calculated doses for both agents verified as correctly entered in Epic.    Per infusion RN note, pt tolerated treatment well & was DC'd home in stable condition.  Pt will RTC for C4D8 on 11/20/19 @ 1500 for prior labs & 11/21/19 @ 1000 for infusion. All procedures completed today were completed per protocol.

## 2019-11-14 NOTE — NURSING
Patient's PAC accessed and labs drawn.  Tolerated well. Line flushed, heparinzed and left in place for scheduled treatment.  Site C/D/I.

## 2019-11-14 NOTE — PLAN OF CARE
[facility] trial tolerated well no adverse reactions noted during treatment, vitals stable, PAC accessed prior to arrival positive blood rtn present and maintained throughout treatment, de accessed up d/c site covered with bandaid. Pt ambulatory no distress to ultrasound.

## 2019-11-18 ENCOUNTER — HOSPITAL ENCOUNTER (OUTPATIENT)
Dept: RADIOLOGY | Facility: HOSPITAL | Age: 59
Discharge: HOME | End: 2019-11-18
Attending: NURSE PRACTITIONER
Payer: COMMERCIAL

## 2019-11-18 ENCOUNTER — PATIENT MESSAGE (OUTPATIENT)
Dept: HEMATOLOGY/ONCOLOGY | Facility: CLINIC | Age: 59
End: 2019-11-18

## 2019-11-18 DIAGNOSIS — M79.89 SWELLING OF LOWER EXTREMITY: ICD-10-CM

## 2019-11-18 PROCEDURE — 93970 EXTREMITY STUDY: CPT | Mod: TC

## 2019-11-18 PROCEDURE — 93970 EXTREMITY STUDY: CPT | Mod: 26,,, | Performed by: RADIOLOGY

## 2019-11-18 PROCEDURE — 93970 US LOWER EXTREMITY VEINS BILATERAL: ICD-10-PCS | Mod: 26,,, | Performed by: RADIOLOGY

## 2019-11-20 ENCOUNTER — LAB VISIT (OUTPATIENT)
Dept: LAB | Facility: HOSPITAL | Age: 59
End: 2019-11-20
Attending: INTERNAL MEDICINE
Payer: COMMERCIAL

## 2019-11-20 DIAGNOSIS — C25.9 PANCREATIC CANCER: ICD-10-CM

## 2019-11-20 DIAGNOSIS — Z00.6 EXAMINATION OF PARTICIPANT IN CLINICAL TRIAL: ICD-10-CM

## 2019-11-20 DIAGNOSIS — C25.0 CANCER OF HEAD OF PANCREAS: ICD-10-CM

## 2019-11-20 LAB
ALBUMIN SERPL BCP-MCNC: 3 G/DL (ref 3.5–5.2)
ALP SERPL-CCNC: 127 U/L (ref 55–135)
ALT SERPL W/O P-5'-P-CCNC: 59 U/L (ref 10–44)
ANION GAP SERPL CALC-SCNC: 7 MMOL/L (ref 8–16)
AST SERPL-CCNC: 51 U/L (ref 10–40)
BASOPHILS # BLD AUTO: 0.09 K/UL (ref 0–0.2)
BASOPHILS NFR BLD: 1.6 % (ref 0–1.9)
BILIRUB SERPL-MCNC: 0.3 MG/DL (ref 0.1–1)
BUN SERPL-MCNC: 14 MG/DL (ref 6–20)
CALCIUM SERPL-MCNC: 9 MG/DL (ref 8.7–10.5)
CHLORIDE SERPL-SCNC: 107 MMOL/L (ref 95–110)
CO2 SERPL-SCNC: 24 MMOL/L (ref 23–29)
CREAT SERPL-MCNC: 0.7 MG/DL (ref 0.5–1.4)
DIFFERENTIAL METHOD: ABNORMAL
EOSINOPHIL # BLD AUTO: 0.1 K/UL (ref 0–0.5)
EOSINOPHIL NFR BLD: 2.6 % (ref 0–8)
ERYTHROCYTE [DISTWIDTH] IN BLOOD BY AUTOMATED COUNT: 17.6 % (ref 11.5–14.5)
EST. GFR  (AFRICAN AMERICAN): >60 ML/MIN/1.73 M^2
EST. GFR  (NON AFRICAN AMERICAN): >60 ML/MIN/1.73 M^2
GLUCOSE SERPL-MCNC: 90 MG/DL (ref 70–110)
HCT VFR BLD AUTO: 33.1 % (ref 37–48.5)
HGB BLD-MCNC: 10.2 G/DL (ref 12–16)
IMM GRANULOCYTES # BLD AUTO: 0.01 K/UL (ref 0–0.04)
IMM GRANULOCYTES NFR BLD AUTO: 0.2 % (ref 0–0.5)
LYMPHOCYTES # BLD AUTO: 1.8 K/UL (ref 1–4.8)
LYMPHOCYTES NFR BLD: 33.2 % (ref 18–48)
MCH RBC QN AUTO: 28.7 PG (ref 27–31)
MCHC RBC AUTO-ENTMCNC: 30.8 G/DL (ref 32–36)
MCV RBC AUTO: 93 FL (ref 82–98)
MONOCYTES # BLD AUTO: 0.7 K/UL (ref 0.3–1)
MONOCYTES NFR BLD: 12.8 % (ref 4–15)
NEUTROPHILS # BLD AUTO: 2.7 K/UL (ref 1.8–7.7)
NEUTROPHILS NFR BLD: 49.6 % (ref 38–73)
NRBC BLD-RTO: 0 /100 WBC
PLATELET # BLD AUTO: 345 K/UL (ref 150–350)
PMV BLD AUTO: 10.4 FL (ref 9.2–12.9)
POTASSIUM SERPL-SCNC: 5.6 MMOL/L (ref 3.5–5.1)
PROT SERPL-MCNC: 6.9 G/DL (ref 6–8.4)
RBC # BLD AUTO: 3.56 M/UL (ref 4–5.4)
SODIUM SERPL-SCNC: 138 MMOL/L (ref 136–145)
WBC # BLD AUTO: 5.46 K/UL (ref 3.9–12.7)

## 2019-11-20 PROCEDURE — 85025 COMPLETE CBC W/AUTO DIFF WBC: CPT

## 2019-11-20 PROCEDURE — 80053 COMPREHEN METABOLIC PANEL: CPT

## 2019-11-20 PROCEDURE — 36415 COLL VENOUS BLD VENIPUNCTURE: CPT

## 2019-11-21 ENCOUNTER — RESEARCH ENCOUNTER (OUTPATIENT)
Dept: RESEARCH | Facility: HOSPITAL | Age: 59
End: 2019-11-21

## 2019-11-21 ENCOUNTER — INFUSION (OUTPATIENT)
Dept: INFUSION THERAPY | Facility: HOSPITAL | Age: 59
End: 2019-11-21
Attending: INTERNAL MEDICINE
Payer: COMMERCIAL

## 2019-11-21 VITALS
DIASTOLIC BLOOD PRESSURE: 63 MMHG | SYSTOLIC BLOOD PRESSURE: 139 MMHG | RESPIRATION RATE: 18 BRPM | HEART RATE: 78 BPM | TEMPERATURE: 98 F

## 2019-11-21 DIAGNOSIS — C25.9 PANCREATIC CANCER: ICD-10-CM

## 2019-11-21 DIAGNOSIS — C25.0 CANCER OF HEAD OF PANCREAS: ICD-10-CM

## 2019-11-21 DIAGNOSIS — Z00.6 EXAMINATION OF PARTICIPANT IN CLINICAL TRIAL: Primary | ICD-10-CM

## 2019-11-21 LAB
ALBUMIN SERPL BCP-MCNC: 2.9 G/DL (ref 3.5–5.2)
ALP SERPL-CCNC: 117 U/L (ref 55–135)
ALT SERPL W/O P-5'-P-CCNC: 50 U/L (ref 10–44)
ANION GAP SERPL CALC-SCNC: 5 MMOL/L (ref 8–16)
AST SERPL-CCNC: 40 U/L (ref 10–40)
BILIRUB SERPL-MCNC: 0.2 MG/DL (ref 0.1–1)
BUN SERPL-MCNC: 16 MG/DL (ref 6–20)
CALCIUM SERPL-MCNC: 8.6 MG/DL (ref 8.7–10.5)
CHLORIDE SERPL-SCNC: 107 MMOL/L (ref 95–110)
CO2 SERPL-SCNC: 24 MMOL/L (ref 23–29)
CREAT SERPL-MCNC: 0.7 MG/DL (ref 0.5–1.4)
EST. GFR  (AFRICAN AMERICAN): >60 ML/MIN/1.73 M^2
EST. GFR  (NON AFRICAN AMERICAN): >60 ML/MIN/1.73 M^2
GLUCOSE SERPL-MCNC: 88 MG/DL (ref 70–110)
POTASSIUM SERPL-SCNC: 5.2 MMOL/L (ref 3.5–5.1)
PROT SERPL-MCNC: 6.1 G/DL (ref 6–8.4)
SODIUM SERPL-SCNC: 136 MMOL/L (ref 136–145)

## 2019-11-21 PROCEDURE — 96417 CHEMO IV INFUS EACH ADDL SEQ: CPT

## 2019-11-21 PROCEDURE — 96413 CHEMO IV INFUSION 1 HR: CPT

## 2019-11-21 PROCEDURE — 36415 COLL VENOUS BLD VENIPUNCTURE: CPT

## 2019-11-21 PROCEDURE — 80053 COMPREHEN METABOLIC PANEL: CPT

## 2019-11-21 PROCEDURE — 96375 TX/PRO/DX INJ NEW DRUG ADDON: CPT

## 2019-11-21 PROCEDURE — 63600175 PHARM REV CODE 636 W HCPCS: Performed by: INTERNAL MEDICINE

## 2019-11-21 RX ORDER — SODIUM CHLORIDE 0.9 % (FLUSH) 0.9 %
10 SYRINGE (ML) INJECTION
Status: CANCELLED | OUTPATIENT
Start: 2019-11-21

## 2019-11-21 RX ORDER — SODIUM CHLORIDE 0.9 % (FLUSH) 0.9 %
10 SYRINGE (ML) INJECTION
Status: DISCONTINUED | OUTPATIENT
Start: 2019-11-21 | End: 2019-11-21 | Stop reason: HOSPADM

## 2019-11-21 RX ORDER — PALONOSETRON 0.05 MG/ML
0.25 INJECTION, SOLUTION INTRAVENOUS
Status: CANCELLED | OUTPATIENT
Start: 2019-11-28

## 2019-11-21 RX ORDER — HEPARIN 100 UNIT/ML
500 SYRINGE INTRAVENOUS
Status: DISCONTINUED | OUTPATIENT
Start: 2019-11-21 | End: 2019-11-21 | Stop reason: HOSPADM

## 2019-11-21 RX ORDER — PALONOSETRON 0.05 MG/ML
0.25 INJECTION, SOLUTION INTRAVENOUS
Status: CANCELLED | OUTPATIENT
Start: 2019-11-21

## 2019-11-21 RX ORDER — HEPARIN 100 UNIT/ML
500 SYRINGE INTRAVENOUS
Status: CANCELLED | OUTPATIENT
Start: 2019-11-28

## 2019-11-21 RX ORDER — SODIUM CHLORIDE 0.9 % (FLUSH) 0.9 %
10 SYRINGE (ML) INJECTION
Status: CANCELLED | OUTPATIENT
Start: 2019-11-28

## 2019-11-21 RX ORDER — PALONOSETRON 0.05 MG/ML
0.25 INJECTION, SOLUTION INTRAVENOUS
Status: COMPLETED | OUTPATIENT
Start: 2019-11-21 | End: 2019-11-21

## 2019-11-21 RX ORDER — HEPARIN 100 UNIT/ML
500 SYRINGE INTRAVENOUS
Status: CANCELLED | OUTPATIENT
Start: 2019-11-21

## 2019-11-21 RX ADMIN — PACLITAXEL 160 MG: 100 INJECTION, POWDER, LYOPHILIZED, FOR SUSPENSION INTRAVENOUS at 11:11

## 2019-11-21 RX ADMIN — PALONOSETRON HYDROCHLORIDE 0.25 MG: 0.25 INJECTION INTRAVENOUS at 10:11

## 2019-11-21 RX ADMIN — GEMCITABINE HYDROCHLORIDE 1290 MG: 200 INJECTION, SOLUTION INTRAVENOUS at 11:11

## 2019-11-21 RX ADMIN — APREPITANT 130 MG: 130 INJECTION, EMULSION INTRAVENOUS at 10:11

## 2019-11-21 NOTE — PROGRESS NOTES
PANOVA-3: Pivotal, randomized, open-label study of Tumor Treating Fields (TTFields, 150kHz) concomitant with gemcitabine and nab-paclitaxel for front-line treatment of locally-advanced pancreatic adenocarcinoma  Study #: EF-27  Sponsor: [employer]  PI: Eliot Barrett MD  WIRB: [id number]     C4D8 - 11/21/2019     Pt in today for C4D8 treatment on above-mentioned study. Pt is AAO x3 with appropriate mood & affect. Pt queried & reports continued LLE swelling with numbness/tingling in foot. Pt's LLE US completed Monday 11/18/19 to R/O clot was negative for DVT. Pt continues to report some intermittent nausea post-dose that improves with medication. Pt queried & denies any new or changed ConMeds.    Pt's labs were completed per protocol 11/20/19. Pt's potassium was elevated to 5.6 yesterday, but repeat potassium today was only mildly elevated at 5.2.      Time out with Dr. Barrett:  Pt's CBC & CMP are within eligibility range to continue previous dose level of both Gemcitabine and Abraxane. Pt's WBC, PLTs & ANC are WNL.   Pt's actual weight is stable, thus will continue to calculate dosing for 1.61 m2.   Gemzar @ 800 mg/m2 (1290 mg total) & Abraxane @ 100 mg/m2 (160 mg total). Dose levels & calculated doses for both agents verified as correctly entered in Epic.     Per infusion RN note, pt tolerated treatment well & was DC'd home in stable condition.  Pt is going on a previously scheduled vacation over Thanksgiving & we will be skipping C4D15 of her [place]/Abraxane treatment. Pt is still planning on wearing the TTFields as required. Pt's next CT C/A/P is scheduled for 11/25/19 @ 0845 & pt will RTC for C5D1 on 12/12/19 @ 0900 with labs TBD the day prior. All procedures completed today were completed per protocol.

## 2019-11-25 ENCOUNTER — HOSPITAL ENCOUNTER (OUTPATIENT)
Dept: RADIOLOGY | Facility: HOSPITAL | Age: 59
Discharge: HOME | End: 2019-11-25
Attending: INTERNAL MEDICINE
Payer: COMMERCIAL

## 2019-11-25 ENCOUNTER — PATIENT MESSAGE (OUTPATIENT)
Dept: HEMATOLOGY/ONCOLOGY | Facility: CLINIC | Age: 59
End: 2019-11-25

## 2019-11-25 DIAGNOSIS — Z00.6 RESEARCH STUDY PATIENT: ICD-10-CM

## 2019-11-25 DIAGNOSIS — D49.0 PANCREAS NEOPLASM: ICD-10-CM

## 2019-11-25 DIAGNOSIS — I82.90 VTE (VENOUS THROMBOEMBOLISM): ICD-10-CM

## 2019-11-25 DIAGNOSIS — C25.0 CANCER OF HEAD OF PANCREAS: ICD-10-CM

## 2019-11-25 DIAGNOSIS — G89.3 NEOPLASM RELATED PAIN (ACUTE) (CHRONIC): Primary | ICD-10-CM

## 2019-11-25 DIAGNOSIS — G89.3 NEOPLASM RELATED PAIN (ACUTE) (CHRONIC): ICD-10-CM

## 2019-11-25 PROCEDURE — 74177 CT ABD & PELVIS W/CONTRAST: CPT | Mod: TC

## 2019-11-25 PROCEDURE — 74177 CT ABD & PELVIS W/CONTRAST: CPT | Mod: 26,,, | Performed by: RADIOLOGY

## 2019-11-25 PROCEDURE — 74177 CT CHEST ABDOMEN PELVIS WITH CONTRAST (XPD): ICD-10-PCS | Mod: 26,,, | Performed by: RADIOLOGY

## 2019-11-25 PROCEDURE — 71260 CT CHEST ABDOMEN PELVIS WITH CONTRAST (XPD): ICD-10-PCS | Mod: 26,,, | Performed by: RADIOLOGY

## 2019-11-25 PROCEDURE — 25500020 PHARM REV CODE 255: Performed by: INTERNAL MEDICINE

## 2019-11-25 PROCEDURE — 71260 CT THORAX DX C+: CPT | Mod: 26,,, | Performed by: RADIOLOGY

## 2019-11-25 RX ORDER — FENTANYL 25 UG/1
1 PATCH TRANSDERMAL
Qty: 10 PATCH | Refills: 0 | Status: SHIPPED | OUTPATIENT
Start: 2019-11-25 | End: 2019-12-30 | Stop reason: SDUPTHER

## 2019-11-25 RX ORDER — OXYCODONE HYDROCHLORIDE 15 MG/1
15 TABLET ORAL EVERY 6 HOURS PRN
Qty: 90 TABLET | Refills: 0 | Status: SHIPPED | OUTPATIENT
Start: 2019-11-25 | End: 2020-05-15 | Stop reason: SDUPTHER

## 2019-11-25 RX ADMIN — IOHEXOL 75 ML: 350 INJECTION, SOLUTION INTRAVENOUS at 08:11

## 2019-12-11 ENCOUNTER — LAB VISIT (OUTPATIENT)
Dept: LAB | Facility: HOSPITAL | Age: 59
End: 2019-12-11
Attending: INTERNAL MEDICINE
Payer: COMMERCIAL

## 2019-12-11 DIAGNOSIS — C25.0 CANCER OF HEAD OF PANCREAS: ICD-10-CM

## 2019-12-11 DIAGNOSIS — G89.3 NEOPLASM RELATED PAIN (ACUTE) (CHRONIC): ICD-10-CM

## 2019-12-11 DIAGNOSIS — C25.9 PANCREATIC CANCER: ICD-10-CM

## 2019-12-11 DIAGNOSIS — D49.0 PANCREAS NEOPLASM: ICD-10-CM

## 2019-12-11 DIAGNOSIS — Z00.6 RESEARCH STUDY PATIENT: ICD-10-CM

## 2019-12-11 DIAGNOSIS — Z00.6 EXAMINATION OF PARTICIPANT IN CLINICAL TRIAL: ICD-10-CM

## 2019-12-11 LAB
ALBUMIN SERPL BCP-MCNC: 3.2 G/DL (ref 3.5–5.2)
ALP SERPL-CCNC: 268 U/L (ref 55–135)
ALT SERPL W/O P-5'-P-CCNC: 66 U/L (ref 10–44)
ANION GAP SERPL CALC-SCNC: 6 MMOL/L (ref 8–16)
AST SERPL-CCNC: 44 U/L (ref 10–40)
BASOPHILS # BLD AUTO: 0.07 K/UL (ref 0–0.2)
BASOPHILS NFR BLD: 0.8 % (ref 0–1.9)
BILIRUB SERPL-MCNC: 0.3 MG/DL (ref 0.1–1)
BUN SERPL-MCNC: 12 MG/DL (ref 6–20)
CALCIUM SERPL-MCNC: 9.1 MG/DL (ref 8.7–10.5)
CANCER AG19-9 SERPL-ACNC: 301 U/ML (ref 2–40)
CHLORIDE SERPL-SCNC: 106 MMOL/L (ref 95–110)
CO2 SERPL-SCNC: 26 MMOL/L (ref 23–29)
CREAT SERPL-MCNC: 0.8 MG/DL (ref 0.5–1.4)
DIFFERENTIAL METHOD: ABNORMAL
EOSINOPHIL # BLD AUTO: 0.2 K/UL (ref 0–0.5)
EOSINOPHIL NFR BLD: 1.9 % (ref 0–8)
ERYTHROCYTE [DISTWIDTH] IN BLOOD BY AUTOMATED COUNT: 17.8 % (ref 11.5–14.5)
EST. GFR  (AFRICAN AMERICAN): >60 ML/MIN/1.73 M^2
EST. GFR  (NON AFRICAN AMERICAN): >60 ML/MIN/1.73 M^2
GGT SERPL-CCNC: 361 U/L (ref 8–55)
GLUCOSE SERPL-MCNC: 98 MG/DL (ref 70–110)
HCT VFR BLD AUTO: 38.1 % (ref 37–48.5)
HGB BLD-MCNC: 11.4 G/DL (ref 12–16)
IMM GRANULOCYTES # BLD AUTO: 0.04 K/UL (ref 0–0.04)
IMM GRANULOCYTES NFR BLD AUTO: 0.5 % (ref 0–0.5)
LDH SERPL L TO P-CCNC: 201 U/L (ref 110–260)
LYMPHOCYTES # BLD AUTO: 1.9 K/UL (ref 1–4.8)
LYMPHOCYTES NFR BLD: 22.6 % (ref 18–48)
MCH RBC QN AUTO: 28 PG (ref 27–31)
MCHC RBC AUTO-ENTMCNC: 29.9 G/DL (ref 32–36)
MCV RBC AUTO: 94 FL (ref 82–98)
MONOCYTES # BLD AUTO: 1.2 K/UL (ref 0.3–1)
MONOCYTES NFR BLD: 14.2 % (ref 4–15)
NEUTROPHILS # BLD AUTO: 5.1 K/UL (ref 1.8–7.7)
NEUTROPHILS NFR BLD: 60 % (ref 38–73)
NRBC BLD-RTO: 0 /100 WBC
PLATELET # BLD AUTO: 510 K/UL (ref 150–350)
PMV BLD AUTO: 9.7 FL (ref 9.2–12.9)
POTASSIUM SERPL-SCNC: 5.6 MMOL/L (ref 3.5–5.1)
PROT SERPL-MCNC: 7.1 G/DL (ref 6–8.4)
RBC # BLD AUTO: 4.07 M/UL (ref 4–5.4)
SODIUM SERPL-SCNC: 138 MMOL/L (ref 136–145)
WBC # BLD AUTO: 8.5 K/UL (ref 3.9–12.7)

## 2019-12-11 PROCEDURE — 36415 COLL VENOUS BLD VENIPUNCTURE: CPT

## 2019-12-11 PROCEDURE — 86301 IMMUNOASSAY TUMOR CA 19-9: CPT

## 2019-12-11 PROCEDURE — 82977 ASSAY OF GGT: CPT

## 2019-12-11 PROCEDURE — 80053 COMPREHEN METABOLIC PANEL: CPT

## 2019-12-11 PROCEDURE — 83615 LACTATE (LD) (LDH) ENZYME: CPT

## 2019-12-11 PROCEDURE — 85025 COMPLETE CBC W/AUTO DIFF WBC: CPT

## 2019-12-12 ENCOUNTER — INFUSION (OUTPATIENT)
Dept: INFUSION THERAPY | Facility: HOSPITAL | Age: 59
End: 2019-12-12
Attending: INTERNAL MEDICINE
Payer: COMMERCIAL

## 2019-12-12 ENCOUNTER — RESEARCH ENCOUNTER (OUTPATIENT)
Dept: RESEARCH | Facility: HOSPITAL | Age: 59
End: 2019-12-12

## 2019-12-12 ENCOUNTER — OFFICE VISIT (OUTPATIENT)
Dept: HEMATOLOGY/ONCOLOGY | Facility: CLINIC | Age: 59
End: 2019-12-12
Payer: COMMERCIAL

## 2019-12-12 VITALS
HEART RATE: 68 BPM | RESPIRATION RATE: 18 BRPM | DIASTOLIC BLOOD PRESSURE: 65 MMHG | SYSTOLIC BLOOD PRESSURE: 127 MMHG | TEMPERATURE: 98 F

## 2019-12-12 VITALS
WEIGHT: 130.06 LBS | HEIGHT: 63 IN | HEART RATE: 80 BPM | RESPIRATION RATE: 20 BRPM | SYSTOLIC BLOOD PRESSURE: 129 MMHG | DIASTOLIC BLOOD PRESSURE: 56 MMHG | BODY MASS INDEX: 23.04 KG/M2 | TEMPERATURE: 98 F | OXYGEN SATURATION: 100 %

## 2019-12-12 DIAGNOSIS — D49.0 PANCREAS NEOPLASM: Primary | ICD-10-CM

## 2019-12-12 DIAGNOSIS — Z00.6 RESEARCH STUDY PATIENT: ICD-10-CM

## 2019-12-12 DIAGNOSIS — C25.0 CANCER OF HEAD OF PANCREAS: ICD-10-CM

## 2019-12-12 DIAGNOSIS — G89.3 NEOPLASM RELATED PAIN (ACUTE) (CHRONIC): Primary | ICD-10-CM

## 2019-12-12 DIAGNOSIS — R79.89 ELEVATED LFTS: ICD-10-CM

## 2019-12-12 LAB
ALBUMIN SERPL BCP-MCNC: 3.1 G/DL (ref 3.5–5.2)
ALP SERPL-CCNC: 249 U/L (ref 55–135)
ALT SERPL W/O P-5'-P-CCNC: 56 U/L (ref 10–44)
ANION GAP SERPL CALC-SCNC: 6 MMOL/L (ref 8–16)
AST SERPL-CCNC: 38 U/L (ref 10–40)
BILIRUB SERPL-MCNC: 0.3 MG/DL (ref 0.1–1)
BUN SERPL-MCNC: 12 MG/DL (ref 6–20)
CALCIUM SERPL-MCNC: 9.2 MG/DL (ref 8.7–10.5)
CHLORIDE SERPL-SCNC: 108 MMOL/L (ref 95–110)
CO2 SERPL-SCNC: 25 MMOL/L (ref 23–29)
CREAT SERPL-MCNC: 0.8 MG/DL (ref 0.5–1.4)
EST. GFR  (AFRICAN AMERICAN): >60 ML/MIN/1.73 M^2
EST. GFR  (NON AFRICAN AMERICAN): >60 ML/MIN/1.73 M^2
GLUCOSE SERPL-MCNC: 100 MG/DL (ref 70–110)
POTASSIUM SERPL-SCNC: 5.1 MMOL/L (ref 3.5–5.1)
PROT SERPL-MCNC: 6.7 G/DL (ref 6–8.4)
SODIUM SERPL-SCNC: 139 MMOL/L (ref 136–145)

## 2019-12-12 PROCEDURE — 99999 PR PBB SHADOW E&M-EST. PATIENT-LVL IV: ICD-10-PCS | Mod: PBBFAC,,,

## 2019-12-12 PROCEDURE — 99214 PR OFFICE/OUTPT VISIT, EST, LEVL IV, 30-39 MIN: ICD-10-PCS | Mod: S$GLB,,, | Performed by: INTERNAL MEDICINE

## 2019-12-12 PROCEDURE — 99999 PR PBB SHADOW E&M-EST. PATIENT-LVL IV: CPT | Mod: PBBFAC,,,

## 2019-12-12 PROCEDURE — 80053 COMPREHEN METABOLIC PANEL: CPT

## 2019-12-12 PROCEDURE — 63600175 PHARM REV CODE 636 W HCPCS: Mod: JG | Performed by: INTERNAL MEDICINE

## 2019-12-12 PROCEDURE — 99214 OFFICE O/P EST MOD 30 MIN: CPT | Mod: S$GLB,,, | Performed by: INTERNAL MEDICINE

## 2019-12-12 PROCEDURE — 96417 CHEMO IV INFUS EACH ADDL SEQ: CPT

## 2019-12-12 PROCEDURE — 96413 CHEMO IV INFUSION 1 HR: CPT

## 2019-12-12 PROCEDURE — 3008F BODY MASS INDEX DOCD: CPT | Mod: CPTII,S$GLB,, | Performed by: INTERNAL MEDICINE

## 2019-12-12 PROCEDURE — 96375 TX/PRO/DX INJ NEW DRUG ADDON: CPT

## 2019-12-12 PROCEDURE — 3008F PR BODY MASS INDEX (BMI) DOCUMENTED: ICD-10-PCS | Mod: CPTII,S$GLB,, | Performed by: INTERNAL MEDICINE

## 2019-12-12 RX ORDER — HYDROCODONE BITARTRATE AND ACETAMINOPHEN 10; 325 MG/1; MG/1
1 TABLET ORAL EVERY 6 HOURS PRN
Qty: 100 TABLET | Refills: 0 | Status: ON HOLD | OUTPATIENT
Start: 2019-12-12 | End: 2019-12-24 | Stop reason: HOSPADM

## 2019-12-12 RX ORDER — SODIUM CHLORIDE 0.9 % (FLUSH) 0.9 %
10 SYRINGE (ML) INJECTION
Status: DISCONTINUED | OUTPATIENT
Start: 2019-12-12 | End: 2019-12-12 | Stop reason: HOSPADM

## 2019-12-12 RX ORDER — PALONOSETRON 0.05 MG/ML
0.25 INJECTION, SOLUTION INTRAVENOUS
Status: COMPLETED | OUTPATIENT
Start: 2019-12-12 | End: 2019-12-12

## 2019-12-12 RX ORDER — HEPARIN 100 UNIT/ML
500 SYRINGE INTRAVENOUS
Status: DISCONTINUED | OUTPATIENT
Start: 2019-12-12 | End: 2019-12-12 | Stop reason: HOSPADM

## 2019-12-12 RX ADMIN — PALONOSETRON HYDROCHLORIDE 0.25 MG: 0.25 INJECTION INTRAVENOUS at 10:12

## 2019-12-12 RX ADMIN — SODIUM CHLORIDE: 0.9 INJECTION, SOLUTION INTRAVENOUS at 10:12

## 2019-12-12 RX ADMIN — PACLITAXEL 160 MG: 100 INJECTION, POWDER, LYOPHILIZED, FOR SUSPENSION INTRAVENOUS at 10:12

## 2019-12-12 RX ADMIN — GEMCITABINE HYDROCHLORIDE 1290 MG: 200 INJECTION, SOLUTION INTRAVENOUS at 11:12

## 2019-12-12 RX ADMIN — APREPITANT 130 MG: 130 INJECTION, EMULSION INTRAVENOUS at 10:12

## 2019-12-12 NOTE — PROGRESS NOTES
Subjective:       Patient ID: Quyen Moody    Chief Complaint:  Pancreatic cancer    HPI     Quyen Moody is a 59 y.o. female, patient  to clinic for follow up and treatment  on PANOVA-3 trial. She is currently receiving [place]+Abraxane and TTF. Patient feeling well today, has nausea post chemo infusion   .Pain is controlled with the fentanyl patch. Appetite and weight improving. She has been working and staying extremely active.     ECOG 0.    Oncologic History:  She has had intermittent upper abdominal pain since early June.  She presented to her PCP who originally ordered an US and CT.  US was negative and CT showed some haziness at the pancreatic head.  She saw GI who performed EUS.  On EUS, a 3x4cm pancreatic head mass was seen with possible invasion into the SMA and portal vein.  FNA path s/w pancreatic adenocarcinoma.  CA 19-9 level at outside hospital was >1000 per the patient.  She endorses nausea and inability to tolerate much PO.  She has lost about 10lbs over the last few weeks and is noticing her skin turning yellow.  Denies pruritis.  She is passing gas but has not had a BM in a few days, she attributes this to narcotic use.  She recently started taking stool softeners.  No previous cardiac or stroke history.  Surgical history significant for open appendectomy when she was in her 20s    Review of Systems   Constitutional: Positive for fatigue. Negative for activity change, appetite change, chills, fever and unexpected weight change (improving).   HENT: Negative for congestion, hearing loss, mouth sores, sore throat, tinnitus and voice change.    Eyes: Negative for pain and visual disturbance.   Respiratory: Positive for shortness of breath. Negative for cough and wheezing.    Cardiovascular: Positive for leg swelling (left). Negative for chest pain and palpitations.   Gastrointestinal: Positive for abdominal pain. Negative for constipation, diarrhea, nausea and vomiting.   Endocrine: Negative for cold  intolerance and heat intolerance.   Genitourinary: Negative for difficulty urinating, dyspareunia, dysuria, frequency, menstrual problem, urgency, vaginal bleeding, vaginal discharge and vaginal pain.   Musculoskeletal: Negative for arthralgias, back pain and myalgias.   Skin: Positive for rash. Negative for color change and wound.   Allergic/Immunologic: Negative for environmental allergies and food allergies.   Neurological: Negative for weakness, numbness and headaches.   Hematological: Negative for adenopathy. Does not bruise/bleed easily.   Psychiatric/Behavioral: Negative for agitation, confusion, hallucinations and sleep disturbance. The patient is nervous/anxious.    All other systems reviewed and are negative.        Allergies:  Review of patient's allergies indicates:   Allergen Reactions    Sulfa (sulfonamide antibiotics) Swelling and Hives     tongue         Medications:  Current Outpatient Medications   Medication Sig Dispense Refill    apixaban (ELIQUIS) 5 mg Tab Take 1 tablet (5 mg total) by mouth 2 (two) times daily. 60 tablet 6    docusate sodium (COLACE) 100 MG capsule Take 100 mg by mouth 2 (two) times daily.      estradiol-norethindrone (ACTIVELLA) 1-0.5 mg per tablet Take 1 tablet by mouth.      fentaNYL (DURAGESIC) 25 mcg/hr Place 1 patch onto the skin every 72 hours. 10 patch 0    hydrocortisone 2.5 % cream Apply topically 2 (two) times daily. 453.6 g 1    lidocaine-prilocaine (EMLA) cream Apply to port 45 minutes prior to access. 30 g 0    lipase-protease-amylase (CREON) 36,000-114,000- 180,000 unit CpDR take 1 capsule by mouth 4 times daily 120 capsule 6    LORazepam (ATIVAN) 0.5 MG tablet Take 1 tablet (0.5 mg total) by mouth every 6 (six) hours as needed for Anxiety (nausea). 60 tablet 0    multivitamin (THERAGRAN) per tablet Take 1 tablet by mouth once daily.      mupirocin (BACTROBAN) 2 % ointment Apply topically 3 (three) times daily. 30 g 1    ondansetron (ZOFRAN) 8 MG  tablet Take 1 tablet (8 mg total) by mouth every 8 (eight) hours as needed for Nausea. 120 tablet 2    oxyCODONE (ROXICODONE) 15 MG Tab Take 1 tablet (15 mg total) by mouth every 6 (six) hours as needed for Pain. 90 tablet 0    polyethylene glycol (GLYCOLAX) 17 gram PwPk Take 1 g by mouth once daily.      ranitidine (ZANTAC) 150 MG tablet Take 150 mg by mouth 2 (two) times daily.       No current facility-administered medications for this visit.        PMH:  Past Medical History:   Diagnosis Date    Allergic rhinitis 3/3/2014    CKD (chronic kidney disease) stage 3, GFR 30-59 ml/min 12/26/2015    Hyperlipidemia 3/3/2014    Pancreas cancer     Skin rash 8/10/2019       PSH:  Past Surgical History:   Procedure Laterality Date    ERCP N/A 7/16/2019    Procedure: ERCP (ENDOSCOPIC RETROGRADE CHOLANGIOPANCREATOGRAPHY);  Surgeon: Chema Harris MD;  Location: [facility] ([address] [place] [address]);  Service: Endoscopy;  Laterality: N/A;    Exporatory lap      INSERTION OF TUNNELED CENTRAL VENOUS CATHETER (CVC) WITH SUBCUTANEOUS PORT Right 8/12/2019    Procedure: DTREZHALN-OVOJ-U-CATH;  Surgeon: Cesar Hallman MD;  Location: [facility] OR [address] [place] [address];  Service: General;  Laterality: Right;  right IJ       FamHx:  Family History   Problem Relation Age of Onset    Diabetes Mother     Diabetes Father     Diabetes Brother     Heart disease Neg Hx        SocHx:  Social History     Socioeconomic History    Marital status:      Spouse name: Not on file    Number of children: 0    Years of education: Not on file    Highest education level: Not on file   Occupational History    Occupation:      Employer: [employer]   Social Needs    Financial resource strain: Not on file    Food insecurity:     Worry: Not on file     Inability: Not on file    Transportation needs:     Medical: Not on file     Non-medical: Not on file   Tobacco Use    Smoking status: Former Smoker     Start date: 12/11/1999    Smokeless tobacco:  Never Used   Substance and Sexual Activity    Alcohol use: Yes     Alcohol/week: 1.0 standard drinks     Types: 1 Glasses of wine per week     Frequency: 4 or more times a week     Drinks per session: 1 or 2     Binge frequency: Never     Comment: last drink a month ago     Drug use: No    Sexual activity: Yes   Lifestyle    Physical activity:     Days per week: Not on file     Minutes per session: Not on file    Stress: Not on file   Relationships    Social connections:     Talks on phone: Not on file     Gets together: Not on file     Attends Christianity service: Not on file     Active member of club or organization: Not on file     Attends meetings of clubs or organizations: Not on file     Relationship status: Not on file   Other Topics Concern    Not on file   Social History Narrative    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      of Cancer in 2016         Objective:       There were no vitals filed for this visit.    Physical Exam   Constitutional: She is oriented to person, place, and time. She appears well-developed and well-nourished. No distress.   HENT:   Head: Normocephalic and atraumatic.   Nose: Nose normal.   Mouth/Throat: Oropharynx is clear and moist. No oropharyngeal exudate.   Eyes: Pupils are equal, round, and reactive to light. Conjunctivae and EOM are normal. Right eye exhibits no discharge. Left eye exhibits no discharge. No scleral icterus.   Neck: Normal range of motion. Neck supple. No tracheal deviation present. No thyromegaly present.   Cardiovascular: Normal rate, regular rhythm, normal heart sounds and normal pulses.   No swelling to bilateral lower extremities.    Pulmonary/Chest: Effort normal and breath sounds normal. She has no wheezes. She has no rales.   Abdominal: Soft. Bowel sounds are normal. She exhibits no distension. There is no tenderness. There is no guarding.   Musculoskeletal: Normal range of motion. She exhibits edema (left lower extremity +1 from ankle to  knee). She exhibits no tenderness or deformity.   No spinal or paraspinal tenderness to palpation.       Lymphadenopathy:     She has no cervical adenopathy.   Neurological: She is alert and oriented to person, place, and time. No cranial nerve deficit. Coordination normal.   Skin: Skin is warm, dry and intact. Rash noted. She is not diaphoretic. No erythema. No pallor.   Psychiatric: She has a normal mood and affect. Her behavior is normal. Judgment and thought content normal.   Nursing note and vitals reviewed.        LABS:  WBC   Date Value Ref Range Status   12/11/2019 8.50 3.90 - 12.70 K/uL Final     Hemoglobin   Date Value Ref Range Status   12/11/2019 11.4 (L) 12.0 - 16.0 g/dL Final     Hematocrit   Date Value Ref Range Status   12/11/2019 38.1 37.0 - 48.5 % Final     Platelets   Date Value Ref Range Status   12/11/2019 510 (H) 150 - 350 K/uL Final       Chemistry        Component Value Date/Time     12/11/2019 1456    K 5.6 (H) 12/11/2019 1456     12/11/2019 1456    CO2 26 12/11/2019 1456    BUN 12 12/11/2019 1456    CREATININE 0.8 12/11/2019 1456    GLU 98 12/11/2019 1456        Component Value Date/Time    CALCIUM 9.1 12/11/2019 1456    ALKPHOS 268 (H) 12/11/2019 1456    AST 44 (H) 12/11/2019 1456    ALT 66 (H) 12/11/2019 1456    BILITOT 0.3 12/11/2019 1456    ESTGFRAFRICA >60.0 12/11/2019 1456    EGFRNONAA >60.0 12/11/2019 1456            Assessment:       1. Neoplasm related pain (acute) (chronic)    2. Cancer of head of pancreas    3. Research study patient    4. Elevated LFTs            Plan:         1. Cancer at the head of the pancreas:    Discussed SOC chemo and chemoXRT with FOLFIRINOX vs our PANOVA-3 trial with [place]-Abraxane +/- TTFields.  Extensively reviewed the rationale of the study and reviewed her eligibility criteria with the research nurse and discussed case with Dr. Hallman as well.  She is interested in this study, and signed consent with our research nurse.     Here today to  con't treatment with [place]+Abraxane and TTF on PANOVA-3, LFTs elevated, but improved. Will con't chemo today, but will dose reduce [place] to 800 and Abraxane to 100 per pt request due to chemo related AEs.      Labs show improvement in LFTs. Will continue with dose reduction.   Acceptable to proceed with treatment. Time our performed between CRC and Sub-I.      up slightly from previous-- will trend.  Schedule to see Dr. Hallman to discuss possible surgical options.  Repeat K+.      RTC per research RN.     Patient was also seen and examined by Dr. Barrett. Patient is in agreement with the proposed treatment plan. All questions were answered to the patient's satisfaction. Pt knows to call clinic if anything is needed before the next clinic visit.    More than 25 mins were spent during this encounter, greater than 50% was spent in direct counseling and/or coordination of care.     Eliot Barrett M.D., M.S., F.A.C.P.  Hematology and Oncology Attending  [facility]

## 2019-12-12 NOTE — PROGRESS NOTES
PANOVA-3: Pivotal, randomized, open-label study of Tumor Treating Fields (TTFields, 150kHz) concomitant with gemcitabine and nab-paclitaxel for front-line treatment of locally-advanced pancreatic adenocarcinoma  Study #: EF-[id number]  Sponsor: [employer]  PI: Eliot Barrett MD  WIRB: [id number]     C5D1 - 12/12/2019     Pt in today for C5D1 treatment on above-mentioned study. Pt is AAO x3 with appropriate mood, affect, and insight. Pt queried & reports continued LLE swelling with numbness/tingling in foot. Pt reports left hand swelling in addition to LLE swelling, both occurring 2-3 days s/p chemotherapy. Pt's 11/18/19 US r/o clot, so Dr. Barrett thinks there is a likely relationship to the chemotherapy medications. Pt continues to report some intermittent nausea post-dose that improves with medication. Pt queried & denies any new or changed ConMeds.  Pt skipped C4D15 due to a pre-planned vacation she could not re-schedule. Pt requested to keep the schedule as it was scheduled, so we kept last week as her original week off.    Pt completed QOLs per protocol. MD reviewed pt's CT scan from 11/25/19. Pt's pancreatic tumor has not changed in longest diameter @ 53 mm, but the prior occlusion of the encased SMV was now patent on exam. Per MD, message sent to Dr. Hallman, the surgeon who completed her initial evaluation, asking if he would like to review pt's scans and possibly re-evaluate pt for resectability. Dr. Hallman agreed to re-evaluate & pt has asked to be scheduled with him ASAP.   Pt's labs were completed per protocol 12/11/19. Pt's potassium was elevated to 5.6 yesterday, so repeat potassium is being drawn again today. Per Dr. Barrett, treatment today does not need to be held while waiting for results of today's CMP.     Time out with Dr. Barrett:  Pt's CBC & CMP are within eligibility range to continue previous dose level of both Gemcitabine and Abraxane. Pt's WBC & ANC are WNL. PLTs are elevated, but NCS per MD.  Pt's  actual weight is stable, thus will continue to calculate dosing for 1.61 m2.   Gemzar @ 800 mg/m2 (1290 mg total) & Abraxane @ 100 mg/m2 (160 mg total). Dose levels & calculated doses for both agents verified as correctly entered in Epic.     Pt will RTC for C5D8 on 12/19/19 with prior labs on 12/18/19. All procedures completed today were completed per protocol.

## 2019-12-17 ENCOUNTER — TELEPHONE (OUTPATIENT)
Dept: HEMATOLOGY/ONCOLOGY | Facility: CLINIC | Age: 59
End: 2019-12-17

## 2019-12-17 NOTE — TELEPHONE ENCOUNTER
----- Message from Laxmi Sexton sent at 12/17/2019  1:26 PM CST -----  Hi Dr. Barrett,    It appears that pt only has 4 cycles of [place]/Abraxane built into her treatment plan, can you please add more cycles? She will be here Thursday morning for her Day 8 visit.     Thank you!

## 2019-12-18 ENCOUNTER — LAB VISIT (OUTPATIENT)
Dept: LAB | Facility: HOSPITAL | Age: 59
End: 2019-12-18
Attending: INTERNAL MEDICINE
Payer: COMMERCIAL

## 2019-12-18 DIAGNOSIS — Z00.6 EXAMINATION OF PARTICIPANT IN CLINICAL TRIAL: ICD-10-CM

## 2019-12-18 DIAGNOSIS — C25.9 PANCREATIC CANCER: ICD-10-CM

## 2019-12-18 DIAGNOSIS — C25.0 CANCER OF HEAD OF PANCREAS: ICD-10-CM

## 2019-12-18 LAB
ALBUMIN SERPL BCP-MCNC: 3.3 G/DL (ref 3.5–5.2)
ALP SERPL-CCNC: 165 U/L (ref 55–135)
ALT SERPL W/O P-5'-P-CCNC: 44 U/L (ref 10–44)
ANION GAP SERPL CALC-SCNC: 5 MMOL/L (ref 8–16)
AST SERPL-CCNC: 36 U/L (ref 10–40)
BASOPHILS # BLD AUTO: 0.06 K/UL (ref 0–0.2)
BASOPHILS NFR BLD: 1.1 % (ref 0–1.9)
BILIRUB SERPL-MCNC: 0.3 MG/DL (ref 0.1–1)
BUN SERPL-MCNC: 16 MG/DL (ref 6–20)
CALCIUM SERPL-MCNC: 8.9 MG/DL (ref 8.7–10.5)
CHLORIDE SERPL-SCNC: 104 MMOL/L (ref 95–110)
CO2 SERPL-SCNC: 25 MMOL/L (ref 23–29)
CREAT SERPL-MCNC: 0.7 MG/DL (ref 0.5–1.4)
DIFFERENTIAL METHOD: ABNORMAL
EOSINOPHIL # BLD AUTO: 0.1 K/UL (ref 0–0.5)
EOSINOPHIL NFR BLD: 1.5 % (ref 0–8)
ERYTHROCYTE [DISTWIDTH] IN BLOOD BY AUTOMATED COUNT: 16.6 % (ref 11.5–14.5)
EST. GFR  (AFRICAN AMERICAN): >60 ML/MIN/1.73 M^2
EST. GFR  (NON AFRICAN AMERICAN): >60 ML/MIN/1.73 M^2
GLUCOSE SERPL-MCNC: 92 MG/DL (ref 70–110)
HCT VFR BLD AUTO: 35.3 % (ref 37–48.5)
HGB BLD-MCNC: 11.3 G/DL (ref 12–16)
IMM GRANULOCYTES # BLD AUTO: 0.02 K/UL (ref 0–0.04)
IMM GRANULOCYTES NFR BLD AUTO: 0.4 % (ref 0–0.5)
LYMPHOCYTES # BLD AUTO: 1.6 K/UL (ref 1–4.8)
LYMPHOCYTES NFR BLD: 29 % (ref 18–48)
MCH RBC QN AUTO: 29.3 PG (ref 27–31)
MCHC RBC AUTO-ENTMCNC: 32 G/DL (ref 32–36)
MCV RBC AUTO: 92 FL (ref 82–98)
MONOCYTES # BLD AUTO: 0.5 K/UL (ref 0.3–1)
MONOCYTES NFR BLD: 8.8 % (ref 4–15)
NEUTROPHILS # BLD AUTO: 3.2 K/UL (ref 1.8–7.7)
NEUTROPHILS NFR BLD: 59.2 % (ref 38–73)
NRBC BLD-RTO: 0 /100 WBC
PLATELET # BLD AUTO: 274 K/UL (ref 150–350)
PMV BLD AUTO: 10.8 FL (ref 9.2–12.9)
POTASSIUM SERPL-SCNC: 4.6 MMOL/L (ref 3.5–5.1)
PROT SERPL-MCNC: 7.1 G/DL (ref 6–8.4)
RBC # BLD AUTO: 3.86 M/UL (ref 4–5.4)
SODIUM SERPL-SCNC: 134 MMOL/L (ref 136–145)
WBC # BLD AUTO: 5.45 K/UL (ref 3.9–12.7)

## 2019-12-18 PROCEDURE — 85025 COMPLETE CBC W/AUTO DIFF WBC: CPT

## 2019-12-18 PROCEDURE — 36415 COLL VENOUS BLD VENIPUNCTURE: CPT

## 2019-12-18 PROCEDURE — 80053 COMPREHEN METABOLIC PANEL: CPT

## 2019-12-19 ENCOUNTER — INFUSION (OUTPATIENT)
Dept: INFUSION THERAPY | Facility: HOSPITAL | Age: 59
End: 2019-12-19
Attending: INTERNAL MEDICINE
Payer: COMMERCIAL

## 2019-12-19 VITALS
DIASTOLIC BLOOD PRESSURE: 70 MMHG | WEIGHT: 133.19 LBS | BODY MASS INDEX: 23.6 KG/M2 | HEART RATE: 80 BPM | HEIGHT: 63 IN | SYSTOLIC BLOOD PRESSURE: 125 MMHG | RESPIRATION RATE: 18 BRPM | TEMPERATURE: 98 F

## 2019-12-19 DIAGNOSIS — C25.0 CANCER OF HEAD OF PANCREAS: Primary | ICD-10-CM

## 2019-12-19 PROCEDURE — A4216 STERILE WATER/SALINE, 10 ML: HCPCS | Performed by: INTERNAL MEDICINE

## 2019-12-19 PROCEDURE — 96375 TX/PRO/DX INJ NEW DRUG ADDON: CPT

## 2019-12-19 PROCEDURE — 25000003 PHARM REV CODE 250: Performed by: INTERNAL MEDICINE

## 2019-12-19 PROCEDURE — 63600175 PHARM REV CODE 636 W HCPCS: Mod: TB | Performed by: INTERNAL MEDICINE

## 2019-12-19 PROCEDURE — 96367 TX/PROPH/DG ADDL SEQ IV INF: CPT

## 2019-12-19 PROCEDURE — 96413 CHEMO IV INFUSION 1 HR: CPT

## 2019-12-19 PROCEDURE — 96417 CHEMO IV INFUS EACH ADDL SEQ: CPT

## 2019-12-19 RX ORDER — HEPARIN 100 UNIT/ML
500 SYRINGE INTRAVENOUS
Status: CANCELLED | OUTPATIENT
Start: 2020-01-16

## 2019-12-19 RX ORDER — PALONOSETRON 0.05 MG/ML
0.25 INJECTION, SOLUTION INTRAVENOUS
Status: CANCELLED | OUTPATIENT
Start: 2020-01-09

## 2019-12-19 RX ORDER — SODIUM CHLORIDE 0.9 % (FLUSH) 0.9 %
10 SYRINGE (ML) INJECTION
Status: CANCELLED | OUTPATIENT
Start: 2019-12-19

## 2019-12-19 RX ORDER — SODIUM CHLORIDE 0.9 % (FLUSH) 0.9 %
10 SYRINGE (ML) INJECTION
Status: CANCELLED | OUTPATIENT
Start: 2020-01-16

## 2019-12-19 RX ORDER — SODIUM CHLORIDE 0.9 % (FLUSH) 0.9 %
10 SYRINGE (ML) INJECTION
Status: CANCELLED | OUTPATIENT
Start: 2020-01-09

## 2019-12-19 RX ORDER — SODIUM CHLORIDE 0.9 % (FLUSH) 0.9 %
10 SYRINGE (ML) INJECTION
Status: DISCONTINUED | OUTPATIENT
Start: 2019-12-19 | End: 2019-12-19 | Stop reason: HOSPADM

## 2019-12-19 RX ORDER — HEPARIN 100 UNIT/ML
500 SYRINGE INTRAVENOUS
Status: CANCELLED | OUTPATIENT
Start: 2020-01-09

## 2019-12-19 RX ORDER — PALONOSETRON 0.05 MG/ML
0.25 INJECTION, SOLUTION INTRAVENOUS
Status: CANCELLED | OUTPATIENT
Start: 2020-01-16

## 2019-12-19 RX ORDER — HEPARIN 100 UNIT/ML
500 SYRINGE INTRAVENOUS
Status: CANCELLED | OUTPATIENT
Start: 2019-12-19

## 2019-12-19 RX ORDER — HEPARIN 100 UNIT/ML
500 SYRINGE INTRAVENOUS
Status: DISCONTINUED | OUTPATIENT
Start: 2019-12-19 | End: 2019-12-19 | Stop reason: HOSPADM

## 2019-12-19 RX ADMIN — PALONOSETRON HYDROCHLORIDE 0.25 MG: 0.25 INJECTION INTRAVENOUS at 09:12

## 2019-12-19 RX ADMIN — GEMCITABINE HYDROCHLORIDE 1290 MG: 1 INJECTION, POWDER, LYOPHILIZED, FOR SOLUTION INTRAVENOUS at 11:12

## 2019-12-19 RX ADMIN — APREPITANT 130 MG: 130 INJECTION, EMULSION INTRAVENOUS at 10:12

## 2019-12-19 RX ADMIN — Medication 10 ML: at 11:12

## 2019-12-19 RX ADMIN — SODIUM CHLORIDE: 0.9 INJECTION, SOLUTION INTRAVENOUS at 09:12

## 2019-12-19 RX ADMIN — PACLITAXEL 160 MG: 100 INJECTION, POWDER, LYOPHILIZED, FOR SUSPENSION INTRAVENOUS at 10:12

## 2019-12-19 NOTE — PLAN OF CARE
Pt received abraxane and gemzar; tolerated well. VSS and NAD. Pt instructed to call MD with any concerns. Pt discharged home independently.

## 2019-12-22 ENCOUNTER — HOSPITAL ENCOUNTER (INPATIENT)
Facility: HOSPITAL | Age: 59
LOS: 2 days | Discharge: HOME | DRG: 445 | End: 2019-12-25
Attending: EMERGENCY MEDICINE | Admitting: INTERNAL MEDICINE
Payer: COMMERCIAL

## 2019-12-22 DIAGNOSIS — R17 ELEVATED BILIRUBIN: ICD-10-CM

## 2019-12-22 DIAGNOSIS — K83.09 ACUTE CHOLANGITIS: ICD-10-CM

## 2019-12-22 DIAGNOSIS — R50.9 FEVER: ICD-10-CM

## 2019-12-22 DIAGNOSIS — C25.9 MALIGNANT NEOPLASM OF PANCREAS, UNSPECIFIED LOCATION OF MALIGNANCY: Primary | ICD-10-CM

## 2019-12-22 LAB
ALBUMIN SERPL BCP-MCNC: 3.2 G/DL (ref 3.5–5.2)
ALP SERPL-CCNC: 345 U/L (ref 55–135)
ALT SERPL W/O P-5'-P-CCNC: 418 U/L (ref 10–44)
ANION GAP SERPL CALC-SCNC: 8 MMOL/L (ref 8–16)
AST SERPL-CCNC: 495 U/L (ref 10–40)
BASOPHILS # BLD AUTO: 0.01 K/UL (ref 0–0.2)
BASOPHILS NFR BLD: 0.1 % (ref 0–1.9)
BILIRUB SERPL-MCNC: 3.4 MG/DL (ref 0.1–1)
BILIRUB UR QL STRIP: NEGATIVE
BUN SERPL-MCNC: 12 MG/DL (ref 6–20)
CALCIUM SERPL-MCNC: 8.9 MG/DL (ref 8.7–10.5)
CHLORIDE SERPL-SCNC: 103 MMOL/L (ref 95–110)
CLARITY UR REFRACT.AUTO: CLEAR
CO2 SERPL-SCNC: 23 MMOL/L (ref 23–29)
COLOR UR AUTO: ABNORMAL
CREAT SERPL-MCNC: 0.8 MG/DL (ref 0.5–1.4)
DIFFERENTIAL METHOD: ABNORMAL
EOSINOPHIL # BLD AUTO: 0 K/UL (ref 0–0.5)
EOSINOPHIL NFR BLD: 0.1 % (ref 0–8)
ERYTHROCYTE [DISTWIDTH] IN BLOOD BY AUTOMATED COUNT: 16.2 % (ref 11.5–14.5)
EST. GFR  (AFRICAN AMERICAN): >60 ML/MIN/1.73 M^2
EST. GFR  (NON AFRICAN AMERICAN): >60 ML/MIN/1.73 M^2
GLUCOSE SERPL-MCNC: 121 MG/DL (ref 70–110)
GLUCOSE UR QL STRIP: NEGATIVE
HCT VFR BLD AUTO: 35.2 % (ref 37–48.5)
HGB BLD-MCNC: 11 G/DL (ref 12–16)
HGB UR QL STRIP: ABNORMAL
IMM GRANULOCYTES # BLD AUTO: 0.05 K/UL (ref 0–0.04)
IMM GRANULOCYTES NFR BLD AUTO: 0.6 % (ref 0–0.5)
INFLUENZA A, MOLECULAR: NEGATIVE
INFLUENZA B, MOLECULAR: NEGATIVE
INR PPP: 1.1 (ref 0.8–1.2)
KETONES UR QL STRIP: ABNORMAL
LACTATE SERPL-SCNC: 0.9 MMOL/L (ref 0.5–2.2)
LEUKOCYTE ESTERASE UR QL STRIP: NEGATIVE
LYMPHOCYTES # BLD AUTO: 0.2 K/UL (ref 1–4.8)
LYMPHOCYTES NFR BLD: 2.5 % (ref 18–48)
MAGNESIUM SERPL-MCNC: 1.4 MG/DL (ref 1.6–2.6)
MCH RBC QN AUTO: 27.9 PG (ref 27–31)
MCHC RBC AUTO-ENTMCNC: 31.3 G/DL (ref 32–36)
MCV RBC AUTO: 89 FL (ref 82–98)
MICROSCOPIC COMMENT: NORMAL
MONOCYTES # BLD AUTO: 0.1 K/UL (ref 0.3–1)
MONOCYTES NFR BLD: 0.8 % (ref 4–15)
NEUTROPHILS # BLD AUTO: 7.6 K/UL (ref 1.8–7.7)
NEUTROPHILS NFR BLD: 95.9 % (ref 38–73)
NITRITE UR QL STRIP: NEGATIVE
NRBC BLD-RTO: 0 /100 WBC
PH UR STRIP: 6 [PH] (ref 5–8)
PHOSPHATE SERPL-MCNC: 3 MG/DL (ref 2.7–4.5)
PLATELET # BLD AUTO: 134 K/UL (ref 150–350)
PMV BLD AUTO: 11.4 FL (ref 9.2–12.9)
POTASSIUM SERPL-SCNC: 4.8 MMOL/L (ref 3.5–5.1)
PROT SERPL-MCNC: 6.8 G/DL (ref 6–8.4)
PROT UR QL STRIP: NEGATIVE
PROTHROMBIN TIME: 11.4 SEC (ref 9–12.5)
RBC # BLD AUTO: 3.94 M/UL (ref 4–5.4)
RBC #/AREA URNS AUTO: 4 /HPF (ref 0–4)
SODIUM SERPL-SCNC: 134 MMOL/L (ref 136–145)
SP GR UR STRIP: 1.01 (ref 1–1.03)
SPECIMEN SOURCE: NORMAL
SQUAMOUS #/AREA URNS AUTO: 4 /HPF
URN SPEC COLLECT METH UR: ABNORMAL
WBC # BLD AUTO: 7.95 K/UL (ref 3.9–12.7)
WBC #/AREA URNS AUTO: 2 /HPF (ref 0–5)

## 2019-12-22 PROCEDURE — 81001 URINALYSIS AUTO W/SCOPE: CPT

## 2019-12-22 PROCEDURE — 85610 PROTHROMBIN TIME: CPT

## 2019-12-22 PROCEDURE — 84100 ASSAY OF PHOSPHORUS: CPT

## 2019-12-22 PROCEDURE — 87502 INFLUENZA DNA AMP PROBE: CPT

## 2019-12-22 PROCEDURE — 83605 ASSAY OF LACTIC ACID: CPT

## 2019-12-22 PROCEDURE — 83735 ASSAY OF MAGNESIUM: CPT

## 2019-12-22 PROCEDURE — 99285 PR EMERGENCY DEPT VISIT,LEVEL V: ICD-10-PCS | Mod: ,,, | Performed by: EMERGENCY MEDICINE

## 2019-12-22 PROCEDURE — 80053 COMPREHEN METABOLIC PANEL: CPT

## 2019-12-22 PROCEDURE — 63600175 PHARM REV CODE 636 W HCPCS: Performed by: EMERGENCY MEDICINE

## 2019-12-22 PROCEDURE — 85025 COMPLETE CBC W/AUTO DIFF WBC: CPT

## 2019-12-22 PROCEDURE — 99285 EMERGENCY DEPT VISIT HI MDM: CPT | Mod: ,,, | Performed by: EMERGENCY MEDICINE

## 2019-12-22 PROCEDURE — 96367 TX/PROPH/DG ADDL SEQ IV INF: CPT

## 2019-12-22 PROCEDURE — 25000003 PHARM REV CODE 250: Performed by: EMERGENCY MEDICINE

## 2019-12-22 PROCEDURE — 96365 THER/PROPH/DIAG IV INF INIT: CPT

## 2019-12-22 PROCEDURE — 99285 EMERGENCY DEPT VISIT HI MDM: CPT | Mod: 25

## 2019-12-22 PROCEDURE — 87040 BLOOD CULTURE FOR BACTERIA: CPT

## 2019-12-22 RX ORDER — OXYCODONE HYDROCHLORIDE 5 MG/1
10 TABLET ORAL
Status: COMPLETED | OUTPATIENT
Start: 2019-12-22 | End: 2019-12-22

## 2019-12-22 RX ORDER — MAGNESIUM SULFATE HEPTAHYDRATE 40 MG/ML
2 INJECTION, SOLUTION INTRAVENOUS
Status: COMPLETED | OUTPATIENT
Start: 2019-12-22 | End: 2019-12-22

## 2019-12-22 RX ADMIN — MAGNESIUM SULFATE IN WATER 2 G: 40 INJECTION, SOLUTION INTRAVENOUS at 10:12

## 2019-12-22 RX ADMIN — PIPERACILLIN AND TAZOBACTAM 4.5 G: 4; .5 INJECTION, POWDER, FOR SOLUTION INTRAVENOUS at 09:12

## 2019-12-22 RX ADMIN — SODIUM CHLORIDE, SODIUM LACTATE, POTASSIUM CHLORIDE, AND CALCIUM CHLORIDE 1000 ML: .6; .31; .03; .02 INJECTION, SOLUTION INTRAVENOUS at 09:12

## 2019-12-22 RX ADMIN — OXYCODONE HYDROCHLORIDE 10 MG: 5 TABLET ORAL at 10:12

## 2019-12-23 PROBLEM — R74.01 ELEVATED TRANSAMINASE LEVEL: Status: ACTIVE | Noted: 2019-12-23

## 2019-12-23 PROBLEM — R50.9 FEVER: Status: ACTIVE | Noted: 2019-12-23

## 2019-12-23 PROBLEM — E87.1 HYPONATREMIA: Status: ACTIVE | Noted: 2019-12-23

## 2019-12-23 PROBLEM — R17 ELEVATED BILIRUBIN: Status: ACTIVE | Noted: 2019-12-23

## 2019-12-23 LAB
ALBUMIN SERPL BCP-MCNC: 2.7 G/DL (ref 3.5–5.2)
ALP SERPL-CCNC: 284 U/L (ref 55–135)
ALT SERPL W/O P-5'-P-CCNC: 322 U/L (ref 10–44)
ANION GAP SERPL CALC-SCNC: 6 MMOL/L (ref 8–16)
APTT BLDCRRT: 24.5 SEC (ref 21–32)
ASCENDING AORTA: 2.77 CM
AST SERPL-CCNC: 312 U/L (ref 10–40)
AV INDEX (PROSTH): 0.74
AV MEAN GRADIENT: 4 MMHG
AV PEAK GRADIENT: 7 MMHG
AV VALVE AREA: 2.34 CM2
AV VELOCITY RATIO: 0.74
BASOPHILS # BLD AUTO: 0.01 K/UL (ref 0–0.2)
BASOPHILS NFR BLD: 0.2 % (ref 0–1.9)
BILIRUB SERPL-MCNC: 4.8 MG/DL (ref 0.1–1)
BSA FOR ECHO PROCEDURE: 1.56 M2
BUN SERPL-MCNC: 10 MG/DL (ref 6–20)
CALCIUM SERPL-MCNC: 8.2 MG/DL (ref 8.7–10.5)
CHLORIDE SERPL-SCNC: 105 MMOL/L (ref 95–110)
CO2 SERPL-SCNC: 22 MMOL/L (ref 23–29)
CREAT SERPL-MCNC: 0.7 MG/DL (ref 0.5–1.4)
CV ECHO LV RWT: 0.32 CM
DIFFERENTIAL METHOD: ABNORMAL
DOP CALC AO PEAK VEL: 1.33 M/S
DOP CALC AO VTI: 26.92 CM
DOP CALC LVOT AREA: 3.2 CM2
DOP CALC LVOT DIAMETER: 2.01 CM
DOP CALC LVOT PEAK VEL: 0.99 M/S
DOP CALC LVOT STROKE VOLUME: 63.02 CM3
DOP CALCLVOT PEAK VEL VTI: 19.87 CM
E WAVE DECELERATION TIME: 193.42 MSEC
E/A RATIO: 1.43
E/E' RATIO: 12.63 M/S
ECHO LV POSTERIOR WALL: 0.69 CM (ref 0.6–1.1)
EOSINOPHIL # BLD AUTO: 0 K/UL (ref 0–0.5)
EOSINOPHIL NFR BLD: 0 % (ref 0–8)
ERYTHROCYTE [DISTWIDTH] IN BLOOD BY AUTOMATED COUNT: 16.4 % (ref 11.5–14.5)
EST. GFR  (AFRICAN AMERICAN): >60 ML/MIN/1.73 M^2
EST. GFR  (NON AFRICAN AMERICAN): >60 ML/MIN/1.73 M^2
FRACTIONAL SHORTENING: 25 % (ref 28–44)
GLUCOSE SERPL-MCNC: 122 MG/DL (ref 70–110)
HCT VFR BLD AUTO: 32.5 % (ref 37–48.5)
HGB BLD-MCNC: 10.3 G/DL (ref 12–16)
IMM GRANULOCYTES # BLD AUTO: 0.05 K/UL (ref 0–0.04)
IMM GRANULOCYTES NFR BLD AUTO: 0.8 % (ref 0–0.5)
INTERVENTRICULAR SEPTUM: 0.71 CM (ref 0.6–1.1)
LA MAJOR: 4.83 CM
LA MINOR: 4.6 CM
LA WIDTH: 3.67 CM
LEFT ATRIUM SIZE: 3.06 CM
LEFT ATRIUM VOLUME INDEX: 28.9 ML/M2
LEFT ATRIUM VOLUME: 44.98 CM3
LEFT INTERNAL DIMENSION IN SYSTOLE: 3.22 CM (ref 2.1–4)
LEFT VENTRICLE DIASTOLIC VOLUME INDEX: 53.52 ML/M2
LEFT VENTRICLE DIASTOLIC VOLUME: 83.2 ML
LEFT VENTRICLE MASS INDEX: 57 G/M2
LEFT VENTRICLE SYSTOLIC VOLUME INDEX: 26.8 ML/M2
LEFT VENTRICLE SYSTOLIC VOLUME: 41.65 ML
LEFT VENTRICULAR INTERNAL DIMENSION IN DIASTOLE: 4.3 CM (ref 3.5–6)
LEFT VENTRICULAR MASS: 88.53 G
LIPASE SERPL-CCNC: 4 U/L (ref 4–60)
LV LATERAL E/E' RATIO: 10 M/S
LV SEPTAL E/E' RATIO: 17.14 M/S
LYMPHOCYTES # BLD AUTO: 0.1 K/UL (ref 1–4.8)
LYMPHOCYTES NFR BLD: 2 % (ref 18–48)
MAGNESIUM SERPL-MCNC: 1.6 MG/DL (ref 1.6–2.6)
MCH RBC QN AUTO: 28.1 PG (ref 27–31)
MCHC RBC AUTO-ENTMCNC: 31.7 G/DL (ref 32–36)
MCV RBC AUTO: 89 FL (ref 82–98)
MONOCYTES # BLD AUTO: 0 K/UL (ref 0.3–1)
MONOCYTES NFR BLD: 0.6 % (ref 4–15)
MV PEAK A VEL: 0.84 M/S
MV PEAK E VEL: 1.2 M/S
NEUTROPHILS # BLD AUTO: 6.3 K/UL (ref 1.8–7.7)
NEUTROPHILS NFR BLD: 96.4 % (ref 38–73)
NRBC BLD-RTO: 0 /100 WBC
PHOSPHATE SERPL-MCNC: 2.6 MG/DL (ref 2.7–4.5)
PISA TR MAX VEL: 1.91 M/S
PLATELET # BLD AUTO: 113 K/UL (ref 150–350)
PMV BLD AUTO: 11.4 FL (ref 9.2–12.9)
POTASSIUM SERPL-SCNC: 4.2 MMOL/L (ref 3.5–5.1)
PROT SERPL-MCNC: 6 G/DL (ref 6–8.4)
PULM VEIN S/D RATIO: 0.98
PV PEAK D VEL: 0.51 M/S
PV PEAK S VEL: 0.5 M/S
RA MAJOR: 4.46 CM
RA PRESSURE: 3 MMHG
RA WIDTH: 2.9 CM
RBC # BLD AUTO: 3.66 M/UL (ref 4–5.4)
RETIRED EF AND QEF - SEE NOTES: 52 %
RIGHT VENTRICULAR END-DIASTOLIC DIMENSION: 3.41 CM
RV TISSUE DOPPLER FREE WALL SYSTOLIC VELOCITY 1 (APICAL 4 CHAMBER VIEW): 13.19 CM/S
SINUS: 2.84 CM
SODIUM SERPL-SCNC: 133 MMOL/L (ref 136–145)
STJ: 2.53 CM
TDI LATERAL: 0.12 M/S
TDI SEPTAL: 0.07 M/S
TDI: 0.1 M/S
TR MAX PG: 15 MMHG
TRICUSPID ANNULAR PLANE SYSTOLIC EXCURSION: 2.05 CM
TV REST PULMONARY ARTERY PRESSURE: 18 MMHG
WBC # BLD AUTO: 6.54 K/UL (ref 3.9–12.7)

## 2019-12-23 PROCEDURE — 25500020 PHARM REV CODE 255: Performed by: INTERNAL MEDICINE

## 2019-12-23 PROCEDURE — 25000003 PHARM REV CODE 250: Performed by: STUDENT IN AN ORGANIZED HEALTH CARE EDUCATION/TRAINING PROGRAM

## 2019-12-23 PROCEDURE — 84100 ASSAY OF PHOSPHORUS: CPT

## 2019-12-23 PROCEDURE — 85025 COMPLETE CBC W/AUTO DIFF WBC: CPT

## 2019-12-23 PROCEDURE — 63600175 PHARM REV CODE 636 W HCPCS: Performed by: STUDENT IN AN ORGANIZED HEALTH CARE EDUCATION/TRAINING PROGRAM

## 2019-12-23 PROCEDURE — 99223 PR INITIAL HOSPITAL CARE,LEVL III: ICD-10-PCS | Mod: ,,, | Performed by: INTERNAL MEDICINE

## 2019-12-23 PROCEDURE — 83735 ASSAY OF MAGNESIUM: CPT

## 2019-12-23 PROCEDURE — 85730 THROMBOPLASTIN TIME PARTIAL: CPT

## 2019-12-23 PROCEDURE — 80053 COMPREHEN METABOLIC PANEL: CPT

## 2019-12-23 PROCEDURE — 20600001 HC STEP DOWN PRIVATE ROOM

## 2019-12-23 PROCEDURE — 99223 1ST HOSP IP/OBS HIGH 75: CPT | Mod: ,,, | Performed by: INTERNAL MEDICINE

## 2019-12-23 PROCEDURE — 83690 ASSAY OF LIPASE: CPT

## 2019-12-23 PROCEDURE — 36415 COLL VENOUS BLD VENIPUNCTURE: CPT

## 2019-12-23 RX ORDER — SODIUM CHLORIDE 0.9 % (FLUSH) 0.9 %
10 SYRINGE (ML) INJECTION
Status: DISCONTINUED | OUTPATIENT
Start: 2019-12-23 | End: 2019-12-25 | Stop reason: HOSPADM

## 2019-12-23 RX ORDER — HYDROCODONE BITARTRATE AND ACETAMINOPHEN 10; 325 MG/1; MG/1
1 TABLET ORAL EVERY 6 HOURS PRN
Status: DISCONTINUED | OUTPATIENT
Start: 2019-12-23 | End: 2019-12-23

## 2019-12-23 RX ORDER — LIDOCAINE AND PRILOCAINE 25; 25 MG/G; MG/G
CREAM TOPICAL ONCE
Status: DISCONTINUED | OUTPATIENT
Start: 2019-12-23 | End: 2019-12-25 | Stop reason: HOSPADM

## 2019-12-23 RX ORDER — AMOXICILLIN 250 MG
1 CAPSULE ORAL 2 TIMES DAILY
Status: DISCONTINUED | OUTPATIENT
Start: 2019-12-23 | End: 2019-12-23

## 2019-12-23 RX ORDER — LORAZEPAM 0.5 MG/1
0.5 TABLET ORAL EVERY 6 HOURS PRN
Status: DISCONTINUED | OUTPATIENT
Start: 2019-12-23 | End: 2019-12-25 | Stop reason: HOSPADM

## 2019-12-23 RX ORDER — ONDANSETRON 2 MG/ML
4 INJECTION INTRAMUSCULAR; INTRAVENOUS EVERY 8 HOURS PRN
Status: DISCONTINUED | OUTPATIENT
Start: 2019-12-23 | End: 2019-12-24

## 2019-12-23 RX ORDER — ONDANSETRON 8 MG/1
8 TABLET, ORALLY DISINTEGRATING ORAL EVERY 8 HOURS PRN
Status: DISCONTINUED | OUTPATIENT
Start: 2019-12-23 | End: 2019-12-25 | Stop reason: HOSPADM

## 2019-12-23 RX ORDER — ESTRADIOL AND NORETHINDRONE ACETATE 1; .5 MG/1; MG/1
1 TABLET ORAL DAILY
Status: DISCONTINUED | OUTPATIENT
Start: 2019-12-23 | End: 2019-12-23

## 2019-12-23 RX ORDER — FENTANYL 25 UG/1
1 PATCH TRANSDERMAL
Status: DISCONTINUED | OUTPATIENT
Start: 2019-12-23 | End: 2019-12-24

## 2019-12-23 RX ORDER — SODIUM CHLORIDE 9 MG/ML
INJECTION, SOLUTION INTRAVENOUS CONTINUOUS
Status: DISCONTINUED | OUTPATIENT
Start: 2019-12-23 | End: 2019-12-25 | Stop reason: HOSPADM

## 2019-12-23 RX ADMIN — PIPERACILLIN AND TAZOBACTAM 4.5 G: 4; .5 INJECTION, POWDER, FOR SOLUTION INTRAVENOUS at 03:12

## 2019-12-23 RX ADMIN — IOHEXOL 75 ML: 350 INJECTION, SOLUTION INTRAVENOUS at 11:12

## 2019-12-23 RX ADMIN — SODIUM CHLORIDE: 0.9 INJECTION, SOLUTION INTRAVENOUS at 01:12

## 2019-12-23 RX ADMIN — ONDANSETRON 8 MG: 8 TABLET, ORALLY DISINTEGRATING ORAL at 04:12

## 2019-12-23 RX ADMIN — PIPERACILLIN AND TAZOBACTAM 4.5 G: 4; .5 INJECTION, POWDER, FOR SOLUTION INTRAVENOUS at 11:12

## 2019-12-23 RX ADMIN — THERA TABS 1 TABLET: TAB at 09:12

## 2019-12-23 RX ADMIN — ONDANSETRON 4 MG: 2 INJECTION INTRAMUSCULAR; INTRAVENOUS at 10:12

## 2019-12-23 RX ADMIN — OXYCODONE HYDROCHLORIDE 15 MG: 10 TABLET ORAL at 01:12

## 2019-12-23 RX ADMIN — SODIUM CHLORIDE: 0.9 INJECTION, SOLUTION INTRAVENOUS at 11:12

## 2019-12-23 RX ADMIN — PIPERACILLIN AND TAZOBACTAM 4.5 G: 4; .5 INJECTION, POWDER, FOR SOLUTION INTRAVENOUS at 07:12

## 2019-12-23 RX ADMIN — ONDANSETRON 8 MG: 8 TABLET, ORALLY DISINTEGRATING ORAL at 02:12

## 2019-12-23 RX ADMIN — SODIUM CHLORIDE: 0.9 INJECTION, SOLUTION INTRAVENOUS at 09:12

## 2019-12-23 RX ADMIN — APIXABAN 5 MG: 2.5 TABLET, FILM COATED ORAL at 09:12

## 2019-12-23 NOTE — HPI
"Mrs. Moody is a 58yo F w/ cancer of the head of the pancreas undergoing chemotherapy w/ [place]+Abraxane and TTF on PANOVA-3 who presents to ED w/ concerns regarding fever at home which she reports to be 102.5F this evening that was responsive to tylenol at home. Her last chemotherapy treatment was on 12/19 (Thu) and she usually "bounces back" by Saturday. However, this past weekend she has continued to feel nauseated with worse appetite than usual. She had a bout of non-bloody, non-bilious emesis Sunday evening. She also notes developing diarrhea, though notes no blood or mucous w/ her movements which she describes as losse. She typically experiences night sweats but notes feeling "chillier" than usual. Her baseline midabdominal pain remains unchanged. She notes no new myalgias, arthralgias, rashes, wounds, dysuria, cough, SOB, CP, rhinorrhea, sore throat, HA, lightheadedness, or dizziness.    "

## 2019-12-23 NOTE — ED NOTES
Patient identifiers for Quyen Moody 59 y.o. female checked and correct.  Chief Complaint   Patient presents with    Fever Post Chemo     Pt c/o TMAX fever 102. Hx of pancreatic cancer, last chemo was past Thrusday.      Past Medical History:   Diagnosis Date    Allergic rhinitis 3/3/2014    CKD (chronic kidney disease) stage 3, GFR 30-59 ml/min 12/26/2015    Hyperlipidemia 3/3/2014    Pancreas cancer     Skin rash 8/10/2019     Allergies reported:   Review of patient's allergies indicates:   Allergen Reactions    Sulfa (sulfonamide antibiotics) Swelling and Hives     tongue           LOC: Patient is awake, alert, and aware of environment with an appropriate affect. Patient is oriented x 4 and speaking appropriately.  APPEARANCE: Patient resting comfortably and in no acute distress. Patient is clean and well groomed, patient's clothing is properly fastened.  SKIN: The skin is warm and dry. Patient has normal skin turgor and moist mucus membranes. Reports fever and chills.  MUSKULOSKELETAL: Patient is moving all extremities well, no obvious deformities noted. Pulses intact. Ambulatory by self.  RESPIRATORY: Airway is open and patent. Respirations are spontaneous and non-labored with normal effort and rate.  CARDIAC: Patient has a normal rate and rhythm. No peripheral edema noted. Denies chest pain.  ABDOMEN: No distention noted. Soft and non-tender upon palpation. Reports nausea since Thursday, denies vomiting.  NEUROLOGICAL: PERRL. Facial expression is symmetrical. Hand grasps are equal bilaterally. Normal sensation in all extremities when touched with finger. Reports headache.

## 2019-12-23 NOTE — ASSESSMENT & PLAN NOTE
Likely due to poor oral intake; UA also notable for ketonuria which is consistent  IVF  Trend, continue to monitor

## 2019-12-23 NOTE — PLAN OF CARE
"MDRs completed with Dr. Montenegro and the team. Patient of Dr. Barrett with a history of pancreatic cancer. Patient s/p chemotherapy. Patient reported a temp of 102.5 at home. Vital signs are stable. T max 99.5. SpO2 % on room air. Blood cultures pending. Urinalysis (-), Flu (-), CXR (-). T Bili 4.8. Abdominal ultrasound results show "Mild intrahepatic bile duct dilatation". IVFs: normal saline at 100 mL/hr continuously. Patient receiving IV Zosyn. Plans for a CT scan today and GI consult. Discharge needs and expected discharge date to be determined. CM to continue to follow with the team.    Loly Lerner, RN, BSN, CM  [facility]  Nurse - Med Onc/Gyn Onc    "

## 2019-12-23 NOTE — PLAN OF CARE
POC reviewed with patient; understanding verbalized. Pt complained of abdominal pain. PRN oxy15 given x1. Pt had 1 episode of clear emesis. PRN SL zofran given. Pt with nonskid footwear on, bed in lowest position, and locked with bed rails up x2. Pt instructed to call prior to getting OOB. Pt has call light and personal items within reach. Patient ambulates in room independently. VSS and afebrile this shift. All questions and concerns addressed at this time. Will continue to monitor.

## 2019-12-23 NOTE — ED NOTES
Ultrasound called. Patient still waiting for transport to ED. Ultrasound will request transport from US to Oncology.  sent to room with patient's personal belongings. Oncology aware.

## 2019-12-23 NOTE — ASSESSMENT & PLAN NOTE
She is currently receiving [place]+Abraxane and TTF.  Last treatment 12/19 w/ dose related reduction due to chemo related AEs.  Upcoming appt w/ Dr. Hallman for surgical options; recent  w/ slight uptrend.

## 2019-12-23 NOTE — H&P
"[facility] Onc  H&P    Subjective:     Principal Problem: Fever    HPI: Mrs. Moody is a 58yo F w/ cancer of the head of the pancreas undergoing chemotherapy w/ Cooper+Abraxane and TTF on PANOVA-3 who presents to ED w/ concerns regarding fever at home which she reports to be 102.5F this evening that was responsive to tylenol at home. Her last chemotherapy treatment was on 12/19 (Thu) and she usually "bounces back" by Saturday. However, this past weekend she has continued to feel nauseated with worse appetite than usual. She had a bout of non-bloody, non-bilious emesis Sunday evening. She also notes developing diarrhea, though notes no blood or mucous w/ her movements which she describes as losse. She typically experiences night sweats but notes feeling "chillier" than usual. Her baseline midabdominal pain remains unchanged. She notes no new myalgias, arthralgias, rashes, wounds, dysuria, cough, SOB, CP, rhinorrhea, sore throat, HA, lightheadedness, or dizziness.      Patient information was obtained from patient, spouse/SO, EMS personnel, past medical records and ER records.     Oncology History:   She has had intermittent upper abdominal pain since early June.  She presented to her PCP who originally ordered an US and CT.  US was negative and CT showed some haziness at the pancreatic head.  She saw GI who performed EUS.  On EUS, a 3x4cm pancreatic head mass was seen with possible invasion into the SMA and portal vein.  FNA path s/w pancreatic adenocarcinoma.  CA 19-9 level at outside hospital was >1000 per the patient.  She endorses nausea and inability to tolerate much PO.  She has lost about 10lbs over the last few weeks and is noticing her skin turning yellow.  Denies pruritis.  She is passing gas but has not had a BM in a few days, she attributes this to narcotic use.  She recently started taking stool softeners.  No previous cardiac or stroke history.  Surgical history significant for " open appendectomy when she was in her 20s     She is currently receiving McKean+Abraxane and TTF.       (Not in a hospital admission)    Sulfa (sulfonamide antibiotics)     Past Medical History:   Diagnosis Date    Allergic rhinitis 3/3/2014    CKD (chronic kidney disease) stage 3, GFR 30-59 ml/min 12/26/2015    Hyperlipidemia 3/3/2014    Pancreas cancer     Skin rash 8/10/2019     Past Surgical History:   Procedure Laterality Date    ERCP N/A 7/16/2019    Procedure: ERCP (ENDOSCOPIC RETROGRADE CHOLANGIOPANCREATOGRAPHY);  Surgeon: Chema Harris MD;  Location: [facility] ([address] [place] [address]);  Service: Endoscopy;  Laterality: N/A;    Exporatory lap      INSERTION OF TUNNELED CENTRAL VENOUS CATHETER (CVC) WITH SUBCUTANEOUS PORT Right 8/12/2019    Procedure: [id number];  Surgeon: Cesar Hallman MD;  Location: [facility] OR [address] [place] [address];  Service: General;  Laterality: Right;  right IJ     Family History     Problem Relation (Age of Onset)    Diabetes Mother, Father, Brother        Tobacco Use    Smoking status: Former Smoker     Start date: 12/11/1999    Smokeless tobacco: Never Used   Substance and Sexual Activity    Alcohol use: Yes     Alcohol/week: 1.0 standard drinks     Types: 1 Glasses of wine per week     Frequency: 4 or more times a week     Drinks per session: 1 or 2     Binge frequency: Never     Comment: last drink a month ago     Drug use: No    Sexual activity: Yes       Review of Systems   Constitutional: Positive for appetite change, chills and fever.   HENT: Negative for congestion, rhinorrhea, sneezing, sore throat and trouble swallowing.    Eyes: Negative for visual disturbance.   Respiratory: Negative for cough, shortness of breath and wheezing.    Cardiovascular: Negative for chest pain, palpitations and leg swelling.   Gastrointestinal: Positive for abdominal pain (unchanged), diarrhea, nausea and vomiting. Negative for blood in stool and constipation.   Genitourinary: Negative for dysuria and  hematuria.   Musculoskeletal: Negative for arthralgias and myalgias.   Skin: Negative for rash and wound.   Neurological: Negative for light-headedness and headaches.     Objective:     Vital Signs (Most Recent):  Temp: 99.5 °F (37.5 °C) (12/22/19 2221)  Pulse: 92 (12/22/19 2221)  Resp: 16 (12/22/19 2221)  BP: 123/62 (12/22/19 2221)  SpO2: 100 % (12/22/19 2221) Vital Signs (24h Range):  Temp:  [99.4 °F (37.4 °C)-99.5 °F (37.5 °C)] 99.5 °F (37.5 °C)  Pulse:  [92-98] 92  Resp:  [16] 16  SpO2:  [96 %-100 %] 100 %  BP: (118-123)/(61-62) 123/62     Weight: 54.4 kg (120 lb)  Body mass index is 21.6 kg/m².  Body surface area is 1.55 meters squared.    ECOG SCORE             Lines/Drains/Airways     Central Venous Catheter Line                 Port A Cath Single Lumen 08/12/19 1601 right internal jugular 132 days          Peripheral Intravenous Line                 Peripheral IV - Single Lumen 12/22/19 2105 20 G Right Antecubital less than 1 day                Physical Exam   Constitutional: She is oriented to person, place, and time. No distress.   HENT:   Head: Normocephalic and atraumatic.   Mouth/Throat: Oropharynx is clear and moist. No oropharyngeal exudate.   Eyes: Conjunctivae are normal. Right eye exhibits no discharge. Left eye exhibits no discharge. No scleral icterus.   Neck: Normal range of motion. Neck supple. No thyromegaly present.   Cardiovascular: Normal rate, regular rhythm, normal heart sounds and intact distal pulses. Exam reveals no gallop and no friction rub.   No murmur heard.  Pulmonary/Chest: Effort normal and breath sounds normal. No respiratory distress. She has no wheezes. She has no rales.   R upper chest port in place: no surrounding erythema, warmth, or tenderness   Abdominal: Soft. Bowel sounds are normal. There is no tenderness.   Musculoskeletal: She exhibits no edema.   Lymphadenopathy:     She has no cervical adenopathy.   Neurological: She is alert and oriented to person, place, and  time.   Skin: Skin is warm and dry. She is not diaphoretic.   Psychiatric: She has a normal mood and affect.   Nursing note and vitals reviewed.      Recent Results (from the past 24 hour(s))   CBC auto differential    Collection Time: 12/22/19  9:05 PM   Result Value Ref Range    WBC 7.95 3.90 - 12.70 K/uL    RBC 3.94 (L) 4.00 - 5.40 M/uL    Hemoglobin 11.0 (L) 12.0 - 16.0 g/dL    Hematocrit 35.2 (L) 37.0 - 48.5 %    Mean Corpuscular Volume 89 82 - 98 fL    Mean Corpuscular Hemoglobin 27.9 27.0 - 31.0 pg    Mean Corpuscular Hemoglobin Conc 31.3 (L) 32.0 - 36.0 g/dL    RDW 16.2 (H) 11.5 - 14.5 %    Platelets 134 (L) 150 - 350 K/uL    MPV 11.4 9.2 - 12.9 fL    Immature Granulocytes 0.6 (H) 0.0 - 0.5 %    Gran # (ANC) 7.6 1.8 - 7.7 K/uL    Immature Grans (Abs) 0.05 (H) 0.00 - 0.04 K/uL    Lymph # 0.2 (L) 1.0 - 4.8 K/uL    Mono # 0.1 (L) 0.3 - 1.0 K/uL    Eos # 0.0 0.0 - 0.5 K/uL    Baso # 0.01 0.00 - 0.20 K/uL    nRBC 0 0 /100 WBC    Gran% 95.9 (H) 38.0 - 73.0 %    Lymph% 2.5 (L) 18.0 - 48.0 %    Mono% 0.8 (L) 4.0 - 15.0 %    Eosinophil% 0.1 0.0 - 8.0 %    Basophil% 0.1 0.0 - 1.9 %    Differential Method Automated    Comprehensive metabolic panel    Collection Time: 12/22/19  9:05 PM   Result Value Ref Range    Sodium 134 (L) 136 - 145 mmol/L    Potassium 4.8 3.5 - 5.1 mmol/L    Chloride 103 95 - 110 mmol/L    CO2 23 23 - 29 mmol/L    Glucose 121 (H) 70 - 110 mg/dL    BUN, Bld 12 6 - 20 mg/dL    Creatinine 0.8 0.5 - 1.4 mg/dL    Calcium 8.9 8.7 - 10.5 mg/dL    Total Protein 6.8 6.0 - 8.4 g/dL    Albumin 3.2 (L) 3.5 - 5.2 g/dL    Total Bilirubin 3.4 (H) 0.1 - 1.0 mg/dL    Alkaline Phosphatase 345 (H) 55 - 135 U/L     (H) 10 - 40 U/L     (H) 10 - 44 U/L    Anion Gap 8 8 - 16 mmol/L    eGFR if African American >60.0 >60 mL/min/1.73 m^2    eGFR if non African American >60.0 >60 mL/min/1.73 m^2   Protime-INR    Collection Time: 12/22/19  9:05 PM   Result Value Ref Range    Prothrombin Time 11.4 9.0 - 12.5 sec     INR 1.1 0.8 - 1.2   Lactic acid, plasma    Collection Time: 12/22/19  9:05 PM   Result Value Ref Range    Lactate (Lactic Acid) 0.9 0.5 - 2.2 mmol/L   Magnesium    Collection Time: 12/22/19  9:05 PM   Result Value Ref Range    Magnesium 1.4 (L) 1.6 - 2.6 mg/dL   Phosphorus    Collection Time: 12/22/19  9:05 PM   Result Value Ref Range    Phosphorus 3.0 2.7 - 4.5 mg/dL   Influenza A & B by Molecular    Collection Time: 12/22/19  9:14 PM   Result Value Ref Range    Influenza A, Molecular Negative Negative    Influenza B, Molecular Negative Negative    Flu A & B Source Nasal swab    Urinalysis, Reflex to Urine Culture Urine, Clean Catch    Collection Time: 12/22/19  9:19 PM   Result Value Ref Range    Specimen UA Urine, Clean Catch     Color, UA Rachael Yellow, Straw, Rachael    Appearance, UA Clear Clear    pH, UA 6.0 5.0 - 8.0    Specific Gravity, UA 1.015 1.005 - 1.030    Protein, UA Negative Negative    Glucose, UA Negative Negative    Ketones, UA Trace (A) Negative    Bilirubin (UA) Negative Negative    Occult Blood UA 1+ (A) Negative    Nitrite, UA Negative Negative    Leukocytes, UA Negative Negative   Urinalysis Microscopic    Collection Time: 12/22/19  9:19 PM   Result Value Ref Range    RBC, UA 4 0 - 4 /hpf    WBC, UA 2 0 - 5 /hpf    Squam Epithel, UA 4 /hpf    Microscopic Comment SEE COMMENT        Imaging Results          US Abdomen Limited (Gallbladder) (In process)  Result time 12/23/19 01:01:31               X-Ray Chest PA And Lateral (Final result)  Result time 12/22/19 20:36:27    Final result by Eder Jonas MD (12/22/19 20:36:27)                 Impression:      No acute intrathoracic process.      Electronically signed by: Eder Jonas MD  Date:    12/22/2019  Time:    20:36             Narrative:    EXAMINATION:  XR CHEST PA AND LATERAL    CLINICAL HISTORY:  Chest Pain;    TECHNIQUE:  PA and lateral views of the chest were performed.    COMPARISON:  08/12/2019.    FINDINGS:  There is unchanged  appearance of a right-sided chest port.  The trachea is unremarkable.  The cardiomediastinal silhouette is within normal limits.  The hilar structures are unremarkable.  The hemidiaphragms are within normal limits.  There is no evidence of free air beneath the hemidiaphragms.  There are no pleural effusions.  There is no evidence of a pneumothorax.  There is no evidence of pneumomediastinum.  There are nodular densities in the left upper lobe.  The osseous structures are unremarkable.    There is a stent in the upper abdomen.  The remainder of the visualized structures in the upper abdomen are unremarkable.                                  Assessment/Plan:     * Fever  Pt presents to ED w/ known pancreatic cancer w/ c/o measured temp at home 102.5 F. Responsive to tylenol at home.   Upon presentation, Pt 99.4 and HDS. WCC 7.95 Gran% 95.9, CXR w/o acute process, U/A w/o pyuria, Flu negative. Lactate wnl.   No obvious etiology of infection; ascending cholangitis a potential concern to note. No abdominal pain on admission.    Continue Zosyn; broaden/taper as clinical course progresses  IVF infusion  F/u blood cultures  F/u abdo U/S  Consider C diff if diarrhea becomes watery    Elevated bilirubin  Likely due to recent chemotherapy  Abdo U/S pending  Continue to trend    Elevated transaminase level  Likely due to recent chemotherapy  Abdo U/S pending  Continue to trend    Hyponatremia  Likely due to poor oral intake; UA also notable for ketonuria which is consistent  IVF  Trend, continue to monitor    Cancer of head of pancreas  She is currently receiving Whitman+Abraxane and TTF.  Last treatment 12/19 w/ dose related reduction due to chemo related AEs.  Upcoming appt w/ Dr. Hallman for surgical options; recent  w/ slight uptrend.         VTE Risk Mitigation (From admission, onward)         Ordered     apixaban tablet 5 mg  2 times daily      12/23/19 0022     Reason for no Mechanical VTE Prophylaxis  Once      Question:  Reasons:  Answer:  Physician Provided (leave comment)  Comment:  on apixiban    12/23/19 0022     IP VTE HIGH RISK PATIENT  Once      12/23/19 0022                    Sherly Shearer MD  PGY-2 IM [facility]

## 2019-12-23 NOTE — TREATMENT PLAN
AES Treatment Plan  12/23/2019  5:55 PM    Chart reviewed, patient seen, and case discussed with attending.  NPO after MN for ERCP tomorrow. Will see patient in tomorrow morning as well as follow labs and CT scan to be certain that proceeding with ERCP is indicated.   Full consult note to follow.    Lisbet Kenyon M.D.  Gastroenterology Fellow, PGY-VI  Pager: [phone]  [facility]

## 2019-12-23 NOTE — H&P
"[facility]  Hematology/Oncology  H&P    Patient Name: Quyen Moody  MRN: [id number]  Admission Date: 12/22/2019  Code Status: Full Code   Attending Provider: Ashish Montenegro MD  Primary Care Physician: MUKUND Rodríguez MD  Principal Problem:Fever       Subjective:      Principal Problem: Fever     HPI: Mrs. Moody is a 58yo F w/ cancer of the head of the pancreas undergoing chemotherapy w/ [place]+Abraxane and TTF on PANOVA-3 who presents to ED w/ concerns regarding fever at home which she reports to be 102.5F this evening that was responsive to tylenol at home. Her last chemotherapy treatment was on 12/19 (Thu) and she usually "bounces back" by Saturday. However, this past weekend she has continued to feel nauseated with worse appetite than usual. She had a bout of non-bloody, non-bilious emesis Sunday evening. She also notes developing diarrhea, though notes no blood or mucous w/ her movements which she describes as losse. She typically experiences night sweats but notes feeling "chillier" than usual. Her baseline midabdominal pain remains unchanged. She notes no new myalgias, arthralgias, rashes, wounds, dysuria, cough, SOB, CP, rhinorrhea, sore throat, HA, lightheadedness, or dizziness.       Patient information was obtained from patient, spouse/SO, EMS personnel, past medical records and ER records.      Oncology History:   She has had intermittent upper abdominal pain since early June.  She presented to her PCP who originally ordered an US and CT.  US was negative and CT showed some haziness at the pancreatic head.  She saw GI who performed EUS.  On EUS, a 3x4cm pancreatic head mass was seen with possible invasion into the SMA and portal vein.  FNA path s/w pancreatic adenocarcinoma.  CA 19-9 level at outside hospital was >1000 per the patient.  She endorses nausea and inability to tolerate much PO.  She has lost about 10lbs over the last few weeks and is noticing her skin turning yellow.  Denies " pruritis.  She is passing gas but has not had a BM in a few days, she attributes this to narcotic use.  She recently started taking stool softeners.  No previous cardiac or stroke history.  Surgical history significant for open appendectomy when she was in her 20s      She is currently receiving [place]+Abraxane and TTF.         Sulfa (sulfonamide antibiotics)           Past Medical History:   Diagnosis Date    Allergic rhinitis 3/3/2014    CKD (chronic kidney disease) stage 3, GFR 30-59 ml/min 12/26/2015    Hyperlipidemia 3/3/2014    Pancreas cancer      Skin rash 8/10/2019            Past Surgical History:   Procedure Laterality Date    ERCP N/A 7/16/2019     Procedure: ERCP (ENDOSCOPIC RETROGRADE CHOLANGIOPANCREATOGRAPHY);  Surgeon: Chema Harris MD;  Location: [facility] ([address] [place] [address]);  Service: Endoscopy;  Laterality: N/A;    Exporatory lap        INSERTION OF TUNNELED CENTRAL VENOUS CATHETER (CVC) WITH SUBCUTANEOUS PORT Right 8/12/2019     Procedure: [id number];  Surgeon: Cesar Hallman MD;  Location: [facility] OR [address] [place] [address];  Service: General;  Laterality: Right;  right IJ           Family History      Problem Relation (Age of Onset)     Diabetes Mother, Father, Brother                Tobacco Use    Smoking status: Former Smoker       Start date: 12/11/1999    Smokeless tobacco: Never Used   Substance and Sexual Activity    Alcohol use: Yes       Alcohol/week: 1.0 standard drinks       Types: 1 Glasses of wine per week       Frequency: 4 or more times a week       Drinks per session: 1 or 2       Binge frequency: Never       Comment: last drink a month ago     Drug use: No    Sexual activity: Yes         Review of Systems   Constitutional: Positive for appetite change, chills and fever.   HENT: Negative for congestion, rhinorrhea, sneezing, sore throat and trouble swallowing.    Eyes: Negative for visual disturbance.   Respiratory: Negative for cough, shortness of breath and wheezing.     Cardiovascular: Negative for chest pain, palpitations and leg swelling.   Gastrointestinal: Positive for abdominal pain (unchanged), diarrhea, nausea and vomiting. Negative for blood in stool and constipation.   Genitourinary: Negative for dysuria and hematuria.   Musculoskeletal: Negative for arthralgias and myalgias.   Skin: Negative for rash and wound.   Neurological: Negative for light-headedness and headaches.      Objective:      Vital Signs (Most Recent):  Temp: 99.5 °F (37.5 °C) (12/22/19 2221)  Pulse: 92 (12/22/19 2221)  Resp: 16 (12/22/19 2221)  BP: 123/62 (12/22/19 2221)  SpO2: 100 % (12/22/19 2221) Vital Signs (24h Range):  Temp:  [99.4 °F (37.4 °C)-99.5 °F (37.5 °C)] 99.5 °F (37.5 °C)  Pulse:  [92-98] 92  Resp:  [16] 16  SpO2:  [96 %-100 %] 100 %  BP: (118-123)/(61-62) 123/62      Weight: 54.4 kg (120 lb)  Body mass index is 21.6 kg/m².  Body surface area is 1.55 meters squared.     ECOG SCORE                 Lines/Drains/Airways            Central Venous Catheter Line                          Port A Cath Single Lumen 08/12/19 1601 right internal jugular 132 days                   Peripheral Intravenous Line                          Peripheral IV - Single Lumen 12/22/19 2105 20 G Right Antecubital less than 1 day                     Physical Exam   Constitutional: She is oriented to person, place, and time. No distress.   HENT:   Head: Normocephalic and atraumatic.   Mouth/Throat: Oropharynx is clear and moist. No oropharyngeal exudate.   Eyes: Conjunctivae are normal. Right eye exhibits no discharge. Left eye exhibits no discharge. No scleral icterus.   Neck: Normal range of motion. Neck supple. No thyromegaly present.   Cardiovascular: Normal rate, regular rhythm, normal heart sounds and intact distal pulses. Exam reveals no gallop and no friction rub.   No murmur heard.  Pulmonary/Chest: Effort normal and breath sounds normal. No respiratory distress. She has no wheezes. She has no rales.   R upper  chest port in place: no surrounding erythema, warmth, or tenderness   Abdominal: Soft. Bowel sounds are normal. There is no tenderness.   Musculoskeletal: She exhibits no edema.   Lymphadenopathy:     She has no cervical adenopathy.   Neurological: She is alert and oriented to person, place, and time.   Skin: Skin is warm and dry. She is not diaphoretic.   Psychiatric: She has a normal mood and affect.   Nursing note and vitals reviewed.              Recent Results (from the past 24 hour(s))   CBC auto differential     Collection Time: 12/22/19  9:05 PM   Result Value Ref Range     WBC 7.95 3.90 - 12.70 K/uL     RBC 3.94 (L) 4.00 - 5.40 M/uL     Hemoglobin 11.0 (L) 12.0 - 16.0 g/dL     Hematocrit 35.2 (L) 37.0 - 48.5 %     Mean Corpuscular Volume 89 82 - 98 fL     Mean Corpuscular Hemoglobin 27.9 27.0 - 31.0 pg     Mean Corpuscular Hemoglobin Conc 31.3 (L) 32.0 - 36.0 g/dL     RDW 16.2 (H) 11.5 - 14.5 %     Platelets 134 (L) 150 - 350 K/uL     MPV 11.4 9.2 - 12.9 fL     Immature Granulocytes 0.6 (H) 0.0 - 0.5 %     Gran # (ANC) 7.6 1.8 - 7.7 K/uL     Immature Grans (Abs) 0.05 (H) 0.00 - 0.04 K/uL     Lymph # 0.2 (L) 1.0 - 4.8 K/uL     Mono # 0.1 (L) 0.3 - 1.0 K/uL     Eos # 0.0 0.0 - 0.5 K/uL     Baso # 0.01 0.00 - 0.20 K/uL     nRBC 0 0 /100 WBC     Gran% 95.9 (H) 38.0 - 73.0 %     Lymph% 2.5 (L) 18.0 - 48.0 %     Mono% 0.8 (L) 4.0 - 15.0 %     Eosinophil% 0.1 0.0 - 8.0 %     Basophil% 0.1 0.0 - 1.9 %     Differential Method Automated     Comprehensive metabolic panel     Collection Time: 12/22/19  9:05 PM   Result Value Ref Range     Sodium 134 (L) 136 - 145 mmol/L     Potassium 4.8 3.5 - 5.1 mmol/L     Chloride 103 95 - 110 mmol/L     CO2 23 23 - 29 mmol/L     Glucose 121 (H) 70 - 110 mg/dL     BUN, Bld 12 6 - 20 mg/dL     Creatinine 0.8 0.5 - 1.4 mg/dL     Calcium 8.9 8.7 - 10.5 mg/dL     Total Protein 6.8 6.0 - 8.4 g/dL     Albumin 3.2 (L) 3.5 - 5.2 g/dL     Total Bilirubin 3.4 (H) 0.1 - 1.0 mg/dL     Alkaline  Phosphatase 345 (H) 55 - 135 U/L      (H) 10 - 40 U/L      (H) 10 - 44 U/L     Anion Gap 8 8 - 16 mmol/L     eGFR if African American >60.0 >60 mL/min/1.73 m^2     eGFR if non African American >60.0 >60 mL/min/1.73 m^2   Protime-INR     Collection Time: 12/22/19  9:05 PM   Result Value Ref Range     Prothrombin Time 11.4 9.0 - 12.5 sec     INR 1.1 0.8 - 1.2   Lactic acid, plasma     Collection Time: 12/22/19  9:05 PM   Result Value Ref Range     Lactate (Lactic Acid) 0.9 0.5 - 2.2 mmol/L   Magnesium     Collection Time: 12/22/19  9:05 PM   Result Value Ref Range     Magnesium 1.4 (L) 1.6 - 2.6 mg/dL   Phosphorus     Collection Time: 12/22/19  9:05 PM   Result Value Ref Range     Phosphorus 3.0 2.7 - 4.5 mg/dL   Influenza A & B by Molecular     Collection Time: 12/22/19  9:14 PM   Result Value Ref Range     Influenza A, Molecular Negative Negative     Influenza B, Molecular Negative Negative     Flu A & B Source Nasal swab     Urinalysis, Reflex to Urine Culture Urine, Clean Catch     Collection Time: 12/22/19  9:19 PM   Result Value Ref Range     Specimen UA Urine, Clean Catch       Color, UA Rachael Yellow, Straw, Rachael     Appearance, UA Clear Clear     pH, UA 6.0 5.0 - 8.0     Specific Gravity, UA 1.015 1.005 - 1.030     Protein, UA Negative Negative     Glucose, UA Negative Negative     Ketones, UA Trace (A) Negative     Bilirubin (UA) Negative Negative     Occult Blood UA 1+ (A) Negative     Nitrite, UA Negative Negative     Leukocytes, UA Negative Negative   Urinalysis Microscopic     Collection Time: 12/22/19  9:19 PM   Result Value Ref Range     RBC, UA 4 0 - 4 /hpf     WBC, UA 2 0 - 5 /hpf     Squam Epithel, UA 4 /hpf     Microscopic Comment SEE COMMENT               Imaging Results                 US Abdomen Limited (Gallbladder) (In process)  Result time 12/23/19 01:01:31                          X-Ray Chest PA And Lateral (Final result)  Result time 12/22/19 20:36:27                Final  result by Eder Jonas MD (12/22/19 20:36:27)                               Impression:        No acute intrathoracic process.        Electronically signed by:     Eder Jonas MD  Date:                                            12/22/2019  Time:                                            20:36                         Narrative:     EXAMINATION:  XR CHEST PA AND LATERAL     CLINICAL HISTORY:  Chest Pain;     TECHNIQUE:  PA and lateral views of the chest were performed.     COMPARISON:  08/12/2019.     FINDINGS:  There is unchanged appearance of a right-sided chest port.  The trachea is unremarkable.  The cardiomediastinal silhouette is within normal limits.  The hilar structures are unremarkable.  The hemidiaphragms are within normal limits.  There is no evidence of free air beneath the hemidiaphragms.  There are no pleural effusions.  There is no evidence of a pneumothorax.  There is no evidence of pneumomediastinum.  There are nodular densities in the left upper lobe.  The osseous structures are unremarkable.     There is a stent in the upper abdomen.  The remainder of the visualized structures in the upper abdomen are unremarkable.                                                Assessment/Plan:      * Fever  Pt presents to ED w/ known pancreatic cancer w/ c/o measured temp at home 102.5 F. Responsive to tylenol at home.   Upon presentation, Pt 99.4 and HDS. WCC 7.95 Gran% 95.9, CXR w/o acute process, U/A w/o pyuria, Flu negative. Lactate wnl.   No obvious etiology of infection; ascending cholangitis a potential concern to note. No abdominal pain on admission.     Continue Zosyn; broaden/taper as clinical course progresses  IVF infusion  F/u blood cultures  F/u abdo U/S  Consider C diff if diarrhea becomes watery     Elevated bilirubin  Likely due to recent chemotherapy  Abdo U/S pending  Continue to trend     Elevated transaminase level  Likely due to recent chemotherapy  Abdo U/S pending  Continue to  trend     Hyponatremia  Likely due to poor oral intake; UA also notable for ketonuria which is consistent  IVF  Trend, continue to monitor     Cancer of head of pancreas  She is currently receiving [place]+Abraxane and TTF.  Last treatment 12/19 w/ dose related reduction due to chemo related AEs.  Upcoming appt w/ Dr. Hallman for surgical options; recent  w/ slight uptrend.                VTE Risk Mitigation (From admission, onward)                  Ordered        apixaban tablet 5 mg  2 times daily      12/23/19 0022        Reason for no Mechanical VTE Prophylaxis  Once     Question:  Reasons:  Answer:  Physician Provided (leave comment)  Comment:  on apixiban    12/23/19 0022        IP VTE HIGH RISK PATIENT  Once      12/23/19 0022                       Sherly Shearer MD   PGY 2 - IM  Hematology/Oncology  [facility]

## 2019-12-23 NOTE — ED PROVIDER NOTES
Encounter Date: 12/22/2019       History     Chief Complaint   Patient presents with    Fever Post Chemo     Pt c/o TMAX fever 102. Hx of pancreatic cancer, last chemo was past Thrusday.      59-year-old woman with comorbidities of pancreatic malignancy currently undergoing chemotherapy with most recent chemo infusion 3 days ago presents to the ED for evaluation of fever noted today up to 102.  She reports taking Tylenol with improvement at 7:00 p.m., and at the time of my exam, only describes her baseline mid abdominal pain consistent with her malignancy.  She notes nausea with induced vomiting earlier today, but denies any shortness of breath, cough, or dysuria.        Review of patient's allergies indicates:   Allergen Reactions    Sulfa (sulfonamide antibiotics) Swelling and Hives     tongue       Past Medical History:   Diagnosis Date    Allergic rhinitis 3/3/2014    CKD (chronic kidney disease) stage 3, GFR 30-59 ml/min 12/26/2015    Hyperlipidemia 3/3/2014    Pancreas cancer     Skin rash 8/10/2019     Past Surgical History:   Procedure Laterality Date    ERCP N/A 7/16/2019    Procedure: ERCP (ENDOSCOPIC RETROGRADE CHOLANGIOPANCREATOGRAPHY);  Surgeon: Chema Harris MD;  Location: [facility] ([address] [place] [address]);  Service: Endoscopy;  Laterality: N/A;    Exporatory lap      INSERTION OF TUNNELED CENTRAL VENOUS CATHETER (CVC) WITH SUBCUTANEOUS PORT Right 8/12/2019    Procedure: [id number];  Surgeon: Cesar Hallman MD;  Location: [facility] OR [address] [place] [address];  Service: General;  Laterality: Right;  right IJ     Family History   Problem Relation Age of Onset    Diabetes Mother     Diabetes Father     Diabetes Brother     Heart disease Neg Hx      Social History     Tobacco Use    Smoking status: Former Smoker     Start date: 12/11/1999    Smokeless tobacco: Never Used   Substance Use Topics    Alcohol use: Yes     Alcohol/week: 1.0 standard drinks     Types: 1 Glasses of wine per week     Frequency: 4 or  more times a week     Drinks per session: 1 or 2     Binge frequency: Never     Comment: last drink a month ago     Drug use: No     Review of Systems   Constitutional: Positive for chills, fatigue and fever.   HENT: Negative for ear pain and nosebleeds.    Respiratory: Negative for chest tightness and shortness of breath.    Cardiovascular: Negative for chest pain and palpitations.   Gastrointestinal: Positive for abdominal pain, diarrhea, nausea and vomiting. Negative for anal bleeding and blood in stool.   Genitourinary: Negative for flank pain and hematuria.   Musculoskeletal: Negative for gait problem and joint swelling.   Skin: Negative for rash and wound.   Neurological: Negative for seizures and speech difficulty.   Hematological: Does not bruise/bleed easily.   Psychiatric/Behavioral: Negative for confusion and decreased concentration.       Physical Exam     Initial Vitals [12/22/19 2010]   BP Pulse Resp Temp SpO2   118/61 98 16 99.4 °F (37.4 °C) 96 %      MAP       --         Physical Exam    Vitals reviewed.  Constitutional:   59-year-old  woman, mild discomfort noted   HENT:   Head: Normocephalic and atraumatic.   Mouth/Throat: Oropharynx is clear and moist.   Eyes: EOM are normal. Pupils are equal, round, and reactive to light.   Neck: No tracheal deviation present.   Cardiovascular: Intact distal pulses.   Mild tachycardia noted   Pulmonary/Chest: Breath sounds normal. No stridor. No respiratory distress.   Abdominal: Soft.   Mild mid epigastric tenderness and fullness noted consistent with history   Musculoskeletal: Normal range of motion. She exhibits no edema.   Neurological: She is alert and oriented to person, place, and time. GCS score is 15. GCS eye subscore is 4. GCS verbal subscore is 5. GCS motor subscore is 6.   Skin: Skin is warm and dry.   Psychiatric: Judgment and thought content normal.         ED Course   Procedures  Labs Reviewed   CBC W/ AUTO DIFFERENTIAL - Abnormal;  Notable for the following components:       Result Value    RBC 3.94 (*)     Hemoglobin 11.0 (*)     Hematocrit 35.2 (*)     Mean Corpuscular Hemoglobin Conc 31.3 (*)     RDW 16.2 (*)     Platelets 134 (*)     Immature Granulocytes 0.6 (*)     Immature Grans (Abs) 0.05 (*)     Lymph # 0.2 (*)     Mono # 0.1 (*)     Gran% 95.9 (*)     Lymph% 2.5 (*)     Mono% 0.8 (*)     All other components within normal limits   COMPREHENSIVE METABOLIC PANEL - Abnormal; Notable for the following components:    Sodium 134 (*)     Glucose 121 (*)     Albumin 3.2 (*)     Total Bilirubin 3.4 (*)     Alkaline Phosphatase 345 (*)      (*)      (*)     All other components within normal limits   URINALYSIS, REFLEX TO URINE CULTURE - Abnormal; Notable for the following components:    Ketones, UA Trace (*)     Occult Blood UA 1+ (*)     All other components within normal limits    Narrative:     Preferred Collection Type->Urine, Clean Catch   MAGNESIUM - Abnormal; Notable for the following components:    Magnesium 1.4 (*)     All other components within normal limits   INFLUENZA A & B BY MOLECULAR   PROTIME-INR   LACTIC ACID, PLASMA   PHOSPHORUS   URINALYSIS MICROSCOPIC    Narrative:     Preferred Collection Type->Urine, Clean Catch          Imaging Results          X-Ray Chest PA And Lateral (Final result)  Result time 12/22/19 20:36:27    Final result by Eder Jonas MD (12/22/19 20:36:27)                 Impression:      No acute intrathoracic process.      Electronically signed by: Eder Jonas MD  Date:    12/22/2019  Time:    20:36             Narrative:    EXAMINATION:  XR CHEST PA AND LATERAL    CLINICAL HISTORY:  Chest Pain;    TECHNIQUE:  PA and lateral views of the chest were performed.    COMPARISON:  08/12/2019.    FINDINGS:  There is unchanged appearance of a right-sided chest port.  The trachea is unremarkable.  The cardiomediastinal silhouette is within normal limits.  The hilar structures are unremarkable.   The hemidiaphragms are within normal limits.  There is no evidence of free air beneath the hemidiaphragms.  There are no pleural effusions.  There is no evidence of a pneumothorax.  There is no evidence of pneumomediastinum.  There are nodular densities in the left upper lobe.  The osseous structures are unremarkable.    There is a stent in the upper abdomen.  The remainder of the visualized structures in the upper abdomen are unremarkable.                                 Medical Decision Making:   History:   Old Medical Records: I decided to obtain old medical records.  Differential Diagnosis:   Fever post chemo, neutropenic fever, electrolyte derangement, UTI, pneumonia  Clinical Tests:   Lab Tests: Ordered and Reviewed  Radiological Study: Ordered and Reviewed  Medical Tests: Ordered and Reviewed  Other:   I have discussed this case with another health care provider.              Attending Attestation:             Attending ED Notes:   Emergency department evaluation today reveals adequate leukocytes with a near 100% granulocytosis.  Additionally, metabolic profile reveals significant elevations of the serum total bilirubin as well as liver enzymes without previous history of such.  Patient has undergone fluid resuscitation as well as cultures of blood in urine with subsequent infusion of broad-spectrum IV antibiotics.  Findings and concerns have been discussed with Oncology on call who will admit the patient for further evaluation of fever in the setting of cholangitis and recent chemotherapeutic confusion.                        Clinical Impression:       ICD-10-CM ICD-9-CM   1. Malignant neoplasm of pancreas, unspecified location of malignancy C25.9 157.9   2. Acute cholangitis K83.09 576.1   3. Fever R50.9 780.60         Disposition:   Disposition: Placed in Observation  Condition: Fair  Oncology                     Hector Brager MD  12/23/19 [id number]

## 2019-12-23 NOTE — SUBJECTIVE & OBJECTIVE
Patient information was obtained from patient, spouse/SO, EMS personnel, past medical records and ER records.     Oncology History:   She has had intermittent upper abdominal pain since early June.  She presented to her PCP who originally ordered an US and CT.  US was negative and CT showed some haziness at the pancreatic head.  She saw GI who performed EUS.  On EUS, a 3x4cm pancreatic head mass was seen with possible invasion into the SMA and portal vein.  FNA path s/w pancreatic adenocarcinoma.  CA 19-9 level at outside hospital was >1000 per the patient.  She endorses nausea and inability to tolerate much PO.  She has lost about 10lbs over the last few weeks and is noticing her skin turning yellow.  Denies pruritis.  She is passing gas but has not had a BM in a few days, she attributes this to narcotic use.  She recently started taking stool softeners.  No previous cardiac or stroke history.  Surgical history significant for open appendectomy when she was in her 20s     She is currently receiving Havelock+Abraxane and TTF.       (Not in a hospital admission)    Sulfa (sulfonamide antibiotics)     Past Medical History:   Diagnosis Date    Allergic rhinitis 3/3/2014    CKD (chronic kidney disease) stage 3, GFR 30-59 ml/min 12/26/2015    Hyperlipidemia 3/3/2014    Pancreas cancer     Skin rash 8/10/2019     Past Surgical History:   Procedure Laterality Date    ERCP N/A 7/16/2019    Procedure: ERCP (ENDOSCOPIC RETROGRADE CHOLANGIOPANCREATOGRAPHY);  Surgeon: Chema Harris MD;  Location: [facility] ([address] [place] [address]);  Service: Endoscopy;  Laterality: N/A;    Exporatory lap      INSERTION OF TUNNELED CENTRAL VENOUS CATHETER (CVC) WITH SUBCUTANEOUS PORT Right 8/12/2019    Procedure: [id number]-[id number];  Surgeon: Cesar Hallman MD;  Location: [address];  Service: General;  Laterality: Right;  right IJ     Family History     Problem Relation (Age of Onset)    Diabetes Mother, Father, Brother        Tobacco Use     Smoking status: Former Smoker     Start date: 12/11/1999    Smokeless tobacco: Never Used   Substance and Sexual Activity    Alcohol use: Yes     Alcohol/week: 1.0 standard drinks     Types: 1 Glasses of wine per week     Frequency: 4 or more times a week     Drinks per session: 1 or 2     Binge frequency: Never     Comment: last drink a month ago     Drug use: No    Sexual activity: Yes       Review of Systems   Constitutional: Positive for appetite change, chills and fever.   HENT: Negative for congestion, rhinorrhea, sneezing, sore throat and trouble swallowing.    Eyes: Negative for visual disturbance.   Respiratory: Negative for cough, shortness of breath and wheezing.    Cardiovascular: Negative for chest pain, palpitations and leg swelling.   Gastrointestinal: Positive for abdominal pain (unchanged), diarrhea, nausea and vomiting. Negative for blood in stool and constipation.   Genitourinary: Negative for dysuria and hematuria.   Musculoskeletal: Negative for arthralgias and myalgias.   Skin: Negative for rash and wound.   Neurological: Negative for light-headedness and headaches.     Objective:     Vital Signs (Most Recent):  Temp: 99.5 °F (37.5 °C) (12/22/19 2221)  Pulse: 92 (12/22/19 2221)  Resp: 16 (12/22/19 2221)  BP: 123/62 (12/22/19 2221)  SpO2: 100 % (12/22/19 2221) Vital Signs (24h Range):  Temp:  [99.4 °F (37.4 °C)-99.5 °F (37.5 °C)] 99.5 °F (37.5 °C)  Pulse:  [92-98] 92  Resp:  [16] 16  SpO2:  [96 %-100 %] 100 %  BP: (118-123)/(61-62) 123/62     Weight: 54.4 kg (120 lb)  Body mass index is 21.6 kg/m².  Body surface area is 1.55 meters squared.    ECOG SCORE             Lines/Drains/Airways     Central Venous Catheter Line                 Port A Cath Single Lumen 08/12/19 1601 right internal jugular 132 days          Peripheral Intravenous Line                 Peripheral IV - Single Lumen 12/22/19 2105 20 G Right Antecubital less than 1 day                Physical Exam   Constitutional: She is  oriented to person, place, and time. No distress.   HENT:   Head: Normocephalic and atraumatic.   Mouth/Throat: Oropharynx is clear and moist. No oropharyngeal exudate.   Eyes: Conjunctivae are normal. Right eye exhibits no discharge. Left eye exhibits no discharge. No scleral icterus.   Neck: Normal range of motion. Neck supple. No thyromegaly present.   Cardiovascular: Normal rate, regular rhythm, normal heart sounds and intact distal pulses. Exam reveals no gallop and no friction rub.   No murmur heard.  Pulmonary/Chest: Effort normal and breath sounds normal. No respiratory distress. She has no wheezes. She has no rales.   R upper chest port in place: no surrounding erythema, warmth, or tenderness   Abdominal: Soft. Bowel sounds are normal. There is no tenderness.   Musculoskeletal: She exhibits no edema.   Lymphadenopathy:     She has no cervical adenopathy.   Neurological: She is alert and oriented to person, place, and time.   Skin: Skin is warm and dry. She is not diaphoretic.   Psychiatric: She has a normal mood and affect.   Nursing note and vitals reviewed.      Recent Results (from the past 24 hour(s))   CBC auto differential    Collection Time: 12/22/19  9:05 PM   Result Value Ref Range    WBC 7.95 3.90 - 12.70 K/uL    RBC 3.94 (L) 4.00 - 5.40 M/uL    Hemoglobin 11.0 (L) 12.0 - 16.0 g/dL    Hematocrit 35.2 (L) 37.0 - 48.5 %    Mean Corpuscular Volume 89 82 - 98 fL    Mean Corpuscular Hemoglobin 27.9 27.0 - 31.0 pg    Mean Corpuscular Hemoglobin Conc 31.3 (L) 32.0 - 36.0 g/dL    RDW 16.2 (H) 11.5 - 14.5 %    Platelets 134 (L) 150 - 350 K/uL    MPV 11.4 9.2 - 12.9 fL    Immature Granulocytes 0.6 (H) 0.0 - 0.5 %    Gran # (ANC) 7.6 1.8 - 7.7 K/uL    Immature Grans (Abs) 0.05 (H) 0.00 - 0.04 K/uL    Lymph # 0.2 (L) 1.0 - 4.8 K/uL    Mono # 0.1 (L) 0.3 - 1.0 K/uL    Eos # 0.0 0.0 - 0.5 K/uL    Baso # 0.01 0.00 - 0.20 K/uL    nRBC 0 0 /100 WBC    Gran% 95.9 (H) 38.0 - 73.0 %    Lymph% 2.5 (L) 18.0 - 48.0 %     Mono% 0.8 (L) 4.0 - 15.0 %    Eosinophil% 0.1 0.0 - 8.0 %    Basophil% 0.1 0.0 - 1.9 %    Differential Method Automated    Comprehensive metabolic panel    Collection Time: 12/22/19  9:05 PM   Result Value Ref Range    Sodium 134 (L) 136 - 145 mmol/L    Potassium 4.8 3.5 - 5.1 mmol/L    Chloride 103 95 - 110 mmol/L    CO2 23 23 - 29 mmol/L    Glucose 121 (H) 70 - 110 mg/dL    BUN, Bld 12 6 - 20 mg/dL    Creatinine 0.8 0.5 - 1.4 mg/dL    Calcium 8.9 8.7 - 10.5 mg/dL    Total Protein 6.8 6.0 - 8.4 g/dL    Albumin 3.2 (L) 3.5 - 5.2 g/dL    Total Bilirubin 3.4 (H) 0.1 - 1.0 mg/dL    Alkaline Phosphatase 345 (H) 55 - 135 U/L     (H) 10 - 40 U/L     (H) 10 - 44 U/L    Anion Gap 8 8 - 16 mmol/L    eGFR if African American >60.0 >60 mL/min/1.73 m^2    eGFR if non African American >60.0 >60 mL/min/1.73 m^2   Protime-INR    Collection Time: 12/22/19  9:05 PM   Result Value Ref Range    Prothrombin Time 11.4 9.0 - 12.5 sec    INR 1.1 0.8 - 1.2   Lactic acid, plasma    Collection Time: 12/22/19  9:05 PM   Result Value Ref Range    Lactate (Lactic Acid) 0.9 0.5 - 2.2 mmol/L   Magnesium    Collection Time: 12/22/19  9:05 PM   Result Value Ref Range    Magnesium 1.4 (L) 1.6 - 2.6 mg/dL   Phosphorus    Collection Time: 12/22/19  9:05 PM   Result Value Ref Range    Phosphorus 3.0 2.7 - 4.5 mg/dL   Influenza A & B by Molecular    Collection Time: 12/22/19  9:14 PM   Result Value Ref Range    Influenza A, Molecular Negative Negative    Influenza B, Molecular Negative Negative    Flu A & B Source Nasal swab    Urinalysis, Reflex to Urine Culture Urine, Clean Catch    Collection Time: 12/22/19  9:19 PM   Result Value Ref Range    Specimen UA Urine, Clean Catch     Color, UA Rachael Yellow, Straw, Rachael    Appearance, UA Clear Clear    pH, UA 6.0 5.0 - 8.0    Specific Gravity, UA 1.015 1.005 - 1.030    Protein, UA Negative Negative    Glucose, UA Negative Negative    Ketones, UA Trace (A) Negative    Bilirubin (UA) Negative  Negative    Occult Blood UA 1+ (A) Negative    Nitrite, UA Negative Negative    Leukocytes, UA Negative Negative   Urinalysis Microscopic    Collection Time: 12/22/19  9:19 PM   Result Value Ref Range    RBC, UA 4 0 - 4 /hpf    WBC, UA 2 0 - 5 /hpf    Squam Epithel, UA 4 /hpf    Microscopic Comment SEE COMMENT        Imaging Results          US Abdomen Limited (Gallbladder) (In process)  Result time 12/23/19 01:01:31               X-Ray Chest PA And Lateral (Final result)  Result time 12/22/19 20:36:27    Final result by Eder Jonas MD (12/22/19 20:36:27)                 Impression:      No acute intrathoracic process.      Electronically signed by: Eder Jonas MD  Date:    12/22/2019  Time:    20:36             Narrative:    EXAMINATION:  XR CHEST PA AND LATERAL    CLINICAL HISTORY:  Chest Pain;    TECHNIQUE:  PA and lateral views of the chest were performed.    COMPARISON:  08/12/2019.    FINDINGS:  There is unchanged appearance of a right-sided chest port.  The trachea is unremarkable.  The cardiomediastinal silhouette is within normal limits.  The hilar structures are unremarkable.  The hemidiaphragms are within normal limits.  There is no evidence of free air beneath the hemidiaphragms.  There are no pleural effusions.  There is no evidence of a pneumothorax.  There is no evidence of pneumomediastinum.  There are nodular densities in the left upper lobe.  The osseous structures are unremarkable.    There is a stent in the upper abdomen.  The remainder of the visualized structures in the upper abdomen are unremarkable.

## 2019-12-23 NOTE — PLAN OF CARE
Pt. with nonskid footwear on with bed in lowest position and locked with bed rails up x2.  Pt. ambulates independently and instructed to call if assistance is needed.  Pt. with call light within reach.  Pt. Is pending 2D echo and CT of abdomin and pelvis.  Pt. with no complaints today.  Pt. With a decreased appetite.   Will continue to monitor pt.

## 2019-12-23 NOTE — ASSESSMENT & PLAN NOTE
Pt presents to ED w/ known pancreatic cancer w/ c/o measured temp at home 102.5 F. Responsive to tylenol at home.   Upon presentation, Pt 99.4 and HDS. WCC 7.95 Gran% 95.9, CXR w/o acute process, U/A w/o pyuria, Flu negative. Lactate wnl.   No obvious etiology of infection; ascending cholangitis a potential concern to note. No abdominal pain on admission.    Continue Zosyn; broaden/taper as clinical course progresses  IVF infusion  F/u blood cultures  F/u abdo U/S  Consider C diff if diarrhea becomes watery

## 2019-12-24 ENCOUNTER — ANESTHESIA (OUTPATIENT)
Dept: ENDOSCOPY | Facility: HOSPITAL | Age: 59
DRG: 445 | End: 2019-12-24
Payer: COMMERCIAL

## 2019-12-24 ENCOUNTER — ANESTHESIA EVENT (OUTPATIENT)
Dept: ENDOSCOPY | Facility: HOSPITAL | Age: 59
DRG: 445 | End: 2019-12-24
Payer: COMMERCIAL

## 2019-12-24 PROBLEM — C25.9 MALIGNANT NEOPLASM OF PANCREAS: Status: ACTIVE | Noted: 2019-12-24

## 2019-12-24 LAB
ALBUMIN SERPL BCP-MCNC: 2.3 G/DL (ref 3.5–5.2)
ALP SERPL-CCNC: 195 U/L (ref 55–135)
ALT SERPL W/O P-5'-P-CCNC: 176 U/L (ref 10–44)
ANION GAP SERPL CALC-SCNC: 5 MMOL/L (ref 8–16)
AST SERPL-CCNC: 103 U/L (ref 10–40)
BASOPHILS # BLD AUTO: 0.02 K/UL (ref 0–0.2)
BASOPHILS NFR BLD: 0.5 % (ref 0–1.9)
BILIRUB SERPL-MCNC: 2.3 MG/DL (ref 0.1–1)
BUN SERPL-MCNC: 9 MG/DL (ref 6–20)
CALCIUM SERPL-MCNC: 7.8 MG/DL (ref 8.7–10.5)
CHLORIDE SERPL-SCNC: 112 MMOL/L (ref 95–110)
CO2 SERPL-SCNC: 21 MMOL/L (ref 23–29)
CREAT SERPL-MCNC: 0.7 MG/DL (ref 0.5–1.4)
DIFFERENTIAL METHOD: ABNORMAL
EOSINOPHIL # BLD AUTO: 0.3 K/UL (ref 0–0.5)
EOSINOPHIL NFR BLD: 6.4 % (ref 0–8)
ERYTHROCYTE [DISTWIDTH] IN BLOOD BY AUTOMATED COUNT: 16.7 % (ref 11.5–14.5)
EST. GFR  (AFRICAN AMERICAN): >60 ML/MIN/1.73 M^2
EST. GFR  (NON AFRICAN AMERICAN): >60 ML/MIN/1.73 M^2
GLUCOSE SERPL-MCNC: 86 MG/DL (ref 70–110)
HCT VFR BLD AUTO: 29.4 % (ref 37–48.5)
HGB BLD-MCNC: 9.3 G/DL (ref 12–16)
IMM GRANULOCYTES # BLD AUTO: 0.03 K/UL (ref 0–0.04)
IMM GRANULOCYTES NFR BLD AUTO: 0.7 % (ref 0–0.5)
LYMPHOCYTES # BLD AUTO: 0.8 K/UL (ref 1–4.8)
LYMPHOCYTES NFR BLD: 17.1 % (ref 18–48)
MAGNESIUM SERPL-MCNC: 1.6 MG/DL (ref 1.6–2.6)
MCH RBC QN AUTO: 28.4 PG (ref 27–31)
MCHC RBC AUTO-ENTMCNC: 31.6 G/DL (ref 32–36)
MCV RBC AUTO: 90 FL (ref 82–98)
MONOCYTES # BLD AUTO: 0.2 K/UL (ref 0.3–1)
MONOCYTES NFR BLD: 3.4 % (ref 4–15)
NEUTROPHILS # BLD AUTO: 3.2 K/UL (ref 1.8–7.7)
NEUTROPHILS NFR BLD: 71.9 % (ref 38–73)
NRBC BLD-RTO: 0 /100 WBC
PHOSPHATE SERPL-MCNC: 2.4 MG/DL (ref 2.7–4.5)
PLATELET # BLD AUTO: 107 K/UL (ref 150–350)
PMV BLD AUTO: 12.8 FL (ref 9.2–12.9)
POTASSIUM SERPL-SCNC: 3.9 MMOL/L (ref 3.5–5.1)
PROT SERPL-MCNC: 5.3 G/DL (ref 6–8.4)
RBC # BLD AUTO: 3.27 M/UL (ref 4–5.4)
SODIUM SERPL-SCNC: 138 MMOL/L (ref 136–145)
WBC # BLD AUTO: 4.39 K/UL (ref 3.9–12.7)

## 2019-12-24 PROCEDURE — 99223 PR INITIAL HOSPITAL CARE,LEVL III: ICD-10-PCS | Mod: ,,, | Performed by: INTERNAL MEDICINE

## 2019-12-24 PROCEDURE — 83735 ASSAY OF MAGNESIUM: CPT

## 2019-12-24 PROCEDURE — 99223 1ST HOSP IP/OBS HIGH 75: CPT | Mod: ,,, | Performed by: INTERNAL MEDICINE

## 2019-12-24 PROCEDURE — 84100 ASSAY OF PHOSPHORUS: CPT

## 2019-12-24 PROCEDURE — 25000003 PHARM REV CODE 250: Performed by: STUDENT IN AN ORGANIZED HEALTH CARE EDUCATION/TRAINING PROGRAM

## 2019-12-24 PROCEDURE — 74328 PR  X-RAY FOR BILE DUCT ENDOSCOPY: ICD-10-PCS | Mod: 26,,, | Performed by: INTERNAL MEDICINE

## 2019-12-24 PROCEDURE — D9220A PRA ANESTHESIA: ICD-10-PCS | Mod: ANES,,, | Performed by: ANESTHESIOLOGY

## 2019-12-24 PROCEDURE — 43274 ERCP DUCT STENT PLACEMENT: CPT | Mod: ,,, | Performed by: INTERNAL MEDICINE

## 2019-12-24 PROCEDURE — D9220A PRA ANESTHESIA: Mod: ANES,,, | Performed by: ANESTHESIOLOGY

## 2019-12-24 PROCEDURE — 74328 X-RAY BILE DUCT ENDOSCOPY: CPT | Performed by: INTERNAL MEDICINE

## 2019-12-24 PROCEDURE — 43264 PR ERCP,W/REMOVAL STONE,BIL/PANCR DUCTS: ICD-10-PCS | Mod: 51,,, | Performed by: INTERNAL MEDICINE

## 2019-12-24 PROCEDURE — 37000008 HC ANESTHESIA 1ST 15 MINUTES: Performed by: INTERNAL MEDICINE

## 2019-12-24 PROCEDURE — 94761 N-INVAS EAR/PLS OXIMETRY MLT: CPT

## 2019-12-24 PROCEDURE — 43274 ERCP DUCT STENT PLACEMENT: CPT | Performed by: INTERNAL MEDICINE

## 2019-12-24 PROCEDURE — D9220A PRA ANESTHESIA: Mod: CRNA,,, | Performed by: NURSE ANESTHETIST, CERTIFIED REGISTERED

## 2019-12-24 PROCEDURE — 20600001 HC STEP DOWN PRIVATE ROOM

## 2019-12-24 PROCEDURE — C1876 STENT, NON-COA/NON-COV W/DEL: HCPCS | Performed by: INTERNAL MEDICINE

## 2019-12-24 PROCEDURE — 74328 X-RAY BILE DUCT ENDOSCOPY: CPT | Mod: 26,,, | Performed by: INTERNAL MEDICINE

## 2019-12-24 PROCEDURE — 37000009 HC ANESTHESIA EA ADD 15 MINS: Performed by: INTERNAL MEDICINE

## 2019-12-24 PROCEDURE — D9220A PRA ANESTHESIA: ICD-10-PCS | Mod: CRNA,,, | Performed by: NURSE ANESTHETIST, CERTIFIED REGISTERED

## 2019-12-24 PROCEDURE — 85025 COMPLETE CBC W/AUTO DIFF WBC: CPT

## 2019-12-24 PROCEDURE — 43274 PR ERCP W/STENT PLCMNT BILIARY/PANCREATIC DUCT: ICD-10-PCS | Mod: ,,, | Performed by: INTERNAL MEDICINE

## 2019-12-24 PROCEDURE — C1769 GUIDE WIRE: HCPCS | Performed by: INTERNAL MEDICINE

## 2019-12-24 PROCEDURE — 43264 ERCP REMOVE DUCT CALCULI: CPT | Mod: 51,,, | Performed by: INTERNAL MEDICINE

## 2019-12-24 PROCEDURE — 27202125 HC BALLOON, EXTRACTION (ANY): Performed by: INTERNAL MEDICINE

## 2019-12-24 PROCEDURE — 43264 ERCP REMOVE DUCT CALCULI: CPT | Performed by: INTERNAL MEDICINE

## 2019-12-24 PROCEDURE — 63600175 PHARM REV CODE 636 W HCPCS: Performed by: NURSE ANESTHETIST, CERTIFIED REGISTERED

## 2019-12-24 PROCEDURE — 63600175 PHARM REV CODE 636 W HCPCS: Performed by: STUDENT IN AN ORGANIZED HEALTH CARE EDUCATION/TRAINING PROGRAM

## 2019-12-24 PROCEDURE — 80053 COMPREHEN METABOLIC PANEL: CPT

## 2019-12-24 RX ORDER — PROPOFOL 10 MG/ML
VIAL (ML) INTRAVENOUS CONTINUOUS PRN
Status: DISCONTINUED | OUTPATIENT
Start: 2019-12-24 | End: 2019-12-24

## 2019-12-24 RX ORDER — ONDANSETRON 2 MG/ML
4 INJECTION INTRAMUSCULAR; INTRAVENOUS DAILY PRN
Status: DISCONTINUED | OUTPATIENT
Start: 2019-12-24 | End: 2019-12-24 | Stop reason: HOSPADM

## 2019-12-24 RX ORDER — AMOXICILLIN AND CLAVULANATE POTASSIUM 875; 125 MG/1; MG/1
1 TABLET, FILM COATED ORAL EVERY 12 HOURS
Qty: 6 TABLET | Refills: 0 | Status: SHIPPED | OUTPATIENT
Start: 2019-12-24 | End: 2019-12-27

## 2019-12-24 RX ORDER — METRONIDAZOLE 250 MG/1
500 TABLET ORAL EVERY 8 HOURS
Qty: 18 TABLET | Refills: 0 | Status: CANCELLED | OUTPATIENT
Start: 2019-12-24 | End: 2019-12-27

## 2019-12-24 RX ORDER — FENTANYL CITRATE 50 UG/ML
INJECTION, SOLUTION INTRAMUSCULAR; INTRAVENOUS
Status: DISCONTINUED | OUTPATIENT
Start: 2019-12-24 | End: 2019-12-24

## 2019-12-24 RX ORDER — CIPROFLOXACIN 250 MG/1
500 TABLET, FILM COATED ORAL EVERY 12 HOURS
Qty: 12 TABLET | Refills: 0 | Status: CANCELLED | OUTPATIENT
Start: 2019-12-24 | End: 2019-12-27

## 2019-12-24 RX ORDER — MAGNESIUM SULFATE HEPTAHYDRATE 40 MG/ML
2 INJECTION, SOLUTION INTRAVENOUS ONCE
Status: COMPLETED | OUTPATIENT
Start: 2019-12-24 | End: 2019-12-24

## 2019-12-24 RX ORDER — PROPOFOL 10 MG/ML
VIAL (ML) INTRAVENOUS
Status: DISCONTINUED | OUTPATIENT
Start: 2019-12-24 | End: 2019-12-24

## 2019-12-24 RX ORDER — LIDOCAINE HCL/PF 100 MG/5ML
SYRINGE (ML) INTRAVENOUS
Status: DISCONTINUED | OUTPATIENT
Start: 2019-12-24 | End: 2019-12-24

## 2019-12-24 RX ORDER — MIDAZOLAM HYDROCHLORIDE 1 MG/ML
INJECTION, SOLUTION INTRAMUSCULAR; INTRAVENOUS
Status: DISCONTINUED | OUTPATIENT
Start: 2019-12-24 | End: 2019-12-24

## 2019-12-24 RX ORDER — FENTANYL CITRATE 50 UG/ML
INJECTION, SOLUTION INTRAMUSCULAR; INTRAVENOUS
Status: COMPLETED
Start: 2019-12-24 | End: 2019-12-24

## 2019-12-24 RX ORDER — FENTANYL 25 UG/1
1 PATCH TRANSDERMAL
Status: DISCONTINUED | OUTPATIENT
Start: 2019-12-24 | End: 2019-12-25 | Stop reason: HOSPADM

## 2019-12-24 RX ADMIN — PIPERACILLIN AND TAZOBACTAM 4.5 G: 4; .5 INJECTION, POWDER, FOR SOLUTION INTRAVENOUS at 03:12

## 2019-12-24 RX ADMIN — FENTANYL CITRATE 50 MCG: 50 INJECTION, SOLUTION INTRAMUSCULAR; INTRAVENOUS at 02:12

## 2019-12-24 RX ADMIN — PIPERACILLIN AND TAZOBACTAM 4.5 G: 4; .5 INJECTION, POWDER, FOR SOLUTION INTRAVENOUS at 06:12

## 2019-12-24 RX ADMIN — PROPOFOL 200 MCG/KG/MIN: 10 INJECTION, EMULSION INTRAVENOUS at 02:12

## 2019-12-24 RX ADMIN — MIDAZOLAM HYDROCHLORIDE 2 MG: 1 INJECTION, SOLUTION INTRAMUSCULAR; INTRAVENOUS at 02:12

## 2019-12-24 RX ADMIN — OXYCODONE HYDROCHLORIDE 15 MG: 10 TABLET ORAL at 03:12

## 2019-12-24 RX ADMIN — LIDOCAINE HYDROCHLORIDE 60 MG: 20 INJECTION, SOLUTION INTRAVENOUS at 02:12

## 2019-12-24 RX ADMIN — SODIUM CHLORIDE: 0.9 INJECTION, SOLUTION INTRAVENOUS at 07:12

## 2019-12-24 RX ADMIN — PROPOFOL 20 MG: 10 INJECTION, EMULSION INTRAVENOUS at 02:12

## 2019-12-24 RX ADMIN — PIPERACILLIN AND TAZOBACTAM 4.5 G: 4; .5 INJECTION, POWDER, FOR SOLUTION INTRAVENOUS at 11:12

## 2019-12-24 RX ADMIN — SODIUM CHLORIDE: 0.9 INJECTION, SOLUTION INTRAVENOUS at 08:12

## 2019-12-24 RX ADMIN — PROPOFOL 80 MG: 10 INJECTION, EMULSION INTRAVENOUS at 02:12

## 2019-12-24 RX ADMIN — MAGNESIUM SULFATE IN WATER 2 G: 40 INJECTION, SOLUTION INTRAVENOUS at 08:12

## 2019-12-24 RX ADMIN — FENTANYL 1 PATCH: 25 PATCH, EXTENDED RELEASE TRANSDERMAL at 05:12

## 2019-12-24 RX ADMIN — LORAZEPAM 0.5 MG: 0.5 TABLET ORAL at 08:12

## 2019-12-24 RX ADMIN — ONDANSETRON 8 MG: 8 TABLET, ORALLY DISINTEGRATING ORAL at 07:12

## 2019-12-24 NOTE — ANESTHESIA PREPROCEDURE EVALUATION
[facility]  Anesthesia Pre-Operative Evaluation       Patient Name: Quyen Moody  YOB: 1960  MRN: [id number]  [facility]: [id number]      Code Status: Full Code   Date of Procedure: 12/24/2019  Procedure: Procedure(s) (LRB):  ERCP (ENDOSCOPIC RETROGRADE CHOLANGIOPANCREATOGRAPHY) (N/A)  Anesthesia: General/MAC  Pre-Operative Diagnosis: Final diagnoses:  [K83.09] Acute cholangitis  [R50.9] Fever  [C25.9] Malignant neoplasm of pancreas, unspecified location of malignancy (Primary)  Proceduralist/Surgeon(s) and Role:     * Chema Harris MD - Primary    SUBJECTIVE:     Quyen Moody is a 59 y.o. female w/ no significant PMHx. After having several weeks of abdominal pain and weight loss, she was found to have a 3x4cm pancreatic head mass was seen with possible invasion into the SMA and portal vein. FNA path s/w pancreatic adenocarcinoma.   Patient now presents for the above procedure(s).    Last ERCP with native airway tolerated well.    Prev airway: None documented.    LDA:        Port A Cath Single Lumen 08/12/19 1601 right internal jugular (Active)   Number of days: 133      Anesthesia Evaluation      Airway   Mallampati: II  TM distance: Normal, at least 6 cm  Neck ROM: Normal ROM  Dental    (+) In tact    Pulmonary    Cardiovascular   Exercise tolerance: good    Rate: Normal    Neuro/Psych      GI/Hepatic/Renal    (+) chronic renal disease CRI,     Endo/Other    Abdominal                   ALLERGIES:     Review of patient's allergies indicates:   Allergen Reactions    Sulfa (sulfonamide antibiotics) Swelling and Hives     tongue       MEDICATIONS:     Current Outpatient Medications on File Prior to Encounter   Medication Sig Dispense Refill Last Dose    apixaban (ELIQUIS) 5 mg Tab Take 1 tablet (5 mg total) by mouth 2 (two) times daily. 60 tablet 6 12/22/2019    fentaNYL (DURAGESIC) 25 mcg/hr Place 1 patch onto the skin every 72 hours. 10 patch 0 12/22/2019    LORazepam (ATIVAN) 0.5 MG  tablet Take 1 tablet (0.5 mg total) by mouth every 6 (six) hours as needed for Anxiety (nausea). 60 tablet 0 12/22/2019    ondansetron (ZOFRAN) 8 MG tablet Take 1 tablet (8 mg total) by mouth every 8 (eight) hours as needed for Nausea. 120 tablet 2 12/22/2019    oxyCODONE (ROXICODONE) 15 MG Tab Take 1 tablet (15 mg total) by mouth every 6 (six) hours as needed for Pain. 90 tablet 0 12/22/2019    estradiol-norethindrone (ACTIVELLA) 1-0.5 mg per tablet Take 1 tablet by mouth.   Taking    HYDROcodone-acetaminophen (NORCO)  mg per tablet Take 1 tablet by mouth every 6 (six) hours as needed for Pain. 100 tablet 0     hydrocortisone 2.5 % cream Apply topically 2 (two) times daily. 453.6 g 1 Taking    lidocaine-prilocaine (EMLA) cream Apply to port 45 minutes prior to access. 30 g 0 Taking    multivitamin (THERAGRAN) per tablet Take 1 tablet by mouth once daily.   Taking    mupirocin (BACTROBAN) 2 % ointment Apply topically 3 (three) times daily. 30 g 1 Taking     Current Facility-Administered Medications   Medication Dose Route Frequency Provider Last Rate Last Dose    0.9%  NaCl infusion   Intravenous Continuous Sherly Shearer  mL/hr at 12/24/19 0725      fentaNYL 25 mcg/hr 1 patch  1 patch Transdermal Q72H Sherly Shearer MD        iohexol (OMNIPAQUE) oral solution 15 mL  15 mL Oral PRN Isma Giles MD        lidocaine-prilocaine cream   Topical (Top) Once Eladio Hammer MD        LORazepam tablet 0.5 mg  0.5 mg Oral Q6H PRN Sherly Shearer MD        multivitamin tablet  1 tablet Oral Daily Sherly Shearer MD   1 tablet at 12/23/19 0904    ondansetron disintegrating tablet 8 mg  8 mg Oral Q8H PRN Sherly Shearer MD   8 mg at 12/24/19 0724    oxyCODONE immediate release tablet 15 mg  15 mg Oral Q6H PRN Sherly Shearer MD   15 mg at 12/23/19 0136    piperacillin-tazobactam 4.5 g in sodium chloride 0.9% 100 mL IVPB (ready to mix system)  4.5 g Intravenous Q8H Sherly Shearer MD 25 mL/hr at 12/24/19  1135 4.5 g at 12/24/19 1135    sodium chloride 0.9% flush 10 mL  10 mL Intravenous PRN Sherly Shearer MD              History:     Active Hospital Problems    Diagnosis  POA    *Fever [R50.9]  Yes    Hyponatremia [E87.1]  Yes    Elevated transaminase level [R74.0]  Yes    Elevated bilirubin [R17]  Yes    Cancer of head of pancreas [C25.0]  Yes      Resolved Hospital Problems   No resolved problems to display.     Patient Active Problem List   Diagnosis    Allergic rhinitis    Shoulder impingement    CKD (chronic kidney disease) stage 3, GFR 30-59 ml/min    Cancer of head of pancreas    Malignant obstructive jaundice    Biliary obstruction    Skin rash    Fever    Hyponatremia    Elevated transaminase level    Elevated bilirubin      Past Medical History:   Diagnosis Date    Allergic rhinitis 3/3/2014    CKD (chronic kidney disease) stage 3, GFR 30-59 ml/min 12/26/2015    Hyperlipidemia 3/3/2014    Pancreas cancer     Skin rash 8/10/2019     Past Surgical History:   Procedure Laterality Date    ERCP N/A 7/16/2019    Procedure: ERCP (ENDOSCOPIC RETROGRADE CHOLANGIOPANCREATOGRAPHY);  Surgeon: Chema Harris MD;  Location: [facility] ([address] [place] [address]);  Service: Endoscopy;  Laterality: N/A;    Exporatory lap      INSERTION OF TUNNELED CENTRAL VENOUS CATHETER (CVC) WITH SUBCUTANEOUS PORT Right 8/12/2019    Procedure: [id number];  Surgeon: Cesar Hallman MD;  Location: [facility] OR [address] [place] [address];  Service: General;  Laterality: Right;  right IJ     Alcohol use:    Social History     Substance and Sexual Activity   Alcohol Use Yes    Alcohol/week: 1.0 standard drinks    Types: 1 Glasses of wine per week    Frequency: 4 or more times a week    Drinks per session: 1 or 2    Binge frequency: Never    Comment: last drink a month ago           OBJECTIVE:   Last 3 sets of Vitals    Vitals - 1 value per visit 12/19/2019 12/23/2019 12/24/2019   SYSTOLIC 125 - 129   DIASTOLIC 70 - 65   PULSE 80 - 72  "  TEMPERATURE 98 - 98.4   RESPIRATIONS 18 - 16   SPO2 - - 97   Weight (lb) 133.16 123 -   Weight (kg) 60.4 55.792 -   HEIGHT 5' 2.5" 5' 2" -   BODY MASS INDEX 23.97 22.5 -   VISIT REPORT - - -   Pain Score  - - -   Some recent data might be hidden       Vital Signs (Most Recent):  Temp: 36.9 °C (98.4 °F) (12/24/19 0757)  Pulse: 72 (12/24/19 0757)  Resp: 16 (12/24/19 0757)  BP: 129/65 (12/24/19 0757)  SpO2: 97 % (12/24/19 0757) Vital Signs Range (Last 24H):  Temp:  [36.6 °C (97.8 °F)-37 °C (98.6 °F)]   Pulse:  [72-84]   Resp:  [16-18]   BP: (103-129)/(55-65)   SpO2:  [94 %-98 %]          Intake/Output Summary (Last 24 hours) at 12/24/2019 1224  Last data filed at 12/24/2019 0757  Gross per 24 hour   Intake 2850 ml   Output 1750 ml   Net 1100 ml       Body mass index is 22.5 kg/m².     Significant Labs:  Lab Results   Component Value Date    WBC 4.39 12/24/2019    HGB 9.3 (L) 12/24/2019    HCT 29.4 (L) 12/24/2019     (L) 12/24/2019    CHOL 191 06/13/2019    TRIG 146 06/13/2019    HDL 70 06/13/2019     (H) 12/24/2019     (H) 12/24/2019     12/24/2019    K 3.9 12/24/2019     (H) 12/24/2019    CREATININE 0.7 12/24/2019    BUN 9 12/24/2019    CO2 21 (L) 12/24/2019    TSH 0.854 01/28/2019    INR 1.1 12/22/2019    HGBA1C 4.8 07/15/2019     No results found for: PREGTESTUR, PREGSERUM, HCG, HCGQUANT   No LMP recorded. Patient is postmenopausal.    EKG:   No results found for this or any previous visit.    TTE:  Results for orders placed or performed during the hospital encounter of 12/22/19   Echo Color Flow Doppler? Yes   Result Value Ref Range    Ascending aorta 2.77 cm    STJ 2.53 cm    AV mean gradient 4 mmHg    Ao peak chandrakant 1.33 m/s    Ao VTI 26.92 cm    IVS 0.71 0.6 - 1.1 cm    LA size 3.06 cm    Left Atrium Major Axis 4.83 cm    Left Atrium Minor Axis 4.60 cm    LVIDD 4.30 3.5 - 6.0 cm    LVIDS 3.22 2.1 - 4.0 cm    LVOT diameter 2.01 cm    LVOT peak VTI 19.87 cm    PW 0.69 0.6 - 1.1 cm "    MV Peak A Stephen 0.84 m/s    E wave decelartion time 193.42 msec    MV Peak E Stephen 1.20 m/s    PV Peak D Stephen 0.51 m/s    PV Peak S Stephen 0.50 m/s    RA Major Axis 4.46 cm    RA Width 2.90 cm    RVDD 3.41 cm    Sinus 2.84 cm    TAPSE 2.05 cm    TR Max Stephen 1.91 m/s    TDI LATERAL 0.12 m/s    TDI SEPTAL 0.07 m/s    LA WIDTH 3.67 cm    LV Diastolic Volume 83.20 mL    LV Systolic Volume 41.65 mL    RV S' 13.19 cm/s    LVOT peak stephen 0.99 m/s    LV LATERAL E/E' RATIO 10.00 m/s    LV SEPTAL E/E' RATIO 17.14 m/s    FS 25 %    LA volume 44.98 cm3    LV mass 88.53 g    Left Ventricle Relative Wall Thickness 0.32 cm    AV valve area 2.34 cm2    AV Velocity Ratio 0.74     AV index (prosthetic) 0.74     E/A ratio 1.43     Mean e' 0.10 m/s    Pulm vein S/D ratio 0.98     LVOT area 3.2 cm2    LVOT stroke volume 63.02 cm3    AV peak gradient 7 mmHg    E/E' ratio 12.63 m/s    LV Systolic Volume Index 26.8 mL/m2    LV Diastolic Volume Index 53.52 mL/m2    LA Volume Index 28.9 mL/m2    LV Mass Index 57 g/m2    Triscuspid Valve Regurgitation Peak Gradient 15 mmHg    BSA 1.56 m2    Right Atrial Pressure (from IVC) 3 mmHg    QEF 52 %    TV rest pulmonary artery pressure 18 mmHg    Narrative    · Normal left ventricular systolic function. The estimated ejection   fraction is 55%  · Normal LV diastolic function.  · Mild tricuspid regurgitation.  · Normal right ventricular systolic function.  · Mild mitral regurgitation.  · Normal central venous pressure (3 mm Hg).  · The estimated PA systolic pressure is 18 mm Hg        No results found for: EF  No results found for this or any previous visit.  MARILOU:  No results found for this or any previous visit.  Stress Test:  No results found for this or any previous visit.   LHC:  No results found for this or any previous visit.   PFT:  No results found for: FEV1, FVC, [id number], TLC, DLCO     ASSESSMENT/PLAN:       Pre-op Assessment    I have reviewed the Patient Summary Reports.    I have reviewed the  Nursing Notes.   I have reviewed the Medications.     Review of Systems  Anesthesia Hx:  No problems with previous Anesthesia  History of prior surgery of interest to airway management or planning: Previous anesthesia: General   Social:  Former Smoker, Social Alcohol Use    Hematology/Oncology:  Hematology Normal   Oncology Normal     EENT/Dental:EENT/Dental Normal   Cardiovascular:  Cardiovascular Normal Exercise tolerance: good     Pulmonary:  Pulmonary Normal    Renal/:   Chronic Renal Disease, CRI    Hepatic/GI:  Hepatic/GI Normal    Musculoskeletal:  Musculoskeletal Normal    Neurological:  Neurology Normal    Endocrine:  Endocrine Normal    Dermatological:  Skin Normal    Psych:  Psychiatric Normal           Physical Exam  General:  Well nourished    Airway/Jaw/Neck:  Airway Findings: Mouth Opening: Normal Tongue: Normal  General Airway Assessment: Adult  Mallampati: II  Improves to II with phonation.  TM Distance: Normal, at least 6 cm  Jaw/Neck Findings:  Neck ROM: Normal ROM     Eyes/Ears/Nose:  EYES/EARS/NOSE FINDINGS: Normal   Dental:  Dental Findings: In tact   Chest/Lungs:  Chest/Lungs Findings: Clear to auscultation     Heart/Vascular:  Heart Findings: Rate: Normal  Rhythm: Regular Rhythm  Sounds: Normal  Heart murmur: negative Vascular Findings: Normal    Abdomen:  Abdomen Findings: Normal    Musculoskeletal:  Musculoskeletal Findings: Normal   Skin:  Skin Findings: Normal    Mental Status:  Mental Status Findings:  Cooperative, Alert and Oriented         Anesthesia Plan  Type of Anesthesia, risks & benefits discussed:  Anesthesia Type:  general  Patient's Preference: GA  Intra-op Monitoring Plan: standard ASA monitors  Intra-op Monitoring Plan Comments:   Post Op Pain Control Plan: multimodal analgesia, IV/PO Opioids PRN and per primary service following discharge from PACU  Post Op Pain Control Plan Comments: IV meds as needed  Induction:   IV  Beta Blocker:  Patient is not currently on a  Beta-Blocker (No further documentation required).       Informed Consent: Patient understands risks and agrees with Anesthesia plan.  Questions answered. Anesthesia consent signed with patient.  ASA Score: 3     Day of Surgery Review of History & Physical:  There are no significant changes.  H&P update referred to the surgeon.  H&P completed by Anesthesiologist.   Anesthesia Plan Notes: Chart reviewed, patient interviewed and examined.  The anesthetic plan was explained.  Risks, benefits, and alternatives were discussed. Questions were answered and the consent was signed.        CHRISTOFER Grey M.D.         Ready For Surgery From Anesthesia Perspective.

## 2019-12-24 NOTE — HPI
59 female with locally advanced pancreatic cancer enrolled to [employer]-3 trial on who [facility] is being consulted for biliary obstruction.    Patient presented to the ED on 12/22 due to fevers at home as well as ongoing nausea with emesis. On presentation patient was tachycardic with labs showing elevated LFT's. US showed a prominent PD and intrahepatic ductal dilation. She was subsequently cultured and started on antibiotics. In speaking to patient this evening she reports feeling slightly better as she has not been spiking fever.     Of note patient had an ERCP 07/2019 at which point an uncovered metal stent was placed into the common bile duct.

## 2019-12-24 NOTE — HPI
"Mrs. Moody is a 58yo F w/ cancer of the head of the pancreas undergoing chemotherapy w/ [place]+Abraxane and TTF on PANOVA-3 who presents to ED w/ concerns regarding fever at home which she reports to be 102.5F this evening that was responsive to tylenol at home. Her last chemotherapy treatment was on 12/19 (Thu) and she usually "bounces back" by Saturday. However, this past weekend she has continued to feel nauseated with worse appetite than usual. She had a bout of non-bloody, non-bilious emesis Sunday evening. She also notes developing diarrhea, though notes no blood or mucous w/ her movements which she describes as losse. She typically experiences night sweats but notes feeling "chillier" than usual. Her baseline midabdominal pain remains unchanged. She notes no new myalgias, arthralgias, rashes, wounds, dysuria, cough, SOB, CP, rhinorrhea, sore throat, HA, lightheadedness, or dizziness.       Patient information was obtained from patient, spouse/SO, EMS personnel, past medical records and ER records.      Oncology History:   She has had intermittent upper abdominal pain since early June.  She presented to her PCP who originally ordered an US and CT.  US was negative and CT showed some haziness at the pancreatic head.  She saw GI who performed EUS.  On EUS, a 3x4cm pancreatic head mass was seen with possible invasion into the SMA and portal vein.  FNA path s/w pancreatic adenocarcinoma.  CA 19-9 level at outside hospital was >1000 per the patient.  She endorses nausea and inability to tolerate much PO.  She has lost about 10lbs over the last few weeks and is noticing her skin turning yellow.  Denies pruritis.  She is passing gas but has not had a BM in a few days, she attributes this to narcotic use.  She recently started taking stool softeners.  No previous cardiac or stroke history.  Surgical history significant for open appendectomy when she was in her 20s     "

## 2019-12-24 NOTE — [employer]ATION PATIENT INSTRUCTIONS
Discharge Summary/Instructions after an Endoscopic Procedure  Patient Name: Quyen Moody  Patient MRN: [id number]  Patient YOB: 1960 Tuesday, December 24, 2019  Chema Harris MD  RESTRICTIONS:  During your procedure today, you received medications for sedation.  These   medications may affect your judgment, balance and coordination.  Therefore,   for 24 hours, you have the following restrictions:   - DO NOT drive a car, operate machinery, make legal/financial decisions,   sign important papers or drink alcohol.    ACTIVITY:  Today: no heavy lifting, straining or running due to procedural   sedation/anesthesia.  The following day: return to full activity including work.  DIET:  Eat and drink normally unless instructed otherwise.     TREATMENT FOR COMMON SIDE EFFECTS:  - Mild abdominal pain, nausea, belching, bloating or excessive gas:  rest,   eat lightly and use a heating pad.  - Sore Throat: treat with throat lozenges and/or gargle with warm salt   water.  - Because air was used during the procedure, expelling large amounts of air   from your rectum or belching is normal.  - If a bowel prep was taken, you may not have a bowel movement for 1-3 days.    This is normal.  SYMPTOMS TO WATCH FOR AND REPORT TO YOUR PHYSICIAN:  1. Abdominal pain or bloating, other than gas cramps.  2. Chest pain.  3. Back pain.  4. Signs of infection such as: chills or fever occurring within 24 hours   after the procedure.  5. Rectal bleeding, which would show as bright red, maroon, or black stools.   (A tablespoon of blood from the rectum is not serious, especially if   hemorrhoids are present.)  6. Vomiting.  7. Weakness or dizziness.  GO DIRECTLY TO THE NEAREST EMERGENCY ROOM IF YOU HAVE ANY OF THE FOLLOWING:      Difficulty breathing              Chills and/or fever over 101 F   Persistent vomiting and/or vomiting blood   Severe abdominal pain   Severe chest pain   Black, tarry stools   Bleeding- more than one  tablespoon   Any other symptom or condition that you feel may need urgent attention  Your doctor recommends these additional instructions:  If any biopsies were taken, your doctors clinic will contact you in 1 to 2   weeks with any results.  - Return patient to [facility] for [facility] care.   - May advance diet as tolerated.  - Follow-up with oncology service.  For questions, problems or results please call your physician - Chema Harris MD at Work:  [phone].  [facility], EMERGENCY ROOM PHONE NUMBER: [phone]  IF A COMPLICATION OR EMERGENCY SITUATION ARISES AND YOU ARE UNABLE TO REACH   YOUR PHYSICIAN - GO DIRECTLY TO THE EMERGENCY ROOM.  Chema Hraris MD  12/24/2019 2:50:52 PM  This report has been verified and signed electronically.  [employer]

## 2019-12-24 NOTE — ASSESSMENT & PLAN NOTE
She is currently receiving [place]+Abraxane and TTF.  Last treatment 12/19 w/ dose related reduction due to chemo related AEs.  Upcoming appt w/ Dr. Hallman for surgical options; recent  w/ slight uptrend.

## 2019-12-24 NOTE — NURSING TRANSFER
Nursing Transfer Note      12/24/2019     Transfer To: 824    Transfer via stretcher    Transfer with room air     Transported by Darlene    [facility] sent: no    Chart send with patient: Yes    Notified: patient to notify family

## 2019-12-24 NOTE — SUBJECTIVE & OBJECTIVE
Past Medical History:   Diagnosis Date    Allergic rhinitis 3/3/2014    CKD (chronic kidney disease) stage 3, GFR 30-59 ml/min 12/26/2015    Hyperlipidemia 3/3/2014    Pancreas cancer     Skin rash 8/10/2019       Past Surgical History:   Procedure Laterality Date    ERCP N/A 7/16/2019    Procedure: ERCP (ENDOSCOPIC RETROGRADE CHOLANGIOPANCREATOGRAPHY);  Surgeon: Chema Harris MD;  [facility]  Service: Endoscopy;  Laterality: N/A;    Exporatory lap      INSERTION OF TUNNELED CENTRAL VENOUS CATHETER (CVC) WITH SUBCUTANEOUS PORT Right 8/12/2019    Procedure: [id number]-[id number];  Surgeon: Cesar Hallman MD;  [facility];  Service: General;  Laterality: Right;  right IJ       Review of patient's allergies indicates:   Allergen Reactions    Sulfa (sulfonamide antibiotics) Swelling and Hives     tongue       Family History     Problem Relation (Age of Onset)    Diabetes Mother, Father, Brother        Tobacco Use    Smoking status: Former Smoker     Start date: 12/11/1999    Smokeless tobacco: Never Used   Substance and Sexual Activity    Alcohol use: Yes     Alcohol/week: 1.0 standard drinks     Types: 1 Glasses of wine per week     Frequency: 4 or more times a week     Drinks per session: 1 or 2     Binge frequency: Never     Comment: last drink a month ago     Drug use: No    Sexual activity: Yes     Review of Systems   Constitutional: Positive for activity change, appetite change, chills, fatigue and fever. Negative for diaphoresis and unexpected weight change.   HENT: Negative for trouble swallowing and voice change.    Respiratory: Negative for cough and shortness of breath.    Gastrointestinal: Positive for abdominal pain, nausea and vomiting. Negative for abdominal distention, anal bleeding, blood in stool, constipation, diarrhea and rectal pain.   Genitourinary: Negative for dysuria, frequency, hematuria and urgency.   Musculoskeletal: Negative for back pain  and neck pain.   Skin: Negative for color change, pallor, rash and wound.   Neurological: Negative for dizziness, syncope, weakness and light-headedness.     Objective:     Vital Signs (Most Recent):  Temp: 98.6 °F (37 °C) (12/23/19 1557)  Pulse: 84 (12/23/19 1557)  Resp: 18 (12/23/19 1557)  BP: 118/60 (12/23/19 1557)  SpO2: 96 % (12/23/19 1557) Vital Signs (24h Range):  Temp:  [98.4 °F (36.9 °C)-99.6 °F (37.6 °C)] 98.6 °F (37 °C)  Pulse:  [] 84  Resp:  [16-18] 18  SpO2:  [93 %-100 %] 96 %  BP: (103-133)/(58-62) 118/60     Weight: 55.8 kg (123 lb) (12/23/19 1159)  Body mass index is 22.5 kg/m².      Intake/Output Summary (Last 24 hours) at 12/23/2019 1920  Last data filed at 12/23/2019 1407  Gross per 24 hour   Intake 2810 ml   Output 700 ml   Net 2110 ml       Lines/Drains/Airways     Central Venous Catheter Line                 Port A Cath Single Lumen 08/12/19 1601 right internal jugular 133 days          Peripheral Intravenous Line                 Peripheral IV - Single Lumen 12/22/19 2105 20 G Right Antecubital less than 1 day                Physical Exam   Constitutional: She is oriented to person, place, and time. No distress.   HENT:   Head: Normocephalic and atraumatic.   Eyes: Scleral icterus is present.   Cardiovascular: Normal rate and regular rhythm.   Pulmonary/Chest: Effort normal and breath sounds normal.   Abdominal: Soft. Bowel sounds are normal.   Musculoskeletal: She exhibits no edema.   Neurological: She is alert and oriented to person, place, and time.   Skin: She is not diaphoretic.   Nursing note and vitals reviewed.      Significant Labs:  CBC:   Recent Labs   Lab 12/22/19 2105 12/23/19  0431   WBC 7.95 6.54   HGB 11.0* 10.3*   HCT 35.2* 32.5*   * 113*     CMP:   Recent Labs   Lab 12/23/19  0431   *   CALCIUM 8.2*   ALBUMIN 2.7*   PROT 6.0   *   K 4.2   CO2 22*      BUN 10   CREATININE 0.7   ALKPHOS 284*   *   *   BILITOT 4.8*     Coagulation:    Recent Labs   Lab 12/22/19  2105 12/23/19  0431   INR 1.1  --    APTT  --  24.5       Significant Imaging:  Imaging results within the past 24 hours have been reviewed.

## 2019-12-24 NOTE — TRANSFER OF CARE
"Anesthesia Transfer of Care Note    Patient: Quyen Moody    Procedure(s) Performed: Procedure(s) (LRB):  ERCP (ENDOSCOPIC RETROGRADE CHOLANGIOPANCREATOGRAPHY) (N/A)    Patient location: PACU    Anesthesia Type: general    Transport from OR: Transported from OR on room air with adequate spontaneous ventilation    Post pain: pain needs to be addressed    Post assessment: no apparent anesthetic complications and tolerated procedure well    Post vital signs: stable    Level of consciousness: awake, alert and oriented    Nausea/Vomiting: no nausea/vomiting    Complications: none          Last vitals:   Visit Vitals  /67 (BP Location: Left arm, Patient Position: Lying)   Pulse 97   Temp 36.5 °C (97.7 °F) (Temporal)   Resp 18   Ht 5' 2" (1.575 m)   Wt 55.8 kg (123 lb)   SpO2 98%   Breastfeeding? No   BMI 22.50 kg/m²     "

## 2019-12-24 NOTE — TREATMENT PLAN
AES Treatment Plan  12/24/2019  7:56 AM    Chart reviewed, patient seen, and case discussed with attending.   Plan for ERCP today with further recommendations post procedure.    Lisbet Kenyon M.D.  Gastroenterology Fellow, PGY-VI  Pager: [phone]  [facility]

## 2019-12-24 NOTE — SUBJECTIVE & OBJECTIVE
Interval History: No acute events overnight. Patient is afebrile. Vss. LFTs trending down. CT shows unchanged pancreatic and liver lesions when compared to prior. Will do ERCP today    Oncology Treatment Plan:   OP PANC NAB-PACLITAXEL + GEMCITABINE Q4W    Medications:  Continuous Infusions:   sodium chloride 0.9% 100 mL/hr at 12/24/19 0725     Scheduled Meds:   fentaNYL  1 patch Transdermal Q72H    lidocaine-prilocaine   Topical (Top) Once    multivitamin  1 tablet Oral Daily    piperacillin-tazobactam (ZOSYN) IVPB  4.5 g Intravenous Q8H     PRN Meds:iohexol, LORazepam, ondansetron, oxyCODONE, sodium chloride 0.9%     Review of Systems   Constitutional: Positive for activity change, appetite change, chills, fatigue and fever. Negative for diaphoresis and unexpected weight change.   HENT: Negative for congestion, rhinorrhea, sneezing, sore throat, trouble swallowing and voice change.    Eyes: Negative for visual disturbance.   Respiratory: Negative for cough, shortness of breath and wheezing.    Cardiovascular: Negative for chest pain, palpitations and leg swelling.   Gastrointestinal: Positive for abdominal pain (unchanged), diarrhea, nausea and vomiting. Negative for abdominal distention, anal bleeding, blood in stool, constipation and rectal pain.   Genitourinary: Negative for dysuria, frequency, hematuria and urgency.   Musculoskeletal: Negative for arthralgias, back pain, myalgias and neck pain.   Skin: Negative for color change, pallor, rash and wound.   Neurological: Negative for dizziness, syncope, weakness, light-headedness and headaches.     Objective:     Vital Signs (Most Recent):  Temp: 98.8 °F (37.1 °C) (12/24/19 1232)  Pulse: 77 (12/24/19 1232)  Resp: 16 (12/24/19 1232)  BP: 135/74 (12/24/19 1232)  SpO2: 97 % (12/24/19 1232) Vital Signs (24h Range):  Temp:  [97.8 °F (36.6 °C)-98.8 °F (37.1 °C)] 98.8 °F (37.1 °C)  Pulse:  [72-84] 77  Resp:  [16-18] 16  SpO2:  [94 %-98 %] 97 %  BP: (103-135)/(55-74)  135/74     Weight: 55.8 kg (123 lb)  Body mass index is 22.5 kg/m².  Body surface area is 1.56 meters squared.      Intake/Output Summary (Last 24 hours) at 12/24/2019 1346  Last data filed at 12/24/2019 0757  Gross per 24 hour   Intake 2850 ml   Output 1750 ml   Net 1100 ml       Physical Exam   Constitutional: She is oriented to person, place, and time. No distress.   HENT:   Head: Normocephalic and atraumatic.   Mouth/Throat: Oropharynx is clear and moist. No oropharyngeal exudate.   Eyes: Conjunctivae are normal. Right eye exhibits no discharge. Left eye exhibits no discharge. No scleral icterus.   Neck: Normal range of motion. Neck supple. No thyromegaly present.   Cardiovascular: Normal rate, regular rhythm, normal heart sounds and intact distal pulses. Exam reveals no gallop and no friction rub.   No murmur heard.  Pulmonary/Chest: Effort normal and breath sounds normal. No respiratory distress. She has no wheezes. She has no rales.   R upper chest port in place: no surrounding erythema, warmth, or tenderness   Abdominal: Soft. Bowel sounds are normal. There is no tenderness.   Musculoskeletal: She exhibits no edema.   Lymphadenopathy:     She has no cervical adenopathy.   Neurological: She is alert and oriented to person, place, and time.   Skin: Skin is warm and dry. She is not diaphoretic.   Psychiatric: She has a normal mood and affect.   Nursing note and vitals reviewed.      Significant Labs:   All pertinent labs from the last 24 hours have been reviewed.    Diagnostic Results:  I have reviewed all pertinent imaging results/findings within the past 24 hours.

## 2019-12-24 NOTE — PROGRESS NOTES
Met with patient after MD rounds. She is a potential discharge for later today. She is not active on home health services. She does not feel she has any discharge needs at this time. Relayed to the  that patient needs follow up labs on Thursday.

## 2019-12-24 NOTE — PROGRESS NOTES
"[facility]  Hematology/Oncology  Progress Note    Patient Name: Quyen Moody  Admission Date: 12/22/2019  Hospital Length of Stay: 1 days  Code Status: Full Code     Subjective:     HPI:  Mrs. Moody is a 60yo F w/ cancer of the head of the pancreas undergoing chemotherapy w/ [place]+Abraxane and TTF on PANOVA-3 who presents to ED w/ concerns regarding fever at home which she reports to be 102.5F this evening that was responsive to tylenol at home. Her last chemotherapy treatment was on 12/19 (Thu) and she usually "bounces back" by Saturday. However, this past weekend she has continued to feel nauseated with worse appetite than usual. She had a bout of non-bloody, non-bilious emesis Sunday evening. She also notes developing diarrhea, though notes no blood or mucous w/ her movements which she describes as losse. She typically experiences night sweats but notes feeling "chillier" than usual. Her baseline midabdominal pain remains unchanged. She notes no new myalgias, arthralgias, rashes, wounds, dysuria, cough, SOB, CP, rhinorrhea, sore throat, HA, lightheadedness, or dizziness.       Patient information was obtained from patient, spouse/SO, EMS personnel, past medical records and ER records.      Oncology History:   She has had intermittent upper abdominal pain since early June.  She presented to her PCP who originally ordered an US and CT.  US was negative and CT showed some haziness at the pancreatic head.  She saw GI who performed EUS.  On EUS, a 3x4cm pancreatic head mass was seen with possible invasion into the SMA and portal vein.  FNA path s/w pancreatic adenocarcinoma.  CA 19-9 level at outside hospital was >1000 per the patient.  She endorses nausea and inability to tolerate much PO.  She has lost about 10lbs over the last few weeks and is noticing her skin turning yellow.  Denies pruritis.  She is passing gas but has not had a BM in a few days, she attributes this to narcotic use.  She " recently started taking stool softeners.  No previous cardiac or stroke history.  Surgical history significant for open appendectomy when she was in her 20s       Interval History: No acute events overnight. Patient is afebrile. Vss. LFTs trending down. CT shows unchanged pancreatic and liver lesions when compared to prior. Will do ERCP today    Oncology Treatment Plan:   OP PANC NAB-PACLITAXEL + GEMCITABINE Q4W    Medications:  Continuous Infusions:   sodium chloride 0.9% 100 mL/hr at 12/24/19 0725     Scheduled Meds:   fentaNYL  1 patch Transdermal Q72H    lidocaine-prilocaine   Topical (Top) Once    multivitamin  1 tablet Oral Daily    piperacillin-tazobactam (ZOSYN) IVPB  4.5 g Intravenous Q8H     PRN Meds:iohexol, LORazepam, ondansetron, oxyCODONE, sodium chloride 0.9%     Review of Systems   Constitutional: Positive for activity change, appetite change, chills, fatigue and fever. Negative for diaphoresis and unexpected weight change.   HENT: Negative for congestion, rhinorrhea, sneezing, sore throat, trouble swallowing and voice change.    Eyes: Negative for visual disturbance.   Respiratory: Negative for cough, shortness of breath and wheezing.    Cardiovascular: Negative for chest pain, palpitations and leg swelling.   Gastrointestinal: Positive for abdominal pain (unchanged), diarrhea, nausea and vomiting. Negative for abdominal distention, anal bleeding, blood in stool, constipation and rectal pain.   Genitourinary: Negative for dysuria, frequency, hematuria and urgency.   Musculoskeletal: Negative for arthralgias, back pain, myalgias and neck pain.   Skin: Negative for color change, pallor, rash and wound.   Neurological: Negative for dizziness, syncope, weakness, light-headedness and headaches.     Objective:     Vital Signs (Most Recent):  Temp: 98.8 °F (37.1 °C) (12/24/19 1232)  Pulse: 77 (12/24/19 1232)  Resp: 16 (12/24/19 1232)  BP: 135/74 (12/24/19 1232)  SpO2: 97 % (12/24/19 1232) Vital Signs  (24h Range):  Temp:  [97.8 °F (36.6 °C)-98.8 °F (37.1 °C)] 98.8 °F (37.1 °C)  Pulse:  [72-84] 77  Resp:  [16-18] 16  SpO2:  [94 %-98 %] 97 %  BP: (103-135)/(55-74) 135/74     Weight: 55.8 kg (123 lb)  Body mass index is 22.5 kg/m².  Body surface area is 1.56 meters squared.      Intake/Output Summary (Last 24 hours) at 12/24/2019 1346  Last data filed at 12/24/2019 0757  Gross per 24 hour   Intake 2850 ml   Output 1750 ml   Net 1100 ml       Physical Exam   Constitutional: She is oriented to person, place, and time. No distress.   HENT:   Head: Normocephalic and atraumatic.   Mouth/Throat: Oropharynx is clear and moist. No oropharyngeal exudate.   Eyes: Conjunctivae are normal. Right eye exhibits no discharge. Left eye exhibits no discharge. No scleral icterus.   Neck: Normal range of motion. Neck supple. No thyromegaly present.   Cardiovascular: Normal rate, regular rhythm, normal heart sounds and intact distal pulses. Exam reveals no gallop and no friction rub.   No murmur heard.  Pulmonary/Chest: Effort normal and breath sounds normal. No respiratory distress. She has no wheezes. She has no rales.   R upper chest port in place: no surrounding erythema, warmth, or tenderness   Abdominal: Soft. Bowel sounds are normal. There is no tenderness.   Musculoskeletal: She exhibits no edema.   Lymphadenopathy:     She has no cervical adenopathy.   Neurological: She is alert and oriented to person, place, and time.   Skin: Skin is warm and dry. She is not diaphoretic.   Psychiatric: She has a normal mood and affect.   Nursing note and vitals reviewed.      Significant Labs:   All pertinent labs from the last 24 hours have been reviewed.    Diagnostic Results:  I have reviewed all pertinent imaging results/findings within the past 24 hours.    Assessment/Plan:     * Fever  Pt presents to ED w/ known pancreatic cancer w/ c/o measured temp at home 102.5 F. Responsive to tylenol at home.   Upon presentation, Pt 99.4 and HDS.  WCC 7.95 Gran% 95.9, CXR w/o acute process, U/A w/o pyuria, Flu negative. Lactate wnl.   No obvious etiology of infection; ascending cholangitis a potential concern to note. No abdominal pain on admission.     Continue Zosyn; broaden/taper as clinical course progresses  IVF infusion  Blood cultures NGTD  abdo U/S: mild intrahepatic duct dilations. No signs of acute cholecystitis  Patient Afebrile since admission    Elevated transaminase level  Likely due to recent chemotherapy  Continue to trend LFTs    - ERCP today  - trending down    Hyponatremia  Likely due to poor oral intake; UA also notable for ketonuria which is consistent  IVF  Trend, continue to monitor  - currently wnl    Cancer of head of pancreas  She is currently receiving [place]+Abraxane and TTF.  Last treatment 12/19 w/ dose related reduction due to chemo related AEs.  Upcoming appt w/ Dr. Hallman for surgical options; recent  w/ slight uptrend.              Pancho Srivastava MD  Hematology/Oncology  [facility]

## 2019-12-24 NOTE — ASSESSMENT & PLAN NOTE
59 female with locally advanced pancreatic cancer enrolled to Panova-3 trial on who AES is being consulted for biliary obstruction.    Patient presented to the ED after having fevers at home as well as nausea/emesis with labs revealing elevated LFT's  (continue to increase today) as well as US with PD/biliary tree prominence. Although her current presentation could be secondary to progression of her disease with a known biliary stent her current presentation could be from an obstructive process (if the stent has migrated or become occluded). We will follow her morning labs as well as CT ordered by the primary team with plan to move forward with  ERCP.    Recommendations:  --NPO after  MN for ERCP  --Daily CMP and CBC  --Follow up blood cultures  --Continue antibiotics  --F/U CT abdomen/pelvis

## 2019-12-24 NOTE — CONSULTS
[facility]  Gastroenterology  Consult Note    Patient Name: Quyen Moody  MRN: [id number]  Admission Date: 12/22/2019  Hospital Length of Stay: 0 days  Code Status: Full Code   Attending Provider: Ashish Montenegro MD   Consulting Provider: Lisbet Kenyon MD  Primary Care Physician: MUKUND Rodríguez MD  Principal Problem:Fever    Inpatient consult to [facility] ([facility])  Consult performed by: Lisbet Kenyon MD  Consult ordered by: Pancho Srivastava MD        Subjective:     HPI:  59 female with locally advanced pancreatic cancer enrolled to [employer]-3 trial on who [facility] is being consulted for biliary obstruction.    Patient presented to the ED on 12/22 due to fevers at home as well as ongoing nausea with emesis. On presentation patient was tachycardic with labs showing elevated LFT's. US showed a prominent PD and intrahepatic ductal dilation. She was subsequently cultured and started on antibiotics. In speaking to patient this evening she reports feeling slightly better as she has not been spiking fever.     Of note patient had an ERCP 07/2019 at which point an uncovered metal stent was placed into the common bile duct.    Past Medical History:   Diagnosis Date    Allergic rhinitis 3/3/2014    CKD (chronic kidney disease) stage 3, GFR 30-59 ml/min 12/26/2015    Hyperlipidemia 3/3/2014    Pancreas cancer     Skin rash 8/10/2019       Past Surgical History:   Procedure Laterality Date    ERCP N/A 7/16/2019    Procedure: ERCP (ENDOSCOPIC RETROGRADE CHOLANGIOPANCREATOGRAPHY);  Surgeon: Chema Harris MD;  Location: [facility] ([address] [place] [address]);  Service: Endoscopy;  Laterality: N/A;    Exporatory lap      INSERTION OF TUNNELED CENTRAL VENOUS CATHETER (CVC) WITH SUBCUTANEOUS PORT Right 8/12/2019    Procedure: [id number];  Surgeon: Cesar Hallman MD;  [facility];  Service: General;  Laterality: Right;  right IJ       Review of patient's allergies indicates:   Allergen  Reactions    Sulfa (sulfonamide antibiotics) Swelling and Hives     tongue       Family History     Problem Relation (Age of Onset)    Diabetes Mother, Father, Brother        Tobacco Use    Smoking status: Former Smoker     Start date: 12/11/1999    Smokeless tobacco: Never Used   Substance and Sexual Activity    Alcohol use: Yes     Alcohol/week: 1.0 standard drinks     Types: 1 Glasses of wine per week     Frequency: 4 or more times a week     Drinks per session: 1 or 2     Binge frequency: Never     Comment: last drink a month ago     Drug use: No    Sexual activity: Yes     Review of Systems   Constitutional: Positive for activity change, appetite change, chills, fatigue and fever. Negative for diaphoresis and unexpected weight change.   HENT: Negative for trouble swallowing and voice change.    Respiratory: Negative for cough and shortness of breath.    Gastrointestinal: Positive for abdominal pain, nausea and vomiting. Negative for abdominal distention, anal bleeding, blood in stool, constipation, diarrhea and rectal pain.   Genitourinary: Negative for dysuria, frequency, hematuria and urgency.   Musculoskeletal: Negative for back pain and neck pain.   Skin: Negative for color change, pallor, rash and wound.   Neurological: Negative for dizziness, syncope, weakness and light-headedness.     Objective:     Vital Signs (Most Recent):  Temp: 98.6 °F (37 °C) (12/23/19 1557)  Pulse: 84 (12/23/19 1557)  Resp: 18 (12/23/19 1557)  BP: 118/60 (12/23/19 1557)  SpO2: 96 % (12/23/19 1557) Vital Signs (24h Range):  Temp:  [98.4 °F (36.9 °C)-99.6 °F (37.6 °C)] 98.6 °F (37 °C)  Pulse:  [] 84  Resp:  [16-18] 18  SpO2:  [93 %-100 %] 96 %  BP: (103-133)/(58-62) 118/60     Weight: 55.8 kg (123 lb) (12/23/19 1159)  Body mass index is 22.5 kg/m².      Intake/Output Summary (Last 24 hours) at 12/23/2019 1920  Last data filed at 12/23/2019 1407  Gross per 24 hour   Intake 2810 ml   Output 700 ml   Net 2110 ml        Lines/Drains/Airways     Central Venous Catheter Line                 Port A Cath Single Lumen 08/12/19 1601 right internal jugular 133 days          Peripheral Intravenous Line                 Peripheral IV - Single Lumen 12/22/19 2105 20 G Right Antecubital less than 1 day                Physical Exam   Constitutional: She is oriented to person, place, and time. No distress.   HENT:   Head: Normocephalic and atraumatic.   Eyes: Scleral icterus is present.   Cardiovascular: Normal rate and regular rhythm.   Pulmonary/Chest: Effort normal and breath sounds normal.   Abdominal: Soft. Bowel sounds are normal.   Musculoskeletal: She exhibits no edema.   Neurological: She is alert and oriented to person, place, and time.   Skin: She is not diaphoretic.   Nursing note and vitals reviewed.      Significant Labs:  CBC:   Recent Labs   Lab 12/22/19 2105 12/23/19 0431   WBC 7.95 6.54   HGB 11.0* 10.3*   HCT 35.2* 32.5*   * 113*     CMP:   Recent Labs   Lab 12/23/19 0431   *   CALCIUM 8.2*   ALBUMIN 2.7*   PROT 6.0   *   K 4.2   CO2 22*      BUN 10   CREATININE 0.7   ALKPHOS 284*   *   *   BILITOT 4.8*     Coagulation:   Recent Labs   Lab 12/22/19 2105 12/23/19 0431   INR 1.1  --    APTT  --  24.5       Significant Imaging:  Imaging results within the past 24 hours have been reviewed.    Assessment/Plan:     Elevated bilirubin  59 female with locally advanced pancreatic cancer enrolled to [employer]-3 trial on who AES is being consulted for biliary obstruction.    Patient presented to the ED after having fevers at home as well as nausea/emesis with labs revealing elevated LFT's  (continue to increase today) as well as US with PD/biliary tree prominence. Although her current presentation could be secondary to progression of her disease with a known biliary stent her current presentation could be from an obstructive process (if the stent has migrated or become occluded). We will  follow her morning labs as well as CT ordered by the primary team with plan to move forward with  ERCP.    Recommendations:  --NPO after  MN for ERCP  --Daily CMP and CBC  --Follow up blood cultures  --Continue antibiotics  --F/U CT abdomen/pelvis          Thank you for your consult. I will follow-up with patient. Please contact us if you have any additional questions.    Lisbet Kenyon M.D.  Gastroenterology Fellow, PGY-VI  Pager: [phone]  [facility]

## 2019-12-24 NOTE — TREATMENT PLAN
AES Treatment Plan  12/24/2019  2:55 PM    ERCP completed with impression and recs below.    Impression:            - Prior biliary sphincterotomy appeared open.  - One partially occluded stent from the biliary tree was seen in the major papilla.  - A single moderate biliary stricture was found in the lower third of the main bile duct. The stricture was from in-stent stenosis. This stricture was treated with stent placement.  - The biliary tree was swept and sludge was found.  - One bare metal stent was placed into the common bile duct.    Recommendation:         - [facility]  - May advance diet as tolerated  - Daily CMP and CBC  - Continue antibiotics  - Follow-up with oncology service  - We will continue to follow alongside primary team, please call with any additional questions or concerns      Lisbet Kenyon M.D.  Gastroenterology Fellow, PGY-VI  Pager: [phone]  [facility]

## 2019-12-24 NOTE — PLAN OF CARE
Patient AAOX4, vital signs stable. No complaints of pain. Complaints of nausea controlled with PRN zofran. 2D Echo and CT of abdomen and pelvis completed. Patient has been NPO after midnight with plans for ERCP today. No falls this shift. Patient wearing nonskid footwear on when out of bed. Port was accessed last night prior to CT. Will continue to monitor.

## 2019-12-24 NOTE — ASSESSMENT & PLAN NOTE
Likely due to poor oral intake; UA also notable for ketonuria which is consistent  IVF  Trend, continue to monitor  - currently wnl

## 2019-12-24 NOTE — ASSESSMENT & PLAN NOTE
Pt presents to ED w/ known pancreatic cancer w/ c/o measured temp at home 102.5 F. Responsive to tylenol at home.   Upon presentation, Pt 99.4 and HDS. WCC 7.95 Gran% 95.9, CXR w/o acute process, U/A w/o pyuria, Flu negative. Lactate wnl.   No obvious etiology of infection; ascending cholangitis a potential concern to note. No abdominal pain on admission.     Continue Zosyn; broaden/taper as clinical course progresses  IVF infusion  Blood cultures NGTD  abdo U/S: mild intrahepatic duct dilations. No signs of acute cholecystitis  Patient Afebrile since admission

## 2019-12-25 VITALS
WEIGHT: 123 LBS | SYSTOLIC BLOOD PRESSURE: 158 MMHG | RESPIRATION RATE: 16 BRPM | DIASTOLIC BLOOD PRESSURE: 72 MMHG | BODY MASS INDEX: 22.63 KG/M2 | OXYGEN SATURATION: 99 % | TEMPERATURE: 98 F | HEART RATE: 75 BPM | HEIGHT: 62 IN

## 2019-12-25 LAB
ALBUMIN SERPL BCP-MCNC: 2.2 G/DL (ref 3.5–5.2)
ALP SERPL-CCNC: 166 U/L (ref 55–135)
ALT SERPL W/O P-5'-P-CCNC: 123 U/L (ref 10–44)
ANION GAP SERPL CALC-SCNC: 5 MMOL/L (ref 8–16)
AST SERPL-CCNC: 48 U/L (ref 10–40)
BASOPHILS # BLD AUTO: 0.02 K/UL (ref 0–0.2)
BASOPHILS NFR BLD: 0.6 % (ref 0–1.9)
BILIRUB SERPL-MCNC: 1 MG/DL (ref 0.1–1)
BUN SERPL-MCNC: 8 MG/DL (ref 6–20)
CALCIUM SERPL-MCNC: 7.7 MG/DL (ref 8.7–10.5)
CHLORIDE SERPL-SCNC: 112 MMOL/L (ref 95–110)
CO2 SERPL-SCNC: 20 MMOL/L (ref 23–29)
CREAT SERPL-MCNC: 0.7 MG/DL (ref 0.5–1.4)
DIFFERENTIAL METHOD: ABNORMAL
EOSINOPHIL # BLD AUTO: 0.1 K/UL (ref 0–0.5)
EOSINOPHIL NFR BLD: 2.6 % (ref 0–8)
ERYTHROCYTE [DISTWIDTH] IN BLOOD BY AUTOMATED COUNT: 16.5 % (ref 11.5–14.5)
EST. GFR  (AFRICAN AMERICAN): >60 ML/MIN/1.73 M^2
EST. GFR  (NON AFRICAN AMERICAN): >60 ML/MIN/1.73 M^2
GLUCOSE SERPL-MCNC: 106 MG/DL (ref 70–110)
HCT VFR BLD AUTO: 29 % (ref 37–48.5)
HGB BLD-MCNC: 9.1 G/DL (ref 12–16)
IMM GRANULOCYTES # BLD AUTO: 0.01 K/UL (ref 0–0.04)
IMM GRANULOCYTES NFR BLD AUTO: 0.3 % (ref 0–0.5)
LYMPHOCYTES # BLD AUTO: 0.7 K/UL (ref 1–4.8)
LYMPHOCYTES NFR BLD: 19.4 % (ref 18–48)
MAGNESIUM SERPL-MCNC: 1.6 MG/DL (ref 1.6–2.6)
MCH RBC QN AUTO: 28.3 PG (ref 27–31)
MCHC RBC AUTO-ENTMCNC: 31.4 G/DL (ref 32–36)
MCV RBC AUTO: 90 FL (ref 82–98)
MONOCYTES # BLD AUTO: 0.4 K/UL (ref 0.3–1)
MONOCYTES NFR BLD: 11 % (ref 4–15)
NEUTROPHILS # BLD AUTO: 2.3 K/UL (ref 1.8–7.7)
NEUTROPHILS NFR BLD: 66.1 % (ref 38–73)
NRBC BLD-RTO: 0 /100 WBC
PHOSPHATE SERPL-MCNC: 2.3 MG/DL (ref 2.7–4.5)
PLATELET # BLD AUTO: 101 K/UL (ref 150–350)
PMV BLD AUTO: 10.8 FL (ref 9.2–12.9)
POTASSIUM SERPL-SCNC: 3.6 MMOL/L (ref 3.5–5.1)
PROT SERPL-MCNC: 5.3 G/DL (ref 6–8.4)
RBC # BLD AUTO: 3.21 M/UL (ref 4–5.4)
SODIUM SERPL-SCNC: 137 MMOL/L (ref 136–145)
WBC # BLD AUTO: 3.46 K/UL (ref 3.9–12.7)

## 2019-12-25 PROCEDURE — 63600175 PHARM REV CODE 636 W HCPCS: Performed by: STUDENT IN AN ORGANIZED HEALTH CARE EDUCATION/TRAINING PROGRAM

## 2019-12-25 PROCEDURE — 83735 ASSAY OF MAGNESIUM: CPT

## 2019-12-25 PROCEDURE — 99238 HOSP IP/OBS DSCHRG MGMT 30/<: CPT | Mod: ,,, | Performed by: INTERNAL MEDICINE

## 2019-12-25 PROCEDURE — 99238 PR HOSPITAL DISCHARGE DAY,<30 MIN: ICD-10-PCS | Mod: ,,, | Performed by: INTERNAL MEDICINE

## 2019-12-25 PROCEDURE — 25000003 PHARM REV CODE 250: Performed by: STUDENT IN AN ORGANIZED HEALTH CARE EDUCATION/TRAINING PROGRAM

## 2019-12-25 PROCEDURE — 84100 ASSAY OF PHOSPHORUS: CPT

## 2019-12-25 PROCEDURE — 85025 COMPLETE CBC W/AUTO DIFF WBC: CPT

## 2019-12-25 PROCEDURE — 80053 COMPREHEN METABOLIC PANEL: CPT

## 2019-12-25 PROCEDURE — 63600175 PHARM REV CODE 636 W HCPCS: Performed by: INTERNAL MEDICINE

## 2019-12-25 RX ORDER — HEPARIN 100 UNIT/ML
100 SYRINGE INTRAVENOUS
Status: DISCONTINUED | OUTPATIENT
Start: 2019-12-25 | End: 2019-12-25 | Stop reason: HOSPADM

## 2019-12-25 RX ORDER — HEPARIN SODIUM,PORCINE 10 UNIT/ML
300 VIAL (ML) INTRAVENOUS
Status: DISCONTINUED | OUTPATIENT
Start: 2019-12-25 | End: 2019-12-25

## 2019-12-25 RX ORDER — POTASSIUM CHLORIDE 750 MG/1
40 CAPSULE, EXTENDED RELEASE ORAL ONCE
Status: COMPLETED | OUTPATIENT
Start: 2019-12-25 | End: 2019-12-25

## 2019-12-25 RX ORDER — SODIUM,POTASSIUM PHOSPHATES 280-250MG
2 POWDER IN PACKET (EA) ORAL ONCE
Status: COMPLETED | OUTPATIENT
Start: 2019-12-25 | End: 2019-12-25

## 2019-12-25 RX ORDER — MAGNESIUM SULFATE HEPTAHYDRATE 40 MG/ML
2 INJECTION, SOLUTION INTRAVENOUS ONCE
Status: COMPLETED | OUTPATIENT
Start: 2019-12-25 | End: 2019-12-25

## 2019-12-25 RX ADMIN — MAGNESIUM SULFATE IN WATER 2 G: 40 INJECTION, SOLUTION INTRAVENOUS at 06:12

## 2019-12-25 RX ADMIN — OXYCODONE HYDROCHLORIDE 15 MG: 10 TABLET ORAL at 08:12

## 2019-12-25 RX ADMIN — POTASSIUM & SODIUM PHOSPHATES POWDER PACK 280-160-250 MG 2 PACKET: 280-160-250 PACK at 06:12

## 2019-12-25 RX ADMIN — OXYCODONE HYDROCHLORIDE 15 MG: 10 TABLET ORAL at 12:12

## 2019-12-25 RX ADMIN — Medication 300 UNITS: at 11:12

## 2019-12-25 RX ADMIN — PIPERACILLIN AND TAZOBACTAM 4.5 G: 4; .5 INJECTION, POWDER, FOR SOLUTION INTRAVENOUS at 03:12

## 2019-12-25 RX ADMIN — THERA TABS 1 TABLET: TAB at 08:12

## 2019-12-25 RX ADMIN — SODIUM CHLORIDE: 0.9 INJECTION, SOLUTION INTRAVENOUS at 06:12

## 2019-12-25 RX ADMIN — POTASSIUM CHLORIDE 40 MEQ: 750 CAPSULE, EXTENDED RELEASE ORAL at 06:12

## 2019-12-25 NOTE — PLAN OF CARE
Patient AAOX4, vital signs stable. No complaints of nausea. Patient complained of anxiety at beginning of shift, relieved by PRN ativan. Pain relieved by PRN oxy. Patient voiding and tolerating liquids and food. 1 BM this shift. Complaints of nausea controlled with PRN zofran. No acute events overnight. No falls this shift. Patient wearing nonskid footwear on when out of bed. Will continue to monitor.

## 2019-12-25 NOTE — HOSPITAL COURSE
Ms Moody was admitted to Medical Oncology for fever and increased bilirubin. She was started on zosyn and IV fluids. Normal white count. UA and lactate was unremarkable. CXR without acute abnormality. Abdominal US without signs of acute cholecystitis but showed mild intrahepatic duct dilation. CT abdomin with mild intrahepatic ducts with unchanged pancreatic and liver lesions. [facility] and ERCP was done on 12/25. Stent was placed and sludge was found and cleared. Over the course of hospitalization, but did not have a spike of fever. Patient tolerated diet after the procedure and was discharged to home in good condition with Augmentin for 3 days.

## 2019-12-25 NOTE — TREATMENT PLAN
AES Treatment Plan  12/25/2019  2:55 PM    ERCP completed with impression and recs below.    Impression:            - Prior biliary sphincterotomy appeared open.  - One partially occluded stent from the biliary tree was seen in the major papilla.  - A single moderate biliary stricture was found in the lower third of the main bile duct. The stricture was from in-stent stenosis. This stricture was treated with stent placement.  - The biliary tree was swept and sludge was found.  - One bare metal stent was placed into the common bile duct.      12/25/19: LFTS trending down, vitally stable, afebrile. She denies abdominal pain, nausea, vomiting.     Recommendation:         - [facility]  - May advance diet as tolerated  - Daily CMP and CBC  - Continue antibiotics  - Follow-up with oncology service  - We will sign off.     Landon Humphries MD  Gastroenterology Fellow PGY IV   [facility]

## 2019-12-25 NOTE — NURSING
ROADTEST  O-oxygen saturation 97-99% on room air.  A-ambulating independently in room and alexander.  D-right PAC flushed with NS, Heparin 300U then deaccessed.Good blood return present  T-tolerating regular diet without difficulty.   E-voiding adequate amount cyu and had a BM yesterday.  S-able to do ADL'S without difficulty.  T-AVS reviewed with patient using the teach-back method. Waiting for ride.

## 2019-12-25 NOTE — DISCHARGE SUMMARY
"[facility]  Hematology/Oncology  Discharge Summary      Patient Name: Quyen Moody  MRN: [id number]  Admission Date: 12/22/2019  Hospital Length of Stay: 2 days  Discharge Date and Time:  12/25/2019   Attending Physician: Ashish Montenegro MD   Discharging Provider: Pancho Srivastava MD  Primary Care Provider: MUKUND Rodríguez MD    HPI: Mrs. Moody is a 58yo F w/ cancer of the head of the pancreas undergoing chemotherapy w/ [place]+Abraxane and TTF on PANOVA-3 who presents to ED w/ concerns regarding fever at home which she reports to be 102.5F this evening that was responsive to tylenol at home. Her last chemotherapy treatment was on 12/19 (Thu) and she usually "bounces back" by Saturday. However, this past weekend she has continued to feel nauseated with worse appetite than usual. She had a bout of non-bloody, non-bilious emesis Sunday evening. She also notes developing diarrhea, though notes no blood or mucous w/ her movements which she describes as losse. She typically experiences night sweats but notes feeling "chillier" than usual. Her baseline midabdominal pain remains unchanged. She notes no new myalgias, arthralgias, rashes, wounds, dysuria, cough, SOB, CP, rhinorrhea, sore throat, HA, lightheadedness, or dizziness.       Patient information was obtained from patient, spouse/SO, EMS personnel, past medical records and ER records.      Oncology History:   She has had intermittent upper abdominal pain since early June.  She presented to her PCP who originally ordered an US and CT.  US was negative and CT showed some haziness at the pancreatic head.  She saw GI who performed EUS.  On EUS, a 3x4cm pancreatic head mass was seen with possible invasion into the SMA and portal vein.  FNA path s/w pancreatic adenocarcinoma.  CA 19-9 level at outside hospital was >1000 per the patient.  She endorses nausea and inability to tolerate much PO.  She has lost about 10lbs over the last few weeks and is " noticing her skin turning yellow.  Denies pruritis.  She is passing gas but has not had a BM in a few days, she attributes this to narcotic use.  She recently started taking stool softeners.  No previous cardiac or stroke history.  Surgical history significant for open appendectomy when she was in her 20s       Procedure(s) (LRB):  ERCP (ENDOSCOPIC RETROGRADE CHOLANGIOPANCREATOGRAPHY) (N/A)     Hospital Course: Ms Moody was admitted to Medical Oncology for fever and increased bilirubin. She was started on zosyn and IV fluids. Normal white count. UA and lactate was unremarkable. CXR without acute abnormality. Abdominal US without signs of acute cholecystitis but showed mild intrahepatic duct dilation. CT abdomin with mild intrahepatic ducts with unchanged pancreatic and liver lesions. AES and ERCP was done on 12/25. Stent was placed and sludge was found and cleared. Over the course of hospitalization, but did not have a spike of fever. Patient tolerated diet after the procedure and was discharged to home in good condition with Augmentin for 3 days.    Vitals:    12/25/19 1129   BP: (!) 158/72   Pulse: 75   Resp: 16   Temp: 97.6 °F (36.4 °C)     Physical Exam   Constitutional: She is oriented to person, place, and time. No distress.   HENT:   Head: Normocephalic and atraumatic.   Mouth/Throat: Oropharynx is clear and moist. No oropharyngeal exudate.   Eyes: Conjunctivae are normal. Right eye exhibits no discharge. Left eye exhibits no discharge. No scleral icterus.   Neck: Normal range of motion. Neck supple. No thyromegaly present.   Cardiovascular: Normal rate, regular rhythm, normal heart sounds and intact distal pulses. Exam reveals no gallop and no friction rub.   No murmur heard.  Pulmonary/Chest: Effort normal and breath sounds normal. No respiratory distress. She has no wheezes. She has no rales.   R upper chest port in place: no surrounding erythema, warmth, or tenderness   Abdominal: Soft. Bowel sounds are  normal. There is no tenderness.   Musculoskeletal: She exhibits no edema.   Lymphadenopathy:     She has no cervical adenopathy.   Neurological: She is alert and oriented to person, place, and time.   Skin: Skin is warm and dry. She is not diaphoretic.   Psychiatric: She has a normal mood and affect.   Nursing note and vitals reviewed.    Consults:   Consults (From admission, onward)        Status Ordering Provider     Inpatient consult to [facility]  Once     Provider:  (Not yet assigned)    AMEE Carrasquillo          Significant Diagnostic Studies:     Recent Results (from the past 24 hour(s))   Comprehensive metabolic panel    Collection Time: 12/25/19  4:06 AM   Result Value Ref Range    Sodium 137 136 - 145 mmol/L    Potassium 3.6 3.5 - 5.1 mmol/L    Chloride 112 (H) 95 - 110 mmol/L    CO2 20 (L) 23 - 29 mmol/L    Glucose 106 70 - 110 mg/dL    BUN, Bld 8 6 - 20 mg/dL    Creatinine 0.7 0.5 - 1.4 mg/dL    Calcium 7.7 (L) 8.7 - 10.5 mg/dL    Total Protein 5.3 (L) 6.0 - 8.4 g/dL    Albumin 2.2 (L) 3.5 - 5.2 g/dL    Total Bilirubin 1.0 0.1 - 1.0 mg/dL    Alkaline Phosphatase 166 (H) 55 - 135 U/L    AST 48 (H) 10 - 40 U/L     (H) 10 - 44 U/L    Anion Gap 5 (L) 8 - 16 mmol/L    eGFR if African American >60.0 >60 mL/min/1.73 m^2    eGFR if non African American >60.0 >60 mL/min/1.73 m^2   Magnesium    Collection Time: 12/25/19  4:06 AM   Result Value Ref Range    Magnesium 1.6 1.6 - 2.6 mg/dL   Phosphorus    Collection Time: 12/25/19  4:06 AM   Result Value Ref Range    Phosphorus 2.3 (L) 2.7 - 4.5 mg/dL   CBC auto differential    Collection Time: 12/25/19  4:06 AM   Result Value Ref Range    WBC 3.46 (L) 3.90 - 12.70 K/uL    RBC 3.21 (L) 4.00 - 5.40 M/uL    Hemoglobin 9.1 (L) 12.0 - 16.0 g/dL    Hematocrit 29.0 (L) 37.0 - 48.5 %    Mean Corpuscular Volume 90 82 - 98 fL    Mean Corpuscular Hemoglobin 28.3 27.0 - 31.0 pg    Mean Corpuscular Hemoglobin Conc 31.4 (L) 32.0 - 36.0 g/dL     RDW 16.5 (H) 11.5 - 14.5 %    Platelets 101 (L) 150 - 350 K/uL    MPV 10.8 9.2 - 12.9 fL    Immature Granulocytes 0.3 0.0 - 0.5 %    Gran # (ANC) 2.3 1.8 - 7.7 K/uL    Immature Grans (Abs) 0.01 0.00 - 0.04 K/uL    Lymph # 0.7 (L) 1.0 - 4.8 K/uL    Mono # 0.4 0.3 - 1.0 K/uL    Eos # 0.1 0.0 - 0.5 K/uL    Baso # 0.02 0.00 - 0.20 K/uL    nRBC 0 0 /100 WBC    Gran% 66.1 38.0 - 73.0 %    Lymph% 19.4 18.0 - 48.0 %    Mono% 11.0 4.0 - 15.0 %    Eosinophil% 2.6 0.0 - 8.0 %    Basophil% 0.6 0.0 - 1.9 %    Differential Method Automated      CT Abdomen Pelvis With Contrast   Order: [id number]   Status:  Final result   Visible to patient:  No (Not Released) Next appt:  12/26/2019 at 08:50 AM in Lab (LAB, [facility] CANCER [facility])   Details     Reading Physician Reading Date Result Priority   Prasad Mcpherson MD 12/24/2019 STAT   Ariel Freeman MD 12/24/2019       Narrative     EXAMINATION:  CT ABDOMEN PELVIS WITH CONTRAST    CLINICAL HISTORY:  pancreatic head staging;    TECHNIQUE:  Low dose axial images, sagittal and coronal reformations were obtained from the lung bases to the pubic symphysis following the IV administration of 75 mL of Omnipaque 350.  Oral contrast was not administered.Pancreas tumor protocol.    COMPARISON:  Ultrasound abdomen limited 12/23/2019, CT chest abdomen pelvis 11/25/2019    FINDINGS:  Heart: Normal in size without effusion.    Lung Bases: Symmetrically expanded.  There are trace bilateral pleural effusions with associated atelectasis.  No pneumothorax.    Liver: Normal size.  Stable subcentimeter hypodense lesion within the right hepatic lobe near the dome, too small to characterize..    Gallbladder: There are small stones within the gallbladder lumen.    Bile Ducts: There is a common bile duct stent in place and pneumobilia, primarily within the left hepatic lobe.  Additionally, there is mild intrahepatic ductal dilatation.    Pancreas: There is redemonstration of ill-defined enhancing mass at  the level of the pancreatic head measuring approximately 5.3 x 3.2 cm.  The SMA and SMV appear patent.  The SMV again appears encased (greater than 180° contact) and narrowed by tumor.  The SMA is probably just separate from the tumor edge, which is ill-defined.  Pancreatic duct is normal caliber 2-3 mm.    Spleen: Unremarkable.    Adrenals: Unremarkable.    Kidneys/Ureters: Normal in size and location.  Normal enhancement.  No focal mass.  No nephrolithiasis.  No hydroureteronephrosis.    Bladder: No wall thickening.    Reproductive organs: Normal size.  Small left ovarian cyst again noted.    GI Tract/Mesentery: Stomach is unremarkable.  Loops of small and large bowel demonstrate no signs of  obstruction.  There is mild inflammatory changes surrounding the retroperitoneal portion of the duodenum, adjacent to the pancreatic head mass.  No pneumatosis.    Peritoneal Space: There is a small volume of free fluid at the omar hepatis.  No intraperitoneal free air.  Small amount of ascites is also noted in the lower pelvis.    Retroperitoneum: No significant adenopathy.  Upper normal size lymph nodes near the portal vein are unchanged.    Abdominal wall: Unremarkable.    Vasculature: Significant atherosclerotic calcification, both calcified and noncalcified of the intrarenal abdominal aorta and bilateral common iliac arteries.  No aneurysmal dilatation.    Bones: No acute fracture. No suspicious lytic or sclerotic lesions.      Impression       History pancreatic cancer.  Stable findings.  Redemonstration of an ill-defined mass at the level of the pancreatic head measuring approximately 5.3 cm in maximal diameter.    Common bile duct stent in place with expected pneumobilia and mild intrahepatic ductal dilatation.    Redemonstration of an ill-defined right hepatic lobe hypodensity, too small to characterize but could represent metastatic involvement.    Very small pleural effusions, mild ascites, small left ovarian  cyst, atherosclerosis, and other findings as above.    RECIST SUMMARY:    Date of prior examination for comparison: 11/25/2019    Lesion 1: Pancreatic head.  5.3 cm. Series 2 image 89.  Prior measurement 5.6 cm.    Lesion 2: Right hepatic lobe hypodensity.  0.6 cm. Series 2 image 27.  Prior measurement 0.9 cm.    Electronically signed by resident: Luz Marina Villa  Date: 12/23/2019  Time: 23:20    Electronically signed by: Ariel Freeman MD  Date: 12/24/2019  Time: 07:09           ERCP   Order: [id number]   Status:  Final result   Visible to patient:  No (Not Released)   Next appt:  12/26/2019 at 08:50 AM in Lab (LAB, [facility] CANCER [facility])      Narrative   Performed by: [facility]   Patient Name: Quyen Moody  Procedure Date: 12/24/2019 1:57 PM  MRN: [id number]  Account Number: [id number]  YOB: 1960  Age: 59  Room: -[place] [address]  Gender: Female  Attending MD: Chema Harris MD  Procedure:            ERCP  Indications:          Suspected ascending cholangitis, Jaundice  Providers:            Chema Harris MD, Luzmaria James, MEHRAN, Nicole A. Lombardi, CRNA, Kasandra Armas, Technician  Patient Profile:      This is a 59 year old female. . . . Stenting of a                         stricture successful today.  Referring MD:         Eliot Barrett MD  Complications:        No immediate complications. Estimated blood loss:                         Minimal.  Medicines:            Monitored Anesthesia Care  Procedure:            After obtaining informed consent, the scope was                         passed under direct vision. Throughout the                         procedure, the patient's blood pressure, pulse, and                         oxygen saturations were monitored continuously. The                         Olympus scope TJF-Q180V ([id number]) was introduced                         through the mouth, and advanced to the duodenum and                         used to inject contrast into the bile  duct. The                         patient tolerated the procedure fairly well. The                         procedure was determined to be ASGE Complexity                         Level 1.  Findings:       A biliary sphincterotomy had been performed. The sphincterotomy        appeared open. One metal stent originating in the biliary tree was        emerging from the major papilla. The stent was partially occluded.        The bile duct was deeply cannulated with the 9 mm balloon and        guidewire. Contrast was injected. I personally interpreted the bile        duct images. Ductal flow of contrast was adequate. Image quality was        adequate. Contrast extended to the bifurcation. The lower third of        the main bile duct contained a single moderate stenosis. To discover        objects, the biliary tree was swept with a 9 mm balloon starting at        the bifurcation. Sludge was swept from the duct. One 10 mm by 6 cm        bare metal stent was placed into the common bile duct. The stent was        in good position. The total fluoroscopy exposure time was 1.1        minutes.  Impression:           - Prior biliary sphincterotomy appeared open.                        - One partially occluded stent from the biliary                         tree was seen in the major papilla.                        - A single moderate biliary stricture was found in                         the lower third of the main bile duct. The                         stricture was from in-stent stenosis. This                         stricture was treated with stent placement.                        - The biliary tree was swept and sludge was found.                        - One bare metal stent was placed into the common                         bile duct.  Recommendation:       - [facility].                        - May advance diet as tolerated.                        - Follow-up with oncology  service.  Attending Participation:       I personally performed the entire procedure.  Chema Harris MD  12/24/2019 2:50:52 PM  This report has been verified and signed electronically.  Number of Addenda: 0  Note Initiated On: 12/24/2019 1:57 PM  Estimated Blood Loss: Estimated blood loss was minimal.       This report has been verified and signed                 Pending Diagnostic Studies:     Procedure Component Value Units Date/Time    FL ERCP Biliary And Pancreatic [[id number]] Resulted:  12/24/19 1420    Order Status:  Sent Lab Status:  In process Updated:  12/24/19 1445        Final Active Diagnoses:    Diagnosis Date Noted POA    PRINCIPAL PROBLEM:  Fever [R50.9] 12/23/2019 Yes    Malignant neoplasm of pancreas [C25.9] 12/24/2019 Yes    Acute cholangitis [K83.09]  Unknown    Hyponatremia [E87.1] 12/23/2019 Yes    Elevated transaminase level [R74.0] 12/23/2019 Yes    Elevated bilirubin [R17] 12/23/2019 Yes    Cancer of head of pancreas [C25.0] 07/15/2019 Yes      Problems Resolved During this Admission:      Discharged Condition: good    Disposition: Home or Self Care    Follow Up:  Follow-up Information     Eliot Barrett MD.    Specialty:  Hematology and Oncology  Why:  Follow-Up Appointment as scheduled by the clinic  Contact information:  [address] [facility] [address] [phone]                 Patient Instructions:      Notify your health care provider if you experience any of the following:  temperature >100.4     Notify your health care provider if you experience any of the following:  severe uncontrolled pain     Notify your health care provider if you experience any of the following:  persistent nausea and vomiting or diarrhea     Notify your health care provider if you experience any of the following:   Order Comments: Abdominal pain, nausea, vomiting, or jaundice     Medications:  Reconciled Home Medications:      Medication List      START taking these medications     amoxicillin-clavulanate 875-125mg 875-125 mg per tablet  Commonly known as:  AUGMENTIN  Take 1 tablet by mouth every 12 (twelve) hours. for 3 days        CONTINUE taking these medications    apixaban 5 mg Tab  Commonly known as:  ELIQUIS  Take 1 tablet (5 mg total) by mouth 2 (two) times daily.     fentaNYL 25 mcg/hr  Commonly known as:  DURAGESIC  Place 1 patch onto the skin every 72 hours.     hydrocortisone 2.5 % cream  Apply topically 2 (two) times daily.     lidocaine-prilocaine cream  Commonly known as:  EMLA  Apply to port 45 minutes prior to access.     LORazepam 0.5 MG tablet  Commonly known as:  Ativan  Take 1 tablet (0.5 mg total) by mouth every 6 (six) hours as needed for Anxiety (nausea).     multivitamin per tablet  Commonly known as:  THERAGRAN  Take 1 tablet by mouth once daily.     mupirocin 2 % ointment  Commonly known as:  BACTROBAN  Apply topically 3 (three) times daily.     ondansetron 8 MG tablet  Commonly known as:  ZOFRAN  Take 1 tablet (8 mg total) by mouth every 8 (eight) hours as needed for Nausea.     oxyCODONE 15 MG Tab  Commonly known as:  ROXICODONE  Take 1 tablet (15 mg total) by mouth every 6 (six) hours as needed for Pain.        STOP taking these medications    docusate sodium 100 MG capsule  Commonly known as:  COLACE     estradiol-norethindrone 1-0.5 mg per tablet  Commonly known as:  ACTIVELLA     HYDROcodone-acetaminophen  mg per tablet  Commonly known as:  NORCO     lipase-protease-amylase 36,000-114,000- 180,000 unit Cpdr  Commonly known as:  CREON     polyethylene glycol 17 gram Pwpk  Commonly known as:  GLYCOLAX     ranitidine 150 MG tablet  Commonly known as:  GLORIAAC            Pancho Srivastava MD  Hematology/Oncology  [facility]

## 2019-12-25 NOTE — PLAN OF CARE
POC reviewed with patient, questions answered and concerns addressed. VS stable, medicated x 1 for B/T pain, Fentanyl patch applied to RA (came off last night. VS stable. Returned from ERCP at 1530 and the plan was to discharge today but patient started feeling bad and discharge discontinued. In no acute distress; will continue to monitor.

## 2019-12-26 ENCOUNTER — PATIENT MESSAGE (OUTPATIENT)
Dept: HEMATOLOGY/ONCOLOGY | Facility: CLINIC | Age: 59
End: 2019-12-26

## 2019-12-26 NOTE — PLAN OF CARE
On 12/26/19 at 0630: CM noted that the patient discharged home on 12/25/19. Follow-up scheduled as listed below. Discharge and follow-up instructions completed by the bedside nurse.    Future Appointments   Date Time Provider Department Center   12/26/2019  8[facility]50 [facility]   12/26/2019 10:00 AM Karen Hightower NP [facility] HEM ONC [facility]   1/6/2020  4:00 PM [facility] OIC-CT2 500 LB LIMIT [facility]   1/8/2020  3:00 PM [facility] LAB HO [facility]   1/9/2020  8:00 AM Karen Hightower NP [facility] HEM ONC [facility]   1/9/2020  9:00 AM [facility] CHEMO [facility] CHEMO [facility]   1/13/2020  2:15 PM Tono Hallman MD [facility] SURONC [facility]      12/26/19 0646   Final Note   Assessment Type Final Discharge Note   Anticipated Discharge Disposition Home   What phone number can be called within the next 1-3 days to see how you are doing after discharge?   ([phone])   Hospital Follow Up  Appt(s) scheduled? Yes   Discharge plans and expectations educations in teach back method with documentation complete? Yes  (per the bedside nurse)

## 2019-12-26 NOTE — ANESTHESIA POSTPROCEDURE EVALUATION
Anesthesia Post Evaluation    Patient: Quyen Moody    Procedure(s) Performed: Procedure(s) (LRB):  ERCP (ENDOSCOPIC RETROGRADE CHOLANGIOPANCREATOGRAPHY) (N/A)    Final Anesthesia Type: general    Patient location during evaluation: PACU  Patient participation: Yes- Able to Participate  Level of consciousness: awake and alert  Post-procedure vital signs: reviewed and stable  Pain management: adequate  Airway patency: patent    PONV status at discharge: No PONV  Anesthetic complications: no      Cardiovascular status: blood pressure returned to baseline  Respiratory status: unassisted  Hydration status: euvolemic  Follow-up not needed.          Vitals Value Taken Time   /72 12/25/2019 11:29 AM   Temp 36.4 °C (97.6 °F) 12/25/2019 11:29 AM   Pulse 75 12/25/2019 11:29 AM   Resp 16 12/25/2019 11:29 AM   SpO2 99 % 12/25/2019 11:29 AM         Event Time     Out of Recovery 15:20:40          Pain/Moncho Score: Pain Rating Prior to Med Admin: 5 (12/25/2019  8:45 AM)  Pain Rating Post Med Admin: 2 (12/25/2019  9:45 AM)

## 2019-12-27 LAB
BACTERIA BLD CULT: NORMAL
BACTERIA BLD CULT: NORMAL

## 2019-12-27 NOTE — PHYSICIAN QUERY
PT Name: Quyen Moody  MR #: [id number]     Physician Query Form - Etiology of Condition Clarification      CDS: Corinna Marinelli RN   Contact information:[email or web address]    This form is a permanent document in the medical record.     Query Date: December 27, 2019    By submitting this query, we are merely seeking further clarification of documentation.  Please utilize your independent clinical judgment when addressing the question(s) below.     The medical record contains the following:    Findings Supporting Clinical Information Location in Medical Record   Biliary Obstruction   ERCP completed with impression and recs below.  Impression:            - Prior biliary sphincterotomy appeared open.  - One partially occluded stent from the biliary tree was seen in the major papilla.  - A single moderate biliary stricture was found in the lower third of the main bile duct. The stricture was from in-stent stenosis. This stricture was treated with stent placement.  - The biliary tree was swept and sludge was found.  - One bare metal stent was placed into the common bile duct.    Elevated bilirubin  59 female with locally advanced pancreatic cancer enrolled to Panova-3 trial on who [facility] is being consulted for biliary obstruction.  Patient presented to the ED after having fevers at home as well as nausea/emesis with labs revealing elevated LFT's  (continue to increase today) as well as US with PD/biliary tree prominence. Although her current presentation could be secondary to progression of her disease with a known biliary stent her current presentation could be from an obstructive process (if the stent has migrated or become occluded). We will follow her morning labs as well as CT ordered by the primary team with plan to move forward with  ERCP.  Recommendations:  --NPO after  MN for ERCP  --Daily CMP and CBC  --Follow up blood cultures  --Continue antibiotics  --F/U CT abdomen/pelvis   GI Treatment Plan  12/25                    GI Consult 12/23     Please document your best medical opinion regarding the etiology of Biliary Obstruction for which treatment is/was directed.     Provider use only      [X] Pancreatic Cancer      [  ] Other:_________________________         [  ] Clinically Undetermined     Please document in your progress notes daily for the duration of treatment, until resolved, and include in your discharge summary.

## 2019-12-30 ENCOUNTER — OFFICE VISIT (OUTPATIENT)
Dept: HEMATOLOGY/ONCOLOGY | Facility: CLINIC | Age: 59
End: 2019-12-30
Payer: COMMERCIAL

## 2019-12-30 ENCOUNTER — LAB VISIT (OUTPATIENT)
Dept: LAB | Facility: HOSPITAL | Age: 59
End: 2019-12-30
Attending: INTERNAL MEDICINE
Payer: COMMERCIAL

## 2019-12-30 VITALS
HEART RATE: 74 BPM | WEIGHT: 133.63 LBS | TEMPERATURE: 98 F | DIASTOLIC BLOOD PRESSURE: 73 MMHG | HEIGHT: 63 IN | OXYGEN SATURATION: 100 % | SYSTOLIC BLOOD PRESSURE: 170 MMHG | BODY MASS INDEX: 23.68 KG/M2 | RESPIRATION RATE: 16 BRPM

## 2019-12-30 DIAGNOSIS — C25.0 CANCER OF HEAD OF PANCREAS: ICD-10-CM

## 2019-12-30 DIAGNOSIS — G89.3 NEOPLASM RELATED PAIN (ACUTE) (CHRONIC): Primary | ICD-10-CM

## 2019-12-30 DIAGNOSIS — G89.3 NEOPLASM RELATED PAIN (ACUTE) (CHRONIC): ICD-10-CM

## 2019-12-30 DIAGNOSIS — Z00.6 RESEARCH STUDY PATIENT: ICD-10-CM

## 2019-12-30 DIAGNOSIS — R79.89 ELEVATED LFTS: ICD-10-CM

## 2019-12-30 LAB
ALBUMIN SERPL BCP-MCNC: 2.9 G/DL (ref 3.5–5.2)
ALP SERPL-CCNC: 161 U/L (ref 55–135)
ALT SERPL W/O P-5'-P-CCNC: 58 U/L (ref 10–44)
ANION GAP SERPL CALC-SCNC: 4 MMOL/L (ref 8–16)
AST SERPL-CCNC: 30 U/L (ref 10–40)
BASOPHILS # BLD AUTO: 0.03 K/UL (ref 0–0.2)
BASOPHILS NFR BLD: 0.4 % (ref 0–1.9)
BILIRUB SERPL-MCNC: 0.5 MG/DL (ref 0.1–1)
BUN SERPL-MCNC: 10 MG/DL (ref 6–20)
CALCIUM SERPL-MCNC: 8.5 MG/DL (ref 8.7–10.5)
CHLORIDE SERPL-SCNC: 108 MMOL/L (ref 95–110)
CO2 SERPL-SCNC: 26 MMOL/L (ref 23–29)
CREAT SERPL-MCNC: 0.8 MG/DL (ref 0.5–1.4)
DIFFERENTIAL METHOD: ABNORMAL
EOSINOPHIL # BLD AUTO: 0.2 K/UL (ref 0–0.5)
EOSINOPHIL NFR BLD: 2.9 % (ref 0–8)
ERYTHROCYTE [DISTWIDTH] IN BLOOD BY AUTOMATED COUNT: 17.9 % (ref 11.5–14.5)
EST. GFR  (AFRICAN AMERICAN): >60 ML/MIN/1.73 M^2
EST. GFR  (NON AFRICAN AMERICAN): >60 ML/MIN/1.73 M^2
GLUCOSE SERPL-MCNC: 96 MG/DL (ref 70–110)
HCT VFR BLD AUTO: 33.9 % (ref 37–48.5)
HGB BLD-MCNC: 10.5 G/DL (ref 12–16)
IMM GRANULOCYTES # BLD AUTO: 0.03 K/UL (ref 0–0.04)
IMM GRANULOCYTES NFR BLD AUTO: 0.4 % (ref 0–0.5)
LYMPHOCYTES # BLD AUTO: 1.7 K/UL (ref 1–4.8)
LYMPHOCYTES NFR BLD: 25.2 % (ref 18–48)
MCH RBC QN AUTO: 28.6 PG (ref 27–31)
MCHC RBC AUTO-ENTMCNC: 31 G/DL (ref 32–36)
MCV RBC AUTO: 92 FL (ref 82–98)
MONOCYTES # BLD AUTO: 1.1 K/UL (ref 0.3–1)
MONOCYTES NFR BLD: 15.5 % (ref 4–15)
NEUTROPHILS # BLD AUTO: 3.8 K/UL (ref 1.8–7.7)
NEUTROPHILS NFR BLD: 55.6 % (ref 38–73)
NRBC BLD-RTO: 0 /100 WBC
PLATELET # BLD AUTO: 303 K/UL (ref 150–350)
PMV BLD AUTO: 10.6 FL (ref 9.2–12.9)
POTASSIUM SERPL-SCNC: 4.4 MMOL/L (ref 3.5–5.1)
PROT SERPL-MCNC: 6.2 G/DL (ref 6–8.4)
RBC # BLD AUTO: 3.67 M/UL (ref 4–5.4)
SODIUM SERPL-SCNC: 138 MMOL/L (ref 136–145)
WBC # BLD AUTO: 6.79 K/UL (ref 3.9–12.7)

## 2019-12-30 PROCEDURE — 99214 PR OFFICE/OUTPT VISIT, EST, LEVL IV, 30-39 MIN: ICD-10-PCS | Mod: S$GLB,,, | Performed by: INTERNAL MEDICINE

## 2019-12-30 PROCEDURE — 99999 PR PBB SHADOW E&M-EST. PATIENT-LVL IV: CPT | Mod: PBBFAC,,, | Performed by: INTERNAL MEDICINE

## 2019-12-30 PROCEDURE — 80053 COMPREHEN METABOLIC PANEL: CPT

## 2019-12-30 PROCEDURE — 99999 PR PBB SHADOW E&M-EST. PATIENT-LVL IV: ICD-10-PCS | Mod: PBBFAC,,, | Performed by: INTERNAL MEDICINE

## 2019-12-30 PROCEDURE — 36415 COLL VENOUS BLD VENIPUNCTURE: CPT

## 2019-12-30 PROCEDURE — 3008F BODY MASS INDEX DOCD: CPT | Mod: CPTII,S$GLB,, | Performed by: INTERNAL MEDICINE

## 2019-12-30 PROCEDURE — 99214 OFFICE O/P EST MOD 30 MIN: CPT | Mod: S$GLB,,, | Performed by: INTERNAL MEDICINE

## 2019-12-30 PROCEDURE — 3008F PR BODY MASS INDEX (BMI) DOCUMENTED: ICD-10-PCS | Mod: CPTII,S$GLB,, | Performed by: INTERNAL MEDICINE

## 2019-12-30 PROCEDURE — 85025 COMPLETE CBC W/AUTO DIFF WBC: CPT

## 2019-12-30 RX ORDER — FENTANYL 25 UG/1
1 PATCH TRANSDERMAL
Qty: 10 PATCH | Refills: 0 | Status: SHIPPED | OUTPATIENT
Start: 2019-12-30 | End: 2020-01-27 | Stop reason: SDUPTHER

## 2019-12-30 NOTE — PROGRESS NOTES
Subjective:       Patient ID: Quyen Moody    Chief Complaint:  Pancreatic cancer    HPI     Quyen Moody is a 59 y.o. female, patient  to clinic for follow up and treatment  on PANOVA-3 trial. She is currently receiving [place]+Abraxane and TTF. Patient feeling well today, has nausea post chemo infusion      Based on latest scans, Dr. Hallman feels pt may actually be resectable after chemoXRT given good response to current therapy.      ECOG 0.    Oncologic History:  She has had intermittent upper abdominal pain since early June.  She presented to her PCP who originally ordered an US and CT.  US was negative and CT showed some haziness at the pancreatic head.  She saw GI who performed EUS.  On EUS, a 3x4cm pancreatic head mass was seen with possible invasion into the SMA and portal vein.  FNA path s/w pancreatic adenocarcinoma.  CA 19-9 level at outside hospital was >1000 per the patient.  She endorses nausea and inability to tolerate much PO.  She has lost about 10lbs over the last few weeks and is noticing her skin turning yellow.  Denies pruritis.  She is passing gas but has not had a BM in a few days, she attributes this to narcotic use.  She recently started taking stool softeners.  No previous cardiac or stroke history.  Surgical history significant for open appendectomy when she was in her 20s    Review of Systems   Constitutional: Positive for fatigue. Negative for activity change, appetite change, chills, fever and unexpected weight change (improving).   HENT: Negative for congestion, hearing loss, mouth sores, sore throat, tinnitus and voice change.    Eyes: Negative for pain and visual disturbance.   Respiratory: Positive for shortness of breath. Negative for cough and wheezing.    Cardiovascular: Positive for leg swelling (left). Negative for chest pain and palpitations.   Gastrointestinal: Positive for abdominal pain. Negative for constipation, diarrhea, nausea and vomiting.   Endocrine: Negative for cold  intolerance and heat intolerance.   Genitourinary: Negative for difficulty urinating, dyspareunia, dysuria, frequency, menstrual problem, urgency, vaginal bleeding, vaginal discharge and vaginal pain.   Musculoskeletal: Negative for arthralgias, back pain and myalgias.   Skin: Positive for rash. Negative for color change and wound.   Allergic/Immunologic: Negative for environmental allergies and food allergies.   Neurological: Negative for weakness, numbness and headaches.   Hematological: Negative for adenopathy. Does not bruise/bleed easily.   Psychiatric/Behavioral: Negative for agitation, confusion, hallucinations and sleep disturbance. The patient is nervous/anxious.    All other systems reviewed and are negative.        Allergies:  Review of patient's allergies indicates:   Allergen Reactions    Sulfa (sulfonamide antibiotics) Swelling and Hives     tongue         Medications:  Current Outpatient Medications   Medication Sig Dispense Refill    apixaban (ELIQUIS) 5 mg Tab Take 1 tablet (5 mg total) by mouth 2 (two) times daily. 60 tablet 6    fentaNYL (DURAGESIC) 25 mcg/hr Place 1 patch onto the skin every 72 hours. 10 patch 0    hydrocortisone 2.5 % cream Apply topically 2 (two) times daily. 453.6 g 1    lidocaine-prilocaine (EMLA) cream Apply to port 45 minutes prior to access. 30 g 0    LORazepam (ATIVAN) 0.5 MG tablet Take 1 tablet (0.5 mg total) by mouth every 6 (six) hours as needed for Anxiety (nausea). 60 tablet 0    multivitamin (THERAGRAN) per tablet Take 1 tablet by mouth once daily.      mupirocin (BACTROBAN) 2 % ointment Apply topically 3 (three) times daily. 30 g 1    ondansetron (ZOFRAN) 8 MG tablet Take 1 tablet (8 mg total) by mouth every 8 (eight) hours as needed for Nausea. 120 tablet 2    oxyCODONE (ROXICODONE) 15 MG Tab Take 1 tablet (15 mg total) by mouth every 6 (six) hours as needed for Pain. 90 tablet 0     No current facility-administered medications for this visit.         PMH:  Past Medical History:   Diagnosis Date    Allergic rhinitis 3/3/2014    CKD (chronic kidney disease) stage 3, GFR 30-59 ml/min 12/26/2015    Hyperlipidemia 3/3/2014    Pancreas cancer     Skin rash 8/10/2019       PSH:  Past Surgical History:   Procedure Laterality Date    ERCP N/A 7/16/2019    Procedure: ERCP (ENDOSCOPIC RETROGRADE CHOLANGIOPANCREATOGRAPHY);  Surgeon: Chema Harris MD;  Location: [facility] ([address] [place] [address]);  Service: Endoscopy;  Laterality: N/A;    ERCP N/A 12/24/2019    Procedure: ERCP (ENDOSCOPIC RETROGRADE CHOLANGIOPANCREATOGRAPHY);  Surgeon: Chema Harris MD;  [facility]  Service: Endoscopy;  Laterality: N/A;    Exporatory lap      INSERTION OF TUNNELED CENTRAL VENOUS CATHETER (CVC) WITH SUBCUTANEOUS PORT Right 8/12/2019    Procedure: [id number];  Surgeon: Cesar Hallman MD;  Location: [facility] OR [address] [place] [address];  Service: General;  Laterality: Right;  right IJ       FamHx:  Family History   Problem Relation Age of Onset    Diabetes Mother     Diabetes Father     Diabetes Brother     Heart disease Neg Hx        SocHx:  Social History     Socioeconomic History    Marital status:      Spouse name: Not on file    Number of children: 0    Years of education: Not on file    Highest education level: Not on file   Occupational History    Occupation:      Employer: [employer]   Social Needs    Financial resource strain: Not on file    Food insecurity:     Worry: Not on file     Inability: Not on file    Transportation needs:     Medical: Not on file     Non-medical: Not on file   Tobacco Use    Smoking status: Former Smoker     Start date: 12/11/1999    Smokeless tobacco: Never Used   Substance and Sexual Activity    Alcohol use: Yes     Alcohol/week: 1.0 standard drinks     Types: 1 Glasses of wine per week     Frequency: 4 or more times a week     Drinks per session: 1 or 2     Binge frequency: Never     Comment: last drink a  month ago     Drug use: No    Sexual activity: Yes   Lifestyle    Physical activity:     Days per week: Not on file     Minutes per session: Not on file    Stress: Not on file   Relationships    Social connections:     Talks on phone: Not on file     Gets together: Not on file     Attends Cheondoism service: Not on file     Active member of club or organization: Not on file     Attends meetings of clubs or organizations: Not on file     Relationship status: Not on file   Other Topics Concern    Not on file   Social History Narrative    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      of Cancer in 2016         Objective:       Vitals:    19 1507   BP: (!) 170/73   Pulse: 74   Resp: 16   Temp: 98 °F (36.7 °C)       Physical Exam   Constitutional: She is oriented to person, place, and time. She appears well-developed and well-nourished. No distress.   HENT:   Head: Normocephalic and atraumatic.   Nose: Nose normal.   Mouth/Throat: Oropharynx is clear and moist. No oropharyngeal exudate.   Eyes: Pupils are equal, round, and reactive to light. Conjunctivae and EOM are normal. Right eye exhibits no discharge. Left eye exhibits no discharge. No scleral icterus.   Neck: Normal range of motion. Neck supple. No tracheal deviation present. No thyromegaly present.   Cardiovascular: Normal rate, regular rhythm, normal heart sounds and normal pulses.   No swelling to bilateral lower extremities.    Pulmonary/Chest: Effort normal and breath sounds normal. She has no wheezes. She has no rales.   Abdominal: Soft. Bowel sounds are normal. She exhibits no distension. There is no tenderness. There is no guarding.   Musculoskeletal: Normal range of motion. She exhibits edema (left lower extremity +1 from ankle to knee). She exhibits no tenderness or deformity.   No spinal or paraspinal tenderness to palpation.       Lymphadenopathy:     She has no cervical adenopathy.   Neurological: She is alert and oriented to person,  place, and time. No cranial nerve deficit. Coordination normal.   Skin: Skin is warm, dry and intact. Rash noted. She is not diaphoretic. No erythema. No pallor.   Psychiatric: She has a normal mood and affect. Her behavior is normal. Judgment and thought content normal.   Nursing note and vitals reviewed.        LABS:  WBC   Date Value Ref Range Status   12/30/2019 6.79 3.90 - 12.70 K/uL Final     Hemoglobin   Date Value Ref Range Status   12/30/2019 10.5 (L) 12.0 - 16.0 g/dL Final     Hematocrit   Date Value Ref Range Status   12/30/2019 33.9 (L) 37.0 - 48.5 % Final     Platelets   Date Value Ref Range Status   12/30/2019 303 150 - 350 K/uL Final       Chemistry        Component Value Date/Time     12/30/2019 1409    K 4.4 12/30/2019 1409     12/30/2019 1409    CO2 26 12/30/2019 1409    BUN 10 12/30/2019 1409    CREATININE 0.8 12/30/2019 1409    GLU 96 12/30/2019 1409        Component Value Date/Time    CALCIUM 8.5 (L) 12/30/2019 1409    ALKPHOS 161 (H) 12/30/2019 1409    AST 30 12/30/2019 1409    ALT 58 (H) 12/30/2019 1409    BILITOT 0.5 12/30/2019 1409    ESTGFRAFRICA >60.0 12/30/2019 1409    EGFRNONAA >60.0 12/30/2019 1409            Assessment:       1. Neoplasm related pain (acute) (chronic)    2. Cancer of head of pancreas    3. Research study patient    4. Elevated LFTs            Plan:         1. Cancer at the head of the pancreas:    Discussed SOC chemo and chemoXRT with FOLFIRINOX vs our PANOVA-3 trial with Zarephath-Abraxane +/- TTFields.  Extensively reviewed the rationale of the study and reviewed her eligibility criteria with the research nurse and discussed case with Dr. Hallman as well.  She is interested in this study, and signed consent with our research nurse.     Here today to con't treatment with Zarephath+Abraxane and TTF on PANOVA-3, LFTs elevated, but improved. Will con't chemo today, but will dose reduce Zarephath to 800 and Abraxane to 100 per pt request due to chemo related AEs.      Based on  latest scans, Dr. Hallman feels pt may actually be resectable after chemoXRT given good response to current therapy.      Will con't treatment on trial for now, but have pt see rad onc to consider combined chemoXRT.       Refer to rad onc (Dr. Stephenson) to discuss possible combined chemoXRT.  RTC per research RN.     Patient is in agreement with the proposed treatment plan. All questions were answered to the patient's satisfaction. Pt knows to call clinic if anything is needed before the next clinic visit.    More than 25 mins were spent during this encounter, greater than 50% was spent in direct counseling and/or coordination of care.     Eliot Barrett M.D., M.S., F.A.C.P.  Hematology and Oncology Attending  [facility]

## 2019-12-31 ENCOUNTER — PATIENT MESSAGE (OUTPATIENT)
Dept: RADIATION ONCOLOGY | Facility: CLINIC | Age: 59
End: 2019-12-31

## 2020-01-02 ENCOUNTER — PATIENT MESSAGE (OUTPATIENT)
Dept: HEMATOLOGY/ONCOLOGY | Facility: CLINIC | Age: 60
End: 2020-01-02

## 2020-01-08 ENCOUNTER — LAB VISIT (OUTPATIENT)
Dept: LAB | Facility: HOSPITAL | Age: 60
End: 2020-01-08
Attending: INTERNAL MEDICINE
Payer: COMMERCIAL

## 2020-01-08 DIAGNOSIS — Z00.6 RESEARCH STUDY PATIENT: ICD-10-CM

## 2020-01-08 DIAGNOSIS — C25.0 CANCER OF HEAD OF PANCREAS: ICD-10-CM

## 2020-01-08 DIAGNOSIS — Z00.6 EXAMINATION OF PARTICIPANT IN CLINICAL TRIAL: ICD-10-CM

## 2020-01-08 DIAGNOSIS — C25.9 PANCREATIC CANCER: ICD-10-CM

## 2020-01-08 DIAGNOSIS — D49.0 PANCREAS NEOPLASM: ICD-10-CM

## 2020-01-08 DIAGNOSIS — G89.3 NEOPLASM RELATED PAIN (ACUTE) (CHRONIC): ICD-10-CM

## 2020-01-08 LAB
ALBUMIN SERPL BCP-MCNC: 3.5 G/DL (ref 3.5–5.2)
ALP SERPL-CCNC: 145 U/L (ref 55–135)
ALT SERPL W/O P-5'-P-CCNC: 38 U/L (ref 10–44)
ANION GAP SERPL CALC-SCNC: 7 MMOL/L (ref 8–16)
AST SERPL-CCNC: 31 U/L (ref 10–40)
BASOPHILS # BLD AUTO: 0.05 K/UL (ref 0–0.2)
BASOPHILS NFR BLD: 0.6 % (ref 0–1.9)
BILIRUB SERPL-MCNC: 0.5 MG/DL (ref 0.1–1)
BUN SERPL-MCNC: 15 MG/DL (ref 6–20)
CALCIUM SERPL-MCNC: 9.4 MG/DL (ref 8.7–10.5)
CANCER AG19-9 SERPL-ACNC: 1039 U/ML (ref 2–40)
CHLORIDE SERPL-SCNC: 106 MMOL/L (ref 95–110)
CO2 SERPL-SCNC: 23 MMOL/L (ref 23–29)
CREAT SERPL-MCNC: 0.8 MG/DL (ref 0.5–1.4)
DIFFERENTIAL METHOD: ABNORMAL
EOSINOPHIL # BLD AUTO: 0.1 K/UL (ref 0–0.5)
EOSINOPHIL NFR BLD: 1.1 % (ref 0–8)
ERYTHROCYTE [DISTWIDTH] IN BLOOD BY AUTOMATED COUNT: 17.1 % (ref 11.5–14.5)
EST. GFR  (AFRICAN AMERICAN): >60 ML/MIN/1.73 M^2
EST. GFR  (NON AFRICAN AMERICAN): >60 ML/MIN/1.73 M^2
GGT SERPL-CCNC: 187 U/L (ref 8–55)
GLUCOSE SERPL-MCNC: 97 MG/DL (ref 70–110)
HCT VFR BLD AUTO: 40.7 % (ref 37–48.5)
HGB BLD-MCNC: 12.3 G/DL (ref 12–16)
IMM GRANULOCYTES # BLD AUTO: 0.03 K/UL (ref 0–0.04)
IMM GRANULOCYTES NFR BLD AUTO: 0.4 % (ref 0–0.5)
LDH SERPL L TO P-CCNC: 179 U/L (ref 110–260)
LYMPHOCYTES # BLD AUTO: 1.8 K/UL (ref 1–4.8)
LYMPHOCYTES NFR BLD: 20.5 % (ref 18–48)
MCH RBC QN AUTO: 27.8 PG (ref 27–31)
MCHC RBC AUTO-ENTMCNC: 30.2 G/DL (ref 32–36)
MCV RBC AUTO: 92 FL (ref 82–98)
MONOCYTES # BLD AUTO: 1 K/UL (ref 0.3–1)
MONOCYTES NFR BLD: 11.6 % (ref 4–15)
NEUTROPHILS # BLD AUTO: 5.6 K/UL (ref 1.8–7.7)
NEUTROPHILS NFR BLD: 65.8 % (ref 38–73)
NRBC BLD-RTO: 0 /100 WBC
PLATELET # BLD AUTO: 510 K/UL (ref 150–350)
PMV BLD AUTO: 9.3 FL (ref 9.2–12.9)
POTASSIUM SERPL-SCNC: 5.4 MMOL/L (ref 3.5–5.1)
PROT SERPL-MCNC: 7.4 G/DL (ref 6–8.4)
RBC # BLD AUTO: 4.43 M/UL (ref 4–5.4)
SODIUM SERPL-SCNC: 136 MMOL/L (ref 136–145)
WBC # BLD AUTO: 8.55 K/UL (ref 3.9–12.7)

## 2020-01-08 PROCEDURE — 82977 ASSAY OF GGT: CPT

## 2020-01-08 PROCEDURE — 80053 COMPREHEN METABOLIC PANEL: CPT

## 2020-01-08 PROCEDURE — 36415 COLL VENOUS BLD VENIPUNCTURE: CPT

## 2020-01-08 PROCEDURE — 83615 LACTATE (LD) (LDH) ENZYME: CPT

## 2020-01-08 PROCEDURE — 86301 IMMUNOASSAY TUMOR CA 19-9: CPT

## 2020-01-08 PROCEDURE — 85025 COMPLETE CBC W/AUTO DIFF WBC: CPT

## 2020-01-08 NOTE — PROGRESS NOTES
Subjective:       Patient ID: Quyen Moody    Chief Complaint:  Pancreatic cancer    HPI     Quyen Moody is a 59 y.o. female, patient to clinic for follow up and treatment on PANOVA-3 trial. She is currently receiving [place]+Abraxane and TTF. Patient feeling well today. Scheduled to see Dr. Hallman and rad onc next week for possible further therapy.     ECOG 0.    Oncologic History:  She has had intermittent upper abdominal pain since early June.  She presented to her PCP who originally ordered an US and CT.  US was negative and CT showed some haziness at the pancreatic head.  She saw GI who performed EUS.  On EUS, a 3x4cm pancreatic head mass was seen with possible invasion into the SMA and portal vein.  FNA path s/w pancreatic adenocarcinoma.  CA 19-9 level at outside hospital was >1000 per the patient.  She endorses nausea and inability to tolerate much PO.  She has lost about 10lbs over the last few weeks and is noticing her skin turning yellow.  Denies pruritis.  She is passing gas but has not had a BM in a few days, she attributes this to narcotic use.  She recently started taking stool softeners.  No previous cardiac or stroke history.  Surgical history significant for open appendectomy when she was in her 20s    Review of Systems   Constitutional: Positive for fatigue. Negative for activity change, appetite change, chills, fever and unexpected weight change (improving).   HENT: Negative for congestion, hearing loss, mouth sores, sore throat, tinnitus and voice change.    Eyes: Negative for pain and visual disturbance.   Respiratory: Positive for shortness of breath. Negative for cough and wheezing.    Cardiovascular: Positive for leg swelling (left). Negative for chest pain and palpitations.   Gastrointestinal: Positive for abdominal pain. Negative for constipation, diarrhea, nausea and vomiting.   Endocrine: Negative for cold intolerance and heat intolerance.   Genitourinary: Negative for difficulty urinating,  dyspareunia, dysuria, frequency, menstrual problem, urgency, vaginal bleeding, vaginal discharge and vaginal pain.   Musculoskeletal: Negative for arthralgias, back pain and myalgias.   Skin: Positive for rash. Negative for color change and wound.   Allergic/Immunologic: Negative for environmental allergies and food allergies.   Neurological: Negative for weakness, numbness and headaches.   Hematological: Negative for adenopathy. Does not bruise/bleed easily.   Psychiatric/Behavioral: Negative for agitation, confusion, hallucinations and sleep disturbance. The patient is nervous/anxious.    All other systems reviewed and are negative.        Allergies:  Review of patient's allergies indicates:   Allergen Reactions    Sulfa (sulfonamide antibiotics) Swelling and Hives     tongue         Medications:  Current Outpatient Medications   Medication Sig Dispense Refill    apixaban (ELIQUIS) 5 mg Tab Take 1 tablet (5 mg total) by mouth 2 (two) times daily. 60 tablet 6    fentaNYL (DURAGESIC) 25 mcg/hr Place 1 patch onto the skin every 72 hours. 10 patch 0    lidocaine-prilocaine (EMLA) cream Apply to port 45 minutes prior to access. 30 g 0    LORazepam (ATIVAN) 0.5 MG tablet Take 1 tablet (0.5 mg total) by mouth every 6 (six) hours as needed for Anxiety (nausea). 60 tablet 0    multivitamin (THERAGRAN) per tablet Take 1 tablet by mouth once daily.      ondansetron (ZOFRAN) 8 MG tablet Take 1 tablet (8 mg total) by mouth every 8 (eight) hours as needed for Nausea. 120 tablet 2    oxyCODONE (ROXICODONE) 15 MG Tab Take 1 tablet (15 mg total) by mouth every 6 (six) hours as needed for Pain. 90 tablet 0    hydrocortisone 2.5 % cream Apply topically 2 (two) times daily. (Patient not taking: Reported on 1/9/2020) 453.6 g 1     No current facility-administered medications for this visit.        PMH:  Past Medical History:   Diagnosis Date    Allergic rhinitis 3/3/2014    CKD (chronic kidney disease) stage 3, GFR 30-59  ml/min 12/26/2015    Hyperlipidemia 3/3/2014    Pancreas cancer     Skin rash 8/10/2019       PSH:  Past Surgical History:   Procedure Laterality Date    ERCP N/A 7/16/2019    Procedure: ERCP (ENDOSCOPIC RETROGRADE CHOLANGIOPANCREATOGRAPHY);  Surgeon: Chema Harris MD;  Location: [address]);  Service: Endoscopy;  Laterality: N/A;    ERCP N/A 12/24/2019    Procedure: ERCP (ENDOSCOPIC RETROGRADE CHOLANGIOPANCREATOGRAPHY);  Surgeon: Chema Harris MD;  Location: [facility] ([address] [place] [address]);  Service: Endoscopy;  Laterality: N/A;    Exporatory lap      INSERTION OF TUNNELED CENTRAL VENOUS CATHETER (CVC) WITH SUBCUTANEOUS PORT Right 8/12/2019    Procedure: [id number];  Surgeon: Cesar Hallman MD;  Location: [facility] OR [address] [place] [address];  Service: General;  Laterality: Right;  right IJ       FamHx:  Family History   Problem Relation Age of Onset    Diabetes Mother     Diabetes Father     Diabetes Brother     Heart disease Neg Hx        SocHx:  Social History     Socioeconomic History    Marital status:      Spouse name: Not on file    Number of children: 0    Years of education: Not on file    Highest education level: Not on file   Occupational History    Occupation:      Employer: [employer]   Social Needs    Financial resource strain: Not on file    Food insecurity:     Worry: Not on file     Inability: Not on file    Transportation needs:     Medical: Not on file     Non-medical: Not on file   Tobacco Use    Smoking status: Former Smoker     Start date: 12/11/1999    Smokeless tobacco: Never Used   Substance and Sexual Activity    Alcohol use: Yes     Alcohol/week: 1.0 standard drinks     Types: 1 Glasses of wine per week     Frequency: 4 or more times a week     Drinks per session: 1 or 2     Binge frequency: Never     Comment: last drink a month ago     Drug use: No    Sexual activity: Yes   Lifestyle    Physical activity:     Days per week: Not on file     Minutes per  session: Not on file    Stress: Not on file   Relationships    Social connections:     Talks on phone: Not on file     Gets together: Not on file     Attends Sabianism service: Not on file     Active member of club or organization: Not on file     Attends meetings of clubs or organizations: Not on file     Relationship status: Not on file   Other Topics Concern    Not on file   Social History Narrative    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      of Cancer in 2016         Objective:       Vitals:    20 0808   BP: 121/66   Pulse: 86   Resp: 20   Temp: 97.8 °F (36.6 °C)       Physical Exam   Constitutional: She is oriented to person, place, and time. She appears well-developed and well-nourished. No distress.   HENT:   Head: Normocephalic and atraumatic.   Nose: Nose normal.   Mouth/Throat: Oropharynx is clear and moist. No oropharyngeal exudate.   Eyes: Pupils are equal, round, and reactive to light. Conjunctivae and EOM are normal. Right eye exhibits no discharge. Left eye exhibits no discharge. No scleral icterus.   Neck: Normal range of motion. Neck supple. No tracheal deviation present. No thyromegaly present.   Cardiovascular: Normal rate, regular rhythm, normal heart sounds and normal pulses.   No swelling to bilateral lower extremities.    Pulmonary/Chest: Effort normal and breath sounds normal. She has no wheezes. She has no rales.   Abdominal: Soft. Bowel sounds are normal. She exhibits no distension. There is no tenderness. There is no guarding.   Musculoskeletal: Normal range of motion. She exhibits edema (left lower extremity +1 from ankle to knee). She exhibits no tenderness or deformity.   No spinal or paraspinal tenderness to palpation.       Lymphadenopathy:     She has no cervical adenopathy.   Neurological: She is alert and oriented to person, place, and time. No cranial nerve deficit. Coordination normal.   Skin: Skin is warm, dry and intact. Rash noted. She is not diaphoretic. No  erythema. No pallor.   Psychiatric: She has a normal mood and affect. Her behavior is normal. Judgment and thought content normal.   Nursing note and vitals reviewed.        LABS:  WBC   Date Value Ref Range Status   01/08/2020 8.55 3.90 - 12.70 K/uL Final     Hemoglobin   Date Value Ref Range Status   01/08/2020 12.3 12.0 - 16.0 g/dL Final     Hematocrit   Date Value Ref Range Status   01/08/2020 40.7 37.0 - 48.5 % Final     Platelets   Date Value Ref Range Status   01/08/2020 510 (H) 150 - 350 K/uL Final       Chemistry        Component Value Date/Time     01/08/2020 1558    K 5.4 (H) 01/08/2020 1558     01/08/2020 1558    CO2 23 01/08/2020 1558    BUN 15 01/08/2020 1558    CREATININE 0.8 01/08/2020 1558    GLU 97 01/08/2020 1558        Component Value Date/Time    CALCIUM 9.4 01/08/2020 1558    ALKPHOS 145 (H) 01/08/2020 1558    AST 31 01/08/2020 1558    ALT 38 01/08/2020 1558    BILITOT 0.5 01/08/2020 1558    ESTGFRAFRICA >60.0 01/08/2020 1558    EGFRNONAA >60.0 01/08/2020 1558            Assessment:       1. Cancer of head of pancreas    2. Research study patient    3. Antineoplastic chemotherapy induced anemia    4. Elevated LFTs    5. VTE (venous thromboembolism)    6. Anticoagulated          Plan:         1,2 Cancer at the head of the pancreas:    Discussed SOC chemo and chemoXRT with FOLFIRINOX vs our PANOVA-3 trial with [place]-Abraxane +/- TTFields.  Extensively reviewed the rationale of the study and reviewed her eligibility criteria with the research nurse and discussed case with Dr. Hallman as well.  She is interested in this study, and signed consent with our research nurse.     Here today to con't treatment with [place]+Abraxane and TTF on PANOVA-3, LFTs elevated, but improved. Will con't chemo today, but will dose reduce Marshall to 800 and Abraxane to 100 per pt request due to chemo related AEs.      Based on latest scans, Dr. Hallman feels pt may actually be resectable after chemoXRT given good  response to current therapy. Now with rising ca-19-9. Case discussed with Milli Barrett and Lan. Will proceed with this cycle on trial, get CT chest this weekend and see Dr. Hallman and rad onc to consider combined chemoXRT next week as scheduled.       RTC per research RN.     3- Stable, will monitor.     4- Improving.    5,6- On Xarelto. Discussed need for ultrasound to determine if this is a second blood clot. If it is, will need to switch to Lovenox. Patient aware.    More than 45 mins were spent during this encounter, greater than 50% was spent in direct counseling and/or coordination of care.     Patient is in agreement with the proposed treatment plan. All questions were answered to the patient's satisfaction. Pt knows to call clinic if anything is needed before the next clinic visit.    Karen Hightower, MSN, APRN, FNP-C  Hematology and Medical Oncology  Nurse Practitioner to Dr. Eliot Barrett  Nurse Practitioner, [facility]

## 2020-01-09 ENCOUNTER — OFFICE VISIT (OUTPATIENT)
Dept: HEMATOLOGY/ONCOLOGY | Facility: CLINIC | Age: 60
End: 2020-01-09
Payer: COMMERCIAL

## 2020-01-09 ENCOUNTER — INFUSION (OUTPATIENT)
Dept: INFUSION THERAPY | Facility: HOSPITAL | Age: 60
End: 2020-01-09
Attending: INTERNAL MEDICINE
Payer: COMMERCIAL

## 2020-01-09 ENCOUNTER — RESEARCH ENCOUNTER (OUTPATIENT)
Dept: RESEARCH | Facility: HOSPITAL | Age: 60
End: 2020-01-09

## 2020-01-09 VITALS
HEART RATE: 86 BPM | RESPIRATION RATE: 20 BRPM | TEMPERATURE: 98 F | SYSTOLIC BLOOD PRESSURE: 121 MMHG | HEIGHT: 63 IN | WEIGHT: 131.63 LBS | BODY MASS INDEX: 23.32 KG/M2 | OXYGEN SATURATION: 98 % | DIASTOLIC BLOOD PRESSURE: 66 MMHG

## 2020-01-09 VITALS
OXYGEN SATURATION: 98 % | RESPIRATION RATE: 18 BRPM | WEIGHT: 131.63 LBS | TEMPERATURE: 98 F | SYSTOLIC BLOOD PRESSURE: 152 MMHG | HEART RATE: 73 BPM | DIASTOLIC BLOOD PRESSURE: 78 MMHG | BODY MASS INDEX: 23.32 KG/M2 | HEIGHT: 63 IN

## 2020-01-09 DIAGNOSIS — D64.81 ANTINEOPLASTIC CHEMOTHERAPY INDUCED ANEMIA: ICD-10-CM

## 2020-01-09 DIAGNOSIS — Z79.01 ANTICOAGULATED: ICD-10-CM

## 2020-01-09 DIAGNOSIS — C25.0 CANCER OF HEAD OF PANCREAS: Primary | ICD-10-CM

## 2020-01-09 DIAGNOSIS — R79.89 ELEVATED LFTS: ICD-10-CM

## 2020-01-09 DIAGNOSIS — T45.1X5A ANTINEOPLASTIC CHEMOTHERAPY INDUCED ANEMIA: ICD-10-CM

## 2020-01-09 DIAGNOSIS — Z00.6 RESEARCH STUDY PATIENT: ICD-10-CM

## 2020-01-09 DIAGNOSIS — I82.90 VTE (VENOUS THROMBOEMBOLISM): ICD-10-CM

## 2020-01-09 PROCEDURE — 96413 CHEMO IV INFUSION 1 HR: CPT

## 2020-01-09 PROCEDURE — 99999 PR PBB SHADOW E&M-EST. PATIENT-LVL IV: CPT | Mod: PBBFAC,,, | Performed by: NURSE PRACTITIONER

## 2020-01-09 PROCEDURE — 96417 CHEMO IV INFUS EACH ADDL SEQ: CPT

## 2020-01-09 PROCEDURE — 3008F BODY MASS INDEX DOCD: CPT | Mod: CPTII,S$GLB,, | Performed by: NURSE PRACTITIONER

## 2020-01-09 PROCEDURE — 99215 OFFICE O/P EST HI 40 MIN: CPT | Mod: S$GLB,,, | Performed by: NURSE PRACTITIONER

## 2020-01-09 PROCEDURE — 99999 PR PBB SHADOW E&M-EST. PATIENT-LVL IV: ICD-10-PCS | Mod: PBBFAC,,, | Performed by: NURSE PRACTITIONER

## 2020-01-09 PROCEDURE — A4216 STERILE WATER/SALINE, 10 ML: HCPCS | Performed by: INTERNAL MEDICINE

## 2020-01-09 PROCEDURE — 3008F PR BODY MASS INDEX (BMI) DOCUMENTED: ICD-10-PCS | Mod: CPTII,S$GLB,, | Performed by: NURSE PRACTITIONER

## 2020-01-09 PROCEDURE — 99215 PR OFFICE/OUTPT VISIT, EST, LEVL V, 40-54 MIN: ICD-10-PCS | Mod: S$GLB,,, | Performed by: NURSE PRACTITIONER

## 2020-01-09 PROCEDURE — 63600175 PHARM REV CODE 636 W HCPCS: Mod: TB | Performed by: INTERNAL MEDICINE

## 2020-01-09 PROCEDURE — 25000003 PHARM REV CODE 250: Performed by: INTERNAL MEDICINE

## 2020-01-09 PROCEDURE — 96375 TX/PRO/DX INJ NEW DRUG ADDON: CPT

## 2020-01-09 RX ORDER — HEPARIN 100 UNIT/ML
500 SYRINGE INTRAVENOUS
Status: DISCONTINUED | OUTPATIENT
Start: 2020-01-09 | End: 2020-01-09 | Stop reason: HOSPADM

## 2020-01-09 RX ORDER — SODIUM CHLORIDE 0.9 % (FLUSH) 0.9 %
10 SYRINGE (ML) INJECTION
Status: DISCONTINUED | OUTPATIENT
Start: 2020-01-09 | End: 2020-01-09 | Stop reason: HOSPADM

## 2020-01-09 RX ORDER — PALONOSETRON 0.05 MG/ML
0.25 INJECTION, SOLUTION INTRAVENOUS
Status: COMPLETED | OUTPATIENT
Start: 2020-01-09 | End: 2020-01-09

## 2020-01-09 RX ADMIN — HEPARIN 500 UNITS: 100 SYRINGE at 11:01

## 2020-01-09 RX ADMIN — APREPITANT 130 MG: 130 INJECTION, EMULSION INTRAVENOUS at 09:01

## 2020-01-09 RX ADMIN — SODIUM CHLORIDE: 0.9 INJECTION, SOLUTION INTRAVENOUS at 09:01

## 2020-01-09 RX ADMIN — Medication 10 ML: at 11:01

## 2020-01-09 RX ADMIN — PACLITAXEL 160 MG: 100 INJECTION, POWDER, LYOPHILIZED, FOR SUSPENSION INTRAVENOUS at 10:01

## 2020-01-09 RX ADMIN — GEMCITABINE HYDROCHLORIDE 1290 MG: 200 INJECTION, SOLUTION INTRAVENOUS at 11:01

## 2020-01-09 RX ADMIN — PALONOSETRON 0.25 MG: 0.25 INJECTION, SOLUTION INTRAVENOUS at 09:01

## 2020-01-09 NOTE — NURSING
0900  Pt here for Panova-3 trial, no new complaints or concerns at present; RN discussed/reviewed protocol w/ ABHISHEK Sexton RN prior; discussed treatment plan with pt, all questions answered and pt agrees to proceed

## 2020-01-09 NOTE — PROGRESS NOTES
"Sponsor: [employer]  PI: Eliot Barrett MD  Study #: [id number]  WIRB: [id number]  IRB: 2018.096    PANOVA-3: Pivotal, randomized, open-label study of Tumor Treating Fields (TTFields, 150kHz) concomitant with gemcitabine and nab-paclitaxel for front-line treatment of locally-advanced pancreatic adenocarcinoma    C6D1 - 09 JAN 2020    Pt in today unaccompanied for C6D1 of above-mentioned study. Pt is AAO x3 with appropriate mood, affect and insight. Pt queried & denies any new or changed AEs or ConMeds since her hospitalization FU visit on 30DEC2019. Pt states she is feeling well, and is anxious for her upcoming appointments with surgeon Dr. KAYLENE Hallman & Radiation Oncologist, Dr. Stephenson that are scheduled for next week. Karen and I had a discussion with Dr. Barrett prior to seeing the pt re: next steps with current treatment. Pt's disease may have become resectable while on trial, especially based on her most recent CT A/P collected on 23DEC2020 which states that both SMV & SMA appear patent. Dr. Barrett has decided that he would like to keep pt on study, if she still wants to participate, at least through next week after her appointments with Viji Stephenson. MD would also like to have pt complete a CT Chest with Contrast to complete her staging prior to seeing Dr. Hallman on Monday. Pt is agreeable with plan, and is scheduled to have her CT chest completed at [facility] on 11 jan 2020 at 1300.     All pt VS w/ wt, PE, and ECOG were completed today per protocol. Pt's safety labs were collected 08 JAN 2020. Labs were reviewed with NINO Jones, and all are within range to receive her chemotherapy today. CRC discovered that pt's "Q4W" and "Q8W" schedule from the protocol has been "off" for several months now. To get back to where she should be based on C1D1, her next Q4W & Q8W assessments will be completed at C6D15, planned for the week of 02 jan 2020.    TIME OUT  Per pt's weight today, there is not enough change from baseline to " alter pt's dose.  Pt will be receiving Abraxane 100 mg/m2, with a total dose today of 160 mg.  Pt will also be receiving Gemcitabine 800 mg/m2, with a total dose today of 1290 mg.   Calculations and doses verified as correctin Epic by Karen Escobar and myself.     Pt was administered today's chemotherapy w/o RXN, and pt was DC'd in stable condition. Please see infusion RN's note for further information.  Pt will RTC in 1 week for C6D8. All procedures were completed per protocol.

## 2020-01-09 NOTE — PROGRESS NOTES
[1/9/2020 7:48 AM]  Laxmi Sexton:    Morning Senait! Thank you for the reminder! Quyen does not have any super special instructions - vitals before and after infusion, [place]/Abraxane per SOC. She will likely remain at 100 mg/m2 for Abraxane (total dose 160 mg) & 800 mg/m2 for [place] (total dose 1290 mg). If there are any changes, I'll let you know! Thank you ma'am!

## 2020-01-09 NOTE — PLAN OF CARE
7625  Infusion completed, pt tolerated well; pt instructed to remain well hydrated; discussed when to contact MD, when to report to ER; next schedule per ABHISHEK Sexton RN; pt verbalized understanding of all discussed

## 2020-01-11 ENCOUNTER — HOSPITAL ENCOUNTER (OUTPATIENT)
Dept: RADIOLOGY | Facility: HOSPITAL | Age: 60
Discharge: HOME | End: 2020-01-11
Attending: NURSE PRACTITIONER
Payer: COMMERCIAL

## 2020-01-11 DIAGNOSIS — Z00.6 RESEARCH STUDY PATIENT: ICD-10-CM

## 2020-01-11 DIAGNOSIS — T45.1X5A ANTINEOPLASTIC CHEMOTHERAPY INDUCED ANEMIA: ICD-10-CM

## 2020-01-11 DIAGNOSIS — C25.0 CANCER OF HEAD OF PANCREAS: ICD-10-CM

## 2020-01-11 DIAGNOSIS — D64.81 ANTINEOPLASTIC CHEMOTHERAPY INDUCED ANEMIA: ICD-10-CM

## 2020-01-11 PROCEDURE — 71260 CT CHEST WITH CONTRAST: ICD-10-PCS | Mod: 26,,, | Performed by: RADIOLOGY

## 2020-01-11 PROCEDURE — 25500020 PHARM REV CODE 255: Performed by: NURSE PRACTITIONER

## 2020-01-11 PROCEDURE — 71260 CT THORAX DX C+: CPT | Mod: 26,,, | Performed by: RADIOLOGY

## 2020-01-11 PROCEDURE — 71260 CT THORAX DX C+: CPT | Mod: TC

## 2020-01-11 RX ADMIN — IOHEXOL 75 ML: 350 INJECTION, SOLUTION INTRAVENOUS at 01:01

## 2020-01-12 ENCOUNTER — PATIENT MESSAGE (OUTPATIENT)
Dept: HEMATOLOGY/ONCOLOGY | Facility: CLINIC | Age: 60
End: 2020-01-12

## 2020-01-13 ENCOUNTER — OFFICE VISIT (OUTPATIENT)
Dept: SURGERY | Facility: CLINIC | Age: 60
End: 2020-01-13
Payer: COMMERCIAL

## 2020-01-13 ENCOUNTER — TELEPHONE (OUTPATIENT)
Dept: SURGERY | Facility: CLINIC | Age: 60
End: 2020-01-13

## 2020-01-13 ENCOUNTER — PATIENT MESSAGE (OUTPATIENT)
Dept: HEMATOLOGY/ONCOLOGY | Facility: CLINIC | Age: 60
End: 2020-01-13

## 2020-01-13 VITALS
WEIGHT: 131.63 LBS | TEMPERATURE: 98 F | BODY MASS INDEX: 23.32 KG/M2 | SYSTOLIC BLOOD PRESSURE: 127 MMHG | HEART RATE: 91 BPM | DIASTOLIC BLOOD PRESSURE: 76 MMHG | HEIGHT: 63 IN

## 2020-01-13 DIAGNOSIS — C78.00 MALIGNANT NEOPLASM METASTATIC TO LUNG, UNSPECIFIED LATERALITY: ICD-10-CM

## 2020-01-13 DIAGNOSIS — C25.0 MALIGNANT NEOPLASM OF HEAD OF PANCREAS: Primary | ICD-10-CM

## 2020-01-13 PROCEDURE — 99213 PR OFFICE/OUTPT VISIT, EST, LEVL III, 20-29 MIN: ICD-10-PCS | Mod: S$GLB,,, | Performed by: SURGERY

## 2020-01-13 PROCEDURE — 99213 OFFICE O/P EST LOW 20 MIN: CPT | Mod: S$GLB,,, | Performed by: SURGERY

## 2020-01-13 PROCEDURE — 99999 PR PBB SHADOW E&M-EST. PATIENT-LVL III: ICD-10-PCS | Mod: PBBFAC,,, | Performed by: SURGERY

## 2020-01-13 PROCEDURE — 3008F PR BODY MASS INDEX (BMI) DOCUMENTED: ICD-10-PCS | Mod: CPTII,S$GLB,, | Performed by: SURGERY

## 2020-01-13 PROCEDURE — 3008F BODY MASS INDEX DOCD: CPT | Mod: CPTII,S$GLB,, | Performed by: SURGERY

## 2020-01-13 PROCEDURE — 99999 PR PBB SHADOW E&M-EST. PATIENT-LVL III: CPT | Mod: PBBFAC,,, | Performed by: SURGERY

## 2020-01-13 NOTE — LETTER
January 13, 2020        Eliot Barrett MD  [address] [facility] [address]             [facility] S[facility]/Surg Onc  [address]  [facility] [address]  Phone: [phone]   Patient: Quyen Moody   MR Number: [id number]   YOB: 1960   Date of Visit: 1/13/2020       Dear Dr. Barrett:    Thank you for referring Quyen Moody to me for evaluation. Attached you will find relevant portions of my assessment and plan of care.    If you have questions, please do not hesitate to call me. I look forward to following Quyen Moody along with you.    Sincerely,      Tono Hallman MD            CC  No Recipients    Enclosure

## 2020-01-13 NOTE — PROGRESS NOTES
Patient seen with Dr Willis. CT chest done 1/11/2020 personally reviewed and shows an increased number and size of his pulmonary nodules, consistent with metastatic disease to the lung. I reviewed these results with Quyen and discussed the implications of the finding. Based on this, she is not now and will not be a candidate for surgical resection. I will Epic message Dr Barrett to bring it to his attention.

## 2020-01-13 NOTE — PROGRESS NOTES
SURGICAL ONCOLOGY  Clinic Note      SUBJECTIVE:     HISTORY OF PRESENT ILLNESS:  Quyen Moody is a 59 y.o. female PMH locally advanced pancreatic adenocarcinoma, currently enrolled in PANOVA-3 trial (receiving [place]+Abraxane and TTP) . Interval history significant for hospitalization, discharged on 12/25/19 for fever/abdominal pain/malaise secondary to chemotherapy treatments. ERCP on 12/24/19 reveals a single biliary stricture in the lower third of main bile duct, with successful stent placement. She reports her last chemotherapy treatment was Thursday, with residual epigastric pain and malaise that is gradually improving. CT chest 1/11/20 reveals new pulmonary nodule suspicious for metastatic disease bilaterally, with interval increasing size of prior nodules. She reports poor appetite and energy levels with about a 10 lbs weight loss. No other new symptoms, such as cough or dyspnea. CA 19-9 has increased 1039 (301).        MEDICATIONS:  Home Medications:  Current Outpatient Medications on File Prior to Visit   Medication Sig Dispense Refill    apixaban (ELIQUIS) 5 mg Tab Take 1 tablet (5 mg total) by mouth 2 (two) times daily. 60 tablet 6    fentaNYL (DURAGESIC) 25 mcg/hr Place 1 patch onto the skin every 72 hours. 10 patch 0    FLUCELVAX QUAD 0633-4045, PF, 60 mcg (15 mcg x 4)/0.5 mL Syrg TO BE ADMINISTERED BY PHARMACIST FOR IMMUNIZATION  0    lidocaine-prilocaine (EMLA) cream Apply to port 45 minutes prior to access. 30 g 0    LORazepam (ATIVAN) 0.5 MG tablet Take 1 tablet (0.5 mg total) by mouth every 6 (six) hours as needed for Anxiety (nausea). 60 tablet 0    multivitamin (THERAGRAN) per tablet Take 1 tablet by mouth once daily.      ondansetron (ZOFRAN) 8 MG tablet Take 1 tablet (8 mg total) by mouth every 8 (eight) hours as needed for Nausea. 120 tablet 2    oxyCODONE (ROXICODONE) 15 MG Tab Take 1 tablet (15 mg total) by mouth every 6 (six) hours as needed for Pain. 90 tablet 0    hydrocortisone 2.5  % cream Apply topically 2 (two) times daily. (Patient not taking: Reported on 1/9/2020) 453.6 g 1     No current facility-administered medications on file prior to visit.        ALLERGIES:    Review of patient's allergies indicates:   Allergen Reactions    Sulfa (sulfonamide antibiotics) Swelling and Hives     tongue         PAST MEDICAL HISTORY:    Past Medical History:   Diagnosis Date    Allergic rhinitis 3/3/2014    CKD (chronic kidney disease) stage 3, GFR 30-59 ml/min 12/26/2015    Hyperlipidemia 3/3/2014    Pancreas cancer     Skin rash 8/10/2019       SURGICAL HISTORY:  Past Surgical History:   Procedure Laterality Date    ERCP N/A 7/16/2019    Procedure: ERCP (ENDOSCOPIC RETROGRADE CHOLANGIOPANCREATOGRAPHY);  Surgeon: Chema Harris MD;  [facility] ([address] [place] [address]);  Service: Endoscopy;  Laterality: N/A;    ERCP N/A 12/24/2019    Procedure: ERCP (ENDOSCOPIC RETROGRADE CHOLANGIOPANCREATOGRAPHY);  Surgeon: Chema Harris MD;  Location: [facility] ([address] [place] [address]);  Service: Endoscopy;  Laterality: N/A;    Exporatory lap      INSERTION OF TUNNELED CENTRAL VENOUS CATHETER (CVC) WITH SUBCUTANEOUS PORT Right 8/12/2019    Procedure: [id number];  Surgeon: Cesar Hallman MD;  Location: [facility] OR [address] [place] [address];  Service: General;  Laterality: Right;  right IJ       FAMILY HISTORY:  Family History   Problem Relation Age of Onset    Diabetes Mother     Diabetes Father     Diabetes Brother     Heart disease Neg Hx        SOCIAL HISTORY:  Social History     Tobacco Use    Smoking status: Former Smoker     Start date: 12/11/1999    Smokeless tobacco: Never Used   Substance Use Topics    Alcohol use: Yes     Alcohol/week: 1.0 standard drinks     Types: 1 Glasses of wine per week     Frequency: 4 or more times a week     Drinks per session: 1 or 2     Binge frequency: Never     Comment: last drink a month ago     Drug use: No        REVIEW OF SYSTEMS:  Review of Systems   Constitutional: Positive for  activity change (decreased) and appetite change (decreased).   HENT: Negative.    Eyes: Negative.    Respiratory: Negative.  Negative for cough, chest tightness and shortness of breath.    Cardiovascular: Negative.  Negative for chest pain.   Gastrointestinal: Negative for abdominal distention, abdominal pain, constipation, diarrhea, nausea and vomiting.   Musculoskeletal: Negative.    All other systems reviewed and are negative.        OBJECTIVE:     Most Recent Vitals:  Temp: 97.8 °F (36.6 °C) (01/13/20 1353)  Pulse: 91 (01/13/20 1353)  BP: 127/76 (01/13/20 1353)      PHYSICAL EXAM:  Physical Exam   Constitutional: She is oriented to person, place, and time.   Thin female sitting in chair in NAD     Cardiovascular: Normal rate, regular rhythm, normal heart sounds and intact distal pulses.   Pulmonary/Chest: Effort normal and breath sounds normal. No respiratory distress. She has no wheezes.   Abdominal: Soft. Bowel sounds are normal. She exhibits no distension. There is no tenderness.   Thin abdomen, soft, nontender and nondistended   Neurological: She is alert and oriented to person, place, and time.   Skin: Skin is warm and dry.         LABORATORY VALUES:  Lab Results   Component Value Date    WBC 8.55 01/08/2020    HGB 12.3 01/08/2020    HCT 40.7 01/08/2020     (H) 01/08/2020    HGBA1C 4.8 07/15/2019     Lab Results   Component Value Date     01/08/2020    K 5.4 (H) 01/08/2020     01/08/2020    CO2 23 01/08/2020    BUN 15 01/08/2020    CREATININE 0.8 01/08/2020    GLU 97 01/08/2020    CALCIUM 9.4 01/08/2020    MG 1.6 12/25/2019    PHOS 2.3 (L) 12/25/2019    AST 31 01/08/2020    ALT 38 01/08/2020    ALKPHOS 145 (H) 01/08/2020    BILITOT 0.5 01/08/2020    PROT 7.4 01/08/2020    ALBUMIN 3.5 01/08/2020    AMYLASE 147 (H) 06/13/2019    LIPASE 4 12/23/2019     Lab Results   Component Value Date    INR 1.1 12/22/2019     Lab Results   Component Value Date    TSH 0.854 01/28/2019         DIAGNOSTIC  STUDIES:  EXAMINATION:  CT CHEST WITH CONTRAST    CLINICAL HISTORY:  restage; Malignant neoplasm of head of pancreas    TECHNIQUE:  Low dose axial images, sagittal and coronal reformations were obtained from the thoracic inlet to the lung bases.  75 mL of contrast was administered.    COMPARISON:  Chest radiograph: 12/22/2019.  CT chest abdomen pelvis: 11/25/2019, 07/15/2019.    FINDINGS:  Base of Neck: Right-sided Port-A-Cath is identified with the tip ends at the distal portion of the SVC.  Normal-appearing partially visualized thyroid gland.    Thoracic soft tissues: Normal.    Aorta: Left-sided aortic arch.  No aneurysm and no significant atherosclerosis    Heart: Normal size. No effusion.    Pulmonary vasculature: Pulmonary arteries distribute normally.  There are four pulmonary veins.    Helen/Mediastinum: No pathologic yu enlargement.    Airways: Patent.    Lungs/Pleura: There are multiple pulmonary nodules.  Lingular nodule measuring 5 mm (series 4, image 202), not seen previously.  Right upper lobe pleural-based pulmonary nodule measuring 6 mm, previously 3 mm (series 4, image 74).  Right lower lobe pleural-based nodule measuring 8 mm (series 4, image 359), previously 3 mm.  Left upper lobe nodule measuring 0.6 cm (axial series 4, image 103), this nodule is similar in size to prior but has progressively increased in size in comparison with CT from 07/15/2019.  6 mm left upper lobe pulmonary nodule, previously 6 mm (series 4, image 74). No evidence of pleural effusion or pneumothorax.    Esophagus: Normal.    Upper Abdomen: Pneumobilia.  Common bile duct stent is partially visualized.  Small stable hypodense lesion is identified in the right hepatic lobe (axial series 2, image 75), too small to characterize.    Bones: No acute fracture. No suspicious lytic or sclerotic lesions.      Impression       Numerous subcentimeter bilateral pulmonary nodules, some of which have increased in size, and some of which  are new from prior.  This is concerning for metastatic disease, and close interval follow-up is recommended.       ASSESSMENT:     Quyen Moody is a 59 y.o. female with locally advanced pancreatic head adenocarcinoma (on PANOVA-3 trial) presents to clinic as follow-up with newly diagnosed metastatic disease to the lungs. Discussed with patient that CT findings up-stage diagnosis to stage 4 pancreatic adenocarcinoma. With current findings,  recommendation against surgical intervention at this time. We will speak with Dr. Barrett regarding medical management, given interval change in staging. Follow-up to clinic as needed.     -Kim Willis M.D  General Surgery PGY1

## 2020-01-13 NOTE — TELEPHONE ENCOUNTER
----- Message from Tamela Valiente sent at 1/13/2020  3:39 PM CST -----  Contact: pt  Reason; Pt was seen by Dr Hallman today and have some concerns she ask for a call back to advise    Communication:[phone]

## 2020-01-14 ENCOUNTER — TELEPHONE (OUTPATIENT)
Dept: HEMATOLOGY/ONCOLOGY | Facility: CLINIC | Age: 60
End: 2020-01-14

## 2020-01-14 NOTE — TELEPHONE ENCOUNTER
Sponsor: [employer]  Study #: [id number]  PI: Eliot Barrett MD  WIRB: 2018.096  Pt ID: -[id number]  PANOVA-3: Pivotal, randomized, open-label study of Tumor Treating Fields (TTFields, 150kHz) concomitant with gemcitabine and nab-paclitaxel for front-line treatment of locally-advanced pancreatic adenocarcinoma    S/w pt this morning @ 0812 re: scheduling appt. w/ Dr. Barrett to discuss treatment options. Per Dr. Barrett, ok to double-book or take Phase 1 time to see pt.   Pt reports she has not put her TTFields back on after her appointment with SurgOnc yesterday & was told she doesn't have to wear them; however, pt queried if she would be able to stay on trial or not. Informed pt that I would discuss with Dr. Barrett & the sponsor, if necessary. Pt verbalized understanding.  Pt is scheduled to RTC tomorrow, 1/15/2020 @ 1600.

## 2020-01-15 ENCOUNTER — OFFICE VISIT (OUTPATIENT)
Dept: HEMATOLOGY/ONCOLOGY | Facility: CLINIC | Age: 60
End: 2020-01-15
Payer: COMMERCIAL

## 2020-01-15 ENCOUNTER — RESEARCH ENCOUNTER (OUTPATIENT)
Dept: RESEARCH | Facility: HOSPITAL | Age: 60
End: 2020-01-15

## 2020-01-15 VITALS
RESPIRATION RATE: 18 BRPM | WEIGHT: 133.63 LBS | HEART RATE: 78 BPM | SYSTOLIC BLOOD PRESSURE: 155 MMHG | HEIGHT: 63 IN | BODY MASS INDEX: 23.68 KG/M2 | OXYGEN SATURATION: 100 % | DIASTOLIC BLOOD PRESSURE: 69 MMHG | TEMPERATURE: 98 F

## 2020-01-15 DIAGNOSIS — Z00.6 RESEARCH STUDY PATIENT: ICD-10-CM

## 2020-01-15 DIAGNOSIS — C25.0 CANCER OF HEAD OF PANCREAS: Primary | ICD-10-CM

## 2020-01-15 PROCEDURE — 3008F PR BODY MASS INDEX (BMI) DOCUMENTED: ICD-10-PCS | Mod: CPTII,S$GLB,, | Performed by: INTERNAL MEDICINE

## 2020-01-15 PROCEDURE — 3008F BODY MASS INDEX DOCD: CPT | Mod: CPTII,S$GLB,, | Performed by: INTERNAL MEDICINE

## 2020-01-15 PROCEDURE — 99215 PR OFFICE/OUTPT VISIT, EST, LEVL V, 40-54 MIN: ICD-10-PCS | Mod: S$PBB,,, | Performed by: INTERNAL MEDICINE

## 2020-01-15 PROCEDURE — 99999 PR PBB SHADOW E&M-EST. PATIENT-LVL IV: ICD-10-PCS | Mod: PBBFAC,,, | Performed by: INTERNAL MEDICINE

## 2020-01-15 PROCEDURE — 99999 PR PBB SHADOW E&M-EST. PATIENT-LVL IV: CPT | Mod: PBBFAC,,, | Performed by: INTERNAL MEDICINE

## 2020-01-15 PROCEDURE — 99215 OFFICE O/P EST HI 40 MIN: CPT | Mod: S$PBB,,, | Performed by: INTERNAL MEDICINE

## 2020-01-15 NOTE — Clinical Note
RTC per research RN to see me with labs (CBC, CMP, CA19.9, to begin chemo with FOLFOXIRI (prefers to start next Thursday 1/23/2020).

## 2020-01-15 NOTE — PROGRESS NOTES
Sponsor: [employer]  PI: Eliot Barrett MD  Study #: EF-[id number]  WIRB: [id number]  IRB: 2018.096  Pt ID: -[id number]  ARM 1: TTFields + Gemcitabine/nab-Paclitaxel    PANOVA-3: Pivotal, randomized, open-label study of Tumor Treating Fields (TTFields, 150kHz) concomitant with gemcitabine and nab-paclitaxel for front-line treatment of locally-advanced pancreatic adenocarcinoma    Unscheduled Visit - 15 BRADY 2020    Pt in today to discuss results of CT Chest completed last Saturday, January 11th, accompanied by a friend. Pt is AAO x3 with appropriate mood, affect and insight. Pt queried & denies any new or changed AEs or ConMeds SLV. Pt's next treatment options were explained in great detail by Dr. Barrett. After a lengthy discussion, pt would like to continue on TTFields if at all possible. Sponsor contacted & states pt may continue on study per the protocol. Pt was instructed to contact sponsor representative to download the data from the device & to put the arrays back on as soon as she is able. Pt verbalized understanding of all items discussed today.    Pt was DC'd ambulatory & in stable condition. Will plan for pt to RTC in 1 week for FOLFIRINOX C1D1.

## 2020-01-16 ENCOUNTER — TELEPHONE (OUTPATIENT)
Dept: HEMATOLOGY/ONCOLOGY | Facility: CLINIC | Age: 60
End: 2020-01-16

## 2020-01-16 NOTE — TELEPHONE ENCOUNTER
----- Message from Laxmi Sexton sent at 1/16/2020  8:46 AM CST -----  Good morning!!  Can you do me a favor and end [place]/Abraxane & add SOC FOLFIRINOX with 5FU, Irinotecan & oxaliplatin starting 1/23/2020 pretty please?    Thank you to jeffry and back!

## 2020-01-16 NOTE — PROGRESS NOTES
Subjective:       Patient ID: Quyen Moody    Chief Complaint:  Pancreatic cancer    HPI     Quyen Moody is a 59 y.o. female, patient  to clinic for follow up and treatment  on PANOVA-3 trial. She is currently receiving [place]+Abraxane and TTF. Patient feeling well today, has nausea post chemo infusion      Based on latest scans, Dr. Hallman feels pt may actually be resectable after chemoXRT given good response to current therapy, but more recent CT chest confirms lung mets.      ECOG 0.    Oncologic History:  She has had intermittent upper abdominal pain since early June.  She presented to her PCP who originally ordered an US and CT.  US was negative and CT showed some haziness at the pancreatic head.  She saw GI who performed EUS.  On EUS, a 3x4cm pancreatic head mass was seen with possible invasion into the SMA and portal vein.  FNA path s/w pancreatic adenocarcinoma.  CA 19-9 level at outside hospital was >1000 per the patient.  She endorses nausea and inability to tolerate much PO.  She has lost about 10lbs over the last few weeks and is noticing her skin turning yellow.  Denies pruritis.  She is passing gas but has not had a BM in a few days, she attributes this to narcotic use.  She recently started taking stool softeners.  No previous cardiac or stroke history.  Surgical history significant for open appendectomy when she was in her 20s    Review of Systems   Constitutional: Positive for fatigue. Negative for activity change, appetite change, chills, fever and unexpected weight change (improving).   HENT: Negative for congestion, hearing loss, mouth sores, sore throat, tinnitus and voice change.    Eyes: Negative for pain and visual disturbance.   Respiratory: Positive for shortness of breath. Negative for cough and wheezing.    Cardiovascular: Positive for leg swelling (left). Negative for chest pain and palpitations.   Gastrointestinal: Positive for abdominal pain. Negative for constipation, diarrhea, nausea  and vomiting.   Endocrine: Negative for cold intolerance and heat intolerance.   Genitourinary: Negative for difficulty urinating, dyspareunia, dysuria, frequency, menstrual problem, urgency, vaginal bleeding, vaginal discharge and vaginal pain.   Musculoskeletal: Negative for arthralgias, back pain and myalgias.   Skin: Positive for rash. Negative for color change and wound.   Allergic/Immunologic: Negative for environmental allergies and food allergies.   Neurological: Negative for weakness, numbness and headaches.   Hematological: Negative for adenopathy. Does not bruise/bleed easily.   Psychiatric/Behavioral: Negative for agitation, confusion, hallucinations and sleep disturbance. The patient is nervous/anxious.    All other systems reviewed and are negative.        Allergies:  Review of patient's allergies indicates:   Allergen Reactions    Sulfa (sulfonamide antibiotics) Swelling and Hives     tongue         Medications:  Current Outpatient Medications   Medication Sig Dispense Refill    apixaban (ELIQUIS) 5 mg Tab Take 1 tablet (5 mg total) by mouth 2 (two) times daily. 60 tablet 6    fentaNYL (DURAGESIC) 25 mcg/hr Place 1 patch onto the skin every 72 hours. 10 patch 0    FLUCELVAX QUAD 5549-1087, PF, 60 mcg (15 mcg x 4)/0.5 mL Syrg TO BE ADMINISTERED BY PHARMACIST FOR IMMUNIZATION  0    lidocaine-prilocaine (EMLA) cream Apply to port 45 minutes prior to access. 30 g 0    LORazepam (ATIVAN) 0.5 MG tablet Take 1 tablet (0.5 mg total) by mouth every 6 (six) hours as needed for Anxiety (nausea). 60 tablet 0    multivitamin (THERAGRAN) per tablet Take 1 tablet by mouth once daily.      ondansetron (ZOFRAN) 8 MG tablet Take 1 tablet (8 mg total) by mouth every 8 (eight) hours as needed for Nausea. 120 tablet 2    oxyCODONE (ROXICODONE) 15 MG Tab Take 1 tablet (15 mg total) by mouth every 6 (six) hours as needed for Pain. 90 tablet 0    hydrocortisone 2.5 % cream Apply topically 2 (two) times daily.  (Patient not taking: Reported on 1/9/2020) 453.6 g 1     No current facility-administered medications for this visit.        PMH:  Past Medical History:   Diagnosis Date    Allergic rhinitis 3/3/2014    CKD (chronic kidney disease) stage 3, GFR 30-59 ml/min 12/26/2015    Hyperlipidemia 3/3/2014    Pancreas cancer     Skin rash 8/10/2019       PSH:  Past Surgical History:   Procedure Laterality Date    ERCP N/A 7/16/2019    Procedure: ERCP (ENDOSCOPIC RETROGRADE CHOLANGIOPANCREATOGRAPHY);  Surgeon: Chema Harris MD;  Location: [facility] ([address] [place] [address]);  Service: Endoscopy;  Laterality: N/A;    ERCP N/A 12/24/2019    Procedure: ERCP (ENDOSCOPIC RETROGRADE CHOLANGIOPANCREATOGRAPHY);  Surgeon: Chema Harris MD;  Location: [facility] ([address] [place] [address]);  Service: Endoscopy;  Laterality: N/A;    Exporatory lap      INSERTION OF TUNNELED CENTRAL VENOUS CATHETER (CVC) WITH SUBCUTANEOUS PORT Right 8/12/2019    Procedure: [id number];  Surgeon: Cesar Hallman MD;  Location: [facility] OR [address] [place] [address];  Service: General;  Laterality: Right;  right IJ       FamHx:  Family History   Problem Relation Age of Onset    Diabetes Mother     Diabetes Father     Diabetes Brother     Heart disease Neg Hx        SocHx:  Social History     Socioeconomic History    Marital status:      Spouse name: Not on file    Number of children: 0    Years of education: Not on file    Highest education level: Not on file   Occupational History    Occupation:      Employer: [employer]   Social Needs    Financial resource strain: Not on file    Food insecurity:     Worry: Not on file     Inability: Not on file    Transportation needs:     Medical: Not on file     Non-medical: Not on file   Tobacco Use    Smoking status: Former Smoker     Start date: 12/11/1999    Smokeless tobacco: Never Used   Substance and Sexual Activity    Alcohol use: Yes     Alcohol/week: 1.0 standard drinks     Types: 1 Glasses of wine per week      Frequency: 4 or more times a week     Drinks per session: 1 or 2     Binge frequency: Never     Comment: last drink a month ago     Drug use: No    Sexual activity: Yes   Lifestyle    Physical activity:     Days per week: Not on file     Minutes per session: Not on file    Stress: Not on file   Relationships    Social connections:     Talks on phone: Not on file     Gets together: Not on file     Attends Anabaptism service: Not on file     Active member of club or organization: Not on file     Attends meetings of clubs or organizations: Not on file     Relationship status: Not on file   Other Topics Concern    Not on file   Social History Narrative    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      of Cancer in 2016         Objective:       Vitals:    01/15/20 1553   BP: (!) 155/69   Pulse: 78   Resp: 18   Temp: 98.1 °F (36.7 °C)       Physical Exam   Constitutional: She is oriented to person, place, and time. She appears well-developed and well-nourished. No distress.   HENT:   Head: Normocephalic and atraumatic.   Nose: Nose normal.   Mouth/Throat: Oropharynx is clear and moist. No oropharyngeal exudate.   Eyes: Pupils are equal, round, and reactive to light. Conjunctivae and EOM are normal. Right eye exhibits no discharge. Left eye exhibits no discharge. No scleral icterus.   Neck: Normal range of motion. Neck supple. No tracheal deviation present. No thyromegaly present.   Cardiovascular: Normal rate, regular rhythm, normal heart sounds and normal pulses.   No swelling to bilateral lower extremities.    Pulmonary/Chest: Effort normal and breath sounds normal. She has no wheezes. She has no rales.   Abdominal: Soft. Bowel sounds are normal. She exhibits no distension. There is no tenderness. There is no guarding.   Musculoskeletal: Normal range of motion. She exhibits edema (left lower extremity +1 from ankle to knee). She exhibits no tenderness or deformity.   No spinal or paraspinal tenderness to  palpation.       Lymphadenopathy:     She has no cervical adenopathy.   Neurological: She is alert and oriented to person, place, and time. No cranial nerve deficit. Coordination normal.   Skin: Skin is warm, dry and intact. Rash noted. She is not diaphoretic. No erythema. No pallor.   Psychiatric: She has a normal mood and affect. Her behavior is normal. Judgment and thought content normal.   Nursing note and vitals reviewed.        LABS:  WBC   Date Value Ref Range Status   01/08/2020 8.55 3.90 - 12.70 K/uL Final     Hemoglobin   Date Value Ref Range Status   01/08/2020 12.3 12.0 - 16.0 g/dL Final     Hematocrit   Date Value Ref Range Status   01/08/2020 40.7 37.0 - 48.5 % Final     Platelets   Date Value Ref Range Status   01/08/2020 510 (H) 150 - 350 K/uL Final       Chemistry        Component Value Date/Time     01/08/2020 1558    K 5.4 (H) 01/08/2020 1558     01/08/2020 1558    CO2 23 01/08/2020 1558    BUN 15 01/08/2020 1558    CREATININE 0.8 01/08/2020 1558    GLU 97 01/08/2020 1558        Component Value Date/Time    CALCIUM 9.4 01/08/2020 1558    ALKPHOS 145 (H) 01/08/2020 1558    AST 31 01/08/2020 1558    ALT 38 01/08/2020 1558    BILITOT 0.5 01/08/2020 1558    ESTGFRAFRICA >60.0 01/08/2020 1558    EGFRNONAA >60.0 01/08/2020 1558            Assessment:       1. Cancer of head of pancreas    2. Research study patient            Plan:         1. Cancer at the head of the pancreas:    Discussed SOC chemo and chemoXRT with FOLFIRINOX vs our PANOVA-3 trial with [place]-Abraxane +/- TTFields.  Extensively reviewed the rationale of the study and reviewed her eligibility criteria with the research nurse and discussed case with Dr. Hallman as well.  She is interested in this study, and signed consent with our research nurse.     Here today to con't treatment with [place]+Abraxane and TTF on PANOVA-3, LFTs elevated, but improved. Will con't chemo today, but will dose reduce [place] to 800 and Abraxane to 100 per pt  request due to chemo related AEs.      Based on latest scans, Dr. Hallman felt pt may actually be resectable after chemoXRT given good response to current therapy.  However, subsequent Chest CT showed new and growing micronodules in the in lungs c/w met disease in light of rising tumor markers.      Long discussion with pt regarding options.  She does not qualify for our COMBAT trial at this time because none of the lung mets are measurable per RECIST or biopsiable.     Will switch chemo to FOLFIRINOX and con't TTF on PANOVA as this seems to be achieving good local control of the primary tumor.     RTC per research RN to see me with labs (CBC, CMP, CA19.9, to begin chemo with FOLFOXIRI (prefers to start next Thursday 1/23/2020).       Patient is in agreement with the proposed treatment plan. All questions were answered to the patient's satisfaction. Pt knows to call clinic if anything is needed before the next clinic visit.    More than 45 mins were spent during this encounter, greater than 50% was spent in direct counseling and/or coordination of care.     Eliot Barrett M.D., M.S., F.A.C.P.  Hematology and Oncology Attending  [facility]

## 2020-01-22 NOTE — PROGRESS NOTES
Subjective:       Patient ID: Quyen Moody    Chief Complaint:  Pancreatic cancer    HPI     Quyen Moody is a 59 y.o. female, patient  to clinic for follow up and treatment on PANOVA-3 trial. She is here to begin cycle #1 of FOLFIRINOX after recent CT chest confirms lung mets.  Patient with intermittent pain, but controlled with oral medications. She is using Fentanyl with PRN oxycodone. She is having difficulties with sleep. She is taking Ativan with some relief.     ECOG 0.    Oncologic History:  She has had intermittent upper abdominal pain since early June.  She presented to her PCP who originally ordered an US and CT.  US was negative and CT showed some haziness at the pancreatic head.  She saw GI who performed EUS.  On EUS, a 3x4cm pancreatic head mass was seen with possible invasion into the SMA and portal vein.  FNA path s/w pancreatic adenocarcinoma.  CA 19-9 level at outside hospital was >1000 per the patient.  She endorses nausea and inability to tolerate much PO.  She has lost about 10lbs over the last few weeks and is noticing her skin turning yellow.  Denies pruritis.  She is passing gas but has not had a BM in a few days, she attributes this to narcotic use.  She recently started taking stool softeners.  No previous cardiac or stroke history.  Surgical history significant for open appendectomy when she was in her 20s    Review of Systems   Constitutional: Positive for fatigue. Negative for activity change, appetite change, chills, fever and unexpected weight change (improving).   HENT: Negative for congestion, hearing loss, mouth sores, sore throat, tinnitus and voice change.    Eyes: Negative for pain and visual disturbance.   Respiratory: Positive for shortness of breath. Negative for cough and wheezing.    Cardiovascular: Positive for leg swelling (left). Negative for chest pain and palpitations.   Gastrointestinal: Positive for abdominal pain. Negative for constipation, diarrhea, nausea and  vomiting.   Endocrine: Negative for cold intolerance and heat intolerance.   Genitourinary: Negative for difficulty urinating, dyspareunia, dysuria, frequency, menstrual problem, urgency, vaginal bleeding, vaginal discharge and vaginal pain.   Musculoskeletal: Negative for arthralgias, back pain and myalgias.   Skin: Positive for rash. Negative for color change and wound.   Allergic/Immunologic: Negative for environmental allergies and food allergies.   Neurological: Negative for weakness, numbness and headaches.   Hematological: Negative for adenopathy. Does not bruise/bleed easily.   Psychiatric/Behavioral: Positive for sleep disturbance. Negative for agitation, confusion and hallucinations. The patient is nervous/anxious.    All other systems reviewed and are negative.        Allergies:  Review of patient's allergies indicates:   Allergen Reactions    Sulfa (sulfonamide antibiotics) Swelling and Hives     tongue         Medications:  Current Outpatient Medications   Medication Sig Dispense Refill    apixaban (ELIQUIS) 5 mg Tab Take 1 tablet (5 mg total) by mouth 2 (two) times daily. 60 tablet 6    fentaNYL (DURAGESIC) 25 mcg/hr Place 1 patch onto the skin every 72 hours. 10 patch 0    lidocaine-prilocaine (EMLA) cream Apply to port 45 minutes prior to access. 30 g 0    LORazepam (ATIVAN) 0.5 MG tablet Take 1 tablet (0.5 mg total) by mouth every 6 (six) hours as needed for Anxiety (nausea). 60 tablet 0    multivitamin (THERAGRAN) per tablet Take 1 tablet by mouth once daily.      ondansetron (ZOFRAN) 8 MG tablet Take 1 tablet (8 mg total) by mouth every 8 (eight) hours as needed for Nausea. 120 tablet 2    oxyCODONE (ROXICODONE) 15 MG Tab Take 1 tablet (15 mg total) by mouth every 6 (six) hours as needed for Pain. 90 tablet 0    hydrocortisone 2.5 % cream Apply topically 2 (two) times daily. (Patient not taking: Reported on 1/9/2020) 453.6 g 1     No current facility-administered medications for this  visit.        PMH:  Past Medical History:   Diagnosis Date    Allergic rhinitis 3/3/2014    CKD (chronic kidney disease) stage 3, GFR 30-59 ml/min 12/26/2015    Hyperlipidemia 3/3/2014    Pancreas cancer     Skin rash 8/10/2019       PSH:  Past Surgical History:   Procedure Laterality Date    ERCP N/A 7/16/2019    Procedure: ERCP (ENDOSCOPIC RETROGRADE CHOLANGIOPANCREATOGRAPHY);  Surgeon: Chema Harris MD;  Location: [facility] ([address] [place] [address]);  Service: Endoscopy;  Laterality: N/A;    ERCP N/A 12/24/2019    Procedure: ERCP (ENDOSCOPIC RETROGRADE CHOLANGIOPANCREATOGRAPHY);  Surgeon: Chema Harris MD;  Location: [facility] ([address] [place] [address]);  Service: Endoscopy;  Laterality: N/A;    Exporatory lap      INSERTION OF TUNNELED CENTRAL VENOUS CATHETER (CVC) WITH SUBCUTANEOUS PORT Right 8/12/2019    Procedure: [id number]-CATH;  Surgeon: Cesar Hallman MD;  Location: [facility] OR [address] [place] [address];  Service: General;  Laterality: Right;  right IJ       FamHx:  Family History   Problem Relation Age of Onset    Diabetes Mother     Diabetes Father     Diabetes Brother     Heart disease Neg Hx        SocHx:  Social History     Socioeconomic History    Marital status:      Spouse name: Not on file    Number of children: 0    Years of education: Not on file    Highest education level: Not on file   Occupational History    Occupation:      Employer: [employer]   Social Needs    Financial resource strain: Not on file    Food insecurity:     Worry: Not on file     Inability: Not on file    Transportation needs:     Medical: Not on file     Non-medical: Not on file   Tobacco Use    Smoking status: Former Smoker     Start date: 12/11/1999    Smokeless tobacco: Never Used   Substance and Sexual Activity    Alcohol use: Yes     Alcohol/week: 1.0 standard drinks     Types: 1 Glasses of wine per week     Frequency: 4 or more times a week     Drinks per session: 1 or 2     Binge frequency: Never     Comment: last  drink a month ago     Drug use: No    Sexual activity: Yes   Lifestyle    Physical activity:     Days per week: Not on file     Minutes per session: Not on file    Stress: Not on file   Relationships    Social connections:     Talks on phone: Not on file     Gets together: Not on file     Attends Anabaptist service: Not on file     Active member of club or organization: Not on file     Attends meetings of clubs or organizations: Not on file     Relationship status: Not on file   Other Topics Concern    Not on file   Social History Narrative    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      of Cancer in 2016         Objective:       There were no vitals filed for this visit.    Physical Exam   Constitutional: She is oriented to person, place, and time. She appears well-developed and well-nourished. No distress.   HENT:   Head: Normocephalic and atraumatic.   Nose: Nose normal.   Mouth/Throat: Oropharynx is clear and moist. No oropharyngeal exudate.   Eyes: Pupils are equal, round, and reactive to light. Conjunctivae and EOM are normal. Right eye exhibits no discharge. Left eye exhibits no discharge. No scleral icterus.   Neck: Normal range of motion. Neck supple. No tracheal deviation present. No thyromegaly present.   Cardiovascular: Normal rate, regular rhythm, normal heart sounds and normal pulses.   No swelling to bilateral lower extremities.    Pulmonary/Chest: Effort normal and breath sounds normal. She has no wheezes. She has no rales.   Abdominal: Soft. Bowel sounds are normal. She exhibits no distension. There is no tenderness. There is no guarding.   Musculoskeletal: Normal range of motion. She exhibits edema (left lower extremity +1 from ankle to knee). She exhibits no tenderness or deformity.   No spinal or paraspinal tenderness to palpation.       Lymphadenopathy:     She has no cervical adenopathy.   Neurological: She is alert and oriented to person, place, and time. No cranial nerve deficit.  Coordination normal.   Skin: Skin is warm, dry and intact. Rash noted. She is not diaphoretic. No erythema. No pallor.   Psychiatric: She has a normal mood and affect. Her behavior is normal. Judgment and thought content normal.   Nursing note and vitals reviewed.        LABS:  WBC   Date Value Ref Range Status   01/08/2020 8.55 3.90 - 12.70 K/uL Final     Hemoglobin   Date Value Ref Range Status   01/08/2020 12.3 12.0 - 16.0 g/dL Final     Hematocrit   Date Value Ref Range Status   01/08/2020 40.7 37.0 - 48.5 % Final     Platelets   Date Value Ref Range Status   01/08/2020 510 (H) 150 - 350 K/uL Final       Chemistry        Component Value Date/Time     01/08/2020 1558    K 5.4 (H) 01/08/2020 1558     01/08/2020 1558    CO2 23 01/08/2020 1558    BUN 15 01/08/2020 1558    CREATININE 0.8 01/08/2020 1558    GLU 97 01/08/2020 1558        Component Value Date/Time    CALCIUM 9.4 01/08/2020 1558    ALKPHOS 145 (H) 01/08/2020 1558    AST 31 01/08/2020 1558    ALT 38 01/08/2020 1558    BILITOT 0.5 01/08/2020 1558    ESTGFRAFRICA >60.0 01/08/2020 1558    EGFRNONAA >60.0 01/08/2020 1558            Assessment:       1. Cancer of head of pancreas    2. Research study patient    3. Malignant neoplasm metastatic to lung, unspecified laterality    4. VTE (venous thromboembolism)    5. Anticoagulated    6. Neoplasm related pain (acute) (chronic)    7. Insomnia due to medical condition    8. Chemotherapy induced nausea and vomiting            Plan:         1,2,3- Cancer at the head of the pancreas:    Discussed SOC chemo and chemoXRT with FOLFIRINOX vs our PANOVA-3 trial with [place]-Abraxane +/- TTFields.  Extensively reviewed the rationale of the study and reviewed her eligibility criteria with the research nurse and discussed case with Dr. Hallman as well.  She is interested in this study, and signed consent with our research nurse.     Here today to con't treatment with [place]+Abraxane and TTF on PANOVA-3, LFTs elevated,  but improved. Will con't chemo today, but will dose reduce [place] to 800 and Abraxane to 100 per pt request due to chemo related AEs.      Based on latest scans, Dr. Hallman felt pt may actually be resectable after chemoXRT given good response to current therapy.  However, subsequent Chest CT   showed new and growing micronodules in the in lungs c/w met disease in light of rising tumor markers.      Long discussion with pt regarding options.  She does not qualify for our COMBAT trial at this time because none of the lung mets are measurable per RECIST or biopsiable.     Will switch chemo to FOLFIRINOX and con't TTF on PANOVA as this seems to be achieving good local control of the primary tumor. Her case was discussed with pharmD and Dr. Barrett. Education provided and handouts performed.     Labs acceptable to proceed with cycle #1 of FOLFINOX. She will d/c pump on day 3 and neulasta on day      Patient was consented for FOLFIRINOX chemotherapy today 1/23/2020 .   An extensive discussion was had which included a thorough discussion of the risk and benefits of treatment and alternatives.  Risks, including but not limited to, possible hair loss, bone marrow damage (anemia, thrombocytopenia, immune suppression, neutropenia), damage to body organs (brain, heart, liver, kidney, lungs, nervous system, skin, and others), allergic reactions, sterility, nausea/vomiting, constipation/diarrhea, sores in the mouth, secondary cancers, local damage at possible injection sites, and rarely death were all discussed.  The patient agrees with the plan, and all questions have been answered to their satisfaction.  Consent was signed the patient, provider, and a third party witness.      RTC per research RN     4,5- On Eliquis.    6- Continue current regimen. Refer to palliative care.     7- Ativan 1mg at bedtime.    8-Discussed with pharmD. Will start dexamethasone 4mg PO BID on day 2 and 3. If this does not help to prevent nausea, will  start Zyprexa.    More than 45 mins were spent during this encounter, greater than 50% was spent in direct counseling and/or coordination of care.     Patient was also seen and examined by Dr. Barrett. Patient is in agreement with the proposed treatment plan. All questions were answered to the patient's satisfaction. Pt knows to call clinic if anything is needed before the next clinic visit.    Karen Hightower, MSN, APRN, FNP-C  Hematology and Medical Oncology  Nurse Practitioner to Dr. Eliot Barrett  Nurse Practitioner, [facility]

## 2020-01-23 ENCOUNTER — OFFICE VISIT (OUTPATIENT)
Dept: HEMATOLOGY/ONCOLOGY | Facility: CLINIC | Age: 60
End: 2020-01-23
Payer: COMMERCIAL

## 2020-01-23 ENCOUNTER — INFUSION (OUTPATIENT)
Dept: INFUSION THERAPY | Facility: HOSPITAL | Age: 60
End: 2020-01-23
Attending: INTERNAL MEDICINE
Payer: COMMERCIAL

## 2020-01-23 ENCOUNTER — RESEARCH ENCOUNTER (OUTPATIENT)
Dept: RESEARCH | Facility: HOSPITAL | Age: 60
End: 2020-01-23

## 2020-01-23 VITALS
OXYGEN SATURATION: 98 % | DIASTOLIC BLOOD PRESSURE: 70 MMHG | WEIGHT: 132.94 LBS | BODY MASS INDEX: 23.55 KG/M2 | TEMPERATURE: 98 F | RESPIRATION RATE: 20 BRPM | HEIGHT: 63 IN | HEART RATE: 81 BPM | SYSTOLIC BLOOD PRESSURE: 132 MMHG

## 2020-01-23 VITALS
TEMPERATURE: 98 F | HEART RATE: 82 BPM | BODY MASS INDEX: 23.39 KG/M2 | DIASTOLIC BLOOD PRESSURE: 80 MMHG | RESPIRATION RATE: 18 BRPM | WEIGHT: 132 LBS | SYSTOLIC BLOOD PRESSURE: 136 MMHG | HEIGHT: 63 IN

## 2020-01-23 DIAGNOSIS — T45.1X5A CHEMOTHERAPY INDUCED NAUSEA AND VOMITING: ICD-10-CM

## 2020-01-23 DIAGNOSIS — Z00.6 RESEARCH STUDY PATIENT: ICD-10-CM

## 2020-01-23 DIAGNOSIS — I82.90 VTE (VENOUS THROMBOEMBOLISM): ICD-10-CM

## 2020-01-23 DIAGNOSIS — G89.3 NEOPLASM RELATED PAIN (ACUTE) (CHRONIC): ICD-10-CM

## 2020-01-23 DIAGNOSIS — C25.0 CANCER OF HEAD OF PANCREAS: ICD-10-CM

## 2020-01-23 DIAGNOSIS — C78.00 MALIGNANT NEOPLASM METASTATIC TO LUNG, UNSPECIFIED LATERALITY: Primary | ICD-10-CM

## 2020-01-23 DIAGNOSIS — R11.2 CHEMOTHERAPY INDUCED NAUSEA AND VOMITING: ICD-10-CM

## 2020-01-23 DIAGNOSIS — C78.00 MALIGNANT NEOPLASM METASTATIC TO LUNG, UNSPECIFIED LATERALITY: ICD-10-CM

## 2020-01-23 DIAGNOSIS — C25.0 CANCER OF HEAD OF PANCREAS: Primary | ICD-10-CM

## 2020-01-23 DIAGNOSIS — G47.01 INSOMNIA DUE TO MEDICAL CONDITION: ICD-10-CM

## 2020-01-23 DIAGNOSIS — Z79.01 ANTICOAGULATED: ICD-10-CM

## 2020-01-23 PROCEDURE — 99999 PR PBB SHADOW E&M-EST. PATIENT-LVL IV: ICD-10-PCS | Mod: PBBFAC,,, | Performed by: NURSE PRACTITIONER

## 2020-01-23 PROCEDURE — 96415 CHEMO IV INFUSION ADDL HR: CPT

## 2020-01-23 PROCEDURE — 96367 TX/PROPH/DG ADDL SEQ IV INF: CPT

## 2020-01-23 PROCEDURE — 99215 PR OFFICE/OUTPT VISIT, EST, LEVL V, 40-54 MIN: ICD-10-PCS | Mod: S$GLB,,, | Performed by: NURSE PRACTITIONER

## 2020-01-23 PROCEDURE — 63600175 PHARM REV CODE 636 W HCPCS: Mod: JG | Performed by: INTERNAL MEDICINE

## 2020-01-23 PROCEDURE — 99999 PR PBB SHADOW E&M-EST. PATIENT-LVL IV: CPT | Mod: PBBFAC,,, | Performed by: NURSE PRACTITIONER

## 2020-01-23 PROCEDURE — 3008F BODY MASS INDEX DOCD: CPT | Mod: CPTII,S$GLB,, | Performed by: NURSE PRACTITIONER

## 2020-01-23 PROCEDURE — 3008F PR BODY MASS INDEX (BMI) DOCUMENTED: ICD-10-PCS | Mod: CPTII,S$GLB,, | Performed by: NURSE PRACTITIONER

## 2020-01-23 PROCEDURE — 99215 OFFICE O/P EST HI 40 MIN: CPT | Mod: S$GLB,,, | Performed by: NURSE PRACTITIONER

## 2020-01-23 PROCEDURE — 96416 CHEMO PROLONG INFUSE W/PUMP: CPT

## 2020-01-23 PROCEDURE — 96413 CHEMO IV INFUSION 1 HR: CPT

## 2020-01-23 PROCEDURE — 96417 CHEMO IV INFUS EACH ADDL SEQ: CPT

## 2020-01-23 RX ORDER — DIPHENHYDRAMINE HYDROCHLORIDE 50 MG/ML
50 INJECTION INTRAMUSCULAR; INTRAVENOUS ONCE AS NEEDED
Status: DISCONTINUED | OUTPATIENT
Start: 2020-01-23 | End: 2020-01-23 | Stop reason: HOSPADM

## 2020-01-23 RX ORDER — SODIUM CHLORIDE 0.9 % (FLUSH) 0.9 %
10 SYRINGE (ML) INJECTION
Status: CANCELLED | OUTPATIENT
Start: 2020-01-25

## 2020-01-23 RX ORDER — HEPARIN 100 UNIT/ML
500 SYRINGE INTRAVENOUS
Status: DISCONTINUED | OUTPATIENT
Start: 2020-01-23 | End: 2020-01-23 | Stop reason: HOSPADM

## 2020-01-23 RX ORDER — HEPARIN 100 UNIT/ML
500 SYRINGE INTRAVENOUS
Status: CANCELLED | OUTPATIENT
Start: 2020-01-25

## 2020-01-23 RX ORDER — HEPARIN 100 UNIT/ML
500 SYRINGE INTRAVENOUS
Status: CANCELLED | OUTPATIENT
Start: 2020-01-23

## 2020-01-23 RX ORDER — SODIUM CHLORIDE 0.9 % (FLUSH) 0.9 %
10 SYRINGE (ML) INJECTION
Status: CANCELLED | OUTPATIENT
Start: 2020-01-23

## 2020-01-23 RX ORDER — EPINEPHRINE 0.3 MG/.3ML
0.3 INJECTION SUBCUTANEOUS ONCE AS NEEDED
Status: DISCONTINUED | OUTPATIENT
Start: 2020-01-23 | End: 2020-01-23 | Stop reason: HOSPADM

## 2020-01-23 RX ORDER — ATROPINE SULFATE 0.4 MG/ML
0.4 INJECTION, SOLUTION ENDOTRACHEAL; INTRAMEDULLARY; INTRAMUSCULAR; INTRAVENOUS; SUBCUTANEOUS ONCE AS NEEDED
Status: CANCELLED | OUTPATIENT
Start: 2020-01-23

## 2020-01-23 RX ORDER — EPINEPHRINE 0.3 MG/.3ML
0.3 INJECTION SUBCUTANEOUS ONCE AS NEEDED
Status: CANCELLED | OUTPATIENT
Start: 2020-01-23

## 2020-01-23 RX ORDER — ATROPINE SULFATE 0.4 MG/ML
0.4 INJECTION, SOLUTION ENDOTRACHEAL; INTRAMEDULLARY; INTRAMUSCULAR; INTRAVENOUS; SUBCUTANEOUS ONCE AS NEEDED
Status: DISCONTINUED | OUTPATIENT
Start: 2020-01-23 | End: 2020-01-23 | Stop reason: HOSPADM

## 2020-01-23 RX ORDER — DEXAMETHASONE 4 MG/1
4 TABLET ORAL EVERY 12 HOURS
Qty: 20 TABLET | Refills: 0 | Status: SHIPPED | OUTPATIENT
Start: 2020-01-23 | End: 2020-03-31 | Stop reason: SDUPTHER

## 2020-01-23 RX ORDER — DIPHENHYDRAMINE HYDROCHLORIDE 50 MG/ML
50 INJECTION INTRAMUSCULAR; INTRAVENOUS ONCE AS NEEDED
Status: CANCELLED | OUTPATIENT
Start: 2020-01-23

## 2020-01-23 RX ORDER — SODIUM CHLORIDE 0.9 % (FLUSH) 0.9 %
10 SYRINGE (ML) INJECTION
Status: DISCONTINUED | OUTPATIENT
Start: 2020-01-23 | End: 2020-01-23 | Stop reason: HOSPADM

## 2020-01-23 RX ADMIN — OXALIPLATIN 139 MG: 5 INJECTION, SOLUTION, CONCENTRATE INTRAVENOUS at 12:01

## 2020-01-23 RX ADMIN — SODIUM CHLORIDE: 9 INJECTION, SOLUTION INTRAVENOUS at 02:01

## 2020-01-23 RX ADMIN — DEXTROSE: 50 INJECTION, SOLUTION INTRAVENOUS at 11:01

## 2020-01-23 RX ADMIN — SODIUM CHLORIDE 294 MG: 0.9 INJECTION, SOLUTION INTRAVENOUS at 02:01

## 2020-01-23 RX ADMIN — FLUOROURACIL 3910 MG: 50 INJECTION, SOLUTION INTRAVENOUS at 04:01

## 2020-01-23 RX ADMIN — DEXAMETHASONE SODIUM PHOSPHATE: 4 INJECTION, SOLUTION INTRA-ARTICULAR; INTRALESIONAL; INTRAMUSCULAR; INTRAVENOUS; SOFT TISSUE at 11:01

## 2020-01-23 NOTE — PATIENT INSTRUCTIONS
Call ASAP regardless of time for Fever > 100.4 lasting longer than 1 hour.    For nausea/vomiting: Take zofran ODT immediately when nausea starts. If you start vomiting, take phenergan. Phenergan may cause drowsiness.     For Diarrhea: take 2 tablets after first loose stool, followed by 1 tablet after each loose stool, up to 8 tablets/day    For Neulasta to help with bone pain: Take over the counter Claritin starting the day of chemo and for 3-4 days after neulasta injection.

## 2020-01-23 NOTE — PLAN OF CARE
1130 pt here for folfirinox infusion D1C1, labs, hx, meds, allergies reviewed, pt with no complaints at this time, nervous concerning new tx, reclined in chair, continue to monitor

## 2020-01-23 NOTE — PLAN OF CARE
1610 pt feeling better now no c/o nausea, cadd pump infusing to right chest wall port at 2.3 ml/hr without issue prior to d/c, pt to rtc Saturday around 1100 for pump d/c, no distress noted upon d/c to home

## 2020-01-23 NOTE — PROGRESS NOTES
Sponsor: [employer]  PI: Eliot Barrett MD  Study #: [id number]  WIRB: [id number]  IRB: 2018.096  Pt ID: -[id number]  ARM 1 w/ Salvage Therapy: TTFields + FOLFIRINOX (5FU, Irinotecan, Oxaliplatin)    PANOVA-3: Pivotal, randomized, open-label study of Tumor Treating Fields (TTFields, 150kHz) concomitant with gemcitabine and nab-paclitaxel for front-line treatment of locally-advanced pancreatic adenocarcinoma    Visit 6 / Week 24 / Salvage C1D1 - 23 JAN 2020    Pt in today for Visit 6 / Week 24 / Salvage C1D1 on above-mentioned study . Pt is AAO x3 with appropriate mood, affect and insight. Pt queried & denies any new or changed AEs or ConMeds SLV. Pt completed her labs yesterday, but VS, PE, ECOG, and QOLs were completed per protocol. Per Karen, ECOG = 0 / KPS = 100%. Karen also discussed a referral to palliative care - what palliative care means & the potential benefits of having a specialist in symptom management as part of her care team. Pt provided consent for Karen & the most common AEs seen with these medications & this regimen. Karen consulted Pharmacist Renita for potential additional medications appropriate for symptom management until meeting with palliative care MD.     TIME OUT  Pt's BASELINE WEIGHT is 59.9 kg with a BSA of 1.63 m2.  Oxaliplatin @ 85 mg/m2 x 1.63 m2 = 138.55 mg - NORMALIZED 139 mg  Irinotecan @ 180 mg/m2 x 1.63 m2 = 293.4 - NORMALIZED 294 mg  5FU Pump @ 2400 mg/m2 x 1.63 m2 = 3912 mg - NORMALIZED 3910 mg.  Calculations and doses verified as correctin Epic by Karen Escobar and myself.     Pt is receiving the medications at the time of this writing. Please see infusion RN's note for further information. Pt will RTC for C2D1 in 2 weeks   Labs: [id number] @ 1430  MD: [id number] @ 0830 (Lan)  Infusion: [id number] @ 0900  Pump DC: [id number] @ 0835

## 2020-01-23 NOTE — NURSING
1500 pt shayy c/o nausea vomited approximately 200 ml, Irinotecan stopped, BP= 164 /84 HR=96 spo2=98 %, atropine 0.4 mg ivp given, will monitor    1520 pt states feeling better nancy notified will restart in a few minutes and monitor

## 2020-01-25 ENCOUNTER — INFUSION (OUTPATIENT)
Dept: INFUSION THERAPY | Facility: HOSPITAL | Age: 60
End: 2020-01-25
Attending: INTERNAL MEDICINE
Payer: COMMERCIAL

## 2020-01-25 VITALS
SYSTOLIC BLOOD PRESSURE: 135 MMHG | HEART RATE: 90 BPM | TEMPERATURE: 98 F | DIASTOLIC BLOOD PRESSURE: 65 MMHG | RESPIRATION RATE: 18 BRPM

## 2020-01-25 DIAGNOSIS — C25.0 CANCER OF HEAD OF PANCREAS: ICD-10-CM

## 2020-01-25 DIAGNOSIS — C78.00 MALIGNANT NEOPLASM METASTATIC TO LUNG, UNSPECIFIED LATERALITY: Primary | ICD-10-CM

## 2020-01-25 PROCEDURE — A4216 STERILE WATER/SALINE, 10 ML: HCPCS | Performed by: INTERNAL MEDICINE

## 2020-01-25 PROCEDURE — 96523 IRRIG DRUG DELIVERY DEVICE: CPT

## 2020-01-25 PROCEDURE — 25000003 PHARM REV CODE 250: Performed by: INTERNAL MEDICINE

## 2020-01-25 PROCEDURE — 63600175 PHARM REV CODE 636 W HCPCS: Performed by: INTERNAL MEDICINE

## 2020-01-25 RX ORDER — HEPARIN 100 UNIT/ML
500 SYRINGE INTRAVENOUS
Status: DISCONTINUED | OUTPATIENT
Start: 2020-01-25 | End: 2020-01-25 | Stop reason: HOSPADM

## 2020-01-25 RX ORDER — SODIUM CHLORIDE 0.9 % (FLUSH) 0.9 %
10 SYRINGE (ML) INJECTION
Status: DISCONTINUED | OUTPATIENT
Start: 2020-01-25 | End: 2020-01-25 | Stop reason: HOSPADM

## 2020-01-25 RX ADMIN — HEPARIN 500 UNITS: 100 SYRINGE at 11:01

## 2020-01-25 RX ADMIN — Medication 10 ML: at 11:01

## 2020-01-27 ENCOUNTER — PATIENT MESSAGE (OUTPATIENT)
Dept: HEMATOLOGY/ONCOLOGY | Facility: CLINIC | Age: 60
End: 2020-01-27

## 2020-01-27 DIAGNOSIS — G89.3 NEOPLASM RELATED PAIN (ACUTE) (CHRONIC): ICD-10-CM

## 2020-01-27 DIAGNOSIS — R11.2 CHEMOTHERAPY INDUCED NAUSEA AND VOMITING: ICD-10-CM

## 2020-01-27 DIAGNOSIS — T45.1X5A CHEMOTHERAPY INDUCED NAUSEA AND VOMITING: ICD-10-CM

## 2020-01-27 RX ORDER — FENTANYL 25 UG/1
1 PATCH TRANSDERMAL
Qty: 10 PATCH | Refills: 0 | Status: SHIPPED | OUTPATIENT
Start: 2020-01-27 | End: 2020-03-02 | Stop reason: SDUPTHER

## 2020-01-27 RX ORDER — LORAZEPAM 0.5 MG/1
0.5 TABLET ORAL EVERY 6 HOURS PRN
Qty: 60 TABLET | Refills: 0 | Status: SHIPPED | OUTPATIENT
Start: 2020-01-27 | End: 2021-01-26

## 2020-02-05 ENCOUNTER — LAB VISIT (OUTPATIENT)
Dept: LAB | Facility: HOSPITAL | Age: 60
End: 2020-02-05
Attending: INTERNAL MEDICINE
Payer: COMMERCIAL

## 2020-02-05 DIAGNOSIS — C25.0 CANCER OF HEAD OF PANCREAS: ICD-10-CM

## 2020-02-05 DIAGNOSIS — Z00.6 RESEARCH STUDY PATIENT: ICD-10-CM

## 2020-02-05 DIAGNOSIS — G89.3 NEOPLASM RELATED PAIN (ACUTE) (CHRONIC): ICD-10-CM

## 2020-02-05 LAB
ALBUMIN SERPL BCP-MCNC: 3 G/DL (ref 3.5–5.2)
ALP SERPL-CCNC: 115 U/L (ref 55–135)
ALT SERPL W/O P-5'-P-CCNC: 27 U/L (ref 10–44)
ANION GAP SERPL CALC-SCNC: 4 MMOL/L (ref 8–16)
AST SERPL-CCNC: 24 U/L (ref 10–40)
BASOPHILS # BLD AUTO: 0.04 K/UL (ref 0–0.2)
BASOPHILS NFR BLD: 0.7 % (ref 0–1.9)
BILIRUB SERPL-MCNC: 0.3 MG/DL (ref 0.1–1)
BUN SERPL-MCNC: 13 MG/DL (ref 6–20)
CALCIUM SERPL-MCNC: 8.7 MG/DL (ref 8.7–10.5)
CHLORIDE SERPL-SCNC: 110 MMOL/L (ref 95–110)
CO2 SERPL-SCNC: 26 MMOL/L (ref 23–29)
CREAT SERPL-MCNC: 0.7 MG/DL (ref 0.5–1.4)
DIFFERENTIAL METHOD: ABNORMAL
EOSINOPHIL # BLD AUTO: 0.5 K/UL (ref 0–0.5)
EOSINOPHIL NFR BLD: 8.3 % (ref 0–8)
ERYTHROCYTE [DISTWIDTH] IN BLOOD BY AUTOMATED COUNT: 14.8 % (ref 11.5–14.5)
EST. GFR  (AFRICAN AMERICAN): >60 ML/MIN/1.73 M^2
EST. GFR  (NON AFRICAN AMERICAN): >60 ML/MIN/1.73 M^2
GLUCOSE SERPL-MCNC: 107 MG/DL (ref 70–110)
HCT VFR BLD AUTO: 37.1 % (ref 37–48.5)
HGB BLD-MCNC: 11.4 G/DL (ref 12–16)
IMM GRANULOCYTES # BLD AUTO: 0.01 K/UL (ref 0–0.04)
IMM GRANULOCYTES NFR BLD AUTO: 0.2 % (ref 0–0.5)
LYMPHOCYTES # BLD AUTO: 1.3 K/UL (ref 1–4.8)
LYMPHOCYTES NFR BLD: 23.8 % (ref 18–48)
MCH RBC QN AUTO: 28.1 PG (ref 27–31)
MCHC RBC AUTO-ENTMCNC: 30.7 G/DL (ref 32–36)
MCV RBC AUTO: 91 FL (ref 82–98)
MONOCYTES # BLD AUTO: 0.6 K/UL (ref 0.3–1)
MONOCYTES NFR BLD: 10.5 % (ref 4–15)
NEUTROPHILS # BLD AUTO: 3.2 K/UL (ref 1.8–7.7)
NEUTROPHILS NFR BLD: 56.5 % (ref 38–73)
NRBC BLD-RTO: 0 /100 WBC
PLATELET # BLD AUTO: 176 K/UL (ref 150–350)
PMV BLD AUTO: 10.2 FL (ref 9.2–12.9)
POTASSIUM SERPL-SCNC: 4.7 MMOL/L (ref 3.5–5.1)
PROT SERPL-MCNC: 6.4 G/DL (ref 6–8.4)
RBC # BLD AUTO: 4.06 M/UL (ref 4–5.4)
SODIUM SERPL-SCNC: 140 MMOL/L (ref 136–145)
WBC # BLD AUTO: 5.63 K/UL (ref 3.9–12.7)

## 2020-02-05 PROCEDURE — 85025 COMPLETE CBC W/AUTO DIFF WBC: CPT

## 2020-02-05 PROCEDURE — 80053 COMPREHEN METABOLIC PANEL: CPT

## 2020-02-05 PROCEDURE — 36415 COLL VENOUS BLD VENIPUNCTURE: CPT

## 2020-02-06 ENCOUNTER — INFUSION (OUTPATIENT)
Dept: INFUSION THERAPY | Facility: HOSPITAL | Age: 60
End: 2020-02-06
Attending: INTERNAL MEDICINE
Payer: COMMERCIAL

## 2020-02-06 ENCOUNTER — RESEARCH ENCOUNTER (OUTPATIENT)
Dept: RESEARCH | Facility: HOSPITAL | Age: 60
End: 2020-02-06

## 2020-02-06 ENCOUNTER — OFFICE VISIT (OUTPATIENT)
Dept: HEMATOLOGY/ONCOLOGY | Facility: CLINIC | Age: 60
End: 2020-02-06
Payer: COMMERCIAL

## 2020-02-06 VITALS
HEIGHT: 62 IN | HEART RATE: 73 BPM | TEMPERATURE: 98 F | WEIGHT: 133.38 LBS | BODY MASS INDEX: 24.54 KG/M2 | RESPIRATION RATE: 18 BRPM | DIASTOLIC BLOOD PRESSURE: 63 MMHG | SYSTOLIC BLOOD PRESSURE: 136 MMHG

## 2020-02-06 VITALS
HEIGHT: 63 IN | TEMPERATURE: 98 F | WEIGHT: 133.38 LBS | HEART RATE: 82 BPM | OXYGEN SATURATION: 100 % | RESPIRATION RATE: 20 BRPM | DIASTOLIC BLOOD PRESSURE: 61 MMHG | BODY MASS INDEX: 23.63 KG/M2 | SYSTOLIC BLOOD PRESSURE: 122 MMHG

## 2020-02-06 DIAGNOSIS — K21.9 GASTROESOPHAGEAL REFLUX DISEASE WITHOUT ESOPHAGITIS: ICD-10-CM

## 2020-02-06 DIAGNOSIS — Z51.12 ENCOUNTER FOR ANTINEOPLASTIC CHEMOTHERAPY AND IMMUNOTHERAPY: Primary | ICD-10-CM

## 2020-02-06 DIAGNOSIS — C25.0 CANCER OF HEAD OF PANCREAS: ICD-10-CM

## 2020-02-06 DIAGNOSIS — C78.00 MALIGNANT NEOPLASM METASTATIC TO LUNG, UNSPECIFIED LATERALITY: ICD-10-CM

## 2020-02-06 DIAGNOSIS — C80.1 MALIGNANT OBSTRUCTIVE JAUNDICE: ICD-10-CM

## 2020-02-06 DIAGNOSIS — Z51.11 ENCOUNTER FOR ANTINEOPLASTIC CHEMOTHERAPY AND IMMUNOTHERAPY: Primary | ICD-10-CM

## 2020-02-06 DIAGNOSIS — Z00.6 RESEARCH STUDY PATIENT: ICD-10-CM

## 2020-02-06 DIAGNOSIS — C25.0 CANCER OF HEAD OF PANCREAS: Primary | ICD-10-CM

## 2020-02-06 DIAGNOSIS — T45.1X5A CHEMOTHERAPY-INDUCED NAUSEA: ICD-10-CM

## 2020-02-06 DIAGNOSIS — N18.30 CKD (CHRONIC KIDNEY DISEASE) STAGE 3, GFR 30-59 ML/MIN: ICD-10-CM

## 2020-02-06 DIAGNOSIS — K83.1 MALIGNANT OBSTRUCTIVE JAUNDICE: ICD-10-CM

## 2020-02-06 DIAGNOSIS — R11.0 CHEMOTHERAPY-INDUCED NAUSEA: ICD-10-CM

## 2020-02-06 PROCEDURE — 3008F BODY MASS INDEX DOCD: CPT | Mod: CPTII,S$GLB,, | Performed by: INTERNAL MEDICINE

## 2020-02-06 PROCEDURE — 96417 CHEMO IV INFUS EACH ADDL SEQ: CPT

## 2020-02-06 PROCEDURE — 96413 CHEMO IV INFUSION 1 HR: CPT

## 2020-02-06 PROCEDURE — 63600175 PHARM REV CODE 636 W HCPCS: Performed by: INTERNAL MEDICINE

## 2020-02-06 PROCEDURE — 99215 OFFICE O/P EST HI 40 MIN: CPT | Mod: S$GLB,,, | Performed by: INTERNAL MEDICINE

## 2020-02-06 PROCEDURE — 99999 PR PBB SHADOW E&M-EST. PATIENT-LVL III: CPT | Mod: PBBFAC,,, | Performed by: INTERNAL MEDICINE

## 2020-02-06 PROCEDURE — 25000003 PHARM REV CODE 250: Performed by: INTERNAL MEDICINE

## 2020-02-06 PROCEDURE — 96367 TX/PROPH/DG ADDL SEQ IV INF: CPT

## 2020-02-06 PROCEDURE — 96521 REFILL/MAINT PORTABLE PUMP: CPT

## 2020-02-06 PROCEDURE — 99215 PR OFFICE/OUTPT VISIT, EST, LEVL V, 40-54 MIN: ICD-10-PCS | Mod: S$GLB,,, | Performed by: INTERNAL MEDICINE

## 2020-02-06 PROCEDURE — 99999 PR PBB SHADOW E&M-EST. PATIENT-LVL III: ICD-10-PCS | Mod: PBBFAC,,, | Performed by: INTERNAL MEDICINE

## 2020-02-06 PROCEDURE — 96415 CHEMO IV INFUSION ADDL HR: CPT

## 2020-02-06 PROCEDURE — 96375 TX/PRO/DX INJ NEW DRUG ADDON: CPT

## 2020-02-06 PROCEDURE — 3008F PR BODY MASS INDEX (BMI) DOCUMENTED: ICD-10-PCS | Mod: CPTII,S$GLB,, | Performed by: INTERNAL MEDICINE

## 2020-02-06 RX ORDER — OLANZAPINE 10 MG/1
10 TABLET ORAL DAILY
Status: DISCONTINUED | OUTPATIENT
Start: 2020-02-06 | End: 2020-02-06 | Stop reason: HOSPADM

## 2020-02-06 RX ORDER — ATROPINE SULFATE 0.4 MG/ML
0.4 INJECTION, SOLUTION ENDOTRACHEAL; INTRAMEDULLARY; INTRAMUSCULAR; INTRAVENOUS; SUBCUTANEOUS ONCE AS NEEDED
Status: CANCELLED | OUTPATIENT
Start: 2020-02-06

## 2020-02-06 RX ORDER — SODIUM CHLORIDE 0.9 % (FLUSH) 0.9 %
10 SYRINGE (ML) INJECTION
Status: CANCELLED | OUTPATIENT
Start: 2020-02-08

## 2020-02-06 RX ORDER — OLANZAPINE 10 MG/1
10 TABLET ORAL DAILY
Status: CANCELLED
Start: 2020-02-06

## 2020-02-06 RX ORDER — EPINEPHRINE 0.3 MG/.3ML
0.3 INJECTION SUBCUTANEOUS ONCE AS NEEDED
Status: DISCONTINUED | OUTPATIENT
Start: 2020-02-06 | End: 2020-02-06 | Stop reason: HOSPADM

## 2020-02-06 RX ORDER — EPINEPHRINE 0.3 MG/.3ML
0.3 INJECTION SUBCUTANEOUS ONCE AS NEEDED
Status: CANCELLED | OUTPATIENT
Start: 2020-02-06

## 2020-02-06 RX ORDER — HEPARIN 100 UNIT/ML
500 SYRINGE INTRAVENOUS
Status: CANCELLED | OUTPATIENT
Start: 2020-02-06

## 2020-02-06 RX ORDER — HEPARIN 100 UNIT/ML
500 SYRINGE INTRAVENOUS
Status: CANCELLED | OUTPATIENT
Start: 2020-02-08

## 2020-02-06 RX ORDER — DIPHENHYDRAMINE HYDROCHLORIDE 50 MG/ML
50 INJECTION INTRAMUSCULAR; INTRAVENOUS ONCE AS NEEDED
Status: CANCELLED | OUTPATIENT
Start: 2020-02-06

## 2020-02-06 RX ORDER — SODIUM CHLORIDE 0.9 % (FLUSH) 0.9 %
10 SYRINGE (ML) INJECTION
Status: DISCONTINUED | OUTPATIENT
Start: 2020-02-06 | End: 2020-02-06 | Stop reason: HOSPADM

## 2020-02-06 RX ORDER — OLANZAPINE 5 MG/1
5 TABLET ORAL NIGHTLY
Qty: 30 TABLET | Refills: 2 | Status: SHIPPED | OUTPATIENT
Start: 2020-02-06 | End: 2021-02-05

## 2020-02-06 RX ORDER — SODIUM CHLORIDE 0.9 % (FLUSH) 0.9 %
10 SYRINGE (ML) INJECTION
Status: CANCELLED | OUTPATIENT
Start: 2020-02-06

## 2020-02-06 RX ORDER — DIPHENHYDRAMINE HYDROCHLORIDE 50 MG/ML
50 INJECTION INTRAMUSCULAR; INTRAVENOUS ONCE AS NEEDED
Status: DISCONTINUED | OUTPATIENT
Start: 2020-02-06 | End: 2020-02-06 | Stop reason: HOSPADM

## 2020-02-06 RX ORDER — ATROPINE SULFATE 0.4 MG/ML
0.4 INJECTION, SOLUTION ENDOTRACHEAL; INTRAMEDULLARY; INTRAMUSCULAR; INTRAVENOUS; SUBCUTANEOUS ONCE AS NEEDED
Status: COMPLETED | OUTPATIENT
Start: 2020-02-06 | End: 2020-02-06

## 2020-02-06 RX ORDER — HEPARIN 100 UNIT/ML
500 SYRINGE INTRAVENOUS
Status: DISCONTINUED | OUTPATIENT
Start: 2020-02-06 | End: 2020-02-06 | Stop reason: HOSPADM

## 2020-02-06 RX ADMIN — DEXAMETHASONE SODIUM PHOSPHATE: 4 INJECTION, SOLUTION INTRA-ARTICULAR; INTRALESIONAL; INTRAMUSCULAR; INTRAVENOUS; SOFT TISSUE at 10:02

## 2020-02-06 RX ADMIN — ATROPINE SULFATE 0.4 MG: 0.4 INJECTION, SOLUTION INTRAMUSCULAR; INTRAVENOUS; SUBCUTANEOUS at 01:02

## 2020-02-06 RX ADMIN — SODIUM CHLORIDE 294 MG: 0.9 INJECTION, SOLUTION INTRAVENOUS at 01:02

## 2020-02-06 RX ADMIN — APREPITANT 130 MG: 130 INJECTION, EMULSION INTRAVENOUS at 10:02

## 2020-02-06 RX ADMIN — SODIUM CHLORIDE: 0.9 INJECTION, SOLUTION INTRAVENOUS at 10:02

## 2020-02-06 RX ADMIN — DEXTROSE: 50 INJECTION, SOLUTION INTRAVENOUS at 10:02

## 2020-02-06 RX ADMIN — OLANZAPINE 10 MG: 10 TABLET, FILM COATED ORAL at 10:02

## 2020-02-06 RX ADMIN — FLUOROURACIL 3910 MG: 50 INJECTION, SOLUTION INTRAVENOUS at 02:02

## 2020-02-06 RX ADMIN — OXALIPLATIN 139 MG: 100 INJECTION, SOLUTION, CONCENTRATE INTRAVENOUS at 10:02

## 2020-02-06 NOTE — PROGRESS NOTES
Sponsor: [employer]  PI: Eliot Barrett MD  Study #: [id number]  WIRB: [id number]  IRB: 2018.096  Pt ID: -[id number]  ARM 1 w/ Salvage Therapy: TTFields + FOLFIRINOX (5FU, Irinotecan, Oxaliplatin)    PANOVA-3: Pivotal, randomized, open-label study of Tumor Treating Fields (TTFields, 150kHz) concomitant with gemcitabine and nab-paclitaxel for front-line treatment of locally-advanced pancreatic adenocarcinoma    C2D1 - 06 FEB 2020    Pt RTC today for C2D1 on FOLFIRINOX. Pt is AAO x3 with appropriate mood, affect and insight. Pt queried & reports emesis during C1D1 infusion, nausea, acid reflux & stomach cramps when taking the dexamethasone, right foot sensory neuropathy, loose stools, and cold sensitivity. Pt reports utilizing her ondansetron & phenergan for nausea, OTC Nexium & Pepto Bismol for the acid reflux, stomach cramps and loose stools. Dr. Montenegro has added olanzepine on to today's antiemetic regimen & has written a prescription for olanzepine to be used PRN.   Pt queried & reports compliance with TTFields. Pt states sponsor's DSS is due soon to download compliance information. Today's visit is SOC, so no protocol-specific procedures have been completed.     Time Out with Dr. Montenegro  Pt's BASELINE WEIGHT is 60.6 kg with a BSA of 1.63 m2.  Oxaliplatin @ 85 mg/m2 x 1.63 m2 = 138.55 mg - NORMALIZED 139 mg  Irinotecan @ 180 mg/m2 x 1.63 m2 = 293.4 - NORMALIZED 294 mg  5FU Pump @ 2400 mg/m2 x 1.63 m2 = 3912 mg - NORMALIZED 3910 mg.  Calculations and doses verified as correct in Epic by me & Dr. Montenegro    Pt is receiving her infusion at the time of this writing. Please see infusion RN's note for further information. Pt will RTC for C3D1 in 2 weeks   Labs: [id number] @ 1430  MD: [id number] @ 0900 (Nena)  Infusion: [id number] @ 1000  Pump DC: [id number] @ 1000

## 2020-02-06 NOTE — PLAN OF CARE
1502  Infusion completed, pt tolerated well; pt instructed to remain well hydrated, to avoid cold sensation; discussed when to contact MD, when to report to ER; pt declined AVS, verbalized understanding of all discussed and to report for pump d/c on Sat 2/8/20 at 1245.

## 2020-02-06 NOTE — PROGRESS NOTES
Subjective:       Patient ID: Quyen Moody    Chief Complaint:  Pancreatic cancer    HPI     Quyen Moody is a 59 y.o. female, patient  to clinic for follow up and treatment on PANOVA-3 trial. She is here to begin cycle #1 of FOLFIRINOX after recent CT chest confirms lung mets.  Patient with intermittent pain, but controlled with oral medications. She is using Fentanyl with PRN oxycodone. She is having difficulties with sleep. She is taking Ativan with some relief.     ECOG 0.    Oncologic History:  She has had intermittent upper abdominal pain since early June.  She presented to her PCP who originally ordered an US and CT.  US was negative and CT showed some haziness at the pancreatic head.  She saw GI who performed EUS.  On EUS, a 3x4cm pancreatic head mass was seen with possible invasion into the SMA and portal vein.  FNA path s/w pancreatic adenocarcinoma.  CA 19-9 level at outside hospital was >1000 per the patient.  She endorses nausea and inability to tolerate much PO.  She has lost about 10lbs over the last few weeks and is noticing her skin turning yellow.  Denies pruritis.  She is passing gas but has not had a BM in a few days, she attributes this to narcotic use.  She recently started taking stool softeners.  No previous cardiac or stroke history.  Surgical history significant for open appendectomy when she was in her 20s    Review of Systems   Constitutional: Positive for activity change, appetite change and fatigue. Negative for chills, fever and unexpected weight change (improving).   HENT: Negative for congestion, hearing loss, mouth sores, sore throat, tinnitus and voice change.    Eyes: Negative for pain and visual disturbance.   Respiratory: Negative for cough, shortness of breath and wheezing.    Cardiovascular: Positive for leg swelling (left). Negative for chest pain and palpitations.   Gastrointestinal: Positive for abdominal pain, diarrhea and nausea. Negative for constipation and vomiting.    Endocrine: Negative for cold intolerance and heat intolerance.   Genitourinary: Negative for difficulty urinating, dyspareunia, dysuria, frequency, menstrual problem, urgency, vaginal bleeding, vaginal discharge and vaginal pain.   Musculoskeletal: Negative for arthralgias, back pain and myalgias.   Skin: Positive for rash. Negative for color change and wound.   Allergic/Immunologic: Negative for environmental allergies and food allergies.   Neurological: Negative for weakness, numbness and headaches.   Hematological: Negative for adenopathy. Does not bruise/bleed easily.   Psychiatric/Behavioral: Positive for sleep disturbance. Negative for agitation, confusion and hallucinations. The patient is nervous/anxious.    All other systems reviewed and are negative.        Allergies:  Review of patient's allergies indicates:   Allergen Reactions    Sulfa (sulfonamide antibiotics) Swelling and Hives     tongue         Medications:  Current Outpatient Medications   Medication Sig Dispense Refill    apixaban (ELIQUIS) 5 mg Tab Take 1 tablet (5 mg total) by mouth 2 (two) times daily. 60 tablet 6    dexAMETHasone (DECADRON) 4 MG Tab Take 1 tablet (4 mg total) by mouth every 12 (twelve) hours. For 2 days following chemotherapy. 20 tablet 0    fentaNYL (DURAGESIC) 25 mcg/hr Place 1 patch onto the skin every 72 hours. 10 patch 0    lidocaine-prilocaine (EMLA) cream Apply to port 45 minutes prior to access. 30 g 0    LORazepam (ATIVAN) 0.5 MG tablet Take 1 tablet (0.5 mg total) by mouth every 6 (six) hours as needed for Anxiety (nausea). 60 tablet 0    multivitamin (THERAGRAN) per tablet Take 1 tablet by mouth once daily.      ondansetron (ZOFRAN) 8 MG tablet Take 1 tablet (8 mg total) by mouth every 8 (eight) hours as needed for Nausea. 120 tablet 2    oxyCODONE (ROXICODONE) 15 MG Tab Take 1 tablet (15 mg total) by mouth every 6 (six) hours as needed for Pain. 90 tablet 0    hydrocortisone 2.5 % cream Apply topically  2 (two) times daily. (Patient not taking: Reported on 1/9/2020) 453.6 g 1    OLANZapine (ZYPREXA) 5 MG tablet Take 1 tablet (5 mg total) by mouth nightly. To prevent nausea 30 tablet 2     No current facility-administered medications for this visit.      Facility-Administered Medications Ordered in Other Visits   Medication Dose Route Frequency Provider Last Rate Last Dose    alteplase injection 2 mg  2 mg Intra-Catheter PRN Ashish Montenegro MD        aprepitant (CINVANTI) injection 130 mg  130 mg Intravenous 1 time in [facility] Ashish Montenegro MD   130 mg at 02/06/20 1030    atropine injection 0.4 mg  0.4 mg Intravenous Once PRN Ashish Montenegro MD        dextrose 5 % 250 mL flush bag   Intravenous 1 time in [facility] Ashish Montenegro MD        diphenhydrAMINE injection 50 mg  50 mg Intravenous Once PRN Ashish Montenegro MD        EPINEPHrine (EPIPEN) 0.3 mg/0.3 mL pen injection 0.3 mg  0.3 mg Intramuscular Once PRN Ashish Montenegro MD        fluorouracil 2,400 mg/m2 = 3,910 mg in sodium chloride 0.9% 100 mL chemo infusion  2,400 mg/m2 (Treatment Plan Recorded) Intravenous over 46 hr Ashish Montenegro MD        heparin, porcine (PF) 100 unit/mL injection flush 500 Units  500 Units Intravenous PRN Ashish Montenegro MD        hydrocortisone sodium succinate injection 100 mg  100 mg Intravenous Once PRN Ashish Montenegro MD        irinotecan (CAMPTOSAR) 180 mg/m2 = 294 mg in sodium chloride 0.9% 500 mL chemo infusion  180 mg/m2 (Treatment Plan Recorded) Intravenous 1 time in [facility] Ashish Montenegro MD        OLANZapine tablet 10 mg  10 mg Oral Daily Ashish Montenegro MD   10 mg at 02/06/20 1016    oxaliplatin (ELOXATIN) 85 mg/m2 = 139 mg in dextrose 5 % 500 mL chemo infusion  85 mg/m2 (Treatment Plan Recorded) Intravenous 1 time in [facility] Ashish Montenegro MD        palonosetron 0.25mg/dexamethasone 12mg IVPB   Intravenous 1 time in [facility] Ashish Montenegro MD         sodium chloride 0.9% flush 10 mL  10 mL Intravenous PRN Ashish Montenegro MD           PMH:  Past Medical History:   Diagnosis Date    Allergic rhinitis 3/3/2014    CKD (chronic kidney disease) stage 3, GFR 30-59 ml/min 12/26/2015    Hyperlipidemia 3/3/2014    Pancreas cancer     Skin rash 8/10/2019       PSH:  Past Surgical History:   Procedure Laterality Date    ERCP N/A 7/16/2019    Procedure: ERCP (ENDOSCOPIC RETROGRADE CHOLANGIOPANCREATOGRAPHY);  Surgeon: Chema Harris MD;  Location: [facility] ([address] [place] [address]);  Service: Endoscopy;  Laterality: N/A;    ERCP N/A 12/24/2019    Procedure: ERCP (ENDOSCOPIC RETROGRADE CHOLANGIOPANCREATOGRAPHY);  Surgeon: Chema Harris MD;  Location: [facility] ([address] [place] [address]);  Service: Endoscopy;  Laterality: N/A;    Exporatory lap      INSERTION OF TUNNELED CENTRAL VENOUS CATHETER (CVC) WITH SUBCUTANEOUS PORT Right 8/12/2019    Procedure: [id number];  Surgeon: Cesar Hallman MD;  Location: [facility] OR [address] [place] [address];  Service: General;  Laterality: Right;  right IJ       FamHx:  Family History   Problem Relation Age of Onset    Diabetes Mother     Diabetes Father     Diabetes Brother     Heart disease Neg Hx        SocHx:  Social History     Socioeconomic History    Marital status:      Spouse name: Not on file    Number of children: 0    Years of education: Not on file    Highest education level: Not on file   Occupational History    Occupation:      Employer: [employer]   Social Needs    Financial resource strain: Not on file    Food insecurity:     Worry: Not on file     Inability: Not on file    Transportation needs:     Medical: Not on file     Non-medical: Not on file   Tobacco Use    Smoking status: Former Smoker     Start date: 12/11/1999    Smokeless tobacco: Never Used   Substance and Sexual Activity    Alcohol use: Yes     Alcohol/week: 1.0 standard drinks     Types: 1 Glasses of wine per week     Frequency: 4 or more times a week      Drinks per session: 1 or 2     Binge frequency: Never     Comment: last drink a month ago     Drug use: No    Sexual activity: Yes   Lifestyle    Physical activity:     Days per week: Not on file     Minutes per session: Not on file    Stress: Not on file   Relationships    Social connections:     Talks on phone: Not on file     Gets together: Not on file     Attends Congregational service: Not on file     Active member of club or organization: Not on file     Attends meetings of clubs or organizations: Not on file     Relationship status: Not on file   Other Topics Concern    Not on file   Social History Narrative    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      of Cancer in 2016         Objective:       Vitals:    20 0839   BP: 122/61   Pulse: 82   Resp: 20   Temp: 97.6 °F (36.4 °C)       Physical Exam   Constitutional: She is oriented to person, place, and time. She appears well-developed and well-nourished. No distress.   HENT:   Head: Normocephalic and atraumatic.   Nose: Nose normal.   Mouth/Throat: Oropharynx is clear and moist. No oropharyngeal exudate.   Eyes: Pupils are equal, round, and reactive to light. Conjunctivae and EOM are normal. Right eye exhibits no discharge. Left eye exhibits no discharge. No scleral icterus.   Neck: Normal range of motion. Neck supple. No tracheal deviation present. No thyromegaly present.   Cardiovascular: Normal rate, regular rhythm, normal heart sounds and normal pulses.   No swelling to bilateral lower extremities.    Pulmonary/Chest: Effort normal and breath sounds normal. She has no wheezes. She has no rales.   Abdominal: Soft. Bowel sounds are normal. She exhibits no distension. There is no tenderness. There is no guarding.   Musculoskeletal: Normal range of motion. She exhibits edema (left lower extremity +1 from ankle to knee). She exhibits no tenderness or deformity.   No spinal or paraspinal tenderness to palpation.       Lymphadenopathy:     She has no  cervical adenopathy.   Neurological: She is alert and oriented to person, place, and time. No cranial nerve deficit. Coordination normal.   Skin: Skin is warm, dry and intact. She is not diaphoretic. No erythema. No pallor.   Psychiatric: She has a normal mood and affect. Her behavior is normal. Judgment and thought content normal.   Nursing note and vitals reviewed.        LABS:  WBC   Date Value Ref Range Status   02/05/2020 5.63 3.90 - 12.70 K/uL Final     Hemoglobin   Date Value Ref Range Status   02/05/2020 11.4 (L) 12.0 - 16.0 g/dL Final     Hematocrit   Date Value Ref Range Status   02/05/2020 37.1 37.0 - 48.5 % Final     Platelets   Date Value Ref Range Status   02/05/2020 176 150 - 350 K/uL Final       Chemistry        Component Value Date/Time     02/05/2020 1458    K 4.7 02/05/2020 1458     02/05/2020 1458    CO2 26 02/05/2020 1458    BUN 13 02/05/2020 1458    CREATININE 0.7 02/05/2020 1458     02/05/2020 1458        Component Value Date/Time    CALCIUM 8.7 02/05/2020 1458    ALKPHOS 115 02/05/2020 1458    AST 24 02/05/2020 1458    ALT 27 02/05/2020 1458    BILITOT 0.3 02/05/2020 1458    ESTGFRAFRICA >60.0 02/05/2020 1458    EGFRNONAA >60.0 02/05/2020 1458            Assessment:       1. Cancer of head of pancreas    2. Malignant neoplasm metastatic to lung, unspecified laterality    3. Chemotherapy-induced nausea    4. CKD (chronic kidney disease) stage 3, GFR 30-59 ml/min    5. Malignant obstructive jaundice    6. Gastroesophageal reflux disease without esophagitis            Plan:         1,2- Cancer at the head of the pancreas:    Discussed SOC chemo and chemoXRT with FOLFIRINOX vs our PANOVA-3 trial with [place]-Abraxane +/- TTFields.  Extensively reviewed the rationale of the study and reviewed her eligibility criteria with the research nurse and discussed case with Dr. Hallman as well.  She is interested in this study, and signed consent with our research nurse.     Patient was  enrolled on PANOVA-3 with gemcitabine + abraxane and TTF treatments.  She had progression in lungs, so chemotherapy was changed to FOLFIRINOX, but TTF continued on PANOVA as she had continued clinical benefit in pancreatic lesion.      RTC per research RN     3,- Adding zyprexa to regimen.  Also taking steroids days after chemotherapy.    4- Continue current regimen. Defer to pcp    5 - Stable    6. GERD - patient going to start PPI, worse with steroids.    Ashish Montenegro MD  Medical Oncology  Merged with [facility]

## 2020-02-06 NOTE — NURSING
0940 (pt scheduled for 0900)  Pt here for FOLFIRI infusion/pump, no new complaints or concerns at present; OK to proceed with treatment per ABHISHEK Sexton RN; discussed treatment plan for today, all questions answered and pt agrees to proceed

## 2020-02-08 ENCOUNTER — INFUSION (OUTPATIENT)
Dept: INFUSION THERAPY | Facility: HOSPITAL | Age: 60
End: 2020-02-08
Attending: INTERNAL MEDICINE
Payer: COMMERCIAL

## 2020-02-08 VITALS — TEMPERATURE: 98 F

## 2020-02-08 DIAGNOSIS — C78.00 MALIGNANT NEOPLASM METASTATIC TO LUNG, UNSPECIFIED LATERALITY: Primary | ICD-10-CM

## 2020-02-08 DIAGNOSIS — C25.0 CANCER OF HEAD OF PANCREAS: ICD-10-CM

## 2020-02-08 PROCEDURE — A4216 STERILE WATER/SALINE, 10 ML: HCPCS | Performed by: INTERNAL MEDICINE

## 2020-02-08 PROCEDURE — 25000003 PHARM REV CODE 250: Performed by: INTERNAL MEDICINE

## 2020-02-08 PROCEDURE — 63600175 PHARM REV CODE 636 W HCPCS: Performed by: INTERNAL MEDICINE

## 2020-02-08 PROCEDURE — 96523 IRRIG DRUG DELIVERY DEVICE: CPT

## 2020-02-08 RX ORDER — HEPARIN 100 UNIT/ML
500 SYRINGE INTRAVENOUS
Status: DISCONTINUED | OUTPATIENT
Start: 2020-02-08 | End: 2020-02-08 | Stop reason: HOSPADM

## 2020-02-08 RX ORDER — SODIUM CHLORIDE 0.9 % (FLUSH) 0.9 %
10 SYRINGE (ML) INJECTION
Status: DISCONTINUED | OUTPATIENT
Start: 2020-02-08 | End: 2020-02-08 | Stop reason: HOSPADM

## 2020-02-08 RX ADMIN — Medication 10 ML: at 12:02

## 2020-02-08 RX ADMIN — HEPARIN 500 UNITS: 100 SYRINGE at 12:02

## 2020-02-08 NOTE — NURSING
Patient in clinic for CADD pump d/c. Medication visualized to be completed. PAC flushed, heparin locked, and de-accessed. Patient denies any pain or discomfort. VS stable. AVS declined.

## 2020-02-20 ENCOUNTER — RESEARCH ENCOUNTER (OUTPATIENT)
Dept: RESEARCH | Facility: HOSPITAL | Age: 60
End: 2020-02-20

## 2020-02-20 ENCOUNTER — OFFICE VISIT (OUTPATIENT)
Dept: HEMATOLOGY/ONCOLOGY | Facility: CLINIC | Age: 60
End: 2020-02-20
Payer: COMMERCIAL

## 2020-02-20 ENCOUNTER — INFUSION (OUTPATIENT)
Dept: INFUSION THERAPY | Facility: HOSPITAL | Age: 60
End: 2020-02-20
Attending: INTERNAL MEDICINE
Payer: COMMERCIAL

## 2020-02-20 ENCOUNTER — HOSPITAL ENCOUNTER (INPATIENT)
Facility: HOSPITAL | Age: 60
LOS: 2 days | Discharge: HOME | DRG: 808 | End: 2020-02-22
Attending: EMERGENCY MEDICINE | Admitting: INTERNAL MEDICINE
Payer: COMMERCIAL

## 2020-02-20 VITALS
RESPIRATION RATE: 16 BRPM | DIASTOLIC BLOOD PRESSURE: 71 MMHG | DIASTOLIC BLOOD PRESSURE: 80 MMHG | TEMPERATURE: 99 F | HEIGHT: 63 IN | HEART RATE: 84 BPM | WEIGHT: 130.5 LBS | SYSTOLIC BLOOD PRESSURE: 139 MMHG | OXYGEN SATURATION: 99 % | RESPIRATION RATE: 18 BRPM | BODY MASS INDEX: 23.12 KG/M2 | TEMPERATURE: 98 F | HEART RATE: 87 BPM | SYSTOLIC BLOOD PRESSURE: 147 MMHG

## 2020-02-20 DIAGNOSIS — C78.00 MALIGNANT NEOPLASM METASTATIC TO LUNG, UNSPECIFIED LATERALITY: Primary | ICD-10-CM

## 2020-02-20 DIAGNOSIS — C78.00 MALIGNANT NEOPLASM METASTATIC TO LUNG, UNSPECIFIED LATERALITY: ICD-10-CM

## 2020-02-20 DIAGNOSIS — R50.81 NEUTROPENIC FEVER: Primary | ICD-10-CM

## 2020-02-20 DIAGNOSIS — R11.0 CHEMOTHERAPY-INDUCED NAUSEA: ICD-10-CM

## 2020-02-20 DIAGNOSIS — C25.0 CANCER OF HEAD OF PANCREAS: ICD-10-CM

## 2020-02-20 DIAGNOSIS — T45.1X5A CHEMOTHERAPY-INDUCED NAUSEA: ICD-10-CM

## 2020-02-20 DIAGNOSIS — R11.2 NON-INTRACTABLE VOMITING WITH NAUSEA, UNSPECIFIED VOMITING TYPE: ICD-10-CM

## 2020-02-20 DIAGNOSIS — C25.0 CANCER OF HEAD OF PANCREAS: Primary | ICD-10-CM

## 2020-02-20 DIAGNOSIS — D70.9 NEUTROPENIC FEVER: Primary | ICD-10-CM

## 2020-02-20 PROBLEM — R11.10 NON-INTRACTABLE VOMITING: Status: ACTIVE | Noted: 2020-02-20

## 2020-02-20 LAB
ALBUMIN SERPL BCP-MCNC: 3 G/DL (ref 3.5–5.2)
ALP SERPL-CCNC: 296 U/L (ref 55–135)
ALT SERPL W/O P-5'-P-CCNC: 449 U/L (ref 10–44)
ANION GAP SERPL CALC-SCNC: 9 MMOL/L (ref 8–16)
ANISOCYTOSIS BLD QL SMEAR: SLIGHT
AST SERPL-CCNC: 558 U/L (ref 10–40)
BACTERIA #/AREA URNS AUTO: NORMAL /HPF
BASOPHILS NFR BLD: 0 % (ref 0–1.9)
BILIRUB SERPL-MCNC: 1.5 MG/DL (ref 0.1–1)
BILIRUB UR QL STRIP: NEGATIVE
BUN SERPL-MCNC: 13 MG/DL (ref 6–20)
CALCIUM SERPL-MCNC: 8.5 MG/DL (ref 8.7–10.5)
CHLORIDE SERPL-SCNC: 106 MMOL/L (ref 95–110)
CLARITY UR REFRACT.AUTO: CLEAR
CO2 SERPL-SCNC: 21 MMOL/L (ref 23–29)
COLOR UR AUTO: YELLOW
CREAT SERPL-MCNC: 0.7 MG/DL (ref 0.5–1.4)
DIFFERENTIAL METHOD: ABNORMAL
EOSINOPHIL NFR BLD: 0 % (ref 0–8)
ERYTHROCYTE [DISTWIDTH] IN BLOOD BY AUTOMATED COUNT: 15.2 % (ref 11.5–14.5)
EST. GFR  (AFRICAN AMERICAN): >60 ML/MIN/1.73 M^2
EST. GFR  (NON AFRICAN AMERICAN): >60 ML/MIN/1.73 M^2
GLUCOSE SERPL-MCNC: 99 MG/DL (ref 70–110)
GLUCOSE UR QL STRIP: NEGATIVE
HCT VFR BLD AUTO: 38.2 % (ref 37–48.5)
HGB BLD-MCNC: 12.1 G/DL (ref 12–16)
HGB UR QL STRIP: ABNORMAL
IMM GRANULOCYTES # BLD AUTO: ABNORMAL K/UL (ref 0–0.04)
IMM GRANULOCYTES NFR BLD AUTO: ABNORMAL % (ref 0–0.5)
INFLUENZA A, MOLECULAR: NEGATIVE
INFLUENZA B, MOLECULAR: NEGATIVE
KETONES UR QL STRIP: NEGATIVE
LACTATE SERPL-SCNC: 1.1 MMOL/L (ref 0.5–2.2)
LEUKOCYTE ESTERASE UR QL STRIP: NEGATIVE
LYMPHOCYTES NFR BLD: 30 % (ref 18–48)
MCH RBC QN AUTO: 27.6 PG (ref 27–31)
MCHC RBC AUTO-ENTMCNC: 31.7 G/DL (ref 32–36)
MCV RBC AUTO: 87 FL (ref 82–98)
MICROSCOPIC COMMENT: NORMAL
MONOCYTES NFR BLD: 20 % (ref 4–15)
NEUTROPHILS NFR BLD: 40 % (ref 38–73)
NEUTS BAND NFR BLD MANUAL: 10 %
NITRITE UR QL STRIP: NEGATIVE
NRBC BLD-RTO: 0 /100 WBC
PH UR STRIP: 6 [PH] (ref 5–8)
PLATELET # BLD AUTO: 80 K/UL (ref 150–350)
PLATELET BLD QL SMEAR: ABNORMAL
PMV BLD AUTO: 9.7 FL (ref 9.2–12.9)
POIKILOCYTOSIS BLD QL SMEAR: SLIGHT
POTASSIUM SERPL-SCNC: 4.5 MMOL/L (ref 3.5–5.1)
PROT SERPL-MCNC: 6.9 G/DL (ref 6–8.4)
PROT UR QL STRIP: NEGATIVE
RBC # BLD AUTO: 4.39 M/UL (ref 4–5.4)
RBC #/AREA URNS AUTO: 4 /HPF (ref 0–4)
SODIUM SERPL-SCNC: 136 MMOL/L (ref 136–145)
SP GR UR STRIP: 1.01 (ref 1–1.03)
SPECIMEN SOURCE: NORMAL
URN SPEC COLLECT METH UR: ABNORMAL
WBC # BLD AUTO: 0.85 K/UL (ref 3.9–12.7)
WBC #/AREA URNS AUTO: 1 /HPF (ref 0–5)

## 2020-02-20 PROCEDURE — 81001 URINALYSIS AUTO W/SCOPE: CPT

## 2020-02-20 PROCEDURE — 25000003 PHARM REV CODE 250: Performed by: STUDENT IN AN ORGANIZED HEALTH CARE EDUCATION/TRAINING PROGRAM

## 2020-02-20 PROCEDURE — 3008F PR BODY MASS INDEX (BMI) DOCUMENTED: ICD-10-PCS | Mod: CPTII,S$GLB,, | Performed by: INTERNAL MEDICINE

## 2020-02-20 PROCEDURE — 96365 THER/PROPH/DIAG IV INF INIT: CPT

## 2020-02-20 PROCEDURE — 99999 PR PBB SHADOW E&M-EST. PATIENT-LVL IV: ICD-10-PCS | Mod: PBBFAC,,,

## 2020-02-20 PROCEDURE — 85027 COMPLETE CBC AUTOMATED: CPT

## 2020-02-20 PROCEDURE — 99214 OFFICE O/P EST MOD 30 MIN: CPT | Mod: S$GLB,,, | Performed by: INTERNAL MEDICINE

## 2020-02-20 PROCEDURE — 63600175 PHARM REV CODE 636 W HCPCS: Mod: JG | Performed by: INTERNAL MEDICINE

## 2020-02-20 PROCEDURE — 85007 BL SMEAR W/DIFF WBC COUNT: CPT

## 2020-02-20 PROCEDURE — 80053 COMPREHEN METABOLIC PANEL: CPT

## 2020-02-20 PROCEDURE — 99214 PR OFFICE/OUTPT VISIT, EST, LEVL IV, 30-39 MIN: ICD-10-PCS | Mod: S$GLB,,, | Performed by: INTERNAL MEDICINE

## 2020-02-20 PROCEDURE — 83605 ASSAY OF LACTIC ACID: CPT

## 2020-02-20 PROCEDURE — 99285 PR EMERGENCY DEPT VISIT,LEVEL V: ICD-10-PCS | Mod: ,,, | Performed by: EMERGENCY MEDICINE

## 2020-02-20 PROCEDURE — 87502 INFLUENZA DNA AMP PROBE: CPT

## 2020-02-20 PROCEDURE — 12000002 HC ACUTE/MED SURGE SEMI-PRIVATE ROOM

## 2020-02-20 PROCEDURE — 63600175 PHARM REV CODE 636 W HCPCS: Performed by: STUDENT IN AN ORGANIZED HEALTH CARE EDUCATION/TRAINING PROGRAM

## 2020-02-20 PROCEDURE — 99999 PR PBB SHADOW E&M-EST. PATIENT-LVL IV: CPT | Mod: PBBFAC,,,

## 2020-02-20 PROCEDURE — 96367 TX/PROPH/DG ADDL SEQ IV INF: CPT

## 2020-02-20 PROCEDURE — 99285 EMERGENCY DEPT VISIT HI MDM: CPT | Mod: ,,, | Performed by: EMERGENCY MEDICINE

## 2020-02-20 PROCEDURE — 87040 BLOOD CULTURE FOR BACTERIA: CPT | Mod: 59

## 2020-02-20 PROCEDURE — 3008F BODY MASS INDEX DOCD: CPT | Mod: CPTII,S$GLB,, | Performed by: INTERNAL MEDICINE

## 2020-02-20 PROCEDURE — 96361 HYDRATE IV INFUSION ADD-ON: CPT

## 2020-02-20 PROCEDURE — 96372 THER/PROPH/DIAG INJ SC/IM: CPT

## 2020-02-20 PROCEDURE — 99285 EMERGENCY DEPT VISIT HI MDM: CPT | Mod: 25

## 2020-02-20 RX ORDER — ACETAMINOPHEN 325 MG/1
650 TABLET ORAL EVERY 8 HOURS PRN
Status: DISCONTINUED | OUTPATIENT
Start: 2020-02-20 | End: 2020-02-22 | Stop reason: HOSPADM

## 2020-02-20 RX ORDER — ONDANSETRON 8 MG/1
8 TABLET, ORALLY DISINTEGRATING ORAL
Status: COMPLETED | OUTPATIENT
Start: 2020-02-20 | End: 2020-02-20

## 2020-02-20 RX ORDER — CEFEPIME HYDROCHLORIDE 2 G/1
2 INJECTION, POWDER, FOR SOLUTION INTRAVENOUS
Status: DISCONTINUED | OUTPATIENT
Start: 2020-02-21 | End: 2020-02-20

## 2020-02-20 RX ORDER — ACETAMINOPHEN 500 MG
1000 TABLET ORAL
Status: COMPLETED | OUTPATIENT
Start: 2020-02-20 | End: 2020-02-20

## 2020-02-20 RX ORDER — TALC
6 POWDER (GRAM) TOPICAL NIGHTLY PRN
Status: DISCONTINUED | OUTPATIENT
Start: 2020-02-20 | End: 2020-02-22 | Stop reason: HOSPADM

## 2020-02-20 RX ORDER — OLANZAPINE 2.5 MG/1
5 TABLET ORAL NIGHTLY
Status: DISCONTINUED | OUTPATIENT
Start: 2020-02-20 | End: 2020-02-20

## 2020-02-20 RX ORDER — LORAZEPAM 0.5 MG/1
0.5 TABLET ORAL EVERY 6 HOURS PRN
Status: DISCONTINUED | OUTPATIENT
Start: 2020-02-20 | End: 2020-02-22 | Stop reason: HOSPADM

## 2020-02-20 RX ORDER — ONDANSETRON 8 MG/1
8 TABLET, ORALLY DISINTEGRATING ORAL EVERY 8 HOURS PRN
Status: DISCONTINUED | OUTPATIENT
Start: 2020-02-20 | End: 2020-02-22 | Stop reason: HOSPADM

## 2020-02-20 RX ORDER — IBUPROFEN 200 MG
16 TABLET ORAL
Status: DISCONTINUED | OUTPATIENT
Start: 2020-02-20 | End: 2020-02-22 | Stop reason: HOSPADM

## 2020-02-20 RX ORDER — GLUCAGON 1 MG
1 KIT INJECTION
Status: DISCONTINUED | OUTPATIENT
Start: 2020-02-20 | End: 2020-02-22 | Stop reason: HOSPADM

## 2020-02-20 RX ORDER — IBUPROFEN 200 MG
24 TABLET ORAL
Status: DISCONTINUED | OUTPATIENT
Start: 2020-02-20 | End: 2020-02-22 | Stop reason: HOSPADM

## 2020-02-20 RX ORDER — SODIUM CHLORIDE 0.9 % (FLUSH) 0.9 %
10 SYRINGE (ML) INJECTION
Status: DISCONTINUED | OUTPATIENT
Start: 2020-02-20 | End: 2020-02-22 | Stop reason: HOSPADM

## 2020-02-20 RX ORDER — OXYCODONE HYDROCHLORIDE 5 MG/1
15 TABLET ORAL EVERY 6 HOURS PRN
Status: DISCONTINUED | OUTPATIENT
Start: 2020-02-20 | End: 2020-02-22 | Stop reason: HOSPADM

## 2020-02-20 RX ADMIN — ONDANSETRON 8 MG: 8 TABLET, ORALLY DISINTEGRATING ORAL at 09:02

## 2020-02-20 RX ADMIN — FILGRASTIM 480 MCG: 480 INJECTION, SOLUTION INTRAVENOUS; SUBCUTANEOUS at 09:02

## 2020-02-20 RX ADMIN — ACETAMINOPHEN 1000 MG: 500 TABLET ORAL at 09:02

## 2020-02-20 RX ADMIN — SODIUM CHLORIDE 1701 ML: 0.9 INJECTION, SOLUTION INTRAVENOUS at 08:02

## 2020-02-20 RX ADMIN — PIPERACILLIN AND TAZOBACTAM 4.5 G: 4; .5 INJECTION, POWDER, LYOPHILIZED, FOR SOLUTION INTRAVENOUS; PARENTERAL at 10:02

## 2020-02-20 RX ADMIN — VANCOMYCIN HYDROCHLORIDE 1500 MG: 1.5 INJECTION, POWDER, LYOPHILIZED, FOR SOLUTION INTRAVENOUS at 10:02

## 2020-02-20 NOTE — NURSING
0969  Pt here for Neupogen injection, treatment held today per MD r/t ANC of 0.8; discussed treatment plan for today, medication and reason for same; all pt questions answered and pt agrees to proceed

## 2020-02-20 NOTE — PLAN OF CARE
0957  Injection administered, pt tolerated well; discussed possible SE of Neupogen and how to treat, also when to contact MD, when to report to ER; pt declined AVS, verbalized understanding of all discussed and when to report next

## 2020-02-20 NOTE — PROGRESS NOTES
Subjective:       Patient ID: Quyen Moody    Chief Complaint:  Pancreatic cancer    HPI     Quyen Moody is a 59 y.o. female, patient  to clinic for follow up and treatment  on PANOVA-3 trial. She is currently receiving FOLFIRINOX and TTF. Patient feeling well today, has diarrhea post chemo infusion      Based on latest scans, Dr. Hallman feels pt may actually be resectable after chemoXRT given good response to current therapy, but more recent CT chest confirms lung mets.      ECOG 0.    Oncologic History:  She has had intermittent upper abdominal pain since early June.  She presented to her PCP who originally ordered an US and CT.  US was negative and CT showed some haziness at the pancreatic head.  She saw GI who performed EUS.  On EUS, a 3x4cm pancreatic head mass was seen with possible invasion into the SMA and portal vein.  FNA path s/w pancreatic adenocarcinoma.  CA 19-9 level at outside hospital was >1000 per the patient.  She endorses nausea and inability to tolerate much PO.  She has lost about 10lbs over the last few weeks and is noticing her skin turning yellow.  Denies pruritis.  She is passing gas but has not had a BM in a few days, she attributes this to narcotic use.  She recently started taking stool softeners.  No previous cardiac or stroke history.  Surgical history significant for open appendectomy when she was in her 20s    Review of Systems   Constitutional: Positive for fatigue. Negative for activity change, appetite change, chills, fever and unexpected weight change (improving).   HENT: Negative for congestion, hearing loss, mouth sores, sore throat, tinnitus and voice change.    Eyes: Negative for pain and visual disturbance.   Respiratory: Positive for shortness of breath. Negative for cough and wheezing.    Cardiovascular: Positive for leg swelling (left). Negative for chest pain and palpitations.   Gastrointestinal: Positive for abdominal pain. Negative for constipation, diarrhea, nausea  and vomiting.   Endocrine: Negative for cold intolerance and heat intolerance.   Genitourinary: Negative for difficulty urinating, dyspareunia, dysuria, frequency, menstrual problem, urgency, vaginal bleeding, vaginal discharge and vaginal pain.   Musculoskeletal: Negative for arthralgias, back pain and myalgias.   Skin: Positive for rash. Negative for color change and wound.   Allergic/Immunologic: Negative for environmental allergies and food allergies.   Neurological: Negative for weakness, numbness and headaches.   Hematological: Negative for adenopathy. Does not bruise/bleed easily.   Psychiatric/Behavioral: Negative for agitation, confusion, hallucinations and sleep disturbance. The patient is nervous/anxious.    All other systems reviewed and are negative.        Allergies:  Review of patient's allergies indicates:   Allergen Reactions    Sulfa (sulfonamide antibiotics) Swelling and Hives     tongue         Medications:  Current Outpatient Medications   Medication Sig Dispense Refill    apixaban (ELIQUIS) 5 mg Tab Take 1 tablet (5 mg total) by mouth 2 (two) times daily. 60 tablet 6    dexAMETHasone (DECADRON) 4 MG Tab Take 1 tablet (4 mg total) by mouth every 12 (twelve) hours. For 2 days following chemotherapy. 20 tablet 0    fentaNYL (DURAGESIC) 25 mcg/hr Place 1 patch onto the skin every 72 hours. 10 patch 0    lidocaine-prilocaine (EMLA) cream Apply to port 45 minutes prior to access. 30 g 0    LORazepam (ATIVAN) 0.5 MG tablet Take 1 tablet (0.5 mg total) by mouth every 6 (six) hours as needed for Anxiety (nausea). 60 tablet 0    multivitamin (THERAGRAN) per tablet Take 1 tablet by mouth once daily.      OLANZapine (ZYPREXA) 5 MG tablet Take 1 tablet (5 mg total) by mouth nightly. To prevent nausea 30 tablet 2    ondansetron (ZOFRAN) 8 MG tablet Take 1 tablet (8 mg total) by mouth every 8 (eight) hours as needed for Nausea. 120 tablet 2    oxyCODONE (ROXICODONE) 15 MG Tab Take 1 tablet (15 mg  total) by mouth every 6 (six) hours as needed for Pain. 90 tablet 0    hydrocortisone 2.5 % cream Apply topically 2 (two) times daily. (Patient not taking: Reported on 1/9/2020) 453.6 g 1     No current facility-administered medications for this visit.        PMH:  Past Medical History:   Diagnosis Date    Allergic rhinitis 3/3/2014    CKD (chronic kidney disease) stage 3, GFR 30-59 ml/min 12/26/2015    Hyperlipidemia 3/3/2014    Pancreas cancer     Skin rash 8/10/2019       PSH:  Past Surgical History:   Procedure Laterality Date    ERCP N/A 7/16/2019    Procedure: ERCP (ENDOSCOPIC RETROGRADE CHOLANGIOPANCREATOGRAPHY);  Surgeon: Chema Harris MD;  Location: [facility] ([address] [place] [address]);  Service: Endoscopy;  Laterality: N/A;    ERCP N/A 12/24/2019    Procedure: ERCP (ENDOSCOPIC RETROGRADE CHOLANGIOPANCREATOGRAPHY);  Surgeon: Chema Harris MD;  Location: [facility] ([address] [place] [address]);  Service: Endoscopy;  Laterality: N/A;    Exporatory lap      INSERTION OF TUNNELED CENTRAL VENOUS CATHETER (CVC) WITH SUBCUTANEOUS PORT Right 8/12/2019    Procedure: [id number]-CATH;  Surgeon: Cesar Hallman MD;  Location: [facility] OR [address] [place] [address];  Service: General;  Laterality: Right;  right IJ       FamHx:  Family History   Problem Relation Age of Onset    Diabetes Mother     Diabetes Father     Diabetes Brother     Heart disease Neg Hx        SocHx:  Social History     Socioeconomic History    Marital status:      Spouse name: Not on file    Number of children: 0    Years of education: Not on file    Highest education level: Not on file   Occupational History    Occupation:      Employer: [employer]   Social Needs    Financial resource strain: Not on file    Food insecurity:     Worry: Not on file     Inability: Not on file    Transportation needs:     Medical: Not on file     Non-medical: Not on file   Tobacco Use    Smoking status: Former Smoker     Start date: 12/11/1999    Smokeless tobacco: Never  Used   Substance and Sexual Activity    Alcohol use: Yes     Alcohol/week: 1.0 standard drinks     Types: 1 Glasses of wine per week     Frequency: 4 or more times a week     Drinks per session: 1 or 2     Binge frequency: Never     Comment: last drink a month ago     Drug use: No    Sexual activity: Yes   Lifestyle    Physical activity:     Days per week: Not on file     Minutes per session: Not on file    Stress: Not on file   Relationships    Social connections:     Talks on phone: Not on file     Gets together: Not on file     Attends [employer] service: Not on file     Active member of club or organization: Not on file     Attends meetings of clubs or organizations: Not on file     Relationship status: Not on file   Other Topics Concern    Not on file   Social History Narrative    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      of Cancer in 2016         Objective:       Vitals:    20 0904   BP: 139/71   Pulse: 87   Resp: 16   Temp: 98.9 °F (37.2 °C)       Physical Exam   Constitutional: She is oriented to person, place, and time. She appears well-developed and well-nourished. No distress.   HENT:   Head: Normocephalic and atraumatic.   Nose: Nose normal.   Mouth/Throat: Oropharynx is clear and moist. No oropharyngeal exudate.   Eyes: Pupils are equal, round, and reactive to light. Conjunctivae and EOM are normal. Right eye exhibits no discharge. Left eye exhibits no discharge. No scleral icterus.   Neck: Normal range of motion. Neck supple. No tracheal deviation present. No thyromegaly present.   Cardiovascular: Normal rate, regular rhythm, normal heart sounds and normal pulses.   No swelling to bilateral lower extremities.    Pulmonary/Chest: Effort normal and breath sounds normal. She has no wheezes. She has no rales.   Abdominal: Soft. Bowel sounds are normal. She exhibits no distension. There is no tenderness. There is no guarding.   Musculoskeletal: Normal range of motion. She exhibits edema  (left lower extremity +1 from ankle to knee). She exhibits no tenderness or deformity.   No spinal or paraspinal tenderness to palpation.       Lymphadenopathy:     She has no cervical adenopathy.   Neurological: She is alert and oriented to person, place, and time. No cranial nerve deficit. Coordination normal.   Skin: Skin is warm, dry and intact. Rash noted. She is not diaphoretic. No erythema. No pallor.   Psychiatric: She has a normal mood and affect. Her behavior is normal. Judgment and thought content normal.   Nursing note and vitals reviewed.        LABS:  WBC   Date Value Ref Range Status   02/19/2020 3.02 (L) 3.90 - 12.70 K/uL Final     Hemoglobin   Date Value Ref Range Status   02/19/2020 11.8 (L) 12.0 - 16.0 g/dL Final     Hematocrit   Date Value Ref Range Status   02/19/2020 38.9 37.0 - 48.5 % Final     Platelets   Date Value Ref Range Status   02/19/2020 162 150 - 350 K/uL Final       Chemistry        Component Value Date/Time     02/19/2020 1543    K 4.6 02/19/2020 1543     02/19/2020 1543    CO2 25 02/19/2020 1543    BUN 16 02/19/2020 1543    CREATININE 0.8 02/19/2020 1543     02/19/2020 1543        Component Value Date/Time    CALCIUM 8.5 (L) 02/19/2020 1543    ALKPHOS 114 02/19/2020 1543    AST 28 02/19/2020 1543    ALT 40 02/19/2020 1543    BILITOT 0.2 02/19/2020 1543    ESTGFRAFRICA >60.0 02/19/2020 1543    EGFRNONAA >60.0 02/19/2020 1543            Assessment:       1. Cancer of head of pancreas    2. Malignant neoplasm metastatic to lung, unspecified laterality    3. Chemotherapy-induced nausea            Plan:         1. Cancer at the head of the pancreas:    Discussed SOC chemo and chemoXRT with FOLFIRINOX vs our PANOVA-3 trial with Sevier-Abraxane +/- TTFields.  Extensively reviewed the rationale of the study and reviewed her eligibility criteria with the research nurse and discussed case with Dr. Hallman as well.  She is interested in this study, and signed consent with our  research nurse.     Here today to con't treatment with Cleveland+Abraxane and TTF on PANOVA-3, LFTs elevated, but improved. Will con't chemo today, but will dose reduce Cleveland to 800 and Abraxane to 100 per pt request due to chemo related AEs.      Based on latest scans, Dr. Hallman felt pt may actually be resectable after chemoXRT given good response to current therapy.  However, subsequent Chest CT showed new and growing micronodules in the in lungs c/w met disease in light of rising tumor markers.      Long discussion with pt regarding options.  She does not qualify for our COMBAT trial at this time because none of the lung mets are measurable per RECIST or biopsiable.     Switched chemo to FOLFIRINOX and con't TTF on PANOVA as this seems to be achieving good local control of the primary tumor.     IMX=930 today, will given nuepogen x3 days, when add neulasta to next dose of chemo.   decreased since starting FOLFIRINOX.       RTC per research RN.     Patient is in agreement with the proposed treatment plan. All questions were answered to the patient's satisfaction. Pt knows to call clinic if anything is needed before the next clinic visit.    More than 25 mins were spent during this encounter, greater than 50% was spent in direct counseling and/or coordination of care.     Eliot Barrett M.D., M.S., F.A.C.P.  Hematology and Oncology Attending  [facility]

## 2020-02-20 NOTE — PROGRESS NOTES
Sponsor: [employer]  PI: Eliot Barrett MD  Study #: [id number]  WIRB: [id number]  IRB: 2018.096  Pt ID: -[id number]  ARM 1 w/ Salvage Therapy: TTFields + FOLFIRINOX (5FU, Irinotecan, Oxaliplatin)    PANOVA-3: Pivotal, randomized, open-label study of Tumor Treating Fields (TTFields, 150kHz) concomitant with gemcitabine and nab-paclitaxel for front-line treatment of locally-advanced pancreatic adenocarcinoma    VISIT 7 / Week 28 - 20 FEB 2020    Pt RTC today accompanied by a friend for C3D1 on FOLFIRINOX, Week 28/Visit 7 on above-mentioned study. Pt is AAO x3 with appropriate mood, affect and insight. Pt queried & reports that she feels really well, but continues to report post-infusion nausea, acid reflux due to the dexamethasone, sensory neuropathy, and cold sensitivity. Pt reports that the Wednesday after her infusion, she experiences diarrhea. MD encouraged pt to utilize OTC Imodium.     Pt completed QOLs, and pt's labs, VS, PE & ECOG were collected per protocol. MD reviewed pt's labs that were collected yesterday, 02/19/2020, and pt's ANC is 0.8. Since pt is on salvage therapy, chemotherapy treatment decisions are entirely SOC. Dr. Barrett has chosen to hold today's treatment & give Neupogen today, tomorrow & Saturday.   Pt queried & reports compliance with TTFields. Pt states sponsor's DSS is due soon to download compliance information.   Pt was administered Neupogen this morning w/o RXN & was DC'd from clinic ambulatory & in stable condition. Please see Infusion RN note for further information.     Pt will RTC for C4D1 in 2 weeks   Labs: [id number] @ 1430  MD: [id number] @ 0900 (Karen)  Infusion: [id number] @ 1000  Pump DC: 07Mar2020 @ 1000

## 2020-02-21 LAB
ALBUMIN SERPL BCP-MCNC: 2.5 G/DL (ref 3.5–5.2)
ALP SERPL-CCNC: 194 U/L (ref 55–135)
ALT SERPL W/O P-5'-P-CCNC: 340 U/L (ref 10–44)
ANION GAP SERPL CALC-SCNC: 10 MMOL/L (ref 8–16)
ANISOCYTOSIS BLD QL SMEAR: SLIGHT
AST SERPL-CCNC: 240 U/L (ref 10–40)
BASO STIPL BLD QL SMEAR: ABNORMAL
BASOPHILS # BLD AUTO: 0.01 K/UL (ref 0–0.2)
BASOPHILS NFR BLD: 0.5 % (ref 0–1.9)
BILIRUB DIRECT SERPL-MCNC: 2.5 MG/DL (ref 0.1–0.3)
BILIRUB SERPL-MCNC: 3.2 MG/DL (ref 0.1–1)
BUN SERPL-MCNC: 10 MG/DL (ref 6–20)
CALCIUM SERPL-MCNC: 7.9 MG/DL (ref 8.7–10.5)
CHLORIDE SERPL-SCNC: 108 MMOL/L (ref 95–110)
CO2 SERPL-SCNC: 19 MMOL/L (ref 23–29)
CREAT SERPL-MCNC: 0.7 MG/DL (ref 0.5–1.4)
DIFFERENTIAL METHOD: ABNORMAL
EOSINOPHIL # BLD AUTO: 0 K/UL (ref 0–0.5)
EOSINOPHIL NFR BLD: 1.5 % (ref 0–8)
ERYTHROCYTE [DISTWIDTH] IN BLOOD BY AUTOMATED COUNT: 15.9 % (ref 11.5–14.5)
EST. GFR  (AFRICAN AMERICAN): >60 ML/MIN/1.73 M^2
EST. GFR  (NON AFRICAN AMERICAN): >60 ML/MIN/1.73 M^2
GLUCOSE SERPL-MCNC: 94 MG/DL (ref 70–110)
HCT VFR BLD AUTO: 37.2 % (ref 37–48.5)
HGB BLD-MCNC: 11.7 G/DL (ref 12–16)
IMM GRANULOCYTES # BLD AUTO: 0 K/UL (ref 0–0.04)
IMM GRANULOCYTES NFR BLD AUTO: 0 % (ref 0–0.5)
LYMPHOCYTES # BLD AUTO: 0.4 K/UL (ref 1–4.8)
LYMPHOCYTES NFR BLD: 20.7 % (ref 18–48)
MAGNESIUM SERPL-MCNC: 1.1 MG/DL (ref 1.6–2.6)
MCH RBC QN AUTO: 27.6 PG (ref 27–31)
MCHC RBC AUTO-ENTMCNC: 31.5 G/DL (ref 32–36)
MCV RBC AUTO: 88 FL (ref 82–98)
MONOCYTES # BLD AUTO: 0.6 K/UL (ref 0.3–1)
MONOCYTES NFR BLD: 31.3 % (ref 4–15)
NEUTROPHILS # BLD AUTO: 0.9 K/UL (ref 1.8–7.7)
NEUTROPHILS NFR BLD: 46 % (ref 38–73)
NRBC BLD-RTO: 0 /100 WBC
OVALOCYTES BLD QL SMEAR: ABNORMAL
PHOSPHATE SERPL-MCNC: 3.6 MG/DL (ref 2.7–4.5)
PLATELET # BLD AUTO: 99 K/UL (ref 150–350)
PLATELET BLD QL SMEAR: ABNORMAL
PMV BLD AUTO: 12 FL (ref 9.2–12.9)
POIKILOCYTOSIS BLD QL SMEAR: SLIGHT
POLYCHROMASIA BLD QL SMEAR: ABNORMAL
POTASSIUM SERPL-SCNC: 3.9 MMOL/L (ref 3.5–5.1)
PROT SERPL-MCNC: 5.5 G/DL (ref 6–8.4)
RBC # BLD AUTO: 4.24 M/UL (ref 4–5.4)
SODIUM SERPL-SCNC: 137 MMOL/L (ref 136–145)
WBC # BLD AUTO: 1.98 K/UL (ref 3.9–12.7)

## 2020-02-21 PROCEDURE — 63600175 PHARM REV CODE 636 W HCPCS: Performed by: STUDENT IN AN ORGANIZED HEALTH CARE EDUCATION/TRAINING PROGRAM

## 2020-02-21 PROCEDURE — 25000003 PHARM REV CODE 250: Performed by: STUDENT IN AN ORGANIZED HEALTH CARE EDUCATION/TRAINING PROGRAM

## 2020-02-21 PROCEDURE — 63600175 PHARM REV CODE 636 W HCPCS: Mod: JG | Performed by: STUDENT IN AN ORGANIZED HEALTH CARE EDUCATION/TRAINING PROGRAM

## 2020-02-21 PROCEDURE — 85025 COMPLETE CBC W/AUTO DIFF WBC: CPT

## 2020-02-21 PROCEDURE — 20600001 HC STEP DOWN PRIVATE ROOM

## 2020-02-21 PROCEDURE — 99223 1ST HOSP IP/OBS HIGH 75: CPT | Mod: ,,, | Performed by: INTERNAL MEDICINE

## 2020-02-21 PROCEDURE — 80076 HEPATIC FUNCTION PANEL: CPT

## 2020-02-21 PROCEDURE — 83735 ASSAY OF MAGNESIUM: CPT

## 2020-02-21 PROCEDURE — 80048 BASIC METABOLIC PNL TOTAL CA: CPT

## 2020-02-21 PROCEDURE — 36415 COLL VENOUS BLD VENIPUNCTURE: CPT

## 2020-02-21 PROCEDURE — 84100 ASSAY OF PHOSPHORUS: CPT

## 2020-02-21 PROCEDURE — 99223 PR INITIAL HOSPITAL CARE,LEVL III: ICD-10-PCS | Mod: ,,, | Performed by: INTERNAL MEDICINE

## 2020-02-21 RX ORDER — MAGNESIUM SULFATE HEPTAHYDRATE 40 MG/ML
2 INJECTION, SOLUTION INTRAVENOUS
Status: COMPLETED | OUTPATIENT
Start: 2020-02-21 | End: 2020-02-21

## 2020-02-21 RX ORDER — LANOLIN ALCOHOL/MO/W.PET/CERES
400 CREAM (GRAM) TOPICAL ONCE
Status: COMPLETED | OUTPATIENT
Start: 2020-02-21 | End: 2020-02-21

## 2020-02-21 RX ADMIN — PIPERACILLIN AND TAZOBACTAM 4.5 G: 4; .5 INJECTION, POWDER, LYOPHILIZED, FOR SOLUTION INTRAVENOUS; PARENTERAL at 04:02

## 2020-02-21 RX ADMIN — ACETAMINOPHEN 650 MG: 325 TABLET ORAL at 01:02

## 2020-02-21 RX ADMIN — MAGNESIUM SULFATE IN WATER 2 G: 40 INJECTION, SOLUTION INTRAVENOUS at 10:02

## 2020-02-21 RX ADMIN — PIPERACILLIN AND TAZOBACTAM 4.5 G: 4; .5 INJECTION, POWDER, LYOPHILIZED, FOR SOLUTION INTRAVENOUS; PARENTERAL at 01:02

## 2020-02-21 RX ADMIN — APIXABAN 5 MG: 5 TABLET, FILM COATED ORAL at 08:02

## 2020-02-21 RX ADMIN — TBO-FILGRASTIM 300 MCG: 300 INJECTION, SOLUTION SUBCUTANEOUS at 11:02

## 2020-02-21 RX ADMIN — APIXABAN 5 MG: 5 TABLET, FILM COATED ORAL at 10:02

## 2020-02-21 RX ADMIN — MAGNESIUM SULFATE IN WATER 2 G: 40 INJECTION, SOLUTION INTRAVENOUS at 12:02

## 2020-02-21 RX ADMIN — PIPERACILLIN AND TAZOBACTAM 4.5 G: 4; .5 INJECTION, POWDER, LYOPHILIZED, FOR SOLUTION INTRAVENOUS; PARENTERAL at 08:02

## 2020-02-21 RX ADMIN — VANCOMYCIN HYDROCHLORIDE 750 MG: 750 INJECTION, POWDER, LYOPHILIZED, FOR SOLUTION INTRAVENOUS at 10:02

## 2020-02-21 RX ADMIN — Medication 400 MG: at 01:02

## 2020-02-21 NOTE — SUBJECTIVE & OBJECTIVE
Patient information was obtained from patient, past medical records and ER records.     (Not in a hospital admission)    Sulfa (sulfonamide antibiotics)     Past Medical History:   Diagnosis Date    Allergic rhinitis 3/3/2014    CKD (chronic kidney disease) stage 3, GFR 30-59 ml/min 12/26/2015    Hyperlipidemia 3/3/2014    Pancreas cancer     Skin rash 8/10/2019     Past Surgical History:   Procedure Laterality Date    ERCP N/A 7/16/2019    Procedure: ERCP (ENDOSCOPIC RETROGRADE CHOLANGIOPANCREATOGRAPHY);  Surgeon: Chema Harris MD;  Location: [facility] ([address] [place] [address]);  Service: Endoscopy;  Laterality: N/A;    ERCP N/A 12/24/2019    Procedure: ERCP (ENDOSCOPIC RETROGRADE CHOLANGIOPANCREATOGRAPHY);  Surgeon: Chema Harris MD;  Location: [facility] ([address] [place] [address]);  Service: Endoscopy;  Laterality: N/A;    Exporatory lap      INSERTION OF TUNNELED CENTRAL VENOUS CATHETER (CVC) WITH SUBCUTANEOUS PORT Right 8/12/2019    Procedure: [id number];  Surgeon: Cesar Hallman MD;  Location: [facility] OR [address] [place] [address];  Service: General;  Laterality: Right;  right IJ     Family History     Problem Relation (Age of Onset)    Diabetes Mother, Father, Brother        Tobacco Use    Smoking status: Former Smoker     Start date: 12/11/1999    Smokeless tobacco: Never Used   Substance and Sexual Activity    Alcohol use: Yes     Alcohol/week: 1.0 standard drinks     Types: 1 Glasses of wine per week     Frequency: 4 or more times a week     Drinks per session: 1 or 2     Binge frequency: Never     Comment: last drink a month ago     Drug use: No    Sexual activity: Yes       Review of Systems   Constitutional: Positive for chills and fever. Negative for activity change.   HENT: Negative for congestion, postnasal drip, rhinorrhea and sinus pressure.    Eyes: Negative for discharge, redness and visual disturbance.   Respiratory: Negative for cough and shortness of breath.    Cardiovascular: Negative for chest pain and palpitations.    Gastrointestinal: Positive for nausea. Negative for abdominal distention, abdominal pain, constipation, diarrhea and vomiting.   Endocrine: Negative for cold intolerance and heat intolerance.   Genitourinary: Negative for dysuria.   Musculoskeletal: Negative for arthralgias and back pain.   Skin: Negative for color change and pallor.   Neurological: Negative for dizziness, seizures, speech difficulty and headaches.   Psychiatric/Behavioral: Negative for agitation, sleep disturbance and suicidal ideas. The patient is not nervous/anxious.      Objective:     Vital Signs (Most Recent):  Temp: 99.7 °F (37.6 °C) (02/20/20 1851)  Pulse: 101 (02/20/20 2202)  Resp: (!) 21 (02/20/20 2104)  BP: (!) 123/59 (02/20/20 2202)  SpO2: 95 % (02/20/20 2202) Vital Signs (24h Range):  Temp:  [98.2 °F (36.8 °C)-99.7 °F (37.6 °C)] 99.7 °F (37.6 °C)  Pulse:  [] 101  Resp:  [16-21] 21  SpO2:  [95 %-99 %] 95 %  BP: (123-181)/(59-93) 123/59     Weight: 56.7 kg (125 lb)  Body mass index is 22.5 kg/m².  Body surface area is 1.58 meters squared.    ECOG SCORE         [unfilled]    Lines/Drains/Airways     Central Venous Catheter Line                 Port A Cath Single Lumen 08/12/19 1601 right internal jugular 192 days          Peripheral Intravenous Line                 Peripheral IV - Single Lumen 02/20/20 2019 20 G Right less than 1 day                Physical Exam   Constitutional: She is oriented to person, place, and time. She appears well-developed and well-nourished.   Eyes: Pupils are equal, round, and reactive to light. EOM are normal.   Neck: Normal range of motion. Neck supple.   Cardiovascular: Normal rate, regular rhythm and normal heart sounds.   No murmur heard.  Pulmonary/Chest: Effort normal and breath sounds normal. No stridor. No respiratory distress. She has no wheezes.   Abdominal: Soft. Bowel sounds are normal. She exhibits no distension. There is no tenderness. There is no guarding.   Musculoskeletal: Normal range  of motion. She exhibits no edema or deformity.   Neurological: She is alert and oriented to person, place, and time. Coordination normal.   Skin: Skin is warm and dry. Capillary refill takes less than 2 seconds. No erythema.     Significant Labs:   CBC:   Recent Labs   Lab 02/19/20 1543 02/20/20 2020   WBC 3.02* 0.85*   HGB 11.8* 12.1   HCT 38.9 38.2    80*    and CMP:   Recent Labs   Lab 02/19/20 1543 02/20/20 2020    136   K 4.6 4.5    106   CO2 25 21*    99   BUN 16 13   CREATININE 0.8 0.7   CALCIUM 8.5* 8.5*   PROT 6.5 6.9   ALBUMIN 3.0* 3.0*   BILITOT 0.2 1.5*   ALKPHOS 114 296*   AST 28 558*   ALT 40 449*   ANIONGAP 8 9   EGFRNONAA >60.0 >60.0

## 2020-02-21 NOTE — PLAN OF CARE
CM met with patient in room for Dishcarge Planning Assessment.  Patient was able to answer questions.  Per patient she lives alone in a house  with one step(s) to enter.   Per patient she was independent  with ADLS and ambulation.  Patient will have assistance from mother upon discharge.  All questions addressed.  CM will follow for needs. Patient will have transport home upon discharge.       02/21/20 1026   Discharge Assessment   Assessment Type Discharge Planning Assessment   Confirmed/corrected address and phone number on facesheet? Yes   Assessment information obtained from? Patient   Expected Length of Stay (days) 3   Communicated expected length of stay with patient/caregiver yes   Prior to hospitilization cognitive status: Alert/Oriented   Prior to hospitalization functional status: Independent   Current cognitive status: Alert/Oriented   Current Functional Status: Independent   Lives With alone   Able to Return to Prior Arrangements yes   Is patient able to care for self after discharge? Yes   Patient's perception of discharge disposition home or selfcare   Readmission Within the Last 30 Days no previous admission in last 30 days   Patient currently being followed by outpatient case management? No   Patient currently receives any other outside agency services? No   Equipment Currently Used at Home none   Do you have any problems affording any of your prescribed medications? No   Is the patient taking medications as prescribed? yes   Does the patient have transportation home? Yes   Transportation Anticipated family or friend will provide   Does the patient receive services at the [facility]mad[facility] Clinic? No   Discharge Plan A Home   Discharge Plan B Home   DME Needed Upon Discharge  none   Patient/Family in Agreement with Plan yes       MUKUND Rodríguez MD       [id number] [address] [facility] [address]  [facility] [address]  Phone:  [phone] Fax: [phone]      Payor: BLUE CROSS BLUE SHIELD / Plan: BCBS OF [employer]ANESSA CROW[employer] [facility] PLUS / Product Type: Commercial /

## 2020-02-21 NOTE — ED NOTES
"Patient refused Olanzapine because "It's not one of my nightly medications. I don't recognize it, and I don't want it."  "
Patient placed on continuous cardiac monitor, automatic blood pressure cuff and continuous pulse oximeter.  
Pillows and blankets to patient.   
Telebox #[id number] verified with war room. Normal sinus rhythm 97.   
Yes

## 2020-02-21 NOTE — H&P
[facility]  Hematology/Oncology  H&P    Patient Name: Quyen Moody  MRN: [id number]  Admission Date: 2/20/2020  Code Status: Full Code   Attending Provider: Montez Liz MD  Primary Care Physician: MUKUND Rodríguez MD  Principal Problem:Neutropenic fever    Subjective:     HPI: Mrs. Quyen Moody is a 58 yo female with PMHx pancreatic cancer on chemotherapy, CKD, HLD who presents with neutropenic fever.   Patient reports she was feeling at her normal baseline of health until this afternoon when she developed nausea. She initially presented to her normal chemotherapy session but her labs showed that she was neutropenic and was given Neupogen instead. Reports at home she developed severe nausea initially without vomitus and then found to have a temp of 101.6F and decided to present to the ED.     In the ED, the patient vomited once which improved her symptoms. She denies any blood or bile in her vomitus. Patient has had admissions in the past for nausea and found to have an obstruction of her bile ducts requiring stent placement by [facility]. However, patient reports her symptoms are not as severe during these episodes. Patient reports she has a headache which improved with tylenol but denies chest pain, dyspnea, abdominal pain, current nausea, diarrhea, constipation, dysuria, and vaginal discharge, sick contacts.    Oncologic History:  She has had intermittent upper abdominal pain since early June.  She presented to her PCP who originally ordered an US and CT.  US was negative and CT showed some haziness at the pancreatic head.  She saw GI who performed EUS.  On EUS, a 3x4cm pancreatic head mass was seen with possible invasion into the SMA and portal vein.  FNA path s/w pancreatic adenocarcinoma.  CA 19-9 level at outside hospital was >1000 per the patient.  She endorses nausea and inability to tolerate much PO.  She has lost about 10lbs over the last few weeks and is noticing her skin turning yellow.   Denies pruritis.  She is passing gas but has not had a BM in a few days, she attributes this to narcotic use.  She recently started taking stool softeners.  No previous cardiac or stroke history.  Surgical history significant for open appendectomy when she was in her 20s      Currently on PANOVA-3 trial. She is currently receiving FOLFIRINOX and TTF.       Oncology Treatment Plan:   OP PANC FOLFIRINOX Q2W    Medications:  Continuous Infusions:  Scheduled Meds:   [START ON 2/21/2020] apixaban  5 mg Oral BID    [START ON 2/21/2020] piperacillin-tazobactam (ZOSYN) IVPB  4.5 g Intravenous Q12H    vancomycin (VANCOCIN) IVPB  1,500 mg Intravenous Once    [START ON 2/21/2020] vancomycin (VANCOCIN) IVPB  15 mg/kg Intravenous Q12H     PRN Meds:acetaminophen, Dextrose 10% Bolus, Dextrose 10% Bolus, glucagon (human recombinant), glucose, glucose, LORazepam, melatonin, ondansetron, oxyCODONE, promethazine (PHENERGAN) IVPB, sodium chloride 0.9%     Review of patient's allergies indicates:   Allergen Reactions    Sulfa (sulfonamide antibiotics) Swelling and Hives     tongue          Past Medical History:   Diagnosis Date    Allergic rhinitis 3/3/2014    CKD (chronic kidney disease) stage 3, GFR 30-59 ml/min 12/26/2015    Hyperlipidemia 3/3/2014    Pancreas cancer     Skin rash 8/10/2019     Past Surgical History:   Procedure Laterality Date    ERCP N/A 7/16/2019    Procedure: ERCP (ENDOSCOPIC RETROGRADE CHOLANGIOPANCREATOGRAPHY);  Surgeon: Chema Harris MD;  Location: [address]);  Service: Endoscopy;  Laterality: N/A;    ERCP N/A 12/24/2019    Procedure: ERCP (ENDOSCOPIC RETROGRADE CHOLANGIOPANCREATOGRAPHY);  Surgeon: Chema Harris MD;  Location: [address]);  Service: Endoscopy;  Laterality: N/A;    Exporatory lap      INSERTION OF TUNNELED CENTRAL VENOUS CATHETER (CVC) WITH SUBCUTANEOUS PORT Right 8/12/2019    Procedure: [id number];  Surgeon: Cesar Hallman MD;  Location: [facility]  2ND FLR;  Service: General;  Laterality: Right;  right IJ     Family History     Problem Relation (Age of Onset)    Diabetes Mother, Father, Brother        Tobacco Use    Smoking status: Former Smoker     Start date: 12/11/1999    Smokeless tobacco: Never Used   Substance and Sexual Activity    Alcohol use: Yes     Alcohol/week: 1.0 standard drinks     Types: 1 Glasses of wine per week     Frequency: 4 or more times a week     Drinks per session: 1 or 2     Binge frequency: Never     Comment: last drink a month ago     Drug use: No    Sexual activity: Yes       Review of Systems   Constitutional: Negative for activity change, chills and fever.   HENT: Negative for congestion, postnasal drip, rhinorrhea and sinus pressure.    Eyes: Negative for discharge, redness and visual disturbance.   Respiratory: Negative for cough and shortness of breath.    Cardiovascular: Negative for chest pain and palpitations.   Gastrointestinal: Positive for nausea and vomiting. Negative for abdominal distention, abdominal pain, constipation and diarrhea.   Endocrine: Negative for cold intolerance and heat intolerance.   Genitourinary: Negative for dysuria.   Musculoskeletal: Negative for arthralgias and back pain.   Skin: Negative for color change and pallor.   Neurological: Positive for headaches. Negative for dizziness, seizures and speech difficulty.   Psychiatric/Behavioral: Negative for agitation, sleep disturbance and suicidal ideas. The patient is not nervous/anxious.      Objective:     Vital Signs (Most Recent):  Temp: 99.7 °F (37.6 °C) (02/20/20 1851)  Pulse: 101 (02/20/20 2202)  Resp: (!) 21 (02/20/20 2104)  BP: (!) 123/59 (02/20/20 2202)  SpO2: 95 % (02/20/20 2202) Vital Signs (24h Range):  Temp:  [98.2 °F (36.8 °C)-99.7 °F (37.6 °C)] 99.7 °F (37.6 °C)  Pulse:  [] 101  Resp:  [16-21] 21  SpO2:  [95 %-99 %] 95 %  BP: (123-181)/(59-93) 123/59     Weight: 56.7 kg (125 lb)  Body mass index is 22.5 kg/m².  Body surface  area is 1.58 meters squared.      Intake/Output Summary (Last 24 hours) at 2/20/2020 2342  Last data filed at 2/20/2020 2230  Gross per 24 hour   Intake 1801 ml   Output --   Net 1801 ml       Physical Exam   Constitutional: She is oriented to person, place, and time. She appears well-developed and well-nourished.   Patient in thin   Eyes: Pupils are equal, round, and reactive to light. EOM are normal.   Neck: Normal range of motion. Neck supple.   Cardiovascular: Normal rate, regular rhythm and normal heart sounds.   No murmur heard.  Pulmonary/Chest: Effort normal and breath sounds normal. No stridor. No respiratory distress. She has no wheezes.   Abdominal: Soft. Bowel sounds are normal. She exhibits no distension. There is no tenderness. There is no guarding.   Yao sign negative.     Musculoskeletal: Normal range of motion. She exhibits no edema or deformity.   Neurological: She is alert and oriented to person, place, and time. Coordination normal.   Skin: Skin is warm and dry. Capillary refill takes less than 2 seconds. No erythema.     Significant Labs:   CBC:   Recent Labs   Lab 02/19/20  1543 02/20/20 2020   WBC 3.02* 0.85*   HGB 11.8* 12.1   HCT 38.9 38.2    80*    and CMP:   Recent Labs   Lab 02/19/20  1543 02/20/20 2020    136   K 4.6 4.5    106   CO2 25 21*    99   BUN 16 13   CREATININE 0.8 0.7   CALCIUM 8.5* 8.5*   PROT 6.5 6.9   ALBUMIN 3.0* 3.0*   BILITOT 0.2 1.5*   ALKPHOS 114 296*   AST 28 558*   ALT 40 449*   ANIONGAP 8 9   EGFRNONAA >60.0 >60.0         Assessment/Plan:     * Neutropenic fever  Patient presenting with a 1 day hx of fevers, nausea and vomiting. Patient found to have transaminitis on exam. Had a similar presentation after her last session of chemo 2/06/2020.     PLAN:   - Zosyn and vancomycin for neutropenia. Concern for anaerobic infection from abdomen.   - U/S Abdomen for evaluation of transaminitis and nausea.   - F/u blood cultures.      Non-intractable vomiting  - Continue zofran and phenergan PRN for nausea.     Cancer of head of pancreas  She is currently receiving [place]+Abraxane and TTF.  - Continue to monitor.    CKD (chronic kidney disease) stage 3, GFR 30-59 ml/min  Cr at baseline.   - Continue to monitor.         Abdulaziz Meek MD  Hematology/Oncology  [facility]

## 2020-02-21 NOTE — HPI
Mrs. Quyen Moody is a 60 yo female with PMHx pancreatic cancer on chemotherapy, CKD, HLD who presents with neutropenic fever.  Patient reports she was feeling at her normal baseline of health until this afternoon when she developed nausea. She initially presented to her normal chemotherapy session but her labs showed that she was neutropenic and was given Neupogen instead. Reports at home she developed severe nausea initially without vomitus and then found to have a temp of 101.6F and decided to present to the ED.     In the ED, the patient vomited once which improved her symptoms. She denies any blood or bile in her vomitus. Patient has had admissions in the past for nausea and found to have an obstruction of her bile ducts requiring stent placement by [facility]. However, patient reports her symptoms are not as severe during these episodes. Patient reports she has a headache which improved with tylenol but denies chest pain, dyspnea, abdominal pain, current nausea, diarrhea, constipation, dysuria, and vaginal discharge, sick contacts.    Oncologic History:  She has had intermittent upper abdominal pain since early June.  She presented to her PCP who originally ordered an US and CT.  US was negative and CT showed some haziness at the pancreatic head.  She saw GI who performed EUS.  On EUS, a 3x4cm pancreatic head mass was seen with possible invasion into the SMA and portal vein.  FNA path s/w pancreatic adenocarcinoma.  CA 19-9 level at outside hospital was >1000 per the patient.  She endorses nausea and inability to tolerate much PO.  She has lost about 10lbs over the last few weeks and is noticing her skin turning yellow.  Denies pruritis.  She is passing gas but has not had a BM in a few days, she attributes this to narcotic use.  She recently started taking stool softeners.  No previous cardiac or stroke history.  Surgical history significant for open appendectomy when she was in her 20s      Currently on PANOVA-3  trial. She is currently receiving FOLFIRINOX and TTF.

## 2020-02-21 NOTE — ED PROVIDER NOTES
Encounter Date: 2/20/2020       History     Chief Complaint   Patient presents with    Emesis     had chemo injection today     Fever     HPI   59F with PMHx pancreatic cancer on chemotherapy, CKD, HLD who presents with neutropenic fever.  Patient states that she presented for a normal chemotherapy session today when she was told that her ANC was too low to undergo chemo.  She was given Neupogen and sent home without chemo.  While at home, she experienced chest wall pain and became nauseous shortly after.  She took one ODT zofran 8mg tab and subsequently had several episodes of NBNB emesis associated with chills.  She took her temperature orally and found it to be 101.6F, so she presented to the ED.      On my interview, she was complaining of fatigue but denied chest pain, dyspnea, abdominal pain, current nausea, diarrhea, constipation, dysuria, and vaginal discharge, sick contacts.      Review of patient's allergies indicates:   Allergen Reactions    Sulfa (sulfonamide antibiotics) Swelling and Hives     tongue       Past Medical History:   Diagnosis Date    Allergic rhinitis 3/3/2014    CKD (chronic kidney disease) stage 3, GFR 30-59 ml/min 12/26/2015    Hyperlipidemia 3/3/2014    Pancreas cancer     Skin rash 8/10/2019     Past Surgical History:   Procedure Laterality Date    ERCP N/A 7/16/2019    Procedure: ERCP (ENDOSCOPIC RETROGRADE CHOLANGIOPANCREATOGRAPHY);  Surgeon: Chema Harris MD;  Location: [address]);  Service: Endoscopy;  Laterality: N/A;    ERCP N/A 12/24/2019    Procedure: ERCP (ENDOSCOPIC RETROGRADE CHOLANGIOPANCREATOGRAPHY);  Surgeon: Chema Harris MD;  Location: [address]);  Service: Endoscopy;  Laterality: N/A;    Exporatory lap      INSERTION OF TUNNELED CENTRAL VENOUS CATHETER (CVC) WITH SUBCUTANEOUS PORT Right 8/12/2019    Procedure: [id number];  Surgeon: Cesar Hallman MD;  Location: [address];  Service: General;  Laterality: Right;  right  IJ     Family History   Problem Relation Age of Onset    Diabetes Mother     Diabetes Father     Diabetes Brother     Heart disease Neg Hx      Social History     Tobacco Use    Smoking status: Former Smoker     Start date: 12/11/1999    Smokeless tobacco: Never Used   Substance Use Topics    Alcohol use: Yes     Alcohol/week: 1.0 standard drinks     Types: 1 Glasses of wine per week     Frequency: 4 or more times a week     Drinks per session: 1 or 2     Binge frequency: Never     Comment: last drink a month ago     Drug use: No     Review of Systems   Constitutional: Positive for chills and fever (Tmax 101.6).   HENT: Negative for hearing loss, sore throat and trouble swallowing.    Eyes: Negative for visual disturbance.   Respiratory: Negative for cough and shortness of breath.    Cardiovascular: Negative for chest pain and palpitations.   Gastrointestinal: Positive for nausea and vomiting. Negative for abdominal pain, constipation and diarrhea.   Genitourinary: Negative for dysuria and vaginal discharge.   Musculoskeletal: Positive for arthralgias. Negative for back pain.   Skin: Negative for rash.   Neurological: Negative for dizziness, weakness and headaches.   Hematological: Does not bruise/bleed easily.       Physical Exam     Initial Vitals [02/20/20 1851]   BP Pulse Resp Temp SpO2   (!) 181/93 (!) 130 20 99.7 °F (37.6 °C) 96 %      MAP       --         Physical Exam    Constitutional: She appears well-developed and well-nourished.   HENT:   Head: Normocephalic and atraumatic.   Mouth/Throat: Oropharynx is clear and moist. No oropharyngeal exudate.   Eyes: EOM are normal. Pupils are equal, round, and reactive to light. No scleral icterus.   Neck: Normal range of motion. Neck supple.   Cardiovascular: Regular rhythm. Exam reveals no gallop and no friction rub.    No murmur heard.  tachycardic   Pulmonary/Chest: She has no wheezes. She has no rhonchi. She has no rales.   Abdominal: Soft. She exhibits  no distension. There is no tenderness. There is no rebound and no guarding.   Musculoskeletal: Normal range of motion. She exhibits no edema.   Neurological: She is alert and oriented to person, place, and time. She has normal strength. No cranial nerve deficit.   Skin: Skin is warm and dry. Capillary refill takes less than 2 seconds.   Psychiatric: She has a normal mood and affect.         ED Course   Procedures  Labs Reviewed   URINALYSIS, REFLEX TO URINE CULTURE - Abnormal; Notable for the following components:       Result Value    Occult Blood UA 1+ (*)     All other components within normal limits    Narrative:     Preferred Collection Type->Urine, Clean Catch   INFLUENZA A & B BY MOLECULAR   CULTURE, BLOOD   CULTURE, BLOOD   URINALYSIS MICROSCOPIC    Narrative:     Preferred Collection Type->Urine, Clean Catch   CBC W/ AUTO DIFFERENTIAL   COMPREHENSIVE METABOLIC PANEL   LACTIC ACID, PLASMA          Imaging Results          X-Ray Chest PA And Lateral (Final result)  Result time 02/20/20 19:39:24    Final result by Judith Peter MD (02/20/20 19:39:24)                 Impression:      No acute intrathoracic abnormality.      Electronically signed by: Judith Peter  Date:    02/20/2020  Time:    19:39             Narrative:    EXAMINATION:  CHEST PA AND LATERAL    CLINICAL HISTORY:  Neutropenia, unspecified    TECHNIQUE:  PA and lateral chest radiograph    COMPARISON:  12/22/2019 and CT chest from 01/11/2020    FINDINGS:  A right central venous catheter is seen with its tip in the superior vena cava.  The cardiac silhouette is within normal limits.   There is no focal consolidation, pneumothorax, or pleural effusion.  Subcentimeter nodule seen on CT of the chest from 01/11/2020 are not appreciated on the plain radiograph.                                MDM:  59F with PMHx as above who presents with N/V and neutropenic fever.  DDx PNA,   UTI, influenza, AGE, sepsis.  qSOFA is 0, so low suspicion for sepsis  at this time, however, given tachycardia and reported fever, will give 30cc/kg bolus of NS.  CBC, CMP, CXR, UA, blood cultures x2, and abx ordered.  Dispo pending further workup.    Elieser Alvarez  [employer] PGY-1  Emergency Medicine  8:47 PM 2/20/2020     HO1 Update:  CBC notable for prelim result with critically low WBC 0.85, pending diff.  CMP with significant elevations in LFTs above most recent, however, patient has had similar elevations in December 2019 during a hospital admission for biliary tree stenosis.  Given this, presentation is concerning for obstruction of stent, although patient denies abdominal pain and has no consistent physical exam findings. CXR and UA without evidence of infection.  Patient to be admitted to oncology for neutropenic fever.    Elieser Alvarez  [facility] PGY-1  Emergency Medicine  9:53 PM 2/20/2020                            ED Course as of Feb 20 2043   Thu Feb 20, 2020 2029 No evidence infection   X-Ray Chest PA And Lateral [CC]   2029 No evidence infection   Urinalysis, Reflex to Urine Culture Urine, Clean Catch(!) [CC]      ED Course User Index  [CC] Elieser Alvarez MD                Clinical Impression:       ICD-10-CM ICD-9-CM   1. Non-intractable vomiting with nausea, unspecified vomiting type R11.2 787.01   2. Neutropenic fever D70.9 288.00    R50.81 780.61                             Elieser Alvarez MD  Resident  02/20/20 2952

## 2020-02-21 NOTE — PLAN OF CARE
AAOx4. VSS. Afebrile. Ultrasound done overnight. Zosyn infusing at 25 cc/hr. Tolerating well. No reports of N/V. Per patient, headache has resolved. Scheduled for Neupogen injection today at 10 AM. No acute needs at the moment. Bed in lowest position, call light and personal items within reach. Will continue to monitor.

## 2020-02-21 NOTE — PLAN OF CARE
POC reviewed with patient. VSS. Afebrile, continues on IV Van and Zosyn. Received injection for neutropenia. No other significant changes. Rounding and safety maintained. No falls, Will continue to monitor.

## 2020-02-21 NOTE — ASSESSMENT & PLAN NOTE
Patient presenting with a 1 day hx of fevers, nausea and vomiting. Patient found to have transaminitis on exam. Had a similar presentation after her last session of chemo 2/06/2020.     PLAN:   - Zosyn and vancomycin for neutropenia. Concern for anaerobic infection from abdomen.   - U/S Abdomen for evaluation of transaminitis and nausea.   - F/u blood cultures.

## 2020-02-21 NOTE — PROGRESS NOTES
Pharmacokinetic Initial Assessment & Plan: IV Vancomycin    Intravenous vancomycin 1500 mg was initiated in the ED, continue vancomycin 750 mg IV every 12 hours. Draw vancomycin trough level 30 min prior to fourth dose on 02/22 at approximately 1000   Desired empiric serum trough concentration is 15 to 20 mcg/mL    Pharmacy will continue to follow and monitor vancomycin.    Please contact pharmacy at extension [phone] with any questions regarding this assessment.     Thank you for the consult,   Corin Dumont       Patient brief summary:  Quyen Moody is a 59 y.o. female initiated on antimicrobial therapy with IV Vancomycin for treatment of suspected neutropenic fever    Drug Allergies:   Review of patient's allergies indicates:   Allergen Reactions    Sulfa (sulfonamide antibiotics) Swelling and Hives     tongue         Actual Body Weight:   56.7 kg    Renal Function:   Estimated Creatinine Clearance: 69.2 mL/min (based on SCr of 0.7 mg/dL).,     CBC (last 72 hours):  Recent Labs   Lab Result Units 02/19/20  1543 02/20/20  2020   WBC K/uL 3.02* 0.85*   Hemoglobin g/dL 11.8* 12.1   Hematocrit % 38.9 38.2   Platelets K/uL 162 80*   Gran% % 25.4* 40.0   Lymph% % 49.7* 30.0   Mono% % 21.9* 20.0*   Eosinophil% % 2.0 0.0   Basophil% % 0.7 0.0   Differential Method  Automated Manual       Metabolic Panel (last 72 hours):  Recent Labs   Lab Result Units 02/19/20  1543 02/20/20  1927 02/20/20 2020   Sodium mmol/L 141  --  136   Potassium mmol/L 4.6  --  4.5   Chloride mmol/L 108  --  106   CO2 mmol/L 25  --  21*   Glucose mg/dL 105  --  99   Glucose, UA   --  Negative  --    BUN, Bld mg/dL 16  --  13   Creatinine mg/dL 0.8  --  0.7   Albumin g/dL 3.0*  --  3.0*   Total Bilirubin mg/dL 0.2  --  1.5*   Alkaline Phosphatase U/L 114  --  296*   AST U/L 28  --  558*   ALT U/L 40  --  449*       Drug levels (last 3 results):  No results for input(s): VANCOMYCINRA, VANCOMYCINPE, VANCOMYCINTR in the last 72 hours.    Microbiologic  Results:  Microbiology Results (last 7 days)     Procedure Component Value Units Date/Time    Blood culture #2 **CANNOT BE ORDERED STAT** [[id number]] Collected:  02/20/20 2112    Order Status:  Sent Specimen:  Blood from Peripheral, Hand, Right Updated:  02/20/20 2133    Blood culture #1 **CANNOT BE ORDERED STAT** [[id number]] Collected:  02/20/20 2020    Order Status:  Sent Specimen:  Blood from Peripheral, Hand, Right Updated:  02/20/20 2042    Influenza A & B by Molecular [[id number]] Collected:  02/20/20 1927    Order Status:  Completed Specimen:  Nasopharyngeal Swab Updated:  02/20/20 2012     Influenza A, Molecular Negative     Influenza B, Molecular Negative     Flu A & B Source Nasal swab

## 2020-02-21 NOTE — SUBJECTIVE & OBJECTIVE
Oncology Treatment Plan:   OP PANC FOLFIRINOX Q2W    Medications:  Continuous Infusions:  Scheduled Meds:   [START ON 2/21/2020] apixaban  5 mg Oral BID    [START ON 2/21/2020] piperacillin-tazobactam (ZOSYN) IVPB  4.5 g Intravenous Q12H    vancomycin (VANCOCIN) IVPB  1,500 mg Intravenous Once    [START ON 2/21/2020] vancomycin (VANCOCIN) IVPB  15 mg/kg Intravenous Q12H     PRN Meds:acetaminophen, Dextrose 10% Bolus, Dextrose 10% Bolus, glucagon (human recombinant), glucose, glucose, LORazepam, melatonin, ondansetron, oxyCODONE, promethazine (PHENERGAN) IVPB, sodium chloride 0.9%     Review of patient's allergies indicates:   Allergen Reactions    Sulfa (sulfonamide antibiotics) Swelling and Hives     tongue          Past Medical History:   Diagnosis Date    Allergic rhinitis 3/3/2014    CKD (chronic kidney disease) stage 3, GFR 30-59 ml/min 12/26/2015    Hyperlipidemia 3/3/2014    Pancreas cancer     Skin rash 8/10/2019     Past Surgical History:   Procedure Laterality Date    ERCP N/A 7/16/2019    Procedure: ERCP (ENDOSCOPIC RETROGRADE CHOLANGIOPANCREATOGRAPHY);  Surgeon: Chema Harris MD;  Location: [address]);  Service: Endoscopy;  Laterality: N/A;    ERCP N/A 12/24/2019    Procedure: ERCP (ENDOSCOPIC RETROGRADE CHOLANGIOPANCREATOGRAPHY);  Surgeon: Cheam Harris MD;  Location: [address]);  Service: Endoscopy;  Laterality: N/A;    Exporatory lap      INSERTION OF TUNNELED CENTRAL VENOUS CATHETER (CVC) WITH SUBCUTANEOUS PORT Right 8/12/2019    Procedure: [id number];  Surgeon: Cesar Hallman MD;  Location: [address];  Service: General;  Laterality: Right;  right IJ     Family History     Problem Relation (Age of Onset)    Diabetes Mother, Father, Brother        Tobacco Use    Smoking status: Former Smoker     Start date: 12/11/1999    Smokeless tobacco: Never Used   Substance and Sexual Activity    Alcohol use: Yes     Alcohol/week: 1.0 standard drinks      Types: 1 Glasses of wine per week     Frequency: 4 or more times a week     Drinks per session: 1 or 2     Binge frequency: Never     Comment: last drink a month ago     Drug use: No    Sexual activity: Yes       Review of Systems   Constitutional: Negative for activity change, chills and fever.   HENT: Negative for congestion, postnasal drip, rhinorrhea and sinus pressure.    Eyes: Negative for discharge, redness and visual disturbance.   Respiratory: Negative for cough and shortness of breath.    Cardiovascular: Negative for chest pain and palpitations.   Gastrointestinal: Positive for nausea and vomiting. Negative for abdominal distention, abdominal pain, constipation and diarrhea.   Endocrine: Negative for cold intolerance and heat intolerance.   Genitourinary: Negative for dysuria.   Musculoskeletal: Negative for arthralgias and back pain.   Skin: Negative for color change and pallor.   Neurological: Positive for headaches. Negative for dizziness, seizures and speech difficulty.   Psychiatric/Behavioral: Negative for agitation, sleep disturbance and suicidal ideas. The patient is not nervous/anxious.      Objective:     Vital Signs (Most Recent):  Temp: 99.7 °F (37.6 °C) (02/20/20 1851)  Pulse: 101 (02/20/20 2202)  Resp: (!) 21 (02/20/20 2104)  BP: (!) 123/59 (02/20/20 2202)  SpO2: 95 % (02/20/20 2202) Vital Signs (24h Range):  Temp:  [98.2 °F (36.8 °C)-99.7 °F (37.6 °C)] 99.7 °F (37.6 °C)  Pulse:  [] 101  Resp:  [16-21] 21  SpO2:  [95 %-99 %] 95 %  BP: (123-181)/(59-93) 123/59     Weight: 56.7 kg (125 lb)  Body mass index is 22.5 kg/m².  Body surface area is 1.58 meters squared.      Intake/Output Summary (Last 24 hours) at 2/20/2020 2342  Last data filed at 2/20/2020 2230  Gross per 24 hour   Intake 1801 ml   Output --   Net 1801 ml       Physical Exam   Constitutional: She is oriented to person, place, and time. She appears well-developed and well-nourished.   Patient in thin   Eyes: Pupils are  equal, round, and reactive to light. EOM are normal.   Neck: Normal range of motion. Neck supple.   Cardiovascular: Normal rate, regular rhythm and normal heart sounds.   No murmur heard.  Pulmonary/Chest: Effort normal and breath sounds normal. No stridor. No respiratory distress. She has no wheezes.   Abdominal: Soft. Bowel sounds are normal. She exhibits no distension. There is no tenderness. There is no guarding.   Yao sign negative.     Musculoskeletal: Normal range of motion. She exhibits no edema or deformity.   Neurological: She is alert and oriented to person, place, and time. Coordination normal.   Skin: Skin is warm and dry. Capillary refill takes less than 2 seconds. No erythema.     Significant Labs:   CBC:   Recent Labs   Lab 02/19/20  1543 02/20/20  2020   WBC 3.02* 0.85*   HGB 11.8* 12.1   HCT 38.9 38.2    80*    and CMP:   Recent Labs   Lab 02/19/20  1543 02/20/20  2020    136   K 4.6 4.5    106   CO2 25 21*    99   BUN 16 13   CREATININE 0.8 0.7   CALCIUM 8.5* 8.5*   PROT 6.5 6.9   ALBUMIN 3.0* 3.0*   BILITOT 0.2 1.5*   ALKPHOS 114 296*   AST 28 558*   ALT 40 449*   ANIONGAP 8 9   EGFRNONAA >60.0 >60.0

## 2020-02-21 NOTE — HPI
59F with PMHx pancreatic cancer on chemotherapy, CKD, HLD who presents with neutropenic fever.  Patient states that she presented for a normal chemotherapy session today when she was told that her ANC was too low to undergo chemo.  She was given Neupogen and sent home without chemo.  While at home, she experienced chest wall pain and became nauseous shortly after.  She took one ODT zofran 8mg tab and subsequently had several episodes of NBNB emesis associated with chills.  She took her temperature orally and found it to be 101.6F, so she presented to the ED.       On my interview, she was complaining of fatigue but denied chest pain, dyspnea, abdominal pain, current nausea, diarrhea, constipation, dysuria, and vaginal discharge, sick contacts.    Oncologic History:  She has had intermittent upper abdominal pain since early June.  She presented to her PCP who originally ordered an US and CT.  US was negative and CT showed some haziness at the pancreatic head.  She saw GI who performed EUS.  On EUS, a 3x4cm pancreatic head mass was seen with possible invasion into the SMA and portal vein.  FNA path s/w pancreatic adenocarcinoma.  CA 19-9 level at outside hospital was >1000 per the patient.  She endorses nausea and inability to tolerate much PO.  She has lost about 10lbs over the last few weeks and is noticing her skin turning yellow.  Denies pruritis.  She is passing gas but has not had a BM in a few days, she attributes this to narcotic use.  She recently started taking stool softeners.  No previous cardiac or stroke history.  Surgical history significant for open appendectomy when she was in her 20s      Currently on PANOVA-3 trial. She is currently receiving FOLFIRINOX and TTF.

## 2020-02-21 NOTE — ED TRIAGE NOTES
Patient was supposed to get chemo today, but srated having fever, chills, nausea, vomiting, and headache. Took sublingual Zofran with no relief. Temp 101.6 at home. Denies SOB, CP, diarrhea, or consipation

## 2020-02-22 VITALS
SYSTOLIC BLOOD PRESSURE: 128 MMHG | OXYGEN SATURATION: 99 % | HEART RATE: 57 BPM | BODY MASS INDEX: 24.22 KG/M2 | RESPIRATION RATE: 17 BRPM | WEIGHT: 136.69 LBS | TEMPERATURE: 98 F | DIASTOLIC BLOOD PRESSURE: 69 MMHG | HEIGHT: 63 IN

## 2020-02-22 LAB
ALBUMIN SERPL BCP-MCNC: 2.3 G/DL (ref 3.5–5.2)
ALP SERPL-CCNC: 172 U/L (ref 55–135)
ALT SERPL W/O P-5'-P-CCNC: 216 U/L (ref 10–44)
ANION GAP SERPL CALC-SCNC: 8 MMOL/L (ref 8–16)
ANISOCYTOSIS BLD QL SMEAR: SLIGHT
AST SERPL-CCNC: 101 U/L (ref 10–40)
BASOPHILS NFR BLD: 1 % (ref 0–1.9)
BILIRUB SERPL-MCNC: 1.7 MG/DL (ref 0.1–1)
BUN SERPL-MCNC: 11 MG/DL (ref 6–20)
CALCIUM SERPL-MCNC: 8.1 MG/DL (ref 8.7–10.5)
CHLORIDE SERPL-SCNC: 108 MMOL/L (ref 95–110)
CO2 SERPL-SCNC: 22 MMOL/L (ref 23–29)
CREAT SERPL-MCNC: 0.8 MG/DL (ref 0.5–1.4)
DIFFERENTIAL METHOD: ABNORMAL
EOSINOPHIL NFR BLD: 4 % (ref 0–8)
ERYTHROCYTE [DISTWIDTH] IN BLOOD BY AUTOMATED COUNT: 16 % (ref 11.5–14.5)
EST. GFR  (AFRICAN AMERICAN): >60 ML/MIN/1.73 M^2
EST. GFR  (NON AFRICAN AMERICAN): >60 ML/MIN/1.73 M^2
GLUCOSE SERPL-MCNC: 85 MG/DL (ref 70–110)
HCT VFR BLD AUTO: 36.6 % (ref 37–48.5)
HGB BLD-MCNC: 11.3 G/DL (ref 12–16)
IMM GRANULOCYTES # BLD AUTO: ABNORMAL K/UL (ref 0–0.04)
IMM GRANULOCYTES NFR BLD AUTO: ABNORMAL % (ref 0–0.5)
LYMPHOCYTES NFR BLD: 28 % (ref 18–48)
MAGNESIUM SERPL-MCNC: 2 MG/DL (ref 1.6–2.6)
MCH RBC QN AUTO: 27.6 PG (ref 27–31)
MCHC RBC AUTO-ENTMCNC: 30.9 G/DL (ref 32–36)
MCV RBC AUTO: 89 FL (ref 82–98)
MONOCYTES NFR BLD: 28 % (ref 4–15)
NEUTROPHILS NFR BLD: 32 % (ref 38–73)
NEUTS BAND NFR BLD MANUAL: 7 %
NRBC BLD-RTO: 0 /100 WBC
OVALOCYTES BLD QL SMEAR: ABNORMAL
PHOSPHATE SERPL-MCNC: 2.7 MG/DL (ref 2.7–4.5)
PLATELET # BLD AUTO: 104 K/UL (ref 150–350)
PLATELET BLD QL SMEAR: ABNORMAL
PMV BLD AUTO: 11.8 FL (ref 9.2–12.9)
POIKILOCYTOSIS BLD QL SMEAR: SLIGHT
POLYCHROMASIA BLD QL SMEAR: ABNORMAL
POTASSIUM SERPL-SCNC: 3.6 MMOL/L (ref 3.5–5.1)
PROT SERPL-MCNC: 5.5 G/DL (ref 6–8.4)
RBC # BLD AUTO: 4.1 M/UL (ref 4–5.4)
SODIUM SERPL-SCNC: 138 MMOL/L (ref 136–145)
WBC # BLD AUTO: 3.61 K/UL (ref 3.9–12.7)

## 2020-02-22 PROCEDURE — 25000003 PHARM REV CODE 250: Performed by: STUDENT IN AN ORGANIZED HEALTH CARE EDUCATION/TRAINING PROGRAM

## 2020-02-22 PROCEDURE — 80053 COMPREHEN METABOLIC PANEL: CPT

## 2020-02-22 PROCEDURE — 85027 COMPLETE CBC AUTOMATED: CPT

## 2020-02-22 PROCEDURE — 85007 BL SMEAR W/DIFF WBC COUNT: CPT

## 2020-02-22 PROCEDURE — 36415 COLL VENOUS BLD VENIPUNCTURE: CPT

## 2020-02-22 PROCEDURE — 99239 PR HOSPITAL DISCHARGE DAY,>30 MIN: ICD-10-PCS | Mod: ,,, | Performed by: INTERNAL MEDICINE

## 2020-02-22 PROCEDURE — 84100 ASSAY OF PHOSPHORUS: CPT

## 2020-02-22 PROCEDURE — 63600175 PHARM REV CODE 636 W HCPCS: Mod: JG | Performed by: STUDENT IN AN ORGANIZED HEALTH CARE EDUCATION/TRAINING PROGRAM

## 2020-02-22 PROCEDURE — 63600175 PHARM REV CODE 636 W HCPCS: Performed by: STUDENT IN AN ORGANIZED HEALTH CARE EDUCATION/TRAINING PROGRAM

## 2020-02-22 PROCEDURE — 99239 HOSP IP/OBS DSCHRG MGMT >30: CPT | Mod: ,,, | Performed by: INTERNAL MEDICINE

## 2020-02-22 PROCEDURE — 83735 ASSAY OF MAGNESIUM: CPT

## 2020-02-22 RX ORDER — LEVOFLOXACIN 750 MG/1
750 TABLET ORAL DAILY
Qty: 5 TABLET | Refills: 0 | Status: ON HOLD | OUTPATIENT
Start: 2020-02-22 | End: 2020-03-21 | Stop reason: HOSPADM

## 2020-02-22 RX ADMIN — TBO-FILGRASTIM 300 MCG: 300 INJECTION, SOLUTION SUBCUTANEOUS at 01:02

## 2020-02-22 RX ADMIN — PIPERACILLIN AND TAZOBACTAM 4.5 G: 4; .5 INJECTION, POWDER, LYOPHILIZED, FOR SOLUTION INTRAVENOUS; PARENTERAL at 04:02

## 2020-02-22 RX ADMIN — APIXABAN 5 MG: 5 TABLET, FILM COATED ORAL at 10:02

## 2020-02-22 NOTE — HOSPITAL COURSE
Admitted to Medical Oncology on 02/20/2020 for neutropenic fever.  On admission patient was started on vancomycin and Zosyn and her fever resolved.  Her ANC decreased shortly following admission and the patient was given Neupogen x2.  On 02/22, patient's ANC improved and safe for discharge. Patient remains afebrile and IV antibiotics transition to p.o. Levaquin x5 days.  Patient instructed follow up with Oncology as scheduled.

## 2020-02-22 NOTE — PLAN OF CARE
AAOx4. VSS. No significant events overnight. IV abx administered per MD's orders (view MAR). Tolerating well. Up to restroom independently. Safety maintained. No acute needs at the moment. Bed in lowest position, call light and personal items within reach. Will continue to monitor.

## 2020-02-22 NOTE — DISCHARGE SUMMARY
[facility]  Hematology/Oncology  Discharge Summary      Patient Name: Quyen Moody  MRN: [id number]  Admission Date: 2/20/2020  Hospital Length of Stay: 2 days  Discharge Date and Time:  02/22/2020 12:29 PM  Attending Physician: Pierce Stein MD   Discharging Provider: Von Hinojosa MD  Primary Care Provider: MUKUND Rodríguez MD    HPI: Mrs. Quyen Moody is a 60 yo female with PMHx pancreatic cancer on chemotherapy, CKD, HLD who presents with neutropenic fever.   Patient reports she was feeling at her normal baseline of health until this afternoon when she developed nausea. She initially presented to her normal chemotherapy session but her labs showed that she was neutropenic and was given Neupogen instead. Reports at home she developed severe nausea initially without vomitus and then found to have a temp of 101.6F and decided to present to the ED.     In the ED, the patient vomited once which improved her symptoms. She denies any blood or bile in her vomitus. Patient has had admissions in the past for nausea and found to have an obstruction of her bile ducts requiring stent placement by [facility]. However, patient reports her symptoms are not as severe during these episodes. Patient reports she has a headache which improved with tylenol but denies chest pain, dyspnea, abdominal pain, current nausea, diarrhea, constipation, dysuria, and vaginal discharge, sick contacts.    Oncologic History:  She has had intermittent upper abdominal pain since early June.  She presented to her PCP who originally ordered an US and CT.  US was negative and CT showed some haziness at the pancreatic head.  She saw GI who performed EUS.  On EUS, a 3x4cm pancreatic head mass was seen with possible invasion into the SMA and portal vein.  FNA path s/w pancreatic adenocarcinoma.  CA 19-9 level at outside hospital was >1000 per the patient.  She endorses nausea and inability to tolerate much PO.  She has lost about 10lbs over the  last few weeks and is noticing her skin turning yellow.  Denies pruritis.  She is passing gas but has not had a BM in a few days, she attributes this to narcotic use.  She recently started taking stool softeners.  No previous cardiac or stroke history.  Surgical history significant for open appendectomy when she was in her 20s      Currently on PANOVA-3 trial. She is currently receiving FOLFIRINOX and TTF.      * No surgery found *     Hospital Course: Admitted to Medical Oncology on 02/20/2020 for neutropenic fever.  On admission patient was started on vancomycin and Zosyn and her fever resolved.  Her ANC decreased shortly following admission and the patient was given Neupogen x2.  On 02/22, patient's ANC improved and safe for discharge. Patient remains afebrile and IV antibiotics transition to p.o. Levaquin x5 days.  Patient instructed follow up with Oncology as scheduled.    On 2/22/2020, Patient medically stable for discharge. Discharge recommendations and any changes to patient's medication regimen discussed with the patient prior to discharge and included in the patient's discharge packet.      Physical Exam on Day of Discharge:  Vital Signs Reviewed  Gen: NAD  Pulm: CTA-B  Cardiac: RRR, no murmurs  MSK: no edema present  Neuro: AAOx3  Psych: normal behavior      Consults:   Consults (From admission, onward)        Status Ordering Provider     Inpatient consult to Oncology  Once     Provider:  (Not yet assigned)    Acknowledged RUPALI, SANTA          Significant Diagnostic Studies: Labs: All labs within the past 24 hours have been reviewed    Pending Diagnostic Studies:     None        Final Active Diagnoses:    Diagnosis Date Noted POA    PRINCIPAL PROBLEM:  Neutropenic fever [D70.9, R50.81] 02/20/2020 Unknown    Non-intractable vomiting [R11.10] 02/20/2020 Yes    Gastroesophageal reflux disease without esophagitis [K21.9] 02/06/2020 Yes    Elevated transaminase level [R74.0] 12/23/2019 Yes    Cancer  of head of pancreas [C25.0] 07/15/2019 Yes    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, 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] 07/15/2019 Yes    CKD (chronic kidney disease) stage 3, GFR 30-59 ml/min [N18.3] 12/26/2015 Yes      Problems Resolved During this Admission:      Discharged Condition: stable    Disposition: Home or Self Care    Follow Up:    Patient Instructions:      Diet Adult Regular     Notify your health care provider if you experience any of the following:  temperature >100.4     Activity as tolerated     Medications:  Reconciled Home Medications:      Medication List      START taking these medications    levoFLOXacin 750 MG tablet  Commonly known as:  LEVAQUIN  Take 1 tablet (750 mg total) by mouth once daily.        CONTINUE taking these medications    apixaban 5 mg Tab  Commonly known as:  ELIQUIS  Take 1 tablet (5 mg total) by mouth 2 (two) times daily.     dexAMETHasone 4 MG Tab  Commonly known as:  DECADRON  Take 1 tablet (4 mg total) by mouth every 12 (twelve) hours. For 2 days following chemotherapy.     fentaNYL 25 mcg/hr  Commonly known as:  DURAGESIC  Place 1 patch onto the skin every 72 hours.     hydrocortisone 2.5 % cream  Apply topically 2 (two) times daily.     lidocaine-prilocaine cream  Commonly known as:  EMLA  Apply to port 45 minutes prior to access.     LORazepam 0.5 MG tablet  Commonly known as:  Ativan  Take 1 tablet (0.5 mg total) by mouth every 6 (six) hours as needed for Anxiety (nausea).     multivitamin per tablet  Commonly known as:  THERAGRAN  Take 1 tablet by mouth once daily.     OLANZapine 5 MG tablet  Commonly known as:  ZyPREXA  Take 1 tablet (5 mg total) by mouth nightly. To prevent nausea     ondansetron 8 MG tablet  Commonly known as:  ZOFRAN  Take 1 tablet (8 mg total) by mouth every 8 (eight) hours as needed for Nausea.     oxyCODONE 15 MG Tab  Commonly known as:  ROXICODONE  Take 1 tablet (15 mg total) by mouth every 6 (six) hours as needed for Pain.            Von Hinojosa  MD  Hematology/Oncology  [facility]

## 2020-02-24 NOTE — PLAN OF CARE
Future Appointments   Date Time Provider Department Center   3/4/2020  2:30 PM LAB, HEMONC CANCER BLDG [facility]   3/5/2020  9:00 AM Karen Hightower, NP [facility] HEM ONC [facility]   3/5/2020 10:00 AM NURSE 7, [facility]   3/7/2020 11:45 AM INJECTION, [facility]   3/18/2020  2:30 PM LAB, HEMONC CANCER BLDG [facility]   3/19/2020  8:30 AM Karen Hightower NP [facility] HEM ONC [facility]   3/19/2020  9:00 AM NURSE 7, [facility]   3/21/2020 11:00 AM INJECTION, [facility]          02/24/20 1614   Final Note   Assessment Type Final Discharge Note   Anticipated Discharge Disposition Home   What phone number can be called within the next 1-3 days to see how you are doing after discharge? [phone]   Hospital Follow Up  Appt(s) scheduled? Yes   Right Care Referral Info   Post Acute Recommendation No Care   Post-Acute Status   Post-Acute Authorization Other   Other Status No Post-Acute Service Needs   Discharge Delays None known at this time

## 2020-02-25 LAB
BACTERIA BLD CULT: NORMAL
BACTERIA BLD CULT: NORMAL

## 2020-02-29 ENCOUNTER — HOSPITAL ENCOUNTER (EMERGENCY)
Facility: HOSPITAL | Age: 60
Discharge: HOME | End: 2020-03-01
Attending: EMERGENCY MEDICINE
Payer: COMMERCIAL

## 2020-02-29 VITALS
SYSTOLIC BLOOD PRESSURE: 155 MMHG | HEIGHT: 62 IN | TEMPERATURE: 99 F | DIASTOLIC BLOOD PRESSURE: 79 MMHG | OXYGEN SATURATION: 98 % | BODY MASS INDEX: 23 KG/M2 | RESPIRATION RATE: 20 BRPM | HEART RATE: 100 BPM | WEIGHT: 125 LBS

## 2020-02-29 DIAGNOSIS — T45.1X5A ADVERSE EFFECT OF CHEMOTHERAPY, INITIAL ENCOUNTER: ICD-10-CM

## 2020-02-29 DIAGNOSIS — R50.9 FEVER: Primary | ICD-10-CM

## 2020-02-29 LAB
ALBUMIN SERPL BCP-MCNC: 3.1 G/DL (ref 3.5–5.2)
ALP SERPL-CCNC: 371 U/L (ref 55–135)
ALT SERPL W/O P-5'-P-CCNC: 75 U/L (ref 10–44)
ANION GAP SERPL CALC-SCNC: 8 MMOL/L (ref 8–16)
AST SERPL-CCNC: 52 U/L (ref 10–40)
BASOPHILS # BLD AUTO: 0.04 K/UL (ref 0–0.2)
BASOPHILS NFR BLD: 0.5 % (ref 0–1.9)
BILIRUB SERPL-MCNC: 0.5 MG/DL (ref 0.1–1)
BILIRUB UR QL STRIP: NEGATIVE
BUN SERPL-MCNC: 10 MG/DL (ref 6–20)
CALCIUM SERPL-MCNC: 8.9 MG/DL (ref 8.7–10.5)
CHLORIDE SERPL-SCNC: 103 MMOL/L (ref 95–110)
CLARITY UR REFRACT.AUTO: ABNORMAL
CO2 SERPL-SCNC: 24 MMOL/L (ref 23–29)
COLOR UR AUTO: YELLOW
CREAT SERPL-MCNC: 0.8 MG/DL (ref 0.5–1.4)
DIFFERENTIAL METHOD: ABNORMAL
EOSINOPHIL # BLD AUTO: 0 K/UL (ref 0–0.5)
EOSINOPHIL NFR BLD: 0.2 % (ref 0–8)
ERYTHROCYTE [DISTWIDTH] IN BLOOD BY AUTOMATED COUNT: 17 % (ref 11.5–14.5)
EST. GFR  (AFRICAN AMERICAN): >60 ML/MIN/1.73 M^2
EST. GFR  (NON AFRICAN AMERICAN): >60 ML/MIN/1.73 M^2
GLUCOSE SERPL-MCNC: 93 MG/DL (ref 70–110)
GLUCOSE UR QL STRIP: NEGATIVE
HCT VFR BLD AUTO: 38.2 % (ref 37–48.5)
HGB BLD-MCNC: 12 G/DL (ref 12–16)
HGB UR QL STRIP: NEGATIVE
IMM GRANULOCYTES # BLD AUTO: 0.07 K/UL (ref 0–0.04)
IMM GRANULOCYTES NFR BLD AUTO: 0.9 % (ref 0–0.5)
INFLUENZA A, MOLECULAR: NEGATIVE
INFLUENZA B, MOLECULAR: NEGATIVE
KETONES UR QL STRIP: NEGATIVE
LACTATE SERPL-SCNC: 0.6 MMOL/L (ref 0.5–2.2)
LEUKOCYTE ESTERASE UR QL STRIP: NEGATIVE
LYMPHOCYTES # BLD AUTO: 0.9 K/UL (ref 1–4.8)
LYMPHOCYTES NFR BLD: 11.5 % (ref 18–48)
MAGNESIUM SERPL-MCNC: 1.6 MG/DL (ref 1.6–2.6)
MCH RBC QN AUTO: 27.6 PG (ref 27–31)
MCHC RBC AUTO-ENTMCNC: 31.4 G/DL (ref 32–36)
MCV RBC AUTO: 88 FL (ref 82–98)
MONOCYTES # BLD AUTO: 1.1 K/UL (ref 0.3–1)
MONOCYTES NFR BLD: 14.1 % (ref 4–15)
NEUTROPHILS # BLD AUTO: 5.9 K/UL (ref 1.8–7.7)
NEUTROPHILS NFR BLD: 72.8 % (ref 38–73)
NITRITE UR QL STRIP: NEGATIVE
NRBC BLD-RTO: 0 /100 WBC
PH UR STRIP: 7 [PH] (ref 5–8)
PLATELET # BLD AUTO: 241 K/UL (ref 150–350)
PMV BLD AUTO: 11 FL (ref 9.2–12.9)
POTASSIUM SERPL-SCNC: 4.1 MMOL/L (ref 3.5–5.1)
PROT SERPL-MCNC: 7.1 G/DL (ref 6–8.4)
PROT UR QL STRIP: NEGATIVE
RBC # BLD AUTO: 4.34 M/UL (ref 4–5.4)
SODIUM SERPL-SCNC: 135 MMOL/L (ref 136–145)
SP GR UR STRIP: 1.01 (ref 1–1.03)
SPECIMEN SOURCE: NORMAL
URN SPEC COLLECT METH UR: ABNORMAL
WBC # BLD AUTO: 8.1 K/UL (ref 3.9–12.7)

## 2020-02-29 PROCEDURE — 99285 PR EMERGENCY DEPT VISIT,LEVEL V: ICD-10-PCS | Mod: ,,, | Performed by: EMERGENCY MEDICINE

## 2020-02-29 PROCEDURE — 96360 HYDRATION IV INFUSION INIT: CPT

## 2020-02-29 PROCEDURE — 80053 COMPREHEN METABOLIC PANEL: CPT

## 2020-02-29 PROCEDURE — 99285 EMERGENCY DEPT VISIT HI MDM: CPT | Mod: ,,, | Performed by: EMERGENCY MEDICINE

## 2020-02-29 PROCEDURE — 93010 EKG 12-LEAD: ICD-10-PCS | Mod: ,,, | Performed by: INTERNAL MEDICINE

## 2020-02-29 PROCEDURE — 99285 EMERGENCY DEPT VISIT HI MDM: CPT | Mod: 25

## 2020-02-29 PROCEDURE — 87502 INFLUENZA DNA AMP PROBE: CPT

## 2020-02-29 PROCEDURE — 83605 ASSAY OF LACTIC ACID: CPT

## 2020-02-29 PROCEDURE — 93005 ELECTROCARDIOGRAM TRACING: CPT

## 2020-02-29 PROCEDURE — 81003 URINALYSIS AUTO W/O SCOPE: CPT

## 2020-02-29 PROCEDURE — 85025 COMPLETE CBC W/AUTO DIFF WBC: CPT

## 2020-02-29 PROCEDURE — 87040 BLOOD CULTURE FOR BACTERIA: CPT

## 2020-02-29 PROCEDURE — 93010 ELECTROCARDIOGRAM REPORT: CPT | Mod: ,,, | Performed by: INTERNAL MEDICINE

## 2020-02-29 PROCEDURE — 83735 ASSAY OF MAGNESIUM: CPT

## 2020-02-29 PROCEDURE — 63600175 PHARM REV CODE 636 W HCPCS: Performed by: STUDENT IN AN ORGANIZED HEALTH CARE EDUCATION/TRAINING PROGRAM

## 2020-02-29 RX ORDER — CEFEPIME HYDROCHLORIDE 2 G/1
2 INJECTION, POWDER, FOR SOLUTION INTRAVENOUS
Status: DISCONTINUED | OUTPATIENT
Start: 2020-02-29 | End: 2020-02-29

## 2020-02-29 RX ADMIN — SODIUM CHLORIDE 1000 ML: 0.9 INJECTION, SOLUTION INTRAVENOUS at 11:02

## 2020-03-01 NOTE — ED NOTES
Pt. Dc'd home all dc information reviewed with Pt. Verbalized understanding Pt. Ambulated to nicolas

## 2020-03-01 NOTE — DISCHARGE INSTRUCTIONS
You were recently seen at [facility] ED for fevers. During your stay, you received 1 liter of fluid and your fevers improved from 100.4F to 98.9F. Additionally, you were not neutropenic on your lab work and had no other source of infection after extensive work up. Case was discussed w/ Medical Oncology who recommended close follow up with your Oncologist, Dr. Barrett, within the next week for further evaluation and management. You may return to the ED at any time if your symptoms recur or begin to worsen.     Our goal in the emergency department is to always give you outstanding care and exceptional service. You may receive a survey by mail or e-mail in the next week regarding your experience in our ED. We would greatly appreciate your completing and returning the survey. Your feedback provides us with a way to recognize our staff who give very good care and it helps us learn how to improve when your experience was below our aspiration of excellence.

## 2020-03-01 NOTE — ED PROVIDER NOTES
"Encounter Date: 2/29/2020       History     Chief Complaint   Patient presents with    Fever Post Chemo     Pt arrives fever of 101.1 at home. Hx of Cancer. Last chemo february 8th.     Quyen Moody is a 58 yo female with PMHx pancreatic cancer (on chemotherapy), CKD, HLD who presents to [facility] [place] [place] ED c/o fever. Patient reports she was feeling at her normal baseline of health until this morning when she noticed a sore throat. Patient took OTC Zicam w/ little relief, however, was able to go to work for the day. After returning from home states that she began to feel chills despite the house being warm. Oral home temperature was 101.1F, prompting her return to the ED for further evaluation. Patient was recently admitted to the Medical Oncology service on 2/20/2020 for neutropenic fever (to 101.6F). Patient was admitted for 2 days and completed a 3 day course of Neupogen while admitted. She experienced no further fevers and was discharged on 2/22/20 w/ a 5 day course of levaquin, that she has completed. Patient states that she feels like shes developing a "chest cold" and endorses a new onset, tension like headache that began this AM. Additionally, she notes fevers, chills, fatigue, dry cough, sore throat, congestion and dizziness. She denies nausea, vomiting, chest pain, palpitations, lower extremity edema, SOB, wheeze, Abdominal pain, dysuria, myalgias, weakness, sick contacts, and recent travel.        Review of patient's allergies indicates:   Allergen Reactions    Sulfa (sulfonamide antibiotics) Swelling and Hives     tongue       Past Medical History:   Diagnosis Date    Allergic rhinitis 3/3/2014    CKD (chronic kidney disease) stage 3, GFR 30-59 ml/min 12/26/2015    Hyperlipidemia 3/3/2014    Pancreas cancer     Skin rash 8/10/2019     Past Surgical History:   Procedure Laterality Date    ERCP N/A 7/16/2019    Procedure: ERCP (ENDOSCOPIC RETROGRADE CHOLANGIOPANCREATOGRAPHY);  Surgeon: Chema Harris MD;  " [facility]  Service: Endoscopy;  Laterality: N/A;    ERCP N/A 12/24/2019    Procedure: ERCP (ENDOSCOPIC RETROGRADE CHOLANGIOPANCREATOGRAPHY);  Surgeon: Chema Harris MD;  [facility]  Service: Endoscopy;  Laterality: N/A;    Exporatory lap      INSERTION OF TUNNELED CENTRAL VENOUS CATHETER (CVC) WITH SUBCUTANEOUS PORT Right 8/12/2019    Procedure: [id number]-H-CATH;  Surgeon: Cesar Hallman MD;  [facility];  Service: General;  Laterality: Right;  right IJ     Family History   Problem Relation Age of Onset    Diabetes Mother     Diabetes Father     Diabetes Brother     Heart disease Neg Hx      Social History     Tobacco Use    Smoking status: Former Smoker     Start date: 12/11/1999    Smokeless tobacco: Never Used   Substance Use Topics    Alcohol use: Yes     Alcohol/week: 1.0 standard drinks     Types: 1 Glasses of wine per week     Frequency: 4 or more times a week     Drinks per session: 1 or 2     Binge frequency: Never     Comment: last drink a month ago     Drug use: No     Review of Systems   Constitutional: Positive for chills, fatigue and fever. Negative for appetite change and diaphoresis.   HENT: Positive for congestion, rhinorrhea and sore throat. Negative for hearing loss and trouble swallowing.    Eyes: Negative for visual disturbance.   Respiratory: Positive for cough (dry). Negative for shortness of breath and wheezing.    Cardiovascular: Negative for chest pain, palpitations and leg swelling.   Gastrointestinal: Negative for abdominal distention, abdominal pain, constipation, diarrhea, nausea and vomiting.   Genitourinary: Negative for difficulty urinating, dysuria and hematuria.   Musculoskeletal: Negative for arthralgias and myalgias.   Skin: Negative for color change.   Neurological: Positive for dizziness and headaches. Negative for weakness and light-headedness.   Psychiatric/Behavioral: Negative for agitation,  behavioral problems and confusion.       Physical Exam     Initial Vitals [02/29/20 1932]   BP Pulse Resp Temp SpO2   (!) 166/77 103 18 (!) 100.4 °F (38 °C) 97 %      MAP       --         Physical Exam    Nursing note and vitals reviewed.  Constitutional: She appears well-developed and well-nourished. She is not diaphoretic. No distress.   HENT:   Head: Normocephalic and atraumatic.   Mouth/Throat: Oropharynx is clear and moist. No oropharyngeal exudate.   Eyes: Conjunctivae and EOM are normal. Pupils are equal, round, and reactive to light. No scleral icterus.   Neck: Normal range of motion. Neck supple. No JVD present.   Cardiovascular: Regular rhythm, normal heart sounds and intact distal pulses. Tachycardia present.    No murmur heard.  Pulmonary/Chest: Breath sounds normal. No respiratory distress. She has no wheezes. She has no rales.   Abdominal: Soft. Bowel sounds are normal. She exhibits no distension. There is no tenderness.   Musculoskeletal: Normal range of motion. She exhibits edema (Trace LE edema, L>R). She exhibits no tenderness.   Lymphadenopathy:     She has no cervical adenopathy.   Neurological: She is alert. She has normal strength. No cranial nerve deficit or sensory deficit. GCS score is 15. GCS eye subscore is 4. GCS verbal subscore is 5. GCS motor subscore is 6.   Skin: Skin is warm and dry. No erythema.   Psychiatric: She has a normal mood and affect. Thought content normal.         ED Course   Procedures  Labs Reviewed   CBC W/ AUTO DIFFERENTIAL - Abnormal; Notable for the following components:       Result Value    Mean Corpuscular Hemoglobin Conc 31.4 (*)     RDW 17.0 (*)     Immature Granulocytes 0.9 (*)     Immature Grans (Abs) 0.07 (*)     Lymph # 0.9 (*)     Mono # 1.1 (*)     Lymph% 11.5 (*)     All other components within normal limits   COMPREHENSIVE METABOLIC PANEL - Abnormal; Notable for the following components:    Sodium 135 (*)     Albumin 3.1 (*)     Alkaline Phosphatase 371  (*)     AST 52 (*)     ALT 75 (*)     All other components within normal limits   URINALYSIS, REFLEX TO URINE CULTURE - Abnormal; Notable for the following components:    Appearance, UA Hazy (*)     All other components within normal limits    Narrative:     Preferred Collection Type->Urine, Clean Catch   INFLUENZA A & B BY MOLECULAR   CULTURE, BLOOD   CULTURE, BLOOD   MAGNESIUM   LACTIC ACID, PLASMA        ECG Results          EKG 12-lead (Final result)  Result time 03/01/20 09:09:24    Final result by Interface, Lab In [facility] (03/01/20 09:09:24)                 Narrative:    Test Reason : R50.9,    Vent. Rate : 102 BPM     Atrial Rate : 107 BPM     P-R Int : 150 ms          QRS Dur : 070 ms      QT Int : 312 ms       P-R-T Axes : 066 047 041 degrees     QTc Int : 407 ms      Sinus tachycardia  Otherwise normal ECG  When compared with ECG of 20-FEB-2020 19:24,  No significant change was found    Confirmed by Zaki Alarcon MD ([id number]) on 3/1/2020 9:09:17 AM    Referred By: [email or web address]   SELF           Confirmed By:Zaki Alarcon MD                            Imaging Results          X-Ray Chest AP Portable (Final result)  Result time 02/29/20 21:28:38    Final result by Prasad Mcpherson MD (02/29/20 21:28:38)                 Impression:      No significant change from prior study.      Electronically signed by: Prasad Mcpherson MD  Date:    02/29/2020  Time:    21:28             Narrative:    EXAMINATION:  XR CHEST AP PORTABLE    CLINICAL HISTORY:  fever;    TECHNIQUE:  Single frontal view of the chest was performed.    COMPARISON:  February 20, 2020 19:26 hours.    FINDINGS:  Right-sided port catheter appears unchanged.    Heart and lungs  appear unchanged when allowing for differences in technique and positioning.                                 Medical Decision Making:   History:   Old Medical Records: I decided to obtain old medical records.  Old Records Summarized: records from previous admission(s).       <>  Summary of Records: Admitted to Medical Oncology on 02/20/2020 for neutropenic fever.  On admission patient was started on vancomycin and Zosyn and her fever resolved.  Her ANC decreased shortly following admission and the patient was given Neupogen x2.  On 02/22, patient's ANC improved and safe for discharge. Patient remains afebrile and IV antibiotics transition to p.o. Levaquin x5 days.  Patient instructed follow up with Oncology as scheduled.  Initial Assessment:   Quyen Moody is a 58 yo female with PMHx pancreatic cancer (on chemotherapy), CKD, HLD who presents to [facility] ED c/o fever. Patient recently admitted for similar symptoms and discharged on 2/22 w/ 5 day course of Levaquin. Patient noted sore throat this morning and chills this afternoon w/ oral temp 101.1F.  Differential Diagnosis:   Differential diagnosis includes but is not limited to: neutropenic fever, PNA, UTI, sepsis  Independently Interpreted Test(s):   I have ordered and independently interpreted X-rays - see summary below.       <> Summary of X-Ray Reading(s): CXR viewed. No acute infiltrate, effusion or PTX on my read. Right sided port in place.  I have ordered and independently interpreted EKG Reading(s) - see summary below       <> Summary of EKG Reading(s): Sinus tachycardia. No STEMI.  Clinical Tests:   Lab Tests: Ordered and Reviewed  The following lab test(s) were unremarkable: CBC, Lactate and Urinalysis  Radiological Study: Ordered and Reviewed  Medical Tests: Ordered and Reviewed  ED Management:  Patient seen and examined in ED this evening in no acute distress. Patient hypertensive to 166/77, febrile to 100.4 F, and tachycardic to 103. CBC, CMP, Mg, Lactic, Rapid flu, blood cultures x2, CXR, UA, EKG. CXR stable from previous w/o interval development of cardiopulmonary process. WBC 8.1. Not neutropenic at this time. Repeat temperature 98.9F. 1L NS bolus. CMP wnl. Discussed case w/ Medical Oncology, recommend close outpatient follow up  w/ Oncologist. Patient ok to discharge home w/ close Oncology follow up next week.  Other:   I have discussed this case with another health care provider.       <> Summary of the Discussion: oncology              Attending Attestation:   Physician Attestation Statement for Resident:  As the supervising MD   Physician Attestation Statement: I have personally seen and examined this patient.   I agree with the above history. -:   As the supervising MD I agree with the above PE.    As the supervising MD I agree with the above treatment, course, plan, and disposition.   -:     Febrile. Mildly tachycardic. Here w/ fever. Concern for neutropenic fever. ANC 1 week ago. Septic work up initiated including blood cx. Fluid bolus given. WBC normal with ANC normal. CXR w/o PNA. Flu negative. No UTI. Symptoms likely due to viral illness. However, cannot rule out port infection; but site appears well without obvious acute infection. Will have pt return if blood cx concerning. Resident discussed with oncology who agrees with d/c home. Advised to f/u with oncology early next week. Stable for d/c.      I have reviewed and agree with the residents interpretation of the following: lab data, x-rays and EKG.  I have reviewed the following: old records at this facility.                                  Clinical Impression:       ICD-10-CM ICD-9-CM   1. Fever R50.9 780.60             ED Disposition Condition    Discharge Stable        ED Prescriptions     None        Follow-up Information     Follow up With Specialties Details Why Contact Info    Eliot Barrett MD Hematology and Oncology Schedule an appointment as soon as possible for a visit in 1 week Scheduled Appointment [address] [facility] [address]  [phone]      [facility] Emergency Medicine Go to  As needed, If symptoms worsen [address] [facility] [id number] [phone]                                     Mike Frost  MD  Resident  03/01/20 0012       Alfred Lozano MD  03/02/20 9821

## 2020-03-02 ENCOUNTER — PATIENT MESSAGE (OUTPATIENT)
Dept: HEMATOLOGY/ONCOLOGY | Facility: CLINIC | Age: 60
End: 2020-03-02

## 2020-03-02 DIAGNOSIS — G89.3 NEOPLASM RELATED PAIN (ACUTE) (CHRONIC): ICD-10-CM

## 2020-03-02 RX ORDER — FENTANYL 25 UG/1
1 PATCH TRANSDERMAL
Qty: 10 PATCH | Refills: 0 | Status: SHIPPED | OUTPATIENT
Start: 2020-03-02 | End: 2020-04-09 | Stop reason: SDUPTHER

## 2020-03-04 ENCOUNTER — LAB VISIT (OUTPATIENT)
Dept: LAB | Facility: HOSPITAL | Age: 60
End: 2020-03-04
Payer: COMMERCIAL

## 2020-03-04 DIAGNOSIS — C25.9 PANCREATIC CANCER: ICD-10-CM

## 2020-03-04 DIAGNOSIS — C25.0 CANCER OF HEAD OF PANCREAS: ICD-10-CM

## 2020-03-04 DIAGNOSIS — Z00.6 EXAMINATION OF PARTICIPANT IN CLINICAL TRIAL: ICD-10-CM

## 2020-03-04 LAB
ALBUMIN SERPL BCP-MCNC: 3.1 G/DL (ref 3.5–5.2)
ALP SERPL-CCNC: 341 U/L (ref 55–135)
ALT SERPL W/O P-5'-P-CCNC: 60 U/L (ref 10–44)
ANION GAP SERPL CALC-SCNC: 7 MMOL/L (ref 8–16)
ANISOCYTOSIS BLD QL SMEAR: SLIGHT
AST SERPL-CCNC: 65 U/L (ref 10–40)
BASOPHILS # BLD AUTO: 0.06 K/UL (ref 0–0.2)
BASOPHILS NFR BLD: 1.3 % (ref 0–1.9)
BILIRUB SERPL-MCNC: 0.5 MG/DL (ref 0.1–1)
BUN SERPL-MCNC: 15 MG/DL (ref 6–20)
CALCIUM SERPL-MCNC: 9.1 MG/DL (ref 8.7–10.5)
CHLORIDE SERPL-SCNC: 105 MMOL/L (ref 95–110)
CO2 SERPL-SCNC: 26 MMOL/L (ref 23–29)
CREAT SERPL-MCNC: 0.8 MG/DL (ref 0.5–1.4)
DIFFERENTIAL METHOD: ABNORMAL
EOSINOPHIL # BLD AUTO: 0 K/UL (ref 0–0.5)
EOSINOPHIL NFR BLD: 0.7 % (ref 0–8)
ERYTHROCYTE [DISTWIDTH] IN BLOOD BY AUTOMATED COUNT: 17.1 % (ref 11.5–14.5)
EST. GFR  (AFRICAN AMERICAN): >60 ML/MIN/1.73 M^2
EST. GFR  (NON AFRICAN AMERICAN): >60 ML/MIN/1.73 M^2
GLUCOSE SERPL-MCNC: 82 MG/DL (ref 70–110)
HCT VFR BLD AUTO: 40 % (ref 37–48.5)
HGB BLD-MCNC: 12.1 G/DL (ref 12–16)
IMM GRANULOCYTES # BLD AUTO: 0.01 K/UL (ref 0–0.04)
IMM GRANULOCYTES NFR BLD AUTO: 0.2 % (ref 0–0.5)
LYMPHOCYTES # BLD AUTO: 1.8 K/UL (ref 1–4.8)
LYMPHOCYTES NFR BLD: 40.2 % (ref 18–48)
MCH RBC QN AUTO: 27.5 PG (ref 27–31)
MCHC RBC AUTO-ENTMCNC: 30.3 G/DL (ref 32–36)
MCV RBC AUTO: 91 FL (ref 82–98)
MONOCYTES # BLD AUTO: 0.6 K/UL (ref 0.3–1)
MONOCYTES NFR BLD: 14.4 % (ref 4–15)
NEUTROPHILS # BLD AUTO: 1.9 K/UL (ref 1.8–7.7)
NEUTROPHILS NFR BLD: 43.2 % (ref 38–73)
NRBC BLD-RTO: 0 /100 WBC
PLATELET # BLD AUTO: 287 K/UL (ref 150–350)
PLATELET BLD QL SMEAR: ABNORMAL
PMV BLD AUTO: 9.3 FL (ref 9.2–12.9)
POTASSIUM SERPL-SCNC: 4.7 MMOL/L (ref 3.5–5.1)
PROT SERPL-MCNC: 7 G/DL (ref 6–8.4)
RBC # BLD AUTO: 4.4 M/UL (ref 4–5.4)
SCHISTOCYTES BLD QL SMEAR: ABNORMAL
SODIUM SERPL-SCNC: 138 MMOL/L (ref 136–145)
WBC # BLD AUTO: 4.45 K/UL (ref 3.9–12.7)

## 2020-03-04 PROCEDURE — 36415 COLL VENOUS BLD VENIPUNCTURE: CPT

## 2020-03-04 PROCEDURE — 85025 COMPLETE CBC W/AUTO DIFF WBC: CPT

## 2020-03-04 PROCEDURE — 80053 COMPREHEN METABOLIC PANEL: CPT

## 2020-03-04 NOTE — PROGRESS NOTES
Subjective:       Patient ID: Quyen Moody    Chief Complaint:  Pancreatic cancer    HPI     Quyen Moody is a 59 y.o. female, patient  to clinic for follow up and treatment on PANOVA-3 trial. She is here to begin cycle #3 of FOLFIRINOX.  Patient with intermittent pain, but controlled with oral medications. She is using Fentanyl with PRN oxycodone.  She is having difficulties with sleep- has night sweats. Feeling well, notes increased fatigue.     ECOG 0.    Oncologic History:  She has had intermittent upper abdominal pain since early June.  She presented to her PCP who originally ordered an US and CT.  US was negative and CT showed some haziness at the pancreatic head.  She saw GI who performed EUS.  On EUS, a 3x4cm pancreatic head mass was seen with possible invasion into the SMA and portal vein.  FNA path s/w pancreatic adenocarcinoma.  CA 19-9 level at outside hospital was >1000 per the patient.  She endorses nausea and inability to tolerate much PO.  She has lost about 10lbs over the last few weeks and is noticing her skin turning yellow.  Denies pruritis.  She is passing gas but has not had a BM in a few days, she attributes this to narcotic use.  She recently started taking stool softeners.  No previous cardiac or stroke history.  Surgical history significant for open appendectomy when she was in her 20s    Review of Systems   Constitutional: Positive for fatigue. Negative for activity change, appetite change, chills, fever and unexpected weight change (improving).   HENT: Negative for congestion, hearing loss, mouth sores, sore throat, tinnitus and voice change.    Eyes: Negative for pain and visual disturbance.   Respiratory: Positive for shortness of breath. Negative for cough and wheezing.    Cardiovascular: Positive for leg swelling (left). Negative for chest pain and palpitations.   Gastrointestinal: Positive for abdominal pain. Negative for constipation, diarrhea, nausea and vomiting.   Endocrine:  Negative for cold intolerance and heat intolerance.   Genitourinary: Negative for difficulty urinating, dyspareunia, dysuria, frequency, menstrual problem, urgency, vaginal bleeding, vaginal discharge and vaginal pain.   Musculoskeletal: Negative for arthralgias, back pain and myalgias.   Skin: Positive for rash. Negative for color change and wound.   Allergic/Immunologic: Negative for environmental allergies and food allergies.   Neurological: Negative for weakness, numbness and headaches.   Hematological: Negative for adenopathy. Does not bruise/bleed easily.   Psychiatric/Behavioral: Positive for sleep disturbance. Negative for agitation, confusion and hallucinations. The patient is nervous/anxious.    All other systems reviewed and are negative.        Allergies:  Review of patient's allergies indicates:   Allergen Reactions    Sulfa (sulfonamide antibiotics) Swelling and Hives     tongue         Medications:  Current Outpatient Medications   Medication Sig Dispense Refill    apixaban (ELIQUIS) 5 mg Tab Take 1 tablet (5 mg total) by mouth 2 (two) times daily. 60 tablet 6    dexAMETHasone (DECADRON) 4 MG Tab Take 1 tablet (4 mg total) by mouth every 12 (twelve) hours. For 2 days following chemotherapy. 20 tablet 0    fentaNYL (DURAGESIC) 25 mcg/hr Place 1 patch onto the skin every 72 hours. 10 patch 0    hydrocortisone 2.5 % cream Apply topically 2 (two) times daily. (Patient not taking: Reported on 1/9/2020) 453.6 g 1    levoFLOXacin (LEVAQUIN) 750 MG tablet Take 1 tablet (750 mg total) by mouth once daily. 5 tablet 0    lidocaine-prilocaine (EMLA) cream Apply to port 45 minutes prior to access. 30 g 0    LORazepam (ATIVAN) 0.5 MG tablet Take 1 tablet (0.5 mg total) by mouth every 6 (six) hours as needed for Anxiety (nausea). 60 tablet 0    multivitamin (THERAGRAN) per tablet Take 1 tablet by mouth once daily.      OLANZapine (ZYPREXA) 5 MG tablet Take 1 tablet (5 mg total) by mouth nightly. To prevent  nausea 30 tablet 2    ondansetron (ZOFRAN) 8 MG tablet Take 1 tablet (8 mg total) by mouth every 8 (eight) hours as needed for Nausea. 120 tablet 2    oxyCODONE (ROXICODONE) 15 MG Tab Take 1 tablet (15 mg total) by mouth every 6 (six) hours as needed for Pain. 90 tablet 0     No current facility-administered medications for this visit.        PMH:  Past Medical History:   Diagnosis Date    Allergic rhinitis 3/3/2014    CKD (chronic kidney disease) stage 3, GFR 30-59 ml/min 12/26/2015    Hyperlipidemia 3/3/2014    Pancreas cancer     Skin rash 8/10/2019       PSH:  Past Surgical History:   Procedure Laterality Date    ERCP N/A 7/16/2019    Procedure: ERCP (ENDOSCOPIC RETROGRADE CHOLANGIOPANCREATOGRAPHY);  Surgeon: Chema Harris MD;  Location: [facility] ([address] [place] [address]);  Service: Endoscopy;  Laterality: N/A;    ERCP N/A 12/24/2019    Procedure: ERCP (ENDOSCOPIC RETROGRADE CHOLANGIOPANCREATOGRAPHY);  Surgeon: Chema Harris MD;  Location: [facility] ([address] [place] [address]);  Service: Endoscopy;  Laterality: N/A;    Exporatory lap      INSERTION OF TUNNELED CENTRAL VENOUS CATHETER (CVC) WITH SUBCUTANEOUS PORT Right 8/12/2019    Procedure: [id number];  Surgeon: Cesar Hallman MD;  Location: [facility] OR [address] [place] [address];  Service: General;  Laterality: Right;  right IJ       FamHx:  Family History   Problem Relation Age of Onset    Diabetes Mother     Diabetes Father     Diabetes Brother     Heart disease Neg Hx        SocHx:  Social History     Socioeconomic History    Marital status:      Spouse name: Not on file    Number of children: 0    Years of education: Not on file    Highest education level: Not on file   Occupational History    Occupation:      Employer: [employer]   Social Needs    Financial resource strain: Not on file    Food insecurity:     Worry: Not on file     Inability: Not on file    Transportation needs:     Medical: Not on file     Non-medical: Not on file   Tobacco Use     Smoking status: Former Smoker     Start date: 1999    Smokeless tobacco: Never Used   Substance and Sexual Activity    Alcohol use: Yes     Alcohol/week: 1.0 standard drinks     Types: 1 Glasses of wine per week     Frequency: 4 or more times a week     Drinks per session: 1 or 2     Binge frequency: Never     Comment: last drink a month ago     Drug use: No    Sexual activity: Yes   Lifestyle    Physical activity:     Days per week: Not on file     Minutes per session: Not on file    Stress: Not on file   Relationships    Social connections:     Talks on phone: Not on file     Gets together: Not on file     Attends Baptism service: Not on file     Active member of club or organization: Not on file     Attends meetings of clubs or organizations: Not on file     Relationship status: Not on file   Other Topics Concern    Not on file   Social History Narrative    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      of Cancer in          Objective:       Vitals:    20 0927   BP: 135/77   Pulse: 86   Resp: 18   Temp: 97.9 °F (36.6 °C)       Physical Exam   Constitutional: She is oriented to person, place, and time. She appears well-developed and well-nourished. No distress.   HENT:   Head: Normocephalic and atraumatic.   Nose: Nose normal.   Mouth/Throat: Oropharynx is clear and moist. No oropharyngeal exudate.   Eyes: Pupils are equal, round, and reactive to light. Conjunctivae and EOM are normal. Right eye exhibits no discharge. Left eye exhibits no discharge. No scleral icterus.   Neck: Normal range of motion. Neck supple. No tracheal deviation present. No thyromegaly present.   Cardiovascular: Normal rate, regular rhythm, normal heart sounds and normal pulses.   No swelling to bilateral lower extremities.    Pulmonary/Chest: Effort normal and breath sounds normal. She has no wheezes. She has no rales.   Abdominal: Soft. Bowel sounds are normal. She exhibits no distension. There is no tenderness.  There is no guarding.   Musculoskeletal: Normal range of motion. She exhibits edema (left lower extremity +1 from ankle to knee). She exhibits no tenderness or deformity.   No spinal or paraspinal tenderness to palpation.       Lymphadenopathy:     She has no cervical adenopathy.   Neurological: She is alert and oriented to person, place, and time. No cranial nerve deficit. Coordination normal.   Skin: Skin is warm, dry and intact. Rash noted. She is not diaphoretic. No erythema. No pallor.   Psychiatric: She has a normal mood and affect. Her behavior is normal. Judgment and thought content normal.   Nursing note and vitals reviewed.        LABS:  WBC   Date Value Ref Range Status   02/29/2020 8.10 3.90 - 12.70 K/uL Final     Hemoglobin   Date Value Ref Range Status   02/29/2020 12.0 12.0 - 16.0 g/dL Final     Hematocrit   Date Value Ref Range Status   02/29/2020 38.2 37.0 - 48.5 % Final     Platelets   Date Value Ref Range Status   02/29/2020 241 150 - 350 K/uL Final       Chemistry        Component Value Date/Time     (L) 02/29/2020 2152    K 4.1 02/29/2020 2152     02/29/2020 2152    CO2 24 02/29/2020 2152    BUN 10 02/29/2020 2152    CREATININE 0.8 02/29/2020 2152    GLU 93 02/29/2020 2152        Component Value Date/Time    CALCIUM 8.9 02/29/2020 2152    ALKPHOS 371 (H) 02/29/2020 2152    AST 52 (H) 02/29/2020 2152    ALT 75 (H) 02/29/2020 2152    BILITOT 0.5 02/29/2020 2152    ESTGFRAFRICA >60.0 02/29/2020 2152    EGFRNONAA >60.0 02/29/2020 2152            Assessment:       1. Cancer of head of pancreas    2. Malignant neoplasm metastatic to lung, unspecified laterality    3. Chemotherapy induced neutropenia    4. Clinical trial participant    5. Anticoagulated    6. VTE (venous thromboembolism)    7. Neoplasm related pain (acute) (chronic)    8. Chemotherapy-induced nausea            Plan:         1,2,3,4- Cancer at the head of the pancreas:    Discussed SOC chemo and chemoXRT with FOLFIRINOX vs  our PANOVA-3 trial with Jackson-Abraxane +/- TTFields.  Extensively reviewed the rationale of the study and reviewed her eligibility criteria with the research nurse and discussed case with Dr. Hallman as well.  She is interested in this study, and signed consent with our research nurse.     Here today to con't treatment with Jackson+Abraxane and TTF on PANOVA-3, LFTs elevated, but improved. Will con't chemo today, but will dose reduce Jackson to 800 and Abraxane to 100 per pt request due to chemo related AEs.      Based on latest scans, Dr. Hallman felt pt may actually be resectable after chemoXRT given good response to current therapy.  However, subsequent Chest CT   showed new and growing micronodules in the in lungs c/w met disease in light of rising tumor markers.      Long discussion with pt regarding options.  She does not qualify for our COMBAT trial at this time because none of the lung mets are measurable per RECIST or biopsiable.     Will switch chemo to FOLFIRINOX and con't TTF on PANOVA as this seems to be achieving good local control of the primary tumor. Her case was discussed with pharmD and Dr. Barrett. Tolerating treatment well, due for restaging scans before next visit. Recent hospitalization and ED visit for neutropenic fever. Add growth factor on day 4.    Labs acceptable to proceed with cycle #3 of FOLFINOX. She will d/c pump on day 3 and growth factor on day 4.    RTC per research RN     5,6- On Eliquis.    7- Continue current regimen.     8-Discussed with pharmD. Will start dexamethasone 4mg PO BID on day 2 and 3. If this does not help to prevent nausea, will start Zyprexa.    Patient is in agreement with the proposed treatment plan. All questions were answered to the patient's satisfaction. Pt knows to call clinic if anything is needed before the next clinic visit.    Karen Hightower, MSN, APRN, FNP-C  Hematology and Medical Oncology  Nurse Practitioner to Dr. Eliot Barrett  Nurse Practitioner, [facility]

## 2020-03-05 ENCOUNTER — INFUSION (OUTPATIENT)
Dept: INFUSION THERAPY | Facility: HOSPITAL | Age: 60
End: 2020-03-05
Attending: INTERNAL MEDICINE
Payer: COMMERCIAL

## 2020-03-05 ENCOUNTER — RESEARCH ENCOUNTER (OUTPATIENT)
Dept: RESEARCH | Facility: HOSPITAL | Age: 60
End: 2020-03-05

## 2020-03-05 ENCOUNTER — OFFICE VISIT (OUTPATIENT)
Dept: HEMATOLOGY/ONCOLOGY | Facility: CLINIC | Age: 60
End: 2020-03-05
Payer: COMMERCIAL

## 2020-03-05 VITALS
TEMPERATURE: 98 F | HEIGHT: 63 IN | DIASTOLIC BLOOD PRESSURE: 79 MMHG | WEIGHT: 131.81 LBS | SYSTOLIC BLOOD PRESSURE: 154 MMHG | RESPIRATION RATE: 18 BRPM | BODY MASS INDEX: 23.36 KG/M2 | HEART RATE: 82 BPM

## 2020-03-05 VITALS
DIASTOLIC BLOOD PRESSURE: 77 MMHG | SYSTOLIC BLOOD PRESSURE: 135 MMHG | WEIGHT: 131.81 LBS | HEART RATE: 86 BPM | HEIGHT: 63 IN | BODY MASS INDEX: 23.36 KG/M2 | RESPIRATION RATE: 18 BRPM | OXYGEN SATURATION: 99 % | TEMPERATURE: 98 F

## 2020-03-05 DIAGNOSIS — T45.1X5A CHEMOTHERAPY-INDUCED NAUSEA: ICD-10-CM

## 2020-03-05 DIAGNOSIS — Z79.01 ANTICOAGULATED: ICD-10-CM

## 2020-03-05 DIAGNOSIS — C25.0 CANCER OF HEAD OF PANCREAS: Primary | ICD-10-CM

## 2020-03-05 DIAGNOSIS — I82.90 VTE (VENOUS THROMBOEMBOLISM): ICD-10-CM

## 2020-03-05 DIAGNOSIS — D70.1 CHEMOTHERAPY INDUCED NEUTROPENIA: ICD-10-CM

## 2020-03-05 DIAGNOSIS — Z00.6 CLINICAL TRIAL PARTICIPANT: ICD-10-CM

## 2020-03-05 DIAGNOSIS — C78.00 MALIGNANT NEOPLASM METASTATIC TO LUNG, UNSPECIFIED LATERALITY: ICD-10-CM

## 2020-03-05 DIAGNOSIS — Z00.6 RESEARCH STUDY PATIENT: ICD-10-CM

## 2020-03-05 DIAGNOSIS — T45.1X5A CHEMOTHERAPY INDUCED NEUTROPENIA: ICD-10-CM

## 2020-03-05 DIAGNOSIS — R11.0 CHEMOTHERAPY-INDUCED NAUSEA: ICD-10-CM

## 2020-03-05 DIAGNOSIS — G89.3 NEOPLASM RELATED PAIN (ACUTE) (CHRONIC): ICD-10-CM

## 2020-03-05 DIAGNOSIS — D49.0 PANCREAS NEOPLASM: ICD-10-CM

## 2020-03-05 DIAGNOSIS — C25.0 CANCER OF HEAD OF PANCREAS: ICD-10-CM

## 2020-03-05 DIAGNOSIS — C78.00 MALIGNANT NEOPLASM METASTATIC TO LUNG, UNSPECIFIED LATERALITY: Primary | ICD-10-CM

## 2020-03-05 LAB
BACTERIA BLD CULT: NORMAL
BACTERIA BLD CULT: NORMAL

## 2020-03-05 PROCEDURE — 96367 TX/PROPH/DG ADDL SEQ IV INF: CPT

## 2020-03-05 PROCEDURE — 3008F PR BODY MASS INDEX (BMI) DOCUMENTED: ICD-10-PCS | Mod: CPTII,S$GLB,, | Performed by: NURSE PRACTITIONER

## 2020-03-05 PROCEDURE — 99999 PR PBB SHADOW E&M-EST. PATIENT-LVL III: CPT | Mod: PBBFAC,,, | Performed by: NURSE PRACTITIONER

## 2020-03-05 PROCEDURE — 96413 CHEMO IV INFUSION 1 HR: CPT

## 2020-03-05 PROCEDURE — 99214 OFFICE O/P EST MOD 30 MIN: CPT | Mod: S$GLB,,, | Performed by: NURSE PRACTITIONER

## 2020-03-05 PROCEDURE — 96521 REFILL/MAINT PORTABLE PUMP: CPT

## 2020-03-05 PROCEDURE — 3008F BODY MASS INDEX DOCD: CPT | Mod: CPTII,S$GLB,, | Performed by: NURSE PRACTITIONER

## 2020-03-05 PROCEDURE — 96415 CHEMO IV INFUSION ADDL HR: CPT

## 2020-03-05 PROCEDURE — 25000003 PHARM REV CODE 250: Performed by: INTERNAL MEDICINE

## 2020-03-05 PROCEDURE — 99999 PR PBB SHADOW E&M-EST. PATIENT-LVL III: ICD-10-PCS | Mod: PBBFAC,,, | Performed by: NURSE PRACTITIONER

## 2020-03-05 PROCEDURE — 96375 TX/PRO/DX INJ NEW DRUG ADDON: CPT

## 2020-03-05 PROCEDURE — 63600175 PHARM REV CODE 636 W HCPCS: Performed by: INTERNAL MEDICINE

## 2020-03-05 PROCEDURE — 96417 CHEMO IV INFUS EACH ADDL SEQ: CPT

## 2020-03-05 PROCEDURE — 99214 PR OFFICE/OUTPT VISIT, EST, LEVL IV, 30-39 MIN: ICD-10-PCS | Mod: S$GLB,,, | Performed by: NURSE PRACTITIONER

## 2020-03-05 RX ORDER — SODIUM CHLORIDE 0.9 % (FLUSH) 0.9 %
10 SYRINGE (ML) INJECTION
Status: CANCELLED | OUTPATIENT
Start: 2020-03-05

## 2020-03-05 RX ORDER — EPINEPHRINE 0.3 MG/.3ML
0.3 INJECTION SUBCUTANEOUS ONCE AS NEEDED
Status: CANCELLED | OUTPATIENT
Start: 2020-03-05

## 2020-03-05 RX ORDER — SODIUM CHLORIDE 0.9 % (FLUSH) 0.9 %
10 SYRINGE (ML) INJECTION
Status: DISCONTINUED | OUTPATIENT
Start: 2020-03-05 | End: 2020-03-05 | Stop reason: HOSPADM

## 2020-03-05 RX ORDER — ATROPINE SULFATE 0.4 MG/ML
0.4 INJECTION, SOLUTION ENDOTRACHEAL; INTRAMEDULLARY; INTRAMUSCULAR; INTRAVENOUS; SUBCUTANEOUS ONCE AS NEEDED
Status: CANCELLED | OUTPATIENT
Start: 2020-03-05

## 2020-03-05 RX ORDER — DIPHENHYDRAMINE HYDROCHLORIDE 50 MG/ML
50 INJECTION INTRAMUSCULAR; INTRAVENOUS ONCE AS NEEDED
Status: DISCONTINUED | OUTPATIENT
Start: 2020-03-05 | End: 2020-03-05 | Stop reason: HOSPADM

## 2020-03-05 RX ORDER — DIPHENHYDRAMINE HYDROCHLORIDE 50 MG/ML
50 INJECTION INTRAMUSCULAR; INTRAVENOUS ONCE AS NEEDED
Status: CANCELLED | OUTPATIENT
Start: 2020-03-05

## 2020-03-05 RX ORDER — SODIUM CHLORIDE 0.9 % (FLUSH) 0.9 %
10 SYRINGE (ML) INJECTION
Status: CANCELLED | OUTPATIENT
Start: 2020-03-07

## 2020-03-05 RX ORDER — HEPARIN 100 UNIT/ML
500 SYRINGE INTRAVENOUS
Status: CANCELLED | OUTPATIENT
Start: 2020-03-05

## 2020-03-05 RX ORDER — ATROPINE SULFATE 0.4 MG/ML
0.4 INJECTION, SOLUTION ENDOTRACHEAL; INTRAMEDULLARY; INTRAMUSCULAR; INTRAVENOUS; SUBCUTANEOUS ONCE AS NEEDED
Status: DISCONTINUED | OUTPATIENT
Start: 2020-03-05 | End: 2020-03-05 | Stop reason: HOSPADM

## 2020-03-05 RX ORDER — EPINEPHRINE 0.3 MG/.3ML
0.3 INJECTION SUBCUTANEOUS ONCE AS NEEDED
Status: DISCONTINUED | OUTPATIENT
Start: 2020-03-05 | End: 2020-03-05 | Stop reason: HOSPADM

## 2020-03-05 RX ORDER — HEPARIN 100 UNIT/ML
500 SYRINGE INTRAVENOUS
Status: DISCONTINUED | OUTPATIENT
Start: 2020-03-05 | End: 2020-03-05 | Stop reason: HOSPADM

## 2020-03-05 RX ORDER — OLANZAPINE 10 MG/1
10 TABLET ORAL DAILY
Status: CANCELLED
Start: 2020-03-05

## 2020-03-05 RX ORDER — OLANZAPINE 10 MG/1
10 TABLET ORAL DAILY
Status: DISCONTINUED | OUTPATIENT
Start: 2020-03-05 | End: 2020-03-05 | Stop reason: HOSPADM

## 2020-03-05 RX ORDER — HEPARIN 100 UNIT/ML
500 SYRINGE INTRAVENOUS
Status: CANCELLED | OUTPATIENT
Start: 2020-03-07

## 2020-03-05 RX ADMIN — SODIUM CHLORIDE 294 MG: 0.9 INJECTION, SOLUTION INTRAVENOUS at 01:03

## 2020-03-05 RX ADMIN — OXALIPLATIN 139 MG: 5 INJECTION, SOLUTION, CONCENTRATE INTRAVENOUS at 11:03

## 2020-03-05 RX ADMIN — DEXTROSE: 50 INJECTION, SOLUTION INTRAVENOUS at 11:03

## 2020-03-05 RX ADMIN — APREPITANT 130 MG: 130 INJECTION, EMULSION INTRAVENOUS at 10:03

## 2020-03-05 RX ADMIN — FLUOROURACIL 3910 MG: 50 INJECTION, SOLUTION INTRAVENOUS at 02:03

## 2020-03-05 RX ADMIN — DEXAMETHASONE SODIUM PHOSPHATE: 4 INJECTION, SOLUTION INTRA-ARTICULAR; INTRALESIONAL; INTRAMUSCULAR; INTRAVENOUS; SOFT TISSUE at 10:03

## 2020-03-05 RX ADMIN — OLANZAPINE 10 MG: 10 TABLET, FILM COATED ORAL at 10:03

## 2020-03-05 RX ADMIN — SODIUM CHLORIDE: 0.9 INJECTION, SOLUTION INTRAVENOUS at 10:03

## 2020-03-05 NOTE — NURSING
1010  Pt here for Panova-3 Trial, no new complaints or concerns at present; pt reports ER visit past Saturday r/t fever of 101.6, no infection noted; discussed treatment plan for today, all questions answered and pt agrees to proceed

## 2020-03-05 NOTE — PROGRESS NOTES
Sponsor: [employer]  PI: Eliot Barrett MD  Study #: [id number]  WIRB: [id number]  IRB: 2018.096  Pt ID: -[id number]  ARM 1 w/ Salvage Therapy: TTFields + FOLFIRINOX (5FU, Irinotecan, Oxaliplatin)    PANOVA-3: Pivotal, randomized, open-label study of Tumor Treating Fields (TTFields, 150kHz) concomitant with gemcitabine and nab-paclitaxel for front-line treatment of locally-advanced pancreatic adenocarcinoma    NON-STUDY VISIT - FOLFIRINOX C3D1 (Delayed from 20Feb2020) - 05 Mar 2020    Pt RTC today accompanied by a friend for C3D1 on FOLFIRINOX that was delayed from [id number] due to ANC @ 0.8. Pt is AAO x3 with appropriate mood, affect and insight. Pt queried & continues to report sensory neuropathy of her . Pt was hospitalized from 20Feb2020 to 22Feb2020 for febrile neutropenia. Any infectious source is likely viral as all blood cultures & UA returned negative. Influenza A & B also negative. Pt was DC'd home afebrile & in stable condition. Pt presented to the ER again 29Feb2020 after recording a fever >101 degrees. Pt's CBC showed normal WBC & ANC and pt was DC'd home after IV fluids. Pt does report fevers at home continuing to 02Mar2020. Pt states she is still fatigued from the illness/infection she had, but is otherwise doing well.     Pt had SOC labs collected yesterday, and VS, PE & ECOG were collected today. Karen reviewed pt's labs that were collected yesterday, 03/04/2020, and deemed them acceptable for treatment. Since pt is on salvage therapy, chemotherapy treatment decisions are entirely SOC.   Time Out with Karen - Baseline Weight = 60.6 kg - Baseline BSA = 1.63 m2  Oxaliplatin @ 85 mg/m2 x 1.63 m2 = 138.55 mg - NORMALIZED 139 mg  Irinotecan @ 180 mg/m2 x 1.63 m2 = 293.4 - NORMALIZED 294 mg  5FU Pump @ 2400 mg/m2 x 1.63 m2 = 3912 mg - NORMALIZED 3910 mg.  Calculations and doses verified as correct in Epic by me & Karen, approved by Dr. Barrett    Infusion RN Senait was notified that pt's treatment today was SOC  & there are no procedures or special instructions from the clinical trial. RN reported her understanding.  CRC received a report from [employer] this morning reporting pt's last compliance download was 69%. Per pt, she took 4 days off - 3 while hospitalized & again on [id number]. Pt's treatment breaks will be documented accordingly.     Pt was administered all pre-medications and chemotherapy agents w/o RXN & was DC'd from clinic ambulatory & in stable condition. Please see Infusion RN note for further information.     Pt will RTC as follows:  Neulasta: [id number] @ 1400  Week 32 CT C/A/P: [id number] @ 1630  Labs: 16Jmf0509 @ 1430  Lan: [id number] @ 0830  Infusion: 23Sgp1178 @ 0900  Pump DC: 54Jtu1693 @ 1100

## 2020-03-05 NOTE — PLAN OF CARE
1500  Infusion completed, pt tolerated well; pt instructed to avoid cold sensation r/t Oxaliplatin; discussed when to contact MD, when to report to ER; AVS declined, pt verbalized understanding of all discussed and to report for pump d/c on Sat 3/7/20 at 1300.

## 2020-03-07 ENCOUNTER — INFUSION (OUTPATIENT)
Dept: INFUSION THERAPY | Facility: HOSPITAL | Age: 60
End: 2020-03-07
Attending: INTERNAL MEDICINE
Payer: COMMERCIAL

## 2020-03-07 VITALS
DIASTOLIC BLOOD PRESSURE: 70 MMHG | HEART RATE: 65 BPM | TEMPERATURE: 98 F | RESPIRATION RATE: 18 BRPM | SYSTOLIC BLOOD PRESSURE: 141 MMHG

## 2020-03-07 DIAGNOSIS — C78.00 MALIGNANT NEOPLASM METASTATIC TO LUNG, UNSPECIFIED LATERALITY: Primary | ICD-10-CM

## 2020-03-07 DIAGNOSIS — C25.0 CANCER OF HEAD OF PANCREAS: ICD-10-CM

## 2020-03-07 PROCEDURE — 25000003 PHARM REV CODE 250: Performed by: INTERNAL MEDICINE

## 2020-03-07 PROCEDURE — 63600175 PHARM REV CODE 636 W HCPCS: Performed by: INTERNAL MEDICINE

## 2020-03-07 PROCEDURE — 96523 IRRIG DRUG DELIVERY DEVICE: CPT

## 2020-03-07 PROCEDURE — A4216 STERILE WATER/SALINE, 10 ML: HCPCS | Performed by: INTERNAL MEDICINE

## 2020-03-07 RX ORDER — HEPARIN 100 UNIT/ML
500 SYRINGE INTRAVENOUS
Status: DISCONTINUED | OUTPATIENT
Start: 2020-03-07 | End: 2020-03-07 | Stop reason: HOSPADM

## 2020-03-07 RX ORDER — SODIUM CHLORIDE 0.9 % (FLUSH) 0.9 %
10 SYRINGE (ML) INJECTION
Status: DISCONTINUED | OUTPATIENT
Start: 2020-03-07 | End: 2020-03-07 | Stop reason: HOSPADM

## 2020-03-07 RX ADMIN — Medication 10 ML: at 12:03

## 2020-03-07 RX ADMIN — HEPARIN 500 UNITS: 100 SYRINGE at 12:03

## 2020-03-07 NOTE — NURSING
Patient arrived for pump dc. Tolerated well. NAD noted upon discharge. Verbalized understanding to call MD for any questions/concerns. Port + blood return present, flushed, hep locked and deaccessed. Dischargd home, ambulated independently.

## 2020-03-09 ENCOUNTER — INFUSION (OUTPATIENT)
Dept: INFUSION THERAPY | Facility: HOSPITAL | Age: 60
End: 2020-03-09
Attending: INTERNAL MEDICINE
Payer: COMMERCIAL

## 2020-03-09 VITALS
RESPIRATION RATE: 18 BRPM | DIASTOLIC BLOOD PRESSURE: 68 MMHG | HEART RATE: 69 BPM | TEMPERATURE: 98 F | SYSTOLIC BLOOD PRESSURE: 125 MMHG

## 2020-03-09 DIAGNOSIS — C25.0 CANCER OF HEAD OF PANCREAS: ICD-10-CM

## 2020-03-09 DIAGNOSIS — C78.00 MALIGNANT NEOPLASM METASTATIC TO LUNG, UNSPECIFIED LATERALITY: Primary | ICD-10-CM

## 2020-03-09 PROCEDURE — 96372 THER/PROPH/DIAG INJ SC/IM: CPT

## 2020-03-09 PROCEDURE — 63600175 PHARM REV CODE 636 W HCPCS: Performed by: INTERNAL MEDICINE

## 2020-03-09 RX ADMIN — PEGFILGRASTIM-CBQV 6 MG: 6 INJECTION, SOLUTION SUBCUTANEOUS at 02:03

## 2020-03-09 NOTE — NURSING
1415  Pt here for Udenyca injection, no new complaints or concerns at present; SQ injection to RPUA, tolerated well; discussed SE of injection and how to treat; discussed when to contact MD, when to report to ER; AVS declined, pt verbalized understanding of all discussed and when to report next

## 2020-03-12 ENCOUNTER — PATIENT MESSAGE (OUTPATIENT)
Dept: HEMATOLOGY/ONCOLOGY | Facility: CLINIC | Age: 60
End: 2020-03-12

## 2020-03-13 ENCOUNTER — PATIENT MESSAGE (OUTPATIENT)
Dept: HEMATOLOGY/ONCOLOGY | Facility: CLINIC | Age: 60
End: 2020-03-13

## 2020-03-16 ENCOUNTER — HOSPITAL ENCOUNTER (OUTPATIENT)
Dept: RADIOLOGY | Facility: HOSPITAL | Age: 60
Discharge: HOME | DRG: 872 | End: 2020-03-16
Attending: INTERNAL MEDICINE
Payer: COMMERCIAL

## 2020-03-16 DIAGNOSIS — D49.0 PANCREAS NEOPLASM: ICD-10-CM

## 2020-03-16 DIAGNOSIS — Z00.6 RESEARCH STUDY PATIENT: ICD-10-CM

## 2020-03-16 DIAGNOSIS — C25.0 CANCER OF HEAD OF PANCREAS: ICD-10-CM

## 2020-03-16 PROCEDURE — 74177 CT CHEST ABDOMEN PELVIS WITH CONTRAST (XPD): ICD-10-PCS | Mod: 26,,, | Performed by: RADIOLOGY

## 2020-03-16 PROCEDURE — 74177 CT ABD & PELVIS W/CONTRAST: CPT | Mod: TC

## 2020-03-16 PROCEDURE — 25500020 PHARM REV CODE 255: Performed by: INTERNAL MEDICINE

## 2020-03-16 PROCEDURE — 71260 CT THORAX DX C+: CPT | Mod: 26,,, | Performed by: RADIOLOGY

## 2020-03-16 PROCEDURE — 74177 CT ABD & PELVIS W/CONTRAST: CPT | Mod: 26,,, | Performed by: RADIOLOGY

## 2020-03-16 PROCEDURE — 71260 CT CHEST ABDOMEN PELVIS WITH CONTRAST (XPD): ICD-10-PCS | Mod: 26,,, | Performed by: RADIOLOGY

## 2020-03-16 RX ADMIN — IOHEXOL 75 ML: 350 INJECTION, SOLUTION INTRAVENOUS at 05:03

## 2020-03-16 RX ADMIN — IOHEXOL 15 ML: 350 INJECTION, SOLUTION INTRAVENOUS at 05:03

## 2020-03-18 ENCOUNTER — TELEPHONE (OUTPATIENT)
Dept: HEMATOLOGY/ONCOLOGY | Facility: CLINIC | Age: 60
End: 2020-03-18

## 2020-03-18 NOTE — TELEPHONE ENCOUNTER
Sponsor: [employer]  PI: Eliot Barrett MD  Study #: [id number]  WIRB: [id number]  IRB: 2018.096  Pt ID: -[id number]  ARM 1 w/ Salvage Therapy: TTFields + FOLFIRINOX (5FU, Irinotecan, Oxaliplatin)    PANOVA-3: Pivotal, randomized, open-label study of Tumor Treating Fields (TTFields, 150kHz) concomitant with gemcitabine and nab-paclitaxel for front-line treatment of locally-advanced pancreatic adenocarcinoma    Pt called CRC today @ 8945 to inform us that she had a fever of 101.4. Pt was instructed to present to the ED. PI & lead CRC Maritza Gold notified.

## 2020-03-19 ENCOUNTER — TELEPHONE (OUTPATIENT)
Dept: RESEARCH | Facility: HOSPITAL | Age: 60
End: 2020-03-19

## 2020-03-19 ENCOUNTER — OFFICE VISIT (OUTPATIENT)
Dept: HEMATOLOGY/ONCOLOGY | Facility: CLINIC | Age: 60
End: 2020-03-19
Payer: COMMERCIAL

## 2020-03-19 ENCOUNTER — TELEPHONE (OUTPATIENT)
Dept: HEMATOLOGY/ONCOLOGY | Facility: CLINIC | Age: 60
End: 2020-03-19

## 2020-03-19 ENCOUNTER — HOSPITAL ENCOUNTER (INPATIENT)
Facility: HOSPITAL | Age: 60
LOS: 2 days | Discharge: HOME | DRG: 872 | End: 2020-03-21
Attending: EMERGENCY MEDICINE | Admitting: INTERNAL MEDICINE
Payer: COMMERCIAL

## 2020-03-19 ENCOUNTER — RESEARCH ENCOUNTER (OUTPATIENT)
Dept: RESEARCH | Facility: HOSPITAL | Age: 60
End: 2020-03-19

## 2020-03-19 VITALS
HEART RATE: 82 BPM | OXYGEN SATURATION: 100 % | RESPIRATION RATE: 18 BRPM | SYSTOLIC BLOOD PRESSURE: 134 MMHG | WEIGHT: 128.75 LBS | HEIGHT: 63 IN | TEMPERATURE: 98 F | DIASTOLIC BLOOD PRESSURE: 61 MMHG | BODY MASS INDEX: 22.81 KG/M2

## 2020-03-19 DIAGNOSIS — R78.81 GRAM-NEGATIVE BACTEREMIA: Primary | ICD-10-CM

## 2020-03-19 DIAGNOSIS — C25.0 CANCER OF HEAD OF PANCREAS: Primary | ICD-10-CM

## 2020-03-19 DIAGNOSIS — D70.1 CHEMOTHERAPY INDUCED NEUTROPENIA: ICD-10-CM

## 2020-03-19 DIAGNOSIS — K83.1 MALIGNANT OBSTRUCTIVE JAUNDICE: ICD-10-CM

## 2020-03-19 DIAGNOSIS — C78.00 MALIGNANT NEOPLASM METASTATIC TO LUNG, UNSPECIFIED LATERALITY: ICD-10-CM

## 2020-03-19 DIAGNOSIS — T45.1X5A CHEMOTHERAPY INDUCED NEUTROPENIA: ICD-10-CM

## 2020-03-19 DIAGNOSIS — C80.1 MALIGNANT OBSTRUCTIVE JAUNDICE: ICD-10-CM

## 2020-03-19 DIAGNOSIS — N18.30 CKD (CHRONIC KIDNEY DISEASE) STAGE 3, GFR 30-59 ML/MIN: ICD-10-CM

## 2020-03-19 DIAGNOSIS — R79.89 BLOOD CULTURE POSITIVE FOR MICROORGANISM: ICD-10-CM

## 2020-03-19 DIAGNOSIS — Z00.6 CLINICAL TRIAL PARTICIPANT: ICD-10-CM

## 2020-03-19 DIAGNOSIS — R50.9 FEVER, UNSPECIFIED FEVER CAUSE: ICD-10-CM

## 2020-03-19 DIAGNOSIS — I82.90 VTE (VENOUS THROMBOEMBOLISM): ICD-10-CM

## 2020-03-19 LAB
ALBUMIN SERPL BCP-MCNC: 3 G/DL (ref 3.5–5.2)
ALP SERPL-CCNC: 521 U/L (ref 55–135)
ALT SERPL W/O P-5'-P-CCNC: 201 U/L (ref 10–44)
ANION GAP SERPL CALC-SCNC: 8 MMOL/L (ref 8–16)
AST SERPL-CCNC: 126 U/L (ref 10–40)
BACTERIA #/AREA URNS AUTO: NORMAL /HPF
BASOPHILS # BLD AUTO: 0.06 K/UL (ref 0–0.2)
BASOPHILS NFR BLD: 0.6 % (ref 0–1.9)
BILIRUB SERPL-MCNC: 0.7 MG/DL (ref 0.1–1)
BILIRUB UR QL STRIP: NEGATIVE
BUN SERPL-MCNC: 10 MG/DL (ref 6–20)
CALCIUM SERPL-MCNC: 8.9 MG/DL (ref 8.7–10.5)
CAOX CRY UR QL COMP ASSIST: NORMAL
CHLORIDE SERPL-SCNC: 109 MMOL/L (ref 95–110)
CLARITY UR REFRACT.AUTO: CLEAR
CO2 SERPL-SCNC: 22 MMOL/L (ref 23–29)
COLOR UR AUTO: NORMAL
CREAT SERPL-MCNC: 0.7 MG/DL (ref 0.5–1.4)
DIFFERENTIAL METHOD: ABNORMAL
EOSINOPHIL # BLD AUTO: 0.2 K/UL (ref 0–0.5)
EOSINOPHIL NFR BLD: 1.6 % (ref 0–8)
ERYTHROCYTE [DISTWIDTH] IN BLOOD BY AUTOMATED COUNT: 17.7 % (ref 11.5–14.5)
EST. GFR  (AFRICAN AMERICAN): >60 ML/MIN/1.73 M^2
EST. GFR  (NON AFRICAN AMERICAN): >60 ML/MIN/1.73 M^2
GLUCOSE SERPL-MCNC: 108 MG/DL (ref 70–110)
GLUCOSE UR QL STRIP: NEGATIVE
GROUP A STREP, MOLECULAR: NEGATIVE
HCT VFR BLD AUTO: 37.2 % (ref 37–48.5)
HGB BLD-MCNC: 11.4 G/DL (ref 12–16)
HGB UR QL STRIP: NEGATIVE
IMM GRANULOCYTES # BLD AUTO: 0.14 K/UL (ref 0–0.04)
IMM GRANULOCYTES NFR BLD AUTO: 1.3 % (ref 0–0.5)
INFLUENZA A, MOLECULAR: NEGATIVE
INFLUENZA B, MOLECULAR: NEGATIVE
KETONES UR QL STRIP: NEGATIVE
LACTATE SERPL-SCNC: 1.9 MMOL/L (ref 0.5–2.2)
LEUKOCYTE ESTERASE UR QL STRIP: NEGATIVE
LYMPHOCYTES # BLD AUTO: 1.2 K/UL (ref 1–4.8)
LYMPHOCYTES NFR BLD: 10.9 % (ref 18–48)
MCH RBC QN AUTO: 27.5 PG (ref 27–31)
MCHC RBC AUTO-ENTMCNC: 30.6 G/DL (ref 32–36)
MCV RBC AUTO: 90 FL (ref 82–98)
MICROSCOPIC COMMENT: NORMAL
MONOCYTES # BLD AUTO: 1.4 K/UL (ref 0.3–1)
MONOCYTES NFR BLD: 12.7 % (ref 4–15)
NEUTROPHILS # BLD AUTO: 7.8 K/UL (ref 1.8–7.7)
NEUTROPHILS NFR BLD: 72.9 % (ref 38–73)
NITRITE UR QL STRIP: NEGATIVE
NRBC BLD-RTO: 0 /100 WBC
PH UR STRIP: 6 [PH] (ref 5–8)
PLATELET # BLD AUTO: 117 K/UL (ref 150–350)
PMV BLD AUTO: 11.3 FL (ref 9.2–12.9)
POTASSIUM SERPL-SCNC: 3.8 MMOL/L (ref 3.5–5.1)
PROT SERPL-MCNC: 7.1 G/DL (ref 6–8.4)
PROT UR QL STRIP: NEGATIVE
RBC # BLD AUTO: 4.14 M/UL (ref 4–5.4)
RBC #/AREA URNS AUTO: 2 /HPF (ref 0–4)
SODIUM SERPL-SCNC: 139 MMOL/L (ref 136–145)
SP GR UR STRIP: 1.02 (ref 1–1.03)
SPECIMEN SOURCE: NORMAL
SQUAMOUS #/AREA URNS AUTO: 0 /HPF
URN SPEC COLLECT METH UR: NORMAL
WBC # BLD AUTO: 10.71 K/UL (ref 3.9–12.7)
WBC #/AREA URNS AUTO: 1 /HPF (ref 0–5)

## 2020-03-19 PROCEDURE — 87086 URINE CULTURE/COLONY COUNT: CPT | Mod: 59

## 2020-03-19 PROCEDURE — 87502 INFLUENZA DNA AMP PROBE: CPT

## 2020-03-19 PROCEDURE — 63600175 PHARM REV CODE 636 W HCPCS: Performed by: STUDENT IN AN ORGANIZED HEALTH CARE EDUCATION/TRAINING PROGRAM

## 2020-03-19 PROCEDURE — 25000003 PHARM REV CODE 250: Performed by: STUDENT IN AN ORGANIZED HEALTH CARE EDUCATION/TRAINING PROGRAM

## 2020-03-19 PROCEDURE — 99285 EMERGENCY DEPT VISIT HI MDM: CPT

## 2020-03-19 PROCEDURE — 85025 COMPLETE CBC W/AUTO DIFF WBC: CPT

## 2020-03-19 PROCEDURE — 99285 EMERGENCY DEPT VISIT HI MDM: CPT | Mod: ,,, | Performed by: EMERGENCY MEDICINE

## 2020-03-19 PROCEDURE — 83605 ASSAY OF LACTIC ACID: CPT

## 2020-03-19 PROCEDURE — 99999 PR PBB SHADOW E&M-EST. PATIENT-LVL III: ICD-10-PCS | Mod: PBBFAC,,, | Performed by: INTERNAL MEDICINE

## 2020-03-19 PROCEDURE — 80053 COMPREHEN METABOLIC PANEL: CPT

## 2020-03-19 PROCEDURE — 87086 URINE CULTURE/COLONY COUNT: CPT

## 2020-03-19 PROCEDURE — 81001 URINALYSIS AUTO W/SCOPE: CPT

## 2020-03-19 PROCEDURE — 87040 BLOOD CULTURE FOR BACTERIA: CPT | Mod: 59

## 2020-03-19 PROCEDURE — 99223 1ST HOSP IP/OBS HIGH 75: CPT | Mod: ,,, | Performed by: INTERNAL MEDICINE

## 2020-03-19 PROCEDURE — 87070 CULTURE OTHR SPECIMN AEROBIC: CPT

## 2020-03-19 PROCEDURE — 99285 PR EMERGENCY DEPT VISIT,LEVEL V: ICD-10-PCS | Mod: ,,, | Performed by: EMERGENCY MEDICINE

## 2020-03-19 PROCEDURE — 20600001 HC STEP DOWN PRIVATE ROOM

## 2020-03-19 PROCEDURE — 87651 STREP A DNA AMP PROBE: CPT

## 2020-03-19 PROCEDURE — 99999 PR PBB SHADOW E&M-EST. PATIENT-LVL III: CPT | Mod: PBBFAC,,, | Performed by: INTERNAL MEDICINE

## 2020-03-19 PROCEDURE — 99223 PR INITIAL HOSPITAL CARE,LEVL III: ICD-10-PCS | Mod: ,,, | Performed by: INTERNAL MEDICINE

## 2020-03-19 RX ORDER — SODIUM CHLORIDE 0.9 % (FLUSH) 0.9 %
10 SYRINGE (ML) INJECTION
Status: DISCONTINUED | OUTPATIENT
Start: 2020-03-19 | End: 2020-03-21 | Stop reason: HOSPADM

## 2020-03-19 RX ORDER — FENTANYL 25 UG/1
1 PATCH TRANSDERMAL
Status: DISCONTINUED | OUTPATIENT
Start: 2020-03-19 | End: 2020-03-20

## 2020-03-19 RX ORDER — POLYETHYLENE GLYCOL 3350 17 G/17G
17 POWDER, FOR SOLUTION ORAL DAILY
Status: DISCONTINUED | OUTPATIENT
Start: 2020-03-19 | End: 2020-03-21 | Stop reason: HOSPADM

## 2020-03-19 RX ORDER — ENOXAPARIN SODIUM 100 MG/ML
40 INJECTION SUBCUTANEOUS EVERY 24 HOURS
Status: DISCONTINUED | OUTPATIENT
Start: 2020-03-19 | End: 2020-03-19

## 2020-03-19 RX ORDER — HYDROCODONE BITARTRATE AND ACETAMINOPHEN 5; 325 MG/1; MG/1
1 TABLET ORAL EVERY 6 HOURS PRN
COMMUNITY
End: 2020-06-09 | Stop reason: ALTCHOICE

## 2020-03-19 RX ADMIN — PIPERACILLIN SODIUM AND TAZOBACTAM SODIUM 4.5 G: 4; .5 INJECTION, POWDER, LYOPHILIZED, FOR SOLUTION INTRAVENOUS at 02:03

## 2020-03-19 RX ADMIN — APIXABAN 5 MG: 2.5 TABLET, FILM COATED ORAL at 09:03

## 2020-03-19 RX ADMIN — PIPERACILLIN SODIUM AND TAZOBACTAM SODIUM 4.5 G: 4; .5 INJECTION, POWDER, LYOPHILIZED, FOR SOLUTION INTRAVENOUS at 09:03

## 2020-03-19 NOTE — H&P
[facility]  Hematology/Oncology  H&P    Patient Name: Quyen Moody  MRN: [id number]  Admission Date: 3/19/2020  Code Status: Full Code   Attending Provider: Gabriel Hale MD  Primary Care Physician: Eliot Barrett MD  Principal Problem:Gram-negative bacteremia    Subjective:     HPI: Quyen Moody is a 58 y/o F who presented to the ED for evaluation of GNR bacteremia. She has a PMH significant for stage IV unresectable pancreatic adenocarcinoma currently enrolled on the PANOVA-3 trial (TTF +/- systemic chemotherapy). She was scheduled to initiate cycle 4 of FOLFIRINOX today, 03/19/2020.  Ms. Wright was evaluated in the ED yesterday, 03/18 for a fever of ~101 at home. She presented to the ED where she was afebrile, with normal WBCs. Labs were only significant for elevated LFTs. Blood cultures were drawn and she wsa discharged with out pt follow up on 03/19. Today, 1 out of 2 blood cultures are +ve for GNR so she was told to present to the ED.  She reports that she is without fevers, denies HA, congestion, ear pain, throat pain, chest pain, SOB, productive cough, dysuria, abdominal pain.   CT CAP on 03/16 revealed stable pancreatic head mass but interval development of b/l lung nodules and soft tissue densities in the peritoneum, concerning for peritoneal metastatic disease.    Regarding her Oncologic History:  She has had intermittent upper abdominal pain since early June.  She presented to her PCP who originally ordered an US and CT.  US was negative and CT showed some haziness at the pancreatic head.  She saw GI who performed EUS.  On EUS, a 3x4cm pancreatic head mass was seen with possible invasion into the SMA and portal vein.  FNA path s/w pancreatic adenocarcinoma.  CA 19-9 level at outside hospital was >1000 per the patient.  She endorses nausea and inability to tolerate much PO.  She has lost about 10lbs over the last few weeks and is noticing her skin turning yellow.  Denies pruritis.  She  is passing gas but has not had a BM in a few days, she attributes this to narcotic use.  She recently started taking stool softeners.  No previous cardiac or stroke history.  Surgical history significant for open appendectomy when she was in her 20s  - 8/7/2019 - 1/9/2020: Eldon+Abraxane and TTF on PANOVA-3, LFTs elevated, but improved.  - Based on CT 12/2019 scans, Dr. Hallman felt pt was potentially resectable after chemoXRT given good response to current therapy.  However, subsequent Chest CT   showed new and growing micronodules in the in lungs c/w met disease in light of rising tumor markers.    - 1/23/2020: Changed to FOLFIRINOX and con't TTF on PANOVA as this seems to be achieving good local control of the primary tumor.         Oncology Treatment Plan:   OP PANC FOLFIRINOX Q2W    Medications:  Continuous Infusions:  Scheduled Meds:   apixaban  5 mg Oral BID    enoxaparin  40 mg Subcutaneous Daily    fentaNYL  1 patch Transdermal Q72H    piperacillin-tazobactam (ZOSYN) IVPB  4.5 g Intravenous Q8H    polyethylene glycol  17 g Oral Daily     PRN Meds:sodium chloride 0.9%     Review of patient's allergies indicates:   Allergen Reactions    Sulfa (sulfonamide antibiotics) Swelling and Hives     tongue          Past Medical History:   Diagnosis Date    Allergic rhinitis 3/3/2014    CKD (chronic kidney disease) stage 3, GFR 30-59 ml/min 12/26/2015    Hyperlipidemia 3/3/2014    Lung metastasis     Pancreas cancer     Skin rash 8/10/2019     Past Surgical History:   Procedure Laterality Date    ERCP N/A 7/16/2019    Procedure: ERCP (ENDOSCOPIC RETROGRADE CHOLANGIOPANCREATOGRAPHY);  Surgeon: Chema Harris MD;  Location: [facility] ([address] [place] [address]);  Service: Endoscopy;  Laterality: N/A;    ERCP N/A 12/24/2019    Procedure: ERCP (ENDOSCOPIC RETROGRADE CHOLANGIOPANCREATOGRAPHY);  Surgeon: Chema Harris MD;  Location: [facility] ([address] [place] [address]);  Service: Endoscopy;  Laterality: N/A;    Exporatory lap      INSERTION OF  TUNNELED CENTRAL VENOUS CATHETER (CVC) WITH SUBCUTANEOUS PORT Right 8/12/2019    Procedure: [id number];  Surgeon: Cesar Hallman MD;  Location: [facility] OR [address] [place] [address];  Service: General;  Laterality: Right;  right IJ     Family History     Problem Relation (Age of Onset)    Diabetes Mother, Father, Brother        Tobacco Use    Smoking status: Former Smoker     Start date: 12/11/1999    Smokeless tobacco: Never Used   Substance and Sexual Activity    Alcohol use: Not Currently     Alcohol/week: 1.0 standard drinks     Types: 1 Glasses of wine per week     Frequency: 4 or more times a week     Drinks per session: 1 or 2     Binge frequency: Never     Comment: last drink a month ago     Drug use: No    Sexual activity: Yes       Review of Systems   Constitutional: Negative for activity change, appetite change, fever and unexpected weight change.   HENT: Negative for congestion.    Eyes: Negative for discharge and itching.   Respiratory: Negative for apnea, chest tightness and shortness of breath.    Cardiovascular: Negative for chest pain.   Gastrointestinal: Negative for abdominal distention and rectal pain.   Endocrine: Negative for cold intolerance and heat intolerance.   Genitourinary: Negative for dysuria.   Musculoskeletal: Negative for joint swelling.   Neurological: Negative for seizures.   Hematological: Negative for adenopathy.   Psychiatric/Behavioral: Negative for agitation.     Objective:     Vital Signs (Most Recent):  Temp: 98.6 °F (37 °C) (03/19/20 1211)  Pulse: 95 (03/19/20 1211)  Resp: 17 (03/19/20 1211)  BP: (!) 142/68 (03/19/20 1211)  SpO2: 100 % (03/19/20 1211) Vital Signs (24h Range):  Temp:  [98.2 °F (36.8 °C)-100.3 °F (37.9 °C)] 98.6 °F (37 °C)  Pulse:  [] 95  Resp:  [17-18] 17  SpO2:  [97 %-100 %] 100 %  BP: (122-148)/(61-81) 142/68     Weight: 56.7 kg (125 lb)  Body mass index is 22.5 kg/m².  Body surface area is 1.58 meters squared.    No intake or output data in the 24  hours ending 03/19/20 1405    Physical Exam   Constitutional: She is oriented to person, place, and time. She appears well-developed and well-nourished.   HENT:   Head: Normocephalic and atraumatic.   Eyes: No scleral icterus.   Neck: Normal range of motion.   Cardiovascular: Normal rate, regular rhythm and normal heart sounds.   Pulmonary/Chest: Effort normal and breath sounds normal.   Abdominal: Soft. Bowel sounds are normal.   Musculoskeletal: Normal range of motion.   Neurological: She is alert and oriented to person, place, and time.   Skin: Skin is warm.   Psychiatric: She has a normal mood and affect.       Significant Labs:   CBC:   Recent Labs   Lab 03/18/20  1533 03/19/20  1314   WBC 12.17 10.71   HGB 11.4* 11.4*   HCT 36.1* 37.2   PLT 93* 117*    and CMP:   Recent Labs   Lab 03/18/20  1533      K 4.0      CO2 22*   *   BUN 9   CREATININE 0.7   CALCIUM 8.6*   PROT 6.6   ALBUMIN 2.8*   BILITOT 1.2*   ALKPHOS 575*   *   *   ANIONGAP 8   EGFRNONAA >60.0       Diagnostic Results:  I have reviewed all pertinent imaging results/findings within the past 24 hours.  I have reviewed and interpreted all pertinent imaging results/findings within the past 24 hours.    Assessment/Plan:     * Gram-negative bacteremia  1 out of 2 bottles + for GNR  Repeat blood cultures today.  CT CAP 03/16 without source of infection  Checking UA w reflex to urine culture  Checking sputum cultures  Start Zosyn 4.5mg q8h.  End date to be determined by results of repeat blood cultures.    Malignant neoplasm of pancreas  Stage IV  Enrolled in PANOVA 3, FOLFIRINOX+TTF  Holding chemo while inpatient  Out patient follow up with Dr. Barrett & Lan after this hospitalization         Wendy Acevedo MD  Hematology/Oncology  [facility]      ATTENDING NOTE, ONCOLOGY INPATIENT TEAM    As above;   Patient seen and examined in the ED,, chart reviewed.  Briefly, she is a 59-year-old female  currently being treated with chemotherapy for metastatic pancreatic cancer.  She had presented to the emergency room yesterday for evaluation with a fever 101.5.  She was not neutropenic, and she was eventually sent home.      Blood cultures the were drawn in the ED yesterday were reported earlier today growing Gram-negative rods.  The patient was called and was advised to present to emergency room to be admitted.    On examination, she  appears comfortable, in NAD.  She is afebrile.  Lungs are clear to auscultation.  Abdomen is soft, nontender.  Labs reviewed.  Renal and and hepatic function are normal.  Her WBCs are 10,700 per cubic mm, hemoglobin 11.4 grams/deciliter, hematocrit 37.2% and platelets 988696 per cubic mm.    PLAN  Admit to the Oncology Service for further evaluation.  Follow-up speciation and sensitivity pattern on the positive blood culture from 3/18/2020.  Repeat CBC in a.m.  Start Zosyn.  Repeat the blood cultures after 48 hours on Zosyn.  Continue apixaban, fentanyl, and oxycodone p.r.n.  DVT prophylaxis.  We will follow.    Miguel A Miles MD

## 2020-03-19 NOTE — ED TRIAGE NOTES
Patient states he was called to come to ED for positive blood culture, last chemo 2 weeks PTA. Positive fever to 101 yesterday,denies cough. Seen in clinic today.

## 2020-03-19 NOTE — ASSESSMENT & PLAN NOTE
Stage IV  Enrolled in PANOVA 3, FOLFIRINOX+TTF  Holding chemo while inpatient  Out patient follow up with Dr. Barrett & Lan after this hospitalization

## 2020-03-19 NOTE — ASSESSMENT & PLAN NOTE
1 out of 2 bottles + for GNR  Repeat blood cultures today.  CT CAP 03/16 without source of infection  Checking UA w reflex to urine culture  Checking sputum cultures  Start Zosyn 4.5mg q8h.  End date to be determined by results of repeat blood cultures.

## 2020-03-19 NOTE — CONSULTS
Please see H&P by medical oncology team dated 03/19/2020    Wendy Acevedo MD  Clinical Fellow  Hematology and Medical Oncology.          ATTENDING NOTE, ONCOLOGY INPATIENT TEAM    Please refer to my note of same day for our assessment and plan    Miguel A Miles MD

## 2020-03-19 NOTE — PROGRESS NOTES
Subjective:       Patient ID: Quyen Moody    Chief Complaint:  Pancreatic cancer    HPI     Quyen Moody is a 59 y.o. female, patient  to clinic for follow up and treatment on PANOVA-3 trial. She is here to begin cycle #3 of FOLFIRINOX.  Patient with intermittent pain, but controlled with oral medications. She is using Fentanyl with PRN oxycodone.  She is having difficulties with sleep- has night sweats. Feeling well, notes increased fatigue.     ECOG 0.    Oncologic History:  She has had intermittent upper abdominal pain since early June.  She presented to her PCP who originally ordered an US and CT.  US was negative and CT showed some haziness at the pancreatic head.  She saw GI who performed EUS.  On EUS, a 3x4cm pancreatic head mass was seen with possible invasion into the SMA and portal vein.  FNA path s/w pancreatic adenocarcinoma.  CA 19-9 level at outside hospital was >1000 per the patient.  She endorses nausea and inability to tolerate much PO.  She has lost about 10lbs over the last few weeks and is noticing her skin turning yellow.  Denies pruritis.  She is passing gas but has not had a BM in a few days, she attributes this to narcotic use.  She recently started taking stool softeners.  No previous cardiac or stroke history.  Surgical history significant for open appendectomy when she was in her 20s  - 8/7/2019 - 1/9/2020: [place]+Abraxane and TTF on PANOVA-3, LFTs elevated, but improved.  - Based on CT 12/2019 scans, Dr. Hallman felt pt was potentially resectable after chemoXRT given good response to current therapy.  However, subsequent Chest CT   showed new and growing micronodules in the in lungs c/w met disease in light of rising tumor markers.    - 1/23/2020: Changed to FOLFIRINOX and con't TTF on PANOVA as this seems to be achieving good local control of the primary tumor.     Review of Systems   Constitutional: Positive for fatigue. Negative for activity change, appetite change, chills, fever and  unexpected weight change (improving).   HENT: Negative for congestion, hearing loss, mouth sores, sore throat, tinnitus and voice change.    Eyes: Negative for pain and visual disturbance.   Respiratory: Positive for shortness of breath. Negative for cough and wheezing.    Cardiovascular: Positive for leg swelling (left). Negative for chest pain and palpitations.   Gastrointestinal: Positive for abdominal pain. Negative for constipation, diarrhea, nausea and vomiting.   Endocrine: Negative for cold intolerance and heat intolerance.   Genitourinary: Negative for difficulty urinating, dyspareunia, dysuria, frequency, menstrual problem, urgency, vaginal bleeding, vaginal discharge and vaginal pain.   Musculoskeletal: Negative for arthralgias, back pain and myalgias.   Skin: Positive for rash. Negative for color change and wound.   Allergic/Immunologic: Negative for environmental allergies and food allergies.   Neurological: Negative for weakness, numbness and headaches.   Hematological: Negative for adenopathy. Does not bruise/bleed easily.   Psychiatric/Behavioral: Positive for sleep disturbance. Negative for agitation, confusion and hallucinations. The patient is nervous/anxious.    All other systems reviewed and are negative.        Allergies:  Review of patient's allergies indicates:   Allergen Reactions    Sulfa (sulfonamide antibiotics) Swelling and Hives     tongue         Medications:  Current Outpatient Medications   Medication Sig Dispense Refill    apixaban (ELIQUIS) 5 mg Tab Take 1 tablet (5 mg total) by mouth 2 (two) times daily. 60 tablet 6    dexAMETHasone (DECADRON) 4 MG Tab Take 1 tablet (4 mg total) by mouth every 12 (twelve) hours. For 2 days following chemotherapy. 20 tablet 0    fentaNYL (DURAGESIC) 25 mcg/hr Place 1 patch onto the skin every 72 hours. 10 patch 0    lidocaine-prilocaine (EMLA) cream Apply to port 45 minutes prior to access. 30 g 0    multivitamin (THERAGRAN) per tablet Take 1  tablet by mouth once daily.      hydrocortisone 2.5 % cream Apply topically 2 (two) times daily. (Patient not taking: Reported on 1/9/2020) 453.6 g 1    levoFLOXacin (LEVAQUIN) 750 MG tablet Take 1 tablet (750 mg total) by mouth once daily. (Patient not taking: Reported on 3/19/2020) 5 tablet 0    LORazepam (ATIVAN) 0.5 MG tablet Take 1 tablet (0.5 mg total) by mouth every 6 (six) hours as needed for Anxiety (nausea). (Patient not taking: Reported on 3/19/2020) 60 tablet 0    OLANZapine (ZYPREXA) 5 MG tablet Take 1 tablet (5 mg total) by mouth nightly. To prevent nausea (Patient not taking: Reported on 3/19/2020) 30 tablet 2    ondansetron (ZOFRAN) 8 MG tablet Take 1 tablet (8 mg total) by mouth every 8 (eight) hours as needed for Nausea. (Patient not taking: Reported on 3/19/2020) 120 tablet 2    oxyCODONE (ROXICODONE) 15 MG Tab Take 1 tablet (15 mg total) by mouth every 6 (six) hours as needed for Pain. (Patient not taking: Reported on 3/19/2020) 90 tablet 0     No current facility-administered medications for this visit.        PMH:  Past Medical History:   Diagnosis Date    Allergic rhinitis 3/3/2014    CKD (chronic kidney disease) stage 3, GFR 30-59 ml/min 12/26/2015    Hyperlipidemia 3/3/2014    Lung metastasis     Pancreas cancer     Skin rash 8/10/2019       PSH:  Past Surgical History:   Procedure Laterality Date    ERCP N/A 7/16/2019    Procedure: ERCP (ENDOSCOPIC RETROGRADE CHOLANGIOPANCREATOGRAPHY);  Surgeon: Chema Harris MD;  Location: [address]);  Service: Endoscopy;  Laterality: N/A;    ERCP N/A 12/24/2019    Procedure: ERCP (ENDOSCOPIC RETROGRADE CHOLANGIOPANCREATOGRAPHY);  Surgeon: Chema Harris MD;  Location: [address]);  Service: Endoscopy;  Laterality: N/A;    Exporatory lap      INSERTION OF TUNNELED CENTRAL VENOUS CATHETER (CVC) WITH SUBCUTANEOUS PORT Right 8/12/2019    Procedure: [id number];  Surgeon: Cesar Hallman MD;  Location: [address]  FL[facility];  Service: General;  Laterality: Right;  right IJ       FamHx:  Family History   Problem Relation Age of Onset    Diabetes Mother     Diabetes Father     Diabetes Brother     Heart disease Neg Hx        SocHx:  Social History     Socioeconomic History    Marital status:      Spouse name: Not on file    Number of children: 0    Years of education: Not on file    Highest education level: Not on file   Occupational History    Occupation:      Employer: [employer]   Social Needs    Financial resource strain: Not on file    Food insecurity:     Worry: Not on file     Inability: Not on file    Transportation needs:     Medical: Not on file     Non-medical: Not on file   Tobacco Use    Smoking status: Former Smoker     Start date: 1999    Smokeless tobacco: Never Used   Substance and Sexual Activity    Alcohol use: Yes     Alcohol/week: 1.0 standard drinks     Types: 1 Glasses of wine per week     Frequency: 4 or more times a week     Drinks per session: 1 or 2     Binge frequency: Never     Comment: last drink a month ago     Drug use: No    Sexual activity: Yes   Lifestyle    Physical activity:     Days per week: Not on file     Minutes per session: Not on file    Stress: Not on file   Relationships    Social connections:     Talks on phone: Not on file     Gets together: Not on file     Attends Gnosticism service: Not on file     Active member of club or organization: Not on file     Attends meetings of clubs or organizations: Not on file     Relationship status: Not on file   Other Topics Concern    Not on file   Social History Narrative    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      of Cancer in          Objective:       Vitals:    20 0848   BP: 134/61   Pulse: 82   Resp: 18   Temp: 98.2 °F (36.8 °C)       Physical Exam   Constitutional: She is oriented to person, place, and time. She appears well-developed and well-nourished. No distress.   HENT:    Head: Normocephalic and atraumatic.   Nose: Nose normal.   Mouth/Throat: Oropharynx is clear and moist. No oropharyngeal exudate.   Eyes: Pupils are equal, round, and reactive to light. Conjunctivae and EOM are normal. Right eye exhibits no discharge. Left eye exhibits no discharge. No scleral icterus.   Neck: Normal range of motion. Neck supple. No tracheal deviation present. No thyromegaly present.   Cardiovascular: Normal pulses.   No swelling to bilateral lower extremities.    Pulmonary/Chest: Effort normal. No respiratory distress.   Abdominal: Soft. Bowel sounds are normal. She exhibits no distension. There is no tenderness. There is no guarding.   Musculoskeletal: Normal range of motion. She exhibits edema (left lower extremity +1 from ankle to knee). She exhibits no tenderness or deformity.   No spinal or paraspinal tenderness to palpation.       Lymphadenopathy:     She has no cervical adenopathy.   Neurological: She is alert and oriented to person, place, and time. No cranial nerve deficit. Coordination normal.   Skin: Skin is warm, dry and intact. Rash noted. She is not diaphoretic. No erythema. No pallor.   Psychiatric: She has a normal mood and affect. Her behavior is normal. Judgment and thought content normal.   Nursing note and vitals reviewed.        LABS:  WBC   Date Value Ref Range Status   03/18/2020 12.17 3.90 - 12.70 K/uL Final     Hemoglobin   Date Value Ref Range Status   03/18/2020 11.4 (L) 12.0 - 16.0 g/dL Final     Hematocrit   Date Value Ref Range Status   03/18/2020 36.1 (L) 37.0 - 48.5 % Final     Platelets   Date Value Ref Range Status   03/18/2020 93 (L) 150 - 350 K/uL Final       Chemistry        Component Value Date/Time     03/18/2020 1533    K 4.0 03/18/2020 1533     03/18/2020 1533    CO2 22 (L) 03/18/2020 1533    BUN 9 03/18/2020 1533    CREATININE 0.7 03/18/2020 1533     (H) 03/18/2020 1533        Component Value Date/Time    CALCIUM 8.6 (L) 03/18/2020 1533     ALKPHOS 575 (H) 03/18/2020 1533     (H) 03/18/2020 1533     (H) 03/18/2020 1533    BILITOT 1.2 (H) 03/18/2020 1533    ESTGFRAFRICA >60.0 03/18/2020 1533    EGFRNONAA >60.0 03/18/2020 1533            Assessment:       1. Cancer of head of pancreas    2. Malignant neoplasm metastatic to lung, unspecified laterality    3. Clinical trial participant    4. Chemotherapy induced neutropenia    5. Fever, unspecified fever cause    6. VTE (venous thromboembolism)    7. Malignant obstructive jaundice    8. CKD (chronic kidney disease) stage 3, GFR 30-59 ml/min            Plan:         1,2, 3- Cancer at the head of the pancreas:    Patient now has progression in distant lesions, but continued control of pancreatic mass.  She was admitted with problem 4,5.  With elevated LFTs, not a candidate for Genzada at this time.  Plan is to repeat labs in 1 week and re-evaluate for Genzada clinical trial if a slot is available vs. Adding 5-FU/leucovorin bolus back and continuing TTF.    4,5: One time fever, no neutropenia so discharged.  No respiratory symptoms concerning for COVID-19, but told to stay isolated and to call clinic if any respiratory symptoms develop such as SOB or cough.  We will order testing at that time.  She was instructed on where to go for drive through testing if this occurs.    6- On Eliquis.    7- Repeat LFTs in 1 week    Patient is in agreement with the proposed treatment plan. All questions were answered to the patient's satisfaction. Pt knows to call clinic if anything is needed before the next clinic visit.    Ashish Montenegro MD  Medical Oncology  [facility] and [facility]

## 2020-03-19 NOTE — ED NOTES
Spoke with CN, ok to go to [facility] with mask, sent ot [facility] with cup for specimen, update regarding POC.

## 2020-03-19 NOTE — HPI
Quyen Moody is a 58 y/o F who presented to the ED for evaluation of GNR bacteremia. She has a PMH significant for stage IV unresectable pancreatic adenocarcinoma currently enrolled on the PANOVA-3 trial (TTF +/- systemic chemotherapy). She was scheduled to initiate cycle 4 of FOLFIRINOX today, 03/19/2020.  Ms. Wright was evaluated in the ED yesterday, 03/18 for a fever of ~101 at home. She presented to the ED where she was afebrile, with normal WBCs. Labs were only significant for elevated LFTs. Blood cultures were drawn and she wsa discharged with out pt follow up on 03/19. Today, 1 out of 2 blood cultures are +ve for GNR so she was told to present to the ED.  She reports that she is without fevers, denies HA, congestion, ear pain, throat pain, chest pain, SOB, productive cough, dysuria, abdominal pain.   CT CAP on 03/16 revealed stable pancreatic head mass but interval development of b/l lung nodules and soft tissue densities in the peritoneum, concerning for peritoneal metastatic disease.    Regarding her Oncologic History:  She has had intermittent upper abdominal pain since early June.  She presented to her PCP who originally ordered an US and CT.  US was negative and CT showed some haziness at the pancreatic head.  She saw GI who performed EUS.  On EUS, a 3x4cm pancreatic head mass was seen with possible invasion into the SMA and portal vein.  FNA path s/w pancreatic adenocarcinoma.  CA 19-9 level at outside hospital was >1000 per the patient.  She endorses nausea and inability to tolerate much PO.  She has lost about 10lbs over the last few weeks and is noticing her skin turning yellow.  Denies pruritis.  She is passing gas but has not had a BM in a few days, she attributes this to narcotic use.  She recently started taking stool softeners.  No previous cardiac or stroke history.  Surgical history significant for open appendectomy when she was in her 20s  - 8/7/2019 - 1/9/2020: [place]+Abraxane and TTF on  PANOVA-3, LFTs elevated, but improved.  - Based on CT 12/2019 scans, Dr. Hallman felt pt was potentially resectable after chemoXRT given good response to current therapy.  However, subsequent Chest CT   showed new and growing micronodules in the in lungs c/w met disease in light of rising tumor markers.    - 1/23/2020: Changed to FOLFIRINOX and con't TTF on PANOVA as this seems to be achieving good local control of the primary tumor.

## 2020-03-19 NOTE — ED NOTES
Patient identifiers verified and correct for Ms Moody  C/C: Abnormal labs, blood cultures SEE NN  APPEARANCE: awake and alert in NAD.  SKIN: warm, dry and intact. No breakdown or bruising.  MUSCULOSKELETAL: Patient moving all extremities spontaneously, no obvious swelling or deformities noted. Ambulates independently.  RESPIRATORY: Denies shortness of breath.Respirations unlabored. Denies cough, denies fever since yesterday  CARDIAC: Denies CP, 2+ distal pulses; no peripheral edema  ABDOMEN: S/ND/NT, Denies nausea  : voids spontaneously, denies difficulty  Neurologic: AAO x 4; follows commands equal strength in all extremities; denies numbness/tingling. Denies dizziness Denies weakness

## 2020-03-19 NOTE — ED PROVIDER NOTES
"Encounter Date: 3/19/2020    SCRIBE #1 NOTE: I, Re Hay, am scribing for, and in the presence of,  Gabriel Hale MD. I have scribed the following portions of the note - Other sections scribed: HPI ROS PE.       History     Chief Complaint   Patient presents with    Positive blood cultures     Patient reports being evaluated yesterday for fever post chem, had blood work completed, notified today her cultures came back positive.     Time patient was seen by the provider: 12:36 PM      The patient is a 59 y.o. female with co-morbidities including: pancreatic cancer with metastatic disease to lung, CKD III, and hyperlipidemia, who presents to the ED for positive blood cultures that grew gram negative rods. Patient was notified and referred to come to the ED to be reevaluated. She reports feeling at baseline. Denies fever or headache today. Denies neck pain, sore throat, rhinorrhea, cough, shortness of breath, chills, rigors, rash, dysuria, difficulty urinating, vomiting, diarrhea, or melena. Patient reports her temperature this morning was 97.1. She reports she was suppose to have chemotherapy today. However, her lab work showed that her liver enzymes "were not good enough" and was sent home.     Aware of note from Dr. Montenegro. However, on my evaluation, she denies fatigue, shortness of breath, abdominal pain, rash, feeling anxious or sleep disturbances.     The history is provided by the patient.     Review of patient's allergies indicates:   Allergen Reactions    Sulfa (sulfonamide antibiotics) Swelling and Hives     tongue       Past Medical History:   Diagnosis Date    Allergic rhinitis 3/3/2014    CKD (chronic kidney disease) stage 3, GFR 30-59 ml/min 12/26/2015    Hyperlipidemia 3/3/2014    Lung metastasis     Pancreas cancer     Skin rash 8/10/2019     Past Surgical History:   Procedure Laterality Date    ERCP N/A 7/16/2019    Procedure: ERCP (ENDOSCOPIC RETROGRADE CHOLANGIOPANCREATOGRAPHY);  " Surgeon: Chema Harris MD;  Location: [facility] ENDO (2ND FLR);  Service: Endoscopy;  Laterality: N/A;    ERCP N/A 12/24/2019    Procedure: ERCP (ENDOSCOPIC RETROGRADE CHOLANGIOPANCREATOGRAPHY);  Surgeon: Chema Harris MD;  Location: [facility] ENDO (2ND FLR);  Service: Endoscopy;  Laterality: N/A;    Exporatory lap      INSERTION OF TUNNELED CENTRAL VENOUS CATHETER (CVC) WITH SUBCUTANEOUS PORT Right 8/12/2019    Procedure: [id number];  Surgeon: Cesar Hallman MD;  [facility];  Service: General;  Laterality: Right;  right IJ     Family History   Problem Relation Age of Onset    Diabetes Mother     Diabetes Father     Diabetes Brother     Heart disease Neg Hx      Social History     Tobacco Use    Smoking status: Former Smoker     Start date: 12/11/1999    Smokeless tobacco: Never Used   Substance Use Topics    Alcohol use: Not Currently     Alcohol/week: 1.0 standard drinks     Types: 1 Glasses of wine per week     Frequency: 4 or more times a week     Drinks per session: 1 or 2     Binge frequency: Never     Comment: last drink a month ago     Drug use: No     Review of Systems  CONST: No chills, weight change, or fatigue. Fever yesterday which resolved without intervention - without motrin or any antipyretics.   HEENT: No headache, blurry vision/change in vision, sore throat, ear pain, eye pain, otorrhea, rhinorrhea, tooth pain, swelling, or voice changes.  NECK: No pain, masses, trauma, or redness.  HEART: No pain, palpitations, or diaphoresis.  LUNG: No SOB, cough, orthopnea, DELGADO or other complaints.  ABDOMEN: No pain, nausea, vomiting, diarrhea, constipation, or flank pain.  : No discharge, dysuria, lesions, rashes, masses, sores.  EXTREMITIES: FROM with No swelling, redness, injuries/trauma, lesions, sores, weakness, numbness, or tingling.  NEURO: No dizziness, weakness, fatigue, tremors, headache, change in vision or disturbances of balance or coordination.  SKIN: No lesions,  rashes, trauma or other complaints.    Physical Exam     Initial Vitals [03/19/20 1211]   BP Pulse Resp Temp SpO2   (!) 142/68 95 17 98.6 °F (37 °C) 100 %      MAP       --         Physical Exam  PHYSICAL EXAM:  GENERAL: Calm; Cooperative; Well-appearing and Non-Toxic; Well-Nourished; NAD.  HEENT: AT/NC; speaking full sentences with no slurring of speech or drooling/inability to tolerate oral secretions. + Slightly pink non exudative oropharynx with midline uvula and no exudates. No peritonsillar asymmetry. No muffled or coarse voice.   NECK: Supple, FROM with no meningismus, no accessory muscle use.   HEART: Regular rate and rhythm, no M/G/T.  LUNGS: No Tachypnea, No Retractions, and CTA B/L with no W/R/R.  ABDOMEN: +BS, Soft, ND, NTTP.   BACK: Atraumatic, No CVA tenderness B/L.   EXTREMITIES: FROM. Strength 5/5. Symmetrical Sensorium and with no deficits. Soft Comparments.  SKIN: Warm, Dry, No Skin Tears or Rashes. + Non pitting asymmetric edema with about a 5 cm by 1.5 cm oval shape bruise.   VASCULAR: 2+ pulses Prox/Dist & Symmetrical with No delay.  NEUROLOGIC: AAOx3; answering questions appropriately with no focal deficits.     ED Course   Procedures  Labs Reviewed   CBC W/ AUTO DIFFERENTIAL - Abnormal; Notable for the following components:       Result Value    Hemoglobin 11.4 (*)     Mean Corpuscular Hemoglobin Conc 30.6 (*)     RDW 17.7 (*)     Platelets 117 (*)     Immature Granulocytes 1.3 (*)     Gran # (ANC) 7.8 (*)     Immature Grans (Abs) 0.14 (*)     Mono # 1.4 (*)     Lymph% 10.9 (*)     All other components within normal limits   COMPREHENSIVE METABOLIC PANEL - Abnormal; Notable for the following components:    CO2 22 (*)     Albumin 3.0 (*)     Alkaline Phosphatase 521 (*)      (*)      (*)     All other components within normal limits   GROUP A STREP, MOLECULAR   INFLUENZA A & B BY MOLECULAR   CULTURE, URINE   CULTURE, RESPIRATORY  - THROAT   CULTURE, URINE   LACTIC ACID, PLASMA    URINALYSIS    Narrative:     2-YELLOW   URINALYSIS MICROSCOPIC    Narrative:     2-YELLOW          Imaging Results    None          Medical Decision Making:   History:   Old Medical Records: I decided to obtain old medical records.  Initial Assessment:   Well appearing, afebrile, hemodynamically stable female with no complaints. Presents here for abnormal blood cultures, which I confirmed on her chart stating gram negative rods in first bottle, no growth on second bottle, putting into question contamination given her completely asymptomatic state and episode of her single self resolve period of hyperthermia (Tmax of 101 yesterday). Will discuss with oncology team. No signs of infection to HENT, respiratory, abdomen benign, and no urinary complaints.   ____________________  Johann Hale MD, [facility]  Emergency Medicine Staff  12:52 PM 3/19/2020    F/U:  I spoke with hematology fellow and discussed single bottle of gram negative olive in a completely asymptomatic patient. He requested repeat blood culture, urine culture, and any other culture and patient can go home.   ____________________  Johann Hale MD, [facility]  Emergency Medicine Staff  1:01 PM 3/19/2020    F/U:  Spoke with Ashish Montenegro, who requested I hold off on discharging patient. He would like to admit.  Told on the patient has no headache, neck pain/stiffness, sore throat, her right chest region over the port is with no tenderness, erythema or any other subjective discomfort, lungs clear to auscultation with no subjective cough, abdomen benign with no subjective urinary complaints.  ____________________  Johann Hale MD, [facility]  Emergency Medicine Staff  1:13 PM 3/19/2020    Clinical Tests:   Lab Tests: Ordered and Reviewed  Other:   I have discussed this case with another health care provider.       <> Summary of the Discussion: Hematology/oncology             Scribe Attestation:   Scribe #1: I performed the above scribed service and the documentation  accurately describes the services I performed. I attest to the accuracy of the note.                          Clinical Impression:       ICD-10-CM ICD-9-CM   1. Gram-negative bacteremia R78.81 790.7     041.85   2. Blood culture positive for microorganism R79.89 790.99         Disposition:   Disposition: Admitted  Condition: Stable     ED Disposition Condition    Admit                           Gabriel Hale MD  03/20/20 0745

## 2020-03-19 NOTE — SUBJECTIVE & OBJECTIVE
Oncology Treatment Plan:   OP PANC FOLFIRINOX Q2W    Medications:  Continuous Infusions:  Scheduled Meds:   apixaban  5 mg Oral BID    enoxaparin  40 mg Subcutaneous Daily    fentaNYL  1 patch Transdermal Q72H    piperacillin-tazobactam (ZOSYN) IVPB  4.5 g Intravenous Q8H    polyethylene glycol  17 g Oral Daily     PRN Meds:sodium chloride 0.9%     Review of patient's allergies indicates:   Allergen Reactions    Sulfa (sulfonamide antibiotics) Swelling and Hives     tongue          Past Medical History:   Diagnosis Date    Allergic rhinitis 3/3/2014    CKD (chronic kidney disease) stage 3, GFR 30-59 ml/min 12/26/2015    Hyperlipidemia 3/3/2014    Lung metastasis     Pancreas cancer     Skin rash 8/10/2019     Past Surgical History:   Procedure Laterality Date    ERCP N/A 7/16/2019    Procedure: ERCP (ENDOSCOPIC RETROGRADE CHOLANGIOPANCREATOGRAPHY);  Surgeon: Chema Harris MD;  Location: [address]);  Service: Endoscopy;  Laterality: N/A;    ERCP N/A 12/24/2019    Procedure: ERCP (ENDOSCOPIC RETROGRADE CHOLANGIOPANCREATOGRAPHY);  Surgeon: Chema Harris MD;  Location: [address]);  Service: Endoscopy;  Laterality: N/A;    Exporatory lap      INSERTION OF TUNNELED CENTRAL VENOUS CATHETER (CVC) WITH SUBCUTANEOUS PORT Right 8/12/2019    Procedure: [id number];  Surgeon: Cesar Hallman MD;  Location: [address];  Service: General;  Laterality: Right;  right IJ     Family History     Problem Relation (Age of Onset)    Diabetes Mother, Father, Brother        Tobacco Use    Smoking status: Former Smoker     Start date: 12/11/1999    Smokeless tobacco: Never Used   Substance and Sexual Activity    Alcohol use: Not Currently     Alcohol/week: 1.0 standard drinks     Types: 1 Glasses of wine per week     Frequency: 4 or more times a week     Drinks per session: 1 or 2     Binge frequency: Never     Comment: last drink a month ago     Drug use: No    Sexual activity: Yes        Review of Systems   Constitutional: Negative for activity change, appetite change, fever and unexpected weight change.   HENT: Negative for congestion.    Eyes: Negative for discharge and itching.   Respiratory: Negative for apnea, chest tightness and shortness of breath.    Cardiovascular: Negative for chest pain.   Gastrointestinal: Negative for abdominal distention and rectal pain.   Endocrine: Negative for cold intolerance and heat intolerance.   Genitourinary: Negative for dysuria.   Musculoskeletal: Negative for joint swelling.   Neurological: Negative for seizures.   Hematological: Negative for adenopathy.   Psychiatric/Behavioral: Negative for agitation.     Objective:     Vital Signs (Most Recent):  Temp: 98.6 °F (37 °C) (03/19/20 1211)  Pulse: 95 (03/19/20 1211)  Resp: 17 (03/19/20 1211)  BP: (!) 142/68 (03/19/20 1211)  SpO2: 100 % (03/19/20 1211) Vital Signs (24h Range):  Temp:  [98.2 °F (36.8 °C)-100.3 °F (37.9 °C)] 98.6 °F (37 °C)  Pulse:  [] 95  Resp:  [17-18] 17  SpO2:  [97 %-100 %] 100 %  BP: (122-148)/(61-81) 142/68     Weight: 56.7 kg (125 lb)  Body mass index is 22.5 kg/m².  Body surface area is 1.58 meters squared.    No intake or output data in the 24 hours ending 03/19/20 1405    Physical Exam   Constitutional: She is oriented to person, place, and time. She appears well-developed and well-nourished.   HENT:   Head: Normocephalic and atraumatic.   Eyes: No scleral icterus.   Neck: Normal range of motion.   Cardiovascular: Normal rate, regular rhythm and normal heart sounds.   Pulmonary/Chest: Effort normal and breath sounds normal.   Abdominal: Soft. Bowel sounds are normal.   Musculoskeletal: Normal range of motion.   Neurological: She is alert and oriented to person, place, and time.   Skin: Skin is warm.   Psychiatric: She has a normal mood and affect.       Significant Labs:   CBC:   Recent Labs   Lab 03/18/20  1533 03/19/20  1314   WBC 12.17 10.71   HGB 11.4* 11.4*   HCT 36.1*  37.2   PLT 93* 117*    and CMP:   Recent Labs   Lab 03/18/20  1533      K 4.0      CO2 22*   *   BUN 9   CREATININE 0.7   CALCIUM 8.6*   PROT 6.6   ALBUMIN 2.8*   BILITOT 1.2*   ALKPHOS 575*   *   *   ANIONGAP 8   EGFRNONAA >60.0       Diagnostic Results:  I have reviewed all pertinent imaging results/findings within the past 24 hours.  I have reviewed and interpreted all pertinent imaging results/findings within the past 24 hours.

## 2020-03-19 NOTE — PROGRESS NOTES
PI: Eliot Barrett MD  Study #: [id number]  WIRB: [id number]  IRB: 2018.096  Pt ID: -[id number]  ARM 1 w/ Salvage Therapy: TTFields + FOLFIRINOX (5FU, Irinotecan, Oxaliplatin)     PANOVA-3: Pivotal, randomized, open-label study of Tumor Treating Fields (TTFields, 150kHz) concomitant with gemcitabine and nab-paclitaxel for front-line treatment of locally-advanced pancreatic adenocarcinoma    Week 32/ C4D1- Delayed    Unable to conduct study visit due to COVID-19 protocols. Will delay treatment 1 week due to ER visit with fever of 101.4 on 3/18/2020. Sponsor aware.     Spoke with Dr. Montenegro, patient's blood cultures came back positive for gram neg bacteria. Spoke with patient, she is to return to ER for antibiotics. Pt verbalized understanding, will go to ER. Patient also aware of appointments next week.

## 2020-03-20 LAB
ALBUMIN SERPL BCP-MCNC: 2.4 G/DL (ref 3.5–5.2)
ALP SERPL-CCNC: 411 U/L (ref 55–135)
ALT SERPL W/O P-5'-P-CCNC: 146 U/L (ref 10–44)
ANION GAP SERPL CALC-SCNC: 8 MMOL/L (ref 8–16)
AST SERPL-CCNC: 78 U/L (ref 10–40)
BACTERIA UR CULT: NO GROWTH
BACTERIA UR CULT: NO GROWTH
BASOPHILS # BLD AUTO: 0.04 K/UL (ref 0–0.2)
BASOPHILS NFR BLD: 0.5 % (ref 0–1.9)
BILIRUB SERPL-MCNC: 0.5 MG/DL (ref 0.1–1)
BUN SERPL-MCNC: 9 MG/DL (ref 6–20)
CALCIUM SERPL-MCNC: 8.2 MG/DL (ref 8.7–10.5)
CHLORIDE SERPL-SCNC: 113 MMOL/L (ref 95–110)
CO2 SERPL-SCNC: 23 MMOL/L (ref 23–29)
CREAT SERPL-MCNC: 0.7 MG/DL (ref 0.5–1.4)
DIFFERENTIAL METHOD: ABNORMAL
EOSINOPHIL # BLD AUTO: 0.4 K/UL (ref 0–0.5)
EOSINOPHIL NFR BLD: 5.3 % (ref 0–8)
ERYTHROCYTE [DISTWIDTH] IN BLOOD BY AUTOMATED COUNT: 17.5 % (ref 11.5–14.5)
EST. GFR  (AFRICAN AMERICAN): >60 ML/MIN/1.73 M^2
EST. GFR  (NON AFRICAN AMERICAN): >60 ML/MIN/1.73 M^2
GLUCOSE SERPL-MCNC: 95 MG/DL (ref 70–110)
HCT VFR BLD AUTO: 32 % (ref 37–48.5)
HGB BLD-MCNC: 9.8 G/DL (ref 12–16)
IMM GRANULOCYTES # BLD AUTO: 0.14 K/UL (ref 0–0.04)
IMM GRANULOCYTES NFR BLD AUTO: 1.8 % (ref 0–0.5)
LYMPHOCYTES # BLD AUTO: 1.4 K/UL (ref 1–4.8)
LYMPHOCYTES NFR BLD: 18 % (ref 18–48)
MCH RBC QN AUTO: 27.1 PG (ref 27–31)
MCHC RBC AUTO-ENTMCNC: 30.6 G/DL (ref 32–36)
MCV RBC AUTO: 89 FL (ref 82–98)
MONOCYTES # BLD AUTO: 1.3 K/UL (ref 0.3–1)
MONOCYTES NFR BLD: 16.2 % (ref 4–15)
NEUTROPHILS # BLD AUTO: 4.6 K/UL (ref 1.8–7.7)
NEUTROPHILS NFR BLD: 58.2 % (ref 38–73)
NRBC BLD-RTO: 0 /100 WBC
PLATELET # BLD AUTO: 97 K/UL (ref 150–350)
PMV BLD AUTO: 11.5 FL (ref 9.2–12.9)
POTASSIUM SERPL-SCNC: 4 MMOL/L (ref 3.5–5.1)
PROT SERPL-MCNC: 5.7 G/DL (ref 6–8.4)
RBC # BLD AUTO: 3.61 M/UL (ref 4–5.4)
SODIUM SERPL-SCNC: 144 MMOL/L (ref 136–145)
WBC # BLD AUTO: 7.9 K/UL (ref 3.9–12.7)

## 2020-03-20 PROCEDURE — 25000003 PHARM REV CODE 250: Performed by: INTERNAL MEDICINE

## 2020-03-20 PROCEDURE — 20600001 HC STEP DOWN PRIVATE ROOM

## 2020-03-20 PROCEDURE — 25000003 PHARM REV CODE 250: Performed by: STUDENT IN AN ORGANIZED HEALTH CARE EDUCATION/TRAINING PROGRAM

## 2020-03-20 PROCEDURE — 99231 SBSQ HOSP IP/OBS SF/LOW 25: CPT | Mod: ,,, | Performed by: INTERNAL MEDICINE

## 2020-03-20 PROCEDURE — 63600175 PHARM REV CODE 636 W HCPCS: Performed by: STUDENT IN AN ORGANIZED HEALTH CARE EDUCATION/TRAINING PROGRAM

## 2020-03-20 PROCEDURE — 80053 COMPREHEN METABOLIC PANEL: CPT

## 2020-03-20 PROCEDURE — 99231 PR SUBSEQUENT HOSPITAL CARE,LEVL I: ICD-10-PCS | Mod: ,,, | Performed by: INTERNAL MEDICINE

## 2020-03-20 PROCEDURE — 85025 COMPLETE CBC W/AUTO DIFF WBC: CPT

## 2020-03-20 RX ORDER — FENTANYL 25 UG/1
1 PATCH TRANSDERMAL
Status: DISCONTINUED | OUTPATIENT
Start: 2020-03-20 | End: 2020-03-21 | Stop reason: HOSPADM

## 2020-03-20 RX ORDER — ONDANSETRON 8 MG/1
8 TABLET, ORALLY DISINTEGRATING ORAL EVERY 8 HOURS PRN
Status: DISCONTINUED | OUTPATIENT
Start: 2020-03-20 | End: 2020-03-21 | Stop reason: HOSPADM

## 2020-03-20 RX ORDER — PROCHLORPERAZINE EDISYLATE 5 MG/ML
10 INJECTION INTRAMUSCULAR; INTRAVENOUS EVERY 6 HOURS PRN
Status: DISCONTINUED | OUTPATIENT
Start: 2020-03-20 | End: 2020-03-21 | Stop reason: HOSPADM

## 2020-03-20 RX ORDER — OXYCODONE HYDROCHLORIDE 5 MG/1
5 TABLET ORAL EVERY 6 HOURS PRN
Status: DISCONTINUED | OUTPATIENT
Start: 2020-03-20 | End: 2020-03-21 | Stop reason: HOSPADM

## 2020-03-20 RX ADMIN — PIPERACILLIN SODIUM AND TAZOBACTAM SODIUM 4.5 G: 4; .5 INJECTION, POWDER, LYOPHILIZED, FOR SOLUTION INTRAVENOUS at 09:03

## 2020-03-20 RX ADMIN — FENTANYL 1 PATCH: 25 PATCH, EXTENDED RELEASE TRANSDERMAL at 05:03

## 2020-03-20 RX ADMIN — APIXABAN 5 MG: 2.5 TABLET, FILM COATED ORAL at 09:03

## 2020-03-20 RX ADMIN — PIPERACILLIN SODIUM AND TAZOBACTAM SODIUM 4.5 G: 4; .5 INJECTION, POWDER, LYOPHILIZED, FOR SOLUTION INTRAVENOUS at 06:03

## 2020-03-20 RX ADMIN — PIPERACILLIN SODIUM AND TAZOBACTAM SODIUM 4.5 G: 4; .5 INJECTION, POWDER, LYOPHILIZED, FOR SOLUTION INTRAVENOUS at 02:03

## 2020-03-20 NOTE — PLAN OF CARE
POC reviewed with patient; understanding verbalized. Pt had no complaints this shift. Zosyn given as scheduled. Voids in the hat. Pt with nonskid footwear on, bed in lowest position, and locked with bed rails up x2. Pt instructed to call prior to getting OOB. Pt has call light and person items within reach. Patient ambulates in room independently. VSS and afebrile this shift. All questions and concerns addressed at this time. Will continue to monitor.

## 2020-03-20 NOTE — SUBJECTIVE & OBJECTIVE
Interval History: No acute events overnight. Repeat blood cx 03/19 have NGTD. BCX on 3/18 came back with Klebsiella. Pending speciation. Pt cont to be afebrile with no respiratory or GI sxs    Oncology Treatment Plan:   OP PANC FOLFIRINOX Q2W    Medications:  Continuous Infusions:  Scheduled Meds:   apixaban  5 mg Oral BID    fentaNYL  1 patch Transdermal Q72H    piperacillin-tazobactam (ZOSYN) IVPB  4.5 g Intravenous Q8H    polyethylene glycol  17 g Oral Daily     PRN Meds:sodium chloride 0.9%     Review of patient's allergies indicates:   Allergen Reactions    Sulfa (sulfonamide antibiotics) Swelling and Hives     tongue          Past Medical History:   Diagnosis Date    Allergic rhinitis 3/3/2014    CKD (chronic kidney disease) stage 3, GFR 30-59 ml/min 12/26/2015    Hyperlipidemia 3/3/2014    Lung metastasis     Pancreas cancer     Skin rash 8/10/2019     Past Surgical History:   Procedure Laterality Date    ERCP N/A 7/16/2019    Procedure: ERCP (ENDOSCOPIC RETROGRADE CHOLANGIOPANCREATOGRAPHY);  Surgeon: Chema Harris MD;  Location: [address]);  Service: Endoscopy;  Laterality: N/A;    ERCP N/A 12/24/2019    Procedure: ERCP (ENDOSCOPIC RETROGRADE CHOLANGIOPANCREATOGRAPHY);  Surgeon: Chema Harris MD;  Location: [address]);  Service: Endoscopy;  Laterality: N/A;    Exporatory lap      INSERTION OF TUNNELED CENTRAL VENOUS CATHETER (CVC) WITH SUBCUTANEOUS PORT Right 8/12/2019    Procedure: [id number];  Surgeon: Cesar Hallman MD;  Location: [address];  Service: General;  Laterality: Right;  right IJ     Family History     Problem Relation (Age of Onset)    Diabetes Mother, Father, Brother        Tobacco Use    Smoking status: Former Smoker     Start date: 12/11/1999    Smokeless tobacco: Never Used   Substance and Sexual Activity    Alcohol use: Not Currently     Alcohol/week: 1.0 standard drinks     Types: 1 Glasses of wine per week     Frequency: 4 or more  times a week     Drinks per session: 1 or 2     Binge frequency: Never     Comment: last drink a month ago     Drug use: No    Sexual activity: Yes       Review of Systems   Constitutional: Negative for activity change, appetite change, fever and unexpected weight change.   HENT: Negative for congestion.    Eyes: Negative for discharge and itching.   Respiratory: Negative for apnea, chest tightness and shortness of breath.    Cardiovascular: Negative for chest pain.   Gastrointestinal: Negative for abdominal distention and rectal pain.   Endocrine: Negative for cold intolerance and heat intolerance.   Genitourinary: Negative for dysuria.   Musculoskeletal: Negative for joint swelling.   Neurological: Negative for seizures.   Hematological: Negative for adenopathy.   Psychiatric/Behavioral: Negative for agitation.     Objective:     Vital Signs (Most Recent):  Temp: 98 °F (36.7 °C) (03/20/20 0751)  Pulse: 73 (03/20/20 0751)  Resp: 16 (03/20/20 0751)  BP: 134/81 (03/20/20 0751)  SpO2: (!) 94 % (03/20/20 0751) Vital Signs (24h Range):  Temp:  [97.9 °F (36.6 °C)-98.6 °F (37 °C)] 98 °F (36.7 °C)  Pulse:  [66-95] 73  Resp:  [15-18] 16  SpO2:  [93 %-100 %] 94 %  BP: (128-148)/(59-81) 134/81     Weight: 57.3 kg (126 lb 5.2 oz)  Body mass index is 22.72 kg/m².  Body surface area is 1.59 meters squared.      Intake/Output Summary (Last 24 hours) at 3/20/2020 0826  Last data filed at 3/20/2020 0627  Gross per 24 hour   Intake 100 ml   Output 600 ml   Net -500 ml       Physical Exam   Constitutional: She is oriented to person, place, and time. She appears well-developed and well-nourished.   HENT:   Head: Normocephalic and atraumatic.   Eyes: No scleral icterus.   Neck: Normal range of motion.   Cardiovascular: Normal rate, regular rhythm and normal heart sounds.   Pulmonary/Chest: Effort normal and breath sounds normal.   Abdominal: Soft. Bowel sounds are normal.   Musculoskeletal: Normal range of motion.   Neurological: She  is alert and oriented to person, place, and time.   Skin: Skin is warm.   Psychiatric: She has a normal mood and affect.       Significant Labs:   CBC:   Recent Labs   Lab 03/18/20  1533 03/19/20  1314 03/20/20  0326   WBC 12.17 10.71 7.90   HGB 11.4* 11.4* 9.8*   HCT 36.1* 37.2 32.0*   PLT 93* 117* 97*    and CMP:   Recent Labs   Lab 03/18/20  1533 03/19/20  1314 03/20/20  0326    139 144   K 4.0 3.8 4.0    109 113*   CO2 22* 22* 23   * 108 95   BUN 9 10 9   CREATININE 0.7 0.7 0.7   CALCIUM 8.6* 8.9 8.2*   PROT 6.6 7.1 5.7*   ALBUMIN 2.8* 3.0* 2.4*   BILITOT 1.2* 0.7 0.5   ALKPHOS 575* 521* 411*   * 126* 78*   * 201* 146*   ANIONGAP 8 8 8   EGFRNONAA >60.0 >60.0 >60.0       Diagnostic Results:  I have reviewed all pertinent imaging results/findings within the past 24 hours.  I have reviewed and interpreted all pertinent imaging results/findings within the past 24 hours.

## 2020-03-20 NOTE — PROGRESS NOTES
" Admit Assessment    Patient Identification  Quyen Moody   :  1960  Admit Date:  3/19/2020  Attending Provider:  Tono Queen MD              Referral:   Pt was admitted to [facility] med/onc with a diagnosis of Gram-negative bacteremia, and was admitted this hospital stay due to Gram-negative bacteremia [R78.81], Blood culture positive for microorganism [R79.89].      is involved.  Ms. Moody was referred to the Social Work Department via routine referal.  Patient presents as a 59 y.o. year old female.    Persons interviewed:     Living Situation:      Resides at [address] [place] [address], phone: [phone]  Lives alone.  Dr. Liriano is next door neighbor and they are friends.  Other friends live around the corner.    Functional Status Prior  Ambulation Prior: 0-->independent  Transferrin-->independent  Toiletin-->independent  Bathin-->independent  Dressin-->independent  Eatin-->independent  Communication: understands/communicates without difficulty  Swallowing: swallows foods/liquids without difficulty    Current or Past Agencies and Description of Services/Supplies    Nutrition: Oral diet    Outpatient Pharmacy:     [id number] [place] [id number] [address] AT [facility] [address]  [address] [place] [address]  Phone: [phone] Fax: [phone]    Patient informed of right to choose providers or agencies.  Patient provides permission to release any necessary information to [facility] and to Non[facility] agencies as needed to facilitate patient care, treatment planning, and patient discharge planning.  Written and verbal resources provided.      Coping: Patient said she is "trying to have hope."  She also said she has friends she can rely on for emotional support.         Adjustment to Diagnosis and Treatment: Appropriate to " situation.    Emotional/Behavioral/Cognitive Issues: None noted at this time.     History/Current Symptoms of Anxiety/Depression: No  History/Current Substance Use: No  Social History     Tobacco Use    Smoking status: Former Smoker     Start date: 1999    Smokeless tobacco: Never Used   Substance and Sexual Activity    Alcohol use: Not Currently     Alcohol/week: 1.0 standard drinks     Types: 1 Glasses of wine per week     Frequency: 4 or more times a week     Drinks per session: 1 or 2     Binge frequency: Never     Comment: last drink a month ago     Drug use: No    Sexual activity: Yes       Indications of Abuse/Neglect: No  Abuse Screen (yes response referral indicated)  Feels Unsafe at Home: no  Feels Threatened by Someone: no  Does Anyone Try to Keep You From Having Contact with Others or Doing Things Outside Your Home?: no  Physical Signs of Abuse Present: no    Financial:  Payor/Plan Subscr  Sex Relation Sub. Ins. ID Effective Group Num   1. BLUE CROSS BL* HUNTER VARGHESE 1960 Female  [id number] 16 [address] [address]        Other identified concerns/needs: None noted at this time.    Plan: Continue to provide support and resources as needed.  Discharge to home.    Patient engaged in treatment planning process.   providing psychosocial and supportive counseling, resources, education, assistance and discharge planning as appropriate.  Patient/caregiver state understanding of  available resources,  following, remains available.

## 2020-03-20 NOTE — PROGRESS NOTES
[facility]  Hematology/Oncology  H&P    Patient Name: Quyen Moody  MRN: [id number]  Admission Date: 3/19/2020  Code Status: Full Code   Attending Provider: Miguel A Miles MD  Primary Care Physician: Eliot Barrett MD  Principal Problem:Gram-negative bacteremia    Subjective:     HPI: Quyen Moody is a 60 y/o F who presented to the ED for evaluation of GNR bacteremia. She has a PMH significant for stage IV unresectable pancreatic adenocarcinoma currently enrolled on the PANOVA-3 trial (TTF +/- systemic chemotherapy). She was scheduled to initiate cycle 4 of FOLFIRINOX today, 03/19/2020.  Ms. Wright was evaluated in the ED yesterday, 03/18 for a fever of ~101 at home. She presented to the ED where she was afebrile, with normal WBCs. Labs were only significant for elevated LFTs. Blood cultures were drawn and she wsa discharged with out pt follow up on 03/19. Today, 1 out of 2 blood cultures are +ve for GNR so she was told to present to the ED.  She reports that she is without fevers, denies HA, congestion, ear pain, throat pain, chest pain, SOB, productive cough, dysuria, abdominal pain.   CT CAP on 03/16 revealed stable pancreatic head mass but interval development of b/l lung nodules and soft tissue densities in the peritoneum, concerning for peritoneal metastatic disease.    Regarding her Oncologic History:  She has had intermittent upper abdominal pain since early June.  She presented to her PCP who originally ordered an US and CT.  US was negative and CT showed some haziness at the pancreatic head.  She saw GI who performed EUS.  On EUS, a 3x4cm pancreatic head mass was seen with possible invasion into the SMA and portal vein.  FNA path s/w pancreatic adenocarcinoma.  CA 19-9 level at outside hospital was >1000 per the patient.  She endorses nausea and inability to tolerate much PO.  She has lost about 10lbs over the last few weeks and is noticing her skin turning yellow.  Denies pruritis.   She is passing gas but has not had a BM in a few days, she attributes this to narcotic use.  She recently started taking stool softeners.  No previous cardiac or stroke history.  Surgical history significant for open appendectomy when she was in her 20s  - 8/7/2019 - 1/9/2020: Pauline+Abraxane and TTF on PANOVA-3, LFTs elevated, but improved.  - Based on CT 12/2019 scans, Dr. Hallman felt pt was potentially resectable after chemoXRT given good response to current therapy.  However, subsequent Chest CT   showed new and growing micronodules in the in lungs c/w met disease in light of rising tumor markers.    - 1/23/2020: Changed to FOLFIRINOX and con't TTF on PANOVA as this seems to be achieving good local control of the primary tumor.         NAEON  Repeat blood cx 03/19 have NGTD  afebrile    Oncology Treatment Plan:   OP PANC FOLFIRINOX Q2W    Medications:  Continuous Infusions:  Scheduled Meds:   apixaban  5 mg Oral BID    fentaNYL  1 patch Transdermal Q72H    piperacillin-tazobactam (ZOSYN) IVPB  4.5 g Intravenous Q8H    polyethylene glycol  17 g Oral Daily     PRN Meds:sodium chloride 0.9%     Review of patient's allergies indicates:   Allergen Reactions    Sulfa (sulfonamide antibiotics) Swelling and Hives     tongue          Past Medical History:   Diagnosis Date    Allergic rhinitis 3/3/2014    CKD (chronic kidney disease) stage 3, GFR 30-59 ml/min 12/26/2015    Hyperlipidemia 3/3/2014    Lung metastasis     Pancreas cancer     Skin rash 8/10/2019     Past Surgical History:   Procedure Laterality Date    ERCP N/A 7/16/2019    Procedure: ERCP (ENDOSCOPIC RETROGRADE CHOLANGIOPANCREATOGRAPHY);  Surgeon: Chema Harris MD;  Location: [address]);  Service: Endoscopy;  Laterality: N/A;    ERCP N/A 12/24/2019    Procedure: ERCP (ENDOSCOPIC RETROGRADE CHOLANGIOPANCREATOGRAPHY);  Surgeon: Chema Harris MD;  Location: [facility] ([address] [place] [address]);  Service: Endoscopy;  Laterality: N/A;    Exporatory lap       INSERTION OF TUNNELED CENTRAL VENOUS CATHETER (CVC) WITH SUBCUTANEOUS PORT Right 8/12/2019    Procedure: [id number]-[id number];  Surgeon: Cesar Hallman MD;  Location: [facility] OR [address] [place] [address];  Service: General;  Laterality: Right;  right IJ     Family History     Problem Relation (Age of Onset)    Diabetes Mother, Father, Brother        Tobacco Use    Smoking status: Former Smoker     Start date: 12/11/1999    Smokeless tobacco: Never Used   Substance and Sexual Activity    Alcohol use: Not Currently     Alcohol/week: 1.0 standard drinks     Types: 1 Glasses of wine per week     Frequency: 4 or more times a week     Drinks per session: 1 or 2     Binge frequency: Never     Comment: last drink a month ago     Drug use: No    Sexual activity: Yes       Review of Systems   Constitutional: Negative for activity change, appetite change, fever and unexpected weight change.   HENT: Negative for congestion.    Eyes: Negative for discharge and itching.   Respiratory: Negative for apnea, chest tightness and shortness of breath.    Cardiovascular: Negative for chest pain.   Gastrointestinal: Negative for abdominal distention and rectal pain.   Endocrine: Negative for cold intolerance and heat intolerance.   Genitourinary: Negative for dysuria.   Musculoskeletal: Negative for joint swelling.   Neurological: Negative for seizures.   Hematological: Negative for adenopathy.   Psychiatric/Behavioral: Negative for agitation.     Objective:     Vital Signs (Most Recent):  Temp: 98 °F (36.7 °C) (03/20/20 0751)  Pulse: 73 (03/20/20 0751)  Resp: 16 (03/20/20 0751)  BP: 134/81 (03/20/20 0751)  SpO2: (!) 94 % (03/20/20 0751) Vital Signs (24h Range):  Temp:  [97.9 °F (36.6 °C)-98.6 °F (37 °C)] 98 °F (36.7 °C)  Pulse:  [66-95] 73  Resp:  [15-18] 16  SpO2:  [93 %-100 %] 94 %  BP: (128-148)/(59-81) 134/81     Weight: 57.3 kg (126 lb 5.2 oz)  Body mass index is 22.72 kg/m².  Body surface area is 1.59 meters squared.      Intake/Output  Summary (Last 24 hours) at 3/20/2020 0826  Last data filed at 3/20/2020 0627  Gross per 24 hour   Intake 100 ml   Output 600 ml   Net -500 ml       Physical Exam   Constitutional: She is oriented to person, place, and time. She appears well-developed and well-nourished.   HENT:   Head: Normocephalic and atraumatic.   Eyes: No scleral icterus.   Neck: Normal range of motion.   Cardiovascular: Normal rate, regular rhythm and normal heart sounds.   Pulmonary/Chest: Effort normal and breath sounds normal.   Abdominal: Soft. Bowel sounds are normal.   Musculoskeletal: Normal range of motion.   Neurological: She is alert and oriented to person, place, and time.   Skin: Skin is warm.   Psychiatric: She has a normal mood and affect.       Significant Labs:   CBC:   Recent Labs   Lab 03/18/20  1533 03/19/20  1314 03/20/20  0326   WBC 12.17 10.71 7.90   HGB 11.4* 11.4* 9.8*   HCT 36.1* 37.2 32.0*   PLT 93* 117* 97*    and CMP:   Recent Labs   Lab 03/18/20  1533 03/19/20  1314 03/20/20  0326    139 144   K 4.0 3.8 4.0    109 113*   CO2 22* 22* 23   * 108 95   BUN 9 10 9   CREATININE 0.7 0.7 0.7   CALCIUM 8.6* 8.9 8.2*   PROT 6.6 7.1 5.7*   ALBUMIN 2.8* 3.0* 2.4*   BILITOT 1.2* 0.7 0.5   ALKPHOS 575* 521* 411*   * 126* 78*   * 201* 146*   ANIONGAP 8 8 8   EGFRNONAA >60.0 >60.0 >60.0       Diagnostic Results:  I have reviewed all pertinent imaging results/findings within the past 24 hours.  I have reviewed and interpreted all pertinent imaging results/findings within the past 24 hours.    Assessment/Plan:     * Gram-negative bacteremia  1 out of 2 bottles + for GNR  Repeat blood cultures today.  CT CAP 03/16 without source of infection  Checking UA w reflex to urine culture  Checking sputum cultures  Start Zosyn 4.5mg q8h.  End date to be determined by results of repeat blood cultures.    Malignant neoplasm of pancreas  Stage IV  Enrolled in PANOVA 3, FOLFIRINOX+TTF  Holding chemo while  inpatient  Out patient follow up with Dr. Barrett & Lan after this hospitalization     History of VTE    Continue home Apixaban 5mg BID    Wendy Acevedo MD  Hematology/Oncology  [facility]

## 2020-03-20 NOTE — PLAN OF CARE
Patient is AAOx4, up independently, fall precautions maintained. Patient denies pain, nausea, vomiting or diarrhea. IV abx given as ordered. Shower completed today. Safety precautions in place. Voids in the hat. Pt with nonskid footwear on, bed in lowest position, and locked with bed rails up x2. Pt instructed to call prior to getting OOB. Pt has call light and person items within reach. Patient ambulates in room independently. VSS and afebrile this shift. All questions and concerns addressed at this time. Will continue to monitor.

## 2020-03-21 VITALS
DIASTOLIC BLOOD PRESSURE: 74 MMHG | WEIGHT: 126.31 LBS | RESPIRATION RATE: 15 BRPM | HEIGHT: 63 IN | SYSTOLIC BLOOD PRESSURE: 144 MMHG | BODY MASS INDEX: 22.38 KG/M2 | HEART RATE: 70 BPM | TEMPERATURE: 98 F | OXYGEN SATURATION: 96 %

## 2020-03-21 LAB
ALBUMIN SERPL BCP-MCNC: 2.5 G/DL (ref 3.5–5.2)
ALP SERPL-CCNC: 390 U/L (ref 55–135)
ALT SERPL W/O P-5'-P-CCNC: 114 U/L (ref 10–44)
ANION GAP SERPL CALC-SCNC: 9 MMOL/L (ref 8–16)
AST SERPL-CCNC: 53 U/L (ref 10–40)
BACTERIA THROAT CULT: NORMAL
BASOPHILS # BLD AUTO: 0.03 K/UL (ref 0–0.2)
BASOPHILS NFR BLD: 0.4 % (ref 0–1.9)
BILIRUB SERPL-MCNC: 0.5 MG/DL (ref 0.1–1)
BUN SERPL-MCNC: 5 MG/DL (ref 6–20)
CALCIUM SERPL-MCNC: 8.5 MG/DL (ref 8.7–10.5)
CHLORIDE SERPL-SCNC: 109 MMOL/L (ref 95–110)
CO2 SERPL-SCNC: 23 MMOL/L (ref 23–29)
CREAT SERPL-MCNC: 0.7 MG/DL (ref 0.5–1.4)
DIFFERENTIAL METHOD: ABNORMAL
EOSINOPHIL # BLD AUTO: 0.5 K/UL (ref 0–0.5)
EOSINOPHIL NFR BLD: 6 % (ref 0–8)
ERYTHROCYTE [DISTWIDTH] IN BLOOD BY AUTOMATED COUNT: 17.2 % (ref 11.5–14.5)
EST. GFR  (AFRICAN AMERICAN): >60 ML/MIN/1.73 M^2
EST. GFR  (NON AFRICAN AMERICAN): >60 ML/MIN/1.73 M^2
GLUCOSE SERPL-MCNC: 81 MG/DL (ref 70–110)
HCT VFR BLD AUTO: 32.8 % (ref 37–48.5)
HGB BLD-MCNC: 10.3 G/DL (ref 12–16)
IMM GRANULOCYTES # BLD AUTO: 0.16 K/UL (ref 0–0.04)
IMM GRANULOCYTES NFR BLD AUTO: 2 % (ref 0–0.5)
LYMPHOCYTES # BLD AUTO: 1.6 K/UL (ref 1–4.8)
LYMPHOCYTES NFR BLD: 20.2 % (ref 18–48)
MCH RBC QN AUTO: 27.8 PG (ref 27–31)
MCHC RBC AUTO-ENTMCNC: 31.4 G/DL (ref 32–36)
MCV RBC AUTO: 89 FL (ref 82–98)
MONOCYTES # BLD AUTO: 1.2 K/UL (ref 0.3–1)
MONOCYTES NFR BLD: 14.8 % (ref 4–15)
NEUTROPHILS # BLD AUTO: 4.4 K/UL (ref 1.8–7.7)
NEUTROPHILS NFR BLD: 56.6 % (ref 38–73)
NRBC BLD-RTO: 0 /100 WBC
PLATELET # BLD AUTO: 96 K/UL (ref 150–350)
PMV BLD AUTO: 10.1 FL (ref 9.2–12.9)
POTASSIUM SERPL-SCNC: 3.4 MMOL/L (ref 3.5–5.1)
PROT SERPL-MCNC: 6.1 G/DL (ref 6–8.4)
RBC # BLD AUTO: 3.7 M/UL (ref 4–5.4)
SODIUM SERPL-SCNC: 141 MMOL/L (ref 136–145)
WBC # BLD AUTO: 7.83 K/UL (ref 3.9–12.7)

## 2020-03-21 PROCEDURE — 63600175 PHARM REV CODE 636 W HCPCS: Performed by: STUDENT IN AN ORGANIZED HEALTH CARE EDUCATION/TRAINING PROGRAM

## 2020-03-21 PROCEDURE — 85025 COMPLETE CBC W/AUTO DIFF WBC: CPT

## 2020-03-21 PROCEDURE — 80053 COMPREHEN METABOLIC PANEL: CPT

## 2020-03-21 PROCEDURE — 25000003 PHARM REV CODE 250: Performed by: STUDENT IN AN ORGANIZED HEALTH CARE EDUCATION/TRAINING PROGRAM

## 2020-03-21 PROCEDURE — 99239 HOSP IP/OBS DSCHRG MGMT >30: CPT | Mod: ,,, | Performed by: INTERNAL MEDICINE

## 2020-03-21 PROCEDURE — 99239 PR HOSPITAL DISCHARGE DAY,>30 MIN: ICD-10-PCS | Mod: ,,, | Performed by: INTERNAL MEDICINE

## 2020-03-21 RX ORDER — CIPROFLOXACIN 250 MG/1
500 TABLET, FILM COATED ORAL EVERY 12 HOURS
Status: DISCONTINUED | OUTPATIENT
Start: 2020-03-21 | End: 2020-03-21 | Stop reason: HOSPADM

## 2020-03-21 RX ORDER — HEPARIN 100 UNIT/ML
300 SYRINGE INTRAVENOUS ONCE
Status: COMPLETED | OUTPATIENT
Start: 2020-03-21 | End: 2020-03-21

## 2020-03-21 RX ORDER — CIPROFLOXACIN 500 MG/1
500 TABLET ORAL EVERY 12 HOURS
Qty: 20 TABLET | Refills: 0 | Status: SHIPPED | OUTPATIENT
Start: 2020-03-21 | End: 2020-03-31

## 2020-03-21 RX ORDER — POTASSIUM CHLORIDE 750 MG/1
40 CAPSULE, EXTENDED RELEASE ORAL
Status: DISCONTINUED | OUTPATIENT
Start: 2020-03-21 | End: 2020-03-21 | Stop reason: HOSPADM

## 2020-03-21 RX ADMIN — APIXABAN 5 MG: 2.5 TABLET, FILM COATED ORAL at 08:03

## 2020-03-21 RX ADMIN — CIPROFLOXACIN HYDROCHLORIDE 500 MG: 250 TABLET, FILM COATED ORAL at 10:03

## 2020-03-21 RX ADMIN — POTASSIUM CHLORIDE 40 MEQ: 750 CAPSULE, EXTENDED RELEASE ORAL at 09:03

## 2020-03-21 RX ADMIN — PIPERACILLIN SODIUM AND TAZOBACTAM SODIUM 4.5 G: 4; .5 INJECTION, POWDER, LYOPHILIZED, FOR SOLUTION INTRAVENOUS at 06:03

## 2020-03-21 RX ADMIN — HEPARIN 300 UNITS: 100 SYRINGE at 10:03

## 2020-03-21 NOTE — HOSPITAL COURSE
Hospital Course:  59 y.o female with h/o stge IV unresectable pancreatic adenocarcinoma admitted to the hospital for further evaluation of Blood culture positive for GNR. Pt has been asymptomatic prior to and during hospital course. Remains afebrile with stable labs. ANC 4000. Blood culture since 3/19 w NGTD. cx on 3/18 back with klebsiella sensitive to cipro. Pt will be discharged home with cipro X10 days and followup with oncologist in 1 week

## 2020-03-21 NOTE — DISCHARGE SUMMARY
[facility]  Hematology/Oncology  Discharge Summary      Patient Name: Quyen Moody  MRN: [id number]  Admission Date: 3/19/2020  Hospital Length of Stay: 2 days  Discharge Date and Time:  03/21/2020 10:12 AM  Attending Physician: Tono Queen MD   Discharging Provider: Antoni Swift MD  Primary Care Provider: Eliot Barrett MD    HPI: Quyen Moody is a 60 y/o F who presented to the ED for evaluation of GNR bacteremia. She has a PMH significant for stage IV unresectable pancreatic adenocarcinoma currently enrolled on the PANOVA-3 trial (TTF +/- systemic chemotherapy). She was scheduled to initiate cycle 4 of FOLFIRINOX today, 03/19/2020.  Ms. Wright was evaluated in the ED yesterday, 03/18 for a fever of ~101 at home. She presented to the ED where she was afebrile, with normal WBCs. Labs were only significant for elevated LFTs. Blood cultures were drawn and she wsa discharged with out pt follow up on 03/19. Today, 1 out of 2 blood cultures are +ve for GNR so she was told to present to the ED.  She reports that she is without fevers, denies HA, congestion, ear pain, throat pain, chest pain, SOB, productive cough, dysuria, abdominal pain.   CT CAP on 03/16 revealed stable pancreatic head mass but interval development of b/l lung nodules and soft tissue densities in the peritoneum, concerning for peritoneal metastatic disease.    Regarding her Oncologic History:  She has had intermittent upper abdominal pain since early June.  She presented to her PCP who originally ordered an US and CT.  US was negative and CT showed some haziness at the pancreatic head.  She saw GI who performed EUS.  On EUS, a 3x4cm pancreatic head mass was seen with possible invasion into the SMA and portal vein.  FNA path s/w pancreatic adenocarcinoma.  CA 19-9 level at outside hospital was >1000 per the patient.  She endorses nausea and inability to tolerate much PO.  She has lost about 10lbs over the last few weeks and is  noticing her skin turning yellow.  Denies pruritis.  She is passing gas but has not had a BM in a few days, she attributes this to narcotic use.  She recently started taking stool softeners.  No previous cardiac or stroke history.  Surgical history significant for open appendectomy when she was in her 20s  - 8/7/2019 - 1/9/2020: [place]+Abraxane and TTF on PANOVA-3, LFTs elevated, but improved.  - Based on CT 12/2019 scans, Dr. Hallman felt pt was potentially resectable after chemoXRT given good response to current therapy.  However, subsequent Chest CT   showed new and growing micronodules in the in lungs c/w met disease in light of rising tumor markers.    - 1/23/2020: Changed to FOLFIRINOX and con't TTF on PANOVA as this seems to be achieving good local control of the primary tumor.        * No surgery found *     Hospital Course: Hospital Course:  59 y.o female with h/o stge IV unresectable pancreatic adenocarcinoma admitted to the hospital for further evaluation of Blood culture positive for GNR. Pt has been asymptomatic prior to and during hospital course. Remains afebrile with stable labs. ANC 4000. Blood culture since 3/19 w NGTD. cx on 3/18 back with klebsiella sensitive to cipro. Pt will be discharged home with cipro X10 days and followup with oncologist in 1 week    Consults:   Consults (From admission, onward)        Status Ordering Provider     Inpatient consult to Hematology/Oncology  Once     Provider:  (Not yet assigned)    Completed MONIKA ENGEL          Significant Diagnostic Studies:     Pending Diagnostic Studies:     None        Final Active Diagnoses:    Diagnosis Date Noted POA    PRINCIPAL PROBLEM:  Gram-negative bacteremia [R78.81] 03/19/2020 Yes    Malignant neoplasm of pancreas [C25.9] 12/24/2019 Yes      Problems Resolved During this Admission:      Discharged Condition: stable    Disposition: Home or Self Care    Follow Up:  Follow-up Information     Schedule an appointment as soon as  possible for a visit  with Eliot Barrett MD.    Specialty:  Hematology and Oncology  Contact information:  [address] [facility] [id number] [phone]             Ashish Montenegro MD In 3 days.    Specialty:  Hematology and Oncology  Why:  In 4 days without fail (next week).  Contact information:  [address] [facility] [id number] [phone]             [facility].    Specialty:  Emergency Medicine  Why:  If any fever, sore throat, coughing, short of breath, chest/abdominal/back pain, or any urinary complaints or rash  Contact information:  [employer] [facility] [address] [phone]               Patient Instructions:   No discharge procedures on file.  Medications:  Reconciled Home Medications:      Medication List      START taking these medications    ciprofloxacin HCl 500 MG tablet  Commonly known as:  CIPRO  Take 1 tablet (500 mg total) by mouth every 12 (twelve) hours. for 10 days        CONTINUE taking these medications    apixaban 5 mg Tab  Commonly known as:  ELIQUIS  Take 1 tablet (5 mg total) by mouth 2 (two) times daily.     dexAMETHasone 4 MG Tab  Commonly known as:  DECADRON  Take 1 tablet (4 mg total) by mouth every 12 (twelve) hours. For 2 days following chemotherapy.     fentaNYL 25 mcg/hr  Commonly known as:  DURAGESIC  Place 1 patch onto the skin every 72 hours.     HYDROcodone-acetaminophen 5-325 mg per tablet  Commonly known as:  NORCO  Take 1 tablet by mouth every 6 (six) hours as needed for Pain.     hydrocortisone 2.5 % cream  Apply topically 2 (two) times daily.     lidocaine-prilocaine cream  Commonly known as:  EMLA  Apply to port 45 minutes prior to access.     LORazepam 0.5 MG tablet  Commonly known as:  ATIVAN  Take 1 tablet (0.5 mg total) by mouth every 6 (six) hours as needed for Anxiety (nausea).     multivitamin per tablet  Commonly known as:  THERAGRAN  Take 1 tablet by mouth once daily.     OLANZapine 5 MG  tablet  Commonly known as:  ZyPREXA  Take 1 tablet (5 mg total) by mouth nightly. To prevent nausea     ondansetron 8 MG tablet  Commonly known as:  ZOFRAN  Take 1 tablet (8 mg total) by mouth every 8 (eight) hours as needed for Nausea.     oxyCODONE 15 MG Tab  Commonly known as:  ROXICODONE  Take 1 tablet (15 mg total) by mouth every 6 (six) hours as needed for Pain.        STOP taking these medications    levoFLOXacin 750 MG tablet  Commonly known as:  GRANT Swift MD  Hematology/Oncology  [facility]

## 2020-03-21 NOTE — PROGRESS NOTES
Pt discharged to home per MD order. Discharge instructions given and explained to pt. [facility] PIV removed prior to discharge; port flushed and heparin flocked with Heparin U/ml as per protocol; port de-accessed prior to discharge.  Pt ambulating in room with steady gait; tolerating regular diet; voiding without difficulty. Vitals as charted; O2 sats WNL. Pt left floor by wheelchair to home via hospital transportation..

## 2020-03-21 NOTE — PROGRESS NOTES
[facility]  Hematology/Oncology  Progress Note    Patient Name: Quyen Moody  Admission Date: 3/19/2020  Hospital Length of Stay: 2 days  Code Status: Full Code     Subjective:     HPI:  Quyen Moody is a 60 y/o F who presented to the ED for evaluation of GNR bacteremia. She has a PMH significant for stage IV unresectable pancreatic adenocarcinoma currently enrolled on the PANOVA-3 trial (TTF +/- systemic chemotherapy). She was scheduled to initiate cycle 4 of FOLFIRINOX today, 03/19/2020.  Ms. Wright was evaluated in the ED yesterday, 03/18 for a fever of ~101 at home. She presented to the ED where she was afebrile, with normal WBCs. Labs were only significant for elevated LFTs. Blood cultures were drawn and she wsa discharged with out pt follow up on 03/19. Today, 1 out of 2 blood cultures are +ve for GNR so she was told to present to the ED.  She reports that she is without fevers, denies HA, congestion, ear pain, throat pain, chest pain, SOB, productive cough, dysuria, abdominal pain.   CT CAP on 03/16 revealed stable pancreatic head mass but interval development of b/l lung nodules and soft tissue densities in the peritoneum, concerning for peritoneal metastatic disease.    Regarding her Oncologic History:  She has had intermittent upper abdominal pain since early June.  She presented to her PCP who originally ordered an US and CT.  US was negative and CT showed some haziness at the pancreatic head.  She saw GI who performed EUS.  On EUS, a 3x4cm pancreatic head mass was seen with possible invasion into the SMA and portal vein.  FNA path s/w pancreatic adenocarcinoma.  CA 19-9 level at outside hospital was >1000 per the patient.  She endorses nausea and inability to tolerate much PO.  She has lost about 10lbs over the last few weeks and is noticing her skin turning yellow.  Denies pruritis.  She is passing gas but has not had a BM in a few days, she attributes this to narcotic use.  She  recently started taking stool softeners.  No previous cardiac or stroke history.  Surgical history significant for open appendectomy when she was in her 20s  - 8/7/2019 - 1/9/2020: [place]+Abraxane and TTF on PANOVA-3, LFTs elevated, but improved.  - Based on CT 12/2019 scans, Dr. Hallman felt pt was potentially resectable after chemoXRT given good response to current therapy.  However, subsequent Chest CT   showed new and growing micronodules in the in lungs c/w met disease in light of rising tumor markers.    - 1/23/2020: Changed to FOLFIRINOX and con't TTF on PANOVA as this seems to be achieving good local control of the primary tumor.         Hospital Course:  59 y.o female with h/o stge IV unresectable pancreatic adenocarcinoma admitted to the hospital for further evaluation of Blood culture positive for GNR. Pt has been asymptomatic prior to and during hospital course. Remains afebrile with stable labs. ANC 4000. Blood culture since 3/19 w NGTD. cx on 3/18 back with klebsiella. Pending speciation.     Interval History: No acute events overnight. Repeat blood cx 03/19 have NGTD. BCX on 3/18 came back with Klebsiella. Pending speciation. Pt cont to be afebrile with no respiratory or GI sxs    Oncology Treatment Plan:   OP PANC FOLFIRINOX Q2W    Medications:  Continuous Infusions:  Scheduled Meds:   apixaban  5 mg Oral BID    fentaNYL  1 patch Transdermal Q72H    piperacillin-tazobactam (ZOSYN) IVPB  4.5 g Intravenous Q8H    polyethylene glycol  17 g Oral Daily     PRN Meds:sodium chloride 0.9%     Review of patient's allergies indicates:   Allergen Reactions    Sulfa (sulfonamide antibiotics) Swelling and Hives     tongue          Past Medical History:   Diagnosis Date    Allergic rhinitis 3/3/2014    CKD (chronic kidney disease) stage 3, GFR 30-59 ml/min 12/26/2015    Hyperlipidemia 3/3/2014    Lung metastasis     Pancreas cancer     Skin rash 8/10/2019     Past Surgical History:   Procedure Laterality  Date    ERCP N/A 7/16/2019    Procedure: ERCP (ENDOSCOPIC RETROGRADE CHOLANGIOPANCREATOGRAPHY);  Surgeon: Chema Harris MD;  Location: [facility] (2ND FLR);  Service: Endoscopy;  Laterality: N/A;    ERCP N/A 12/24/2019    Procedure: ERCP (ENDOSCOPIC RETROGRADE CHOLANGIOPANCREATOGRAPHY);  Surgeon: Chema Harris MD;  Location: [facility] ENDO (2ND FLR);  Service: Endoscopy;  Laterality: N/A;    Exporatory lap      INSERTION OF TUNNELED CENTRAL VENOUS CATHETER (CVC) WITH SUBCUTANEOUS PORT Right 8/12/2019    Procedure: [id number];  Surgeon: Cesar Hallman MD;  Location: [facility] OR 2ND FLR;  Service: General;  Laterality: Right;  right IJ     Family History     Problem Relation (Age of Onset)    Diabetes Mother, Father, Brother        Tobacco Use    Smoking status: Former Smoker     Start date: 12/11/1999    Smokeless tobacco: Never Used   Substance and Sexual Activity    Alcohol use: Not Currently     Alcohol/week: 1.0 standard drinks     Types: 1 Glasses of wine per week     Frequency: 4 or more times a week     Drinks per session: 1 or 2     Binge frequency: Never     Comment: last drink a month ago     Drug use: No    Sexual activity: Yes       Review of Systems   Constitutional: Negative for activity change, appetite change, fever and unexpected weight change.   HENT: Negative for congestion.    Eyes: Negative for discharge and itching.   Respiratory: Negative for apnea, chest tightness and shortness of breath.    Cardiovascular: Negative for chest pain.   Gastrointestinal: Negative for abdominal distention and rectal pain.   Endocrine: Negative for cold intolerance and heat intolerance.   Genitourinary: Negative for dysuria.   Musculoskeletal: Negative for joint swelling.   Neurological: Negative for seizures.   Hematological: Negative for adenopathy.   Psychiatric/Behavioral: Negative for agitation.     Objective:     Vital Signs (Most Recent):  Temp: 98 °F (36.7 °C) (03/20/20 0751)  Pulse: 73  (03/20/20 0751)  Resp: 16 (03/20/20 0751)  BP: 134/81 (03/20/20 0751)  SpO2: (!) 94 % (03/20/20 0751) Vital Signs (24h Range):  Temp:  [97.9 °F (36.6 °C)-98.6 °F (37 °C)] 98 °F (36.7 °C)  Pulse:  [66-95] 73  Resp:  [15-18] 16  SpO2:  [93 %-100 %] 94 %  BP: (128-148)/(59-81) 134/81     Weight: 57.3 kg (126 lb 5.2 oz)  Body mass index is 22.72 kg/m².  Body surface area is 1.59 meters squared.      Intake/Output Summary (Last 24 hours) at 3/20/2020 0826  Last data filed at 3/20/2020 0627  Gross per 24 hour   Intake 100 ml   Output 600 ml   Net -500 ml       Physical Exam   Constitutional: She is oriented to person, place, and time. She appears well-developed and well-nourished.   HENT:   Head: Normocephalic and atraumatic.   Eyes: No scleral icterus.   Neck: Normal range of motion.   Cardiovascular: Normal rate, regular rhythm and normal heart sounds.   Pulmonary/Chest: Effort normal and breath sounds normal.   Abdominal: Soft. Bowel sounds are normal.   Musculoskeletal: Normal range of motion.   Neurological: She is alert and oriented to person, place, and time.   Skin: Skin is warm.   Psychiatric: She has a normal mood and affect.       Significant Labs:   CBC:   Recent Labs   Lab 03/18/20  1533 03/19/20  1314 03/20/20  0326   WBC 12.17 10.71 7.90   HGB 11.4* 11.4* 9.8*   HCT 36.1* 37.2 32.0*   PLT 93* 117* 97*    and CMP:   Recent Labs   Lab 03/18/20  1533 03/19/20  1314 03/20/20  0326    139 144   K 4.0 3.8 4.0    109 113*   CO2 22* 22* 23   * 108 95   BUN 9 10 9   CREATININE 0.7 0.7 0.7   CALCIUM 8.6* 8.9 8.2*   PROT 6.6 7.1 5.7*   ALBUMIN 2.8* 3.0* 2.4*   BILITOT 1.2* 0.7 0.5   ALKPHOS 575* 521* 411*   * 126* 78*   * 201* 146*   ANIONGAP 8 8 8   EGFRNONAA >60.0 >60.0 >60.0       Diagnostic Results:  I have reviewed all pertinent imaging results/findings within the past 24 hours.  I have reviewed and interpreted all pertinent imaging results/findings within the past 24  hours.    Assessment/Plan:     * Gram-negative bacteremia  Hospital Course:  59 y.o female with h/o stge IV unresectable pancreatic adenocarcinoma admitted to the hospital for further evaluation of blood cx positive for GNR( 1 out of 2 bottles). Pt completely asx prior to hospital admission.    Plan:   --Repeat blood cultures-NGTD  --CT CAP- No acute findings  --UA w reflex to urine culture- Neg for UTI  --sputum cultures- unremarkable  --Cont Zosyn 4.5mg q8h- End date to be determined by results of repeat blood cultures.    Malignant neoplasm of pancreas  Stage IV  Enrolled in PANOVA 3, FOLFIRINOX+TTF  Holding chemo while inpatient  Out patient follow up with Dr. Barrett & Lan after this hospitalization            Antoni Swift MD  Hematology/Oncology  [facility]

## 2020-03-21 NOTE — ASSESSMENT & PLAN NOTE
Hospital Course:  59 y.o female with h/o stge IV unresectable pancreatic adenocarcinoma admitted to the hospital for further evaluation of blood cx positive for GNR( 1 out of 2 bottles). Pt completely asx prior to hospital admission.    Plan:   --Repeat blood cultures-NGTD  --CT CAP- No acute findings  --UA w reflex to urine culture- Neg for UTI  --sputum cultures- unremarkable  --Cont Zosyn 4.5mg q8h- End date to be determined by results of repeat blood cultures.

## 2020-03-23 NOTE — PLAN OF CARE
Future Appointments   Date Time Provider Department Center   3/25/2020  2:00 PM LAB, HEMONC CANCER BLDG [facility] LAB HO Srinivasa Caceres   3/26/2020  8:30 AM Ashish Montenegro MD [facility] HEM ONC S[facility] Caceres   3/26/2020 10:00 AM NURSE 11, [facility] CHEMO [facility] CHEMO Srinivasa Lerner, RN, BSN, CM  Utilization Management  [facility]

## 2020-03-24 LAB
BACTERIA BLD CULT: NORMAL

## 2020-03-25 ENCOUNTER — LAB VISIT (OUTPATIENT)
Dept: LAB | Facility: HOSPITAL | Age: 60
End: 2020-03-25
Payer: COMMERCIAL

## 2020-03-25 DIAGNOSIS — C25.0 CANCER OF HEAD OF PANCREAS: ICD-10-CM

## 2020-03-25 DIAGNOSIS — C25.9 PANCREATIC CANCER: ICD-10-CM

## 2020-03-25 DIAGNOSIS — G89.3 NEOPLASM RELATED PAIN (ACUTE) (CHRONIC): ICD-10-CM

## 2020-03-25 DIAGNOSIS — Z00.6 RESEARCH STUDY PATIENT: ICD-10-CM

## 2020-03-25 DIAGNOSIS — Z00.6 EXAMINATION OF PARTICIPANT IN CLINICAL TRIAL: ICD-10-CM

## 2020-03-25 LAB
ALBUMIN SERPL BCP-MCNC: 2.8 G/DL (ref 3.5–5.2)
ALP SERPL-CCNC: 533 U/L (ref 55–135)
ALT SERPL W/O P-5'-P-CCNC: 108 U/L (ref 10–44)
ANION GAP SERPL CALC-SCNC: 7 MMOL/L (ref 8–16)
AST SERPL-CCNC: 85 U/L (ref 10–40)
BASOPHILS # BLD AUTO: 0.05 K/UL (ref 0–0.2)
BASOPHILS NFR BLD: 0.6 % (ref 0–1.9)
BILIRUB SERPL-MCNC: 0.6 MG/DL (ref 0.1–1)
BUN SERPL-MCNC: 9 MG/DL (ref 6–20)
CALCIUM SERPL-MCNC: 8.7 MG/DL (ref 8.7–10.5)
CANCER AG19-9 SERPL-ACNC: 2212 U/ML (ref 2–40)
CHLORIDE SERPL-SCNC: 108 MMOL/L (ref 95–110)
CO2 SERPL-SCNC: 24 MMOL/L (ref 23–29)
CREAT SERPL-MCNC: 0.8 MG/DL (ref 0.5–1.4)
DIFFERENTIAL METHOD: ABNORMAL
EOSINOPHIL # BLD AUTO: 0.2 K/UL (ref 0–0.5)
EOSINOPHIL NFR BLD: 2.5 % (ref 0–8)
ERYTHROCYTE [DISTWIDTH] IN BLOOD BY AUTOMATED COUNT: 18.2 % (ref 11.5–14.5)
EST. GFR  (AFRICAN AMERICAN): >60 ML/MIN/1.73 M^2
EST. GFR  (NON AFRICAN AMERICAN): >60 ML/MIN/1.73 M^2
GGT SERPL-CCNC: 1395 U/L (ref 8–55)
GLUCOSE SERPL-MCNC: 100 MG/DL (ref 70–110)
HCT VFR BLD AUTO: 35.9 % (ref 37–48.5)
HGB BLD-MCNC: 11 G/DL (ref 12–16)
IMM GRANULOCYTES # BLD AUTO: 0.06 K/UL (ref 0–0.04)
IMM GRANULOCYTES NFR BLD AUTO: 0.7 % (ref 0–0.5)
LDH SERPL L TO P-CCNC: 238 U/L (ref 110–260)
LYMPHOCYTES # BLD AUTO: 1.8 K/UL (ref 1–4.8)
LYMPHOCYTES NFR BLD: 20.2 % (ref 18–48)
MCH RBC QN AUTO: 27.9 PG (ref 27–31)
MCHC RBC AUTO-ENTMCNC: 30.6 G/DL (ref 32–36)
MCV RBC AUTO: 91 FL (ref 82–98)
MONOCYTES # BLD AUTO: 1.2 K/UL (ref 0.3–1)
MONOCYTES NFR BLD: 13.8 % (ref 4–15)
NEUTROPHILS # BLD AUTO: 5.5 K/UL (ref 1.8–7.7)
NEUTROPHILS NFR BLD: 62.2 % (ref 38–73)
NRBC BLD-RTO: 0 /100 WBC
PLATELET # BLD AUTO: 240 K/UL (ref 150–350)
PMV BLD AUTO: 10.2 FL (ref 9.2–12.9)
POTASSIUM SERPL-SCNC: 4.5 MMOL/L (ref 3.5–5.1)
PROT SERPL-MCNC: 6.8 G/DL (ref 6–8.4)
RBC # BLD AUTO: 3.94 M/UL (ref 4–5.4)
SODIUM SERPL-SCNC: 139 MMOL/L (ref 136–145)
WBC # BLD AUTO: 8.83 K/UL (ref 3.9–12.7)

## 2020-03-25 PROCEDURE — 80053 COMPREHEN METABOLIC PANEL: CPT

## 2020-03-25 PROCEDURE — 85025 COMPLETE CBC W/AUTO DIFF WBC: CPT

## 2020-03-25 PROCEDURE — 83615 LACTATE (LD) (LDH) ENZYME: CPT

## 2020-03-25 PROCEDURE — 36415 COLL VENOUS BLD VENIPUNCTURE: CPT

## 2020-03-25 PROCEDURE — 82977 ASSAY OF GGT: CPT

## 2020-03-25 PROCEDURE — 86301 IMMUNOASSAY TUMOR CA 19-9: CPT

## 2020-03-26 ENCOUNTER — RESEARCH ENCOUNTER (OUTPATIENT)
Dept: RESEARCH | Facility: HOSPITAL | Age: 60
End: 2020-03-26

## 2020-03-26 ENCOUNTER — OFFICE VISIT (OUTPATIENT)
Dept: HEMATOLOGY/ONCOLOGY | Facility: CLINIC | Age: 60
End: 2020-03-26
Payer: COMMERCIAL

## 2020-03-26 DIAGNOSIS — C78.00 MALIGNANT NEOPLASM METASTATIC TO LUNG, UNSPECIFIED LATERALITY: ICD-10-CM

## 2020-03-26 DIAGNOSIS — K83.1 MALIGNANT OBSTRUCTIVE JAUNDICE: ICD-10-CM

## 2020-03-26 DIAGNOSIS — Z00.6 CLINICAL TRIAL PARTICIPANT: ICD-10-CM

## 2020-03-26 DIAGNOSIS — R50.9 FEVER, UNSPECIFIED FEVER CAUSE: ICD-10-CM

## 2020-03-26 DIAGNOSIS — C25.0 CANCER OF HEAD OF PANCREAS: Primary | ICD-10-CM

## 2020-03-26 DIAGNOSIS — N18.30 CKD (CHRONIC KIDNEY DISEASE) STAGE 3, GFR 30-59 ML/MIN: ICD-10-CM

## 2020-03-26 DIAGNOSIS — T45.1X5A CHEMOTHERAPY INDUCED NEUTROPENIA: ICD-10-CM

## 2020-03-26 DIAGNOSIS — I82.90 VTE (VENOUS THROMBOEMBOLISM): ICD-10-CM

## 2020-03-26 DIAGNOSIS — D70.1 CHEMOTHERAPY INDUCED NEUTROPENIA: ICD-10-CM

## 2020-03-26 DIAGNOSIS — C80.1 MALIGNANT OBSTRUCTIVE JAUNDICE: ICD-10-CM

## 2020-03-26 PROCEDURE — 99213 OFFICE O/P EST LOW 20 MIN: CPT | Mod: 95,,, | Performed by: INTERNAL MEDICINE

## 2020-03-26 PROCEDURE — 99213 PR OFFICE/OUTPT VISIT, EST, LEVL III, 20-29 MIN: ICD-10-PCS | Mod: 95,,, | Performed by: INTERNAL MEDICINE

## 2020-03-26 NOTE — PROGRESS NOTES
Sponsor: [employer]  PI: Eliot Barrett MD  Study #: [id number]  WIRB: [id number]  IRB: 2018.096  Pt ID: -[id number]  ARM 1 w/ Salvage Therapy: TTFields + FOLFIRINOX (5FU, Irinotecan, Oxaliplatin)     PANOVA-3: Pivotal, randomized, open-label study of Tumor Treating Fields (TTFields, 150kHz) concomitant with gemcitabine and nab-paclitaxel for front-line treatment of locally-advanced pancreatic adenocarcinoma      Cycle 4 Day 1 - Delayed      Due to COVID-19 [facility], patient was transitioned to a virtual visit instead of in clinic visit. Patient reports no change in Baseline and screening issues as listed below.  She denies fever, vomiting, pain, diarrhea, constipation, headaches, and dizziness.       Physical exam not performed d/t COVID-19. ECOG 0 per Dr. Montenegro. Patient had labs yesterday done prior to todays appointment. Patients labs showed grade 4 GGT. Patients grade 2 ALT and grade 1 AST has resolved since last visit. Patients grade 1 neutropenic fever as resolved since last visit. Patient has had grade 1 Alkaline Phosphate since 2/29/2020. Patient states she has been feeling great past two weeks without chemo, but occasional fatigue from working in her yard and other daily activities. Patient will come in next week for labs the day before treatment; treatment pending if her labs are acceptable. Patient continues to wear TTFields without issue.      Reviewed current medications with patient, see Concomitant Medication sheet, and QOL's and VAS were completed over the phone.  Patient still taking Ciprofloxacin p.o. for GNR bacteremia.      Instructed patient to notify physician and/or me with any questions, concerns, or changes in health status. Patient verbalized understanding.     AEs  Grade 4 GGT  Grade 2 ALT- resolved  Garde 1 AST-resolved   Grade 1 Alkaline phosphate   Grade 1 Fever-resolved

## 2020-03-26 NOTE — PROGRESS NOTES
Oncology Telemedicine Follow Up Visit    The patient location is: home  The chief complaint leading to consultation is: Treatment planning for metastatic pancreatic cancer currently on PANOVA-3 trial  Visit type: Virtual visit with synchronous audio and video  Total time spent with patient: 15 minutes  Each patient to whom he or she provides medical services by telemedicine is:  (1) informed of the relationship between the physician and patient and the respective role of any other health care provider with respect to management of the patient; and (2) notified that he or she may decline to receive medical services by telemedicine and may withdraw from such care at any time.    Subjective:       Patient ID: Quyen Moody    Chief Complaint:  Pancreatic cancer    HPI     Quyen Moody is a 59 y.o. female, patient  to clinic for follow up and treatment on PANOVA-3 trial. She is here to discuss treatment options after progression in distant metastatic sites on FOLFIRINOX. Patient had recent admission for GNR bacteremia and neutropenic fever, now improving on po cipro.  She has 9 more days of abx  She is having difficulties with sleep- has night sweats. Feeling well, notes increased fatigue.     ECOG 0.    Oncologic History:  She has had intermittent upper abdominal pain since early June.  She presented to her PCP who originally ordered an US and CT.  US was negative and CT showed some haziness at the pancreatic head.  She saw GI who performed EUS.  On EUS, a 3x4cm pancreatic head mass was seen with possible invasion into the SMA and portal vein.  FNA path s/w pancreatic adenocarcinoma.  CA 19-9 level at outside hospital was >1000 per the patient.  She endorses nausea and inability to tolerate much PO.  She has lost about 10lbs over the last few weeks and is noticing her skin turning yellow.  Denies pruritis.  She is passing gas but has not had a BM in a few days, she attributes this to narcotic use.  She recently started  taking stool softeners.  No previous cardiac or stroke history.  Surgical history significant for open appendectomy when she was in her 20s  - 8/7/2019 - 1/9/2020: [place]+Abraxane and TTF on PANOVA-3, LFTs elevated, but improved.  - Based on CT 12/2019 scans, Dr. Hallman felt pt was potentially resectable after chemoXRT given good response to current therapy.  However, subsequent Chest CT   showed new and growing micronodules in the in lungs c/w met disease in light of rising tumor markers.    - 1/23/2020: Changed to FOLFIRINOX and con't TTF on PANOVA as this seems to be achieving good local control of the primary tumor.     Review of Systems   Constitutional: Positive for fatigue. Negative for activity change, appetite change, chills, fever and unexpected weight change (improving).   HENT: Negative for congestion, hearing loss, mouth sores, sore throat, tinnitus and voice change.    Eyes: Negative for pain and visual disturbance.   Respiratory: Negative for cough, shortness of breath and wheezing.    Cardiovascular: Positive for leg swelling (left). Negative for chest pain and palpitations.   Gastrointestinal: Positive for abdominal pain. Negative for constipation, diarrhea, nausea and vomiting.   Endocrine: Negative for cold intolerance and heat intolerance.   Genitourinary: Negative for difficulty urinating, dyspareunia, dysuria, frequency, menstrual problem, urgency, vaginal bleeding, vaginal discharge and vaginal pain.   Musculoskeletal: Negative for arthralgias, back pain and myalgias.   Skin: Positive for rash. Negative for color change and wound.   Allergic/Immunologic: Negative for environmental allergies and food allergies.   Neurological: Negative for weakness, numbness and headaches.   Hematological: Negative for adenopathy. Does not bruise/bleed easily.   Psychiatric/Behavioral: Positive for sleep disturbance. Negative for agitation, confusion and hallucinations. The patient is nervous/anxious.    All other  systems reviewed and are negative.        Allergies:  Review of patient's allergies indicates:   Allergen Reactions    Sulfa (sulfonamide antibiotics) Swelling and Hives     tongue         Medications:  Current Outpatient Medications   Medication Sig Dispense Refill    apixaban (ELIQUIS) 5 mg Tab Take 1 tablet (5 mg total) by mouth 2 (two) times daily. 60 tablet 6    ciprofloxacin HCl (CIPRO) 500 MG tablet Take 1 tablet (500 mg total) by mouth every 12 (twelve) hours. for 10 days 20 tablet 0    dexAMETHasone (DECADRON) 4 MG Tab Take 1 tablet (4 mg total) by mouth every 12 (twelve) hours. For 2 days following chemotherapy. 20 tablet 0    fentaNYL (DURAGESIC) 25 mcg/hr Place 1 patch onto the skin every 72 hours. 10 patch 0    HYDROcodone-acetaminophen (NORCO) 5-325 mg per tablet Take 1 tablet by mouth every 6 (six) hours as needed for Pain.      hydrocortisone 2.5 % cream Apply topically 2 (two) times daily. (Patient not taking: Reported on 1/9/2020) 453.6 g 1    lidocaine-prilocaine (EMLA) cream Apply to port 45 minutes prior to access. 30 g 0    LORazepam (ATIVAN) 0.5 MG tablet Take 1 tablet (0.5 mg total) by mouth every 6 (six) hours as needed for Anxiety (nausea). (Patient not taking: Reported on 3/19/2020) 60 tablet 0    multivitamin (THERAGRAN) per tablet Take 1 tablet by mouth once daily.      OLANZapine (ZYPREXA) 5 MG tablet Take 1 tablet (5 mg total) by mouth nightly. To prevent nausea (Patient not taking: Reported on 3/19/2020) 30 tablet 2    ondansetron (ZOFRAN) 8 MG tablet Take 1 tablet (8 mg total) by mouth every 8 (eight) hours as needed for Nausea. (Patient not taking: Reported on 3/19/2020) 120 tablet 2    oxyCODONE (ROXICODONE) 15 MG Tab Take 1 tablet (15 mg total) by mouth every 6 (six) hours as needed for Pain. (Patient not taking: Reported on 3/19/2020) 90 tablet 0     No current facility-administered medications for this visit.        PMH:  Past Medical History:   Diagnosis Date     Allergic rhinitis 3/3/2014    CKD (chronic kidney disease) stage 3, GFR 30-59 ml/min 12/26/2015    Hyperlipidemia 3/3/2014    Lung metastasis     Pancreas cancer     Skin rash 8/10/2019       PSH:  Past Surgical History:   Procedure Laterality Date    ERCP N/A 7/16/2019    Procedure: ERCP (ENDOSCOPIC RETROGRADE CHOLANGIOPANCREATOGRAPHY);  Surgeon: Chema Harris MD;  [facility]  Service: Endoscopy;  Laterality: N/A;    ERCP N/A 12/24/2019    Procedure: ERCP (ENDOSCOPIC RETROGRADE CHOLANGIOPANCREATOGRAPHY);  Surgeon: Chema Harris MD;  [facility]  Service: Endoscopy;  Laterality: N/A;    Exporatory lap      INSERTION OF TUNNELED CENTRAL VENOUS CATHETER (CVC) WITH SUBCUTANEOUS PORT Right 8/12/2019    Procedure: [id number];  Surgeon: Cesar Hallman MD;  [facility];  Service: General;  Laterality: Right;  right IJ       FamHx:  Family History   Problem Relation Age of Onset    Diabetes Mother     Diabetes Father     Diabetes Brother     Heart disease Neg Hx        SocHx:  Social History     Socioeconomic History    Marital status:      Spouse name: Not on file    Number of children: 0    Years of education: Not on file    Highest education level: Not on file   Occupational History    Occupation:      Employer: [employer]   Social Needs    Financial resource strain: Not on file    Food insecurity:     Worry: Not on file     Inability: Not on file    Transportation needs:     Medical: Not on file     Non-medical: Not on file   Tobacco Use    Smoking status: Former Smoker     Start date: 12/11/1999    Smokeless tobacco: Never Used   Substance and Sexual Activity    Alcohol use: Not Currently     Alcohol/week: 1.0 standard drinks     Types: 1 Glasses of wine per week     Frequency: 4 or more times a week     Drinks per session: 1 or 2     Binge frequency: Never     Comment: last drink a month ago     Drug use:  No    Sexual activity: Yes   Lifestyle    Physical activity:     Days per week: Not on file     Minutes per session: Not on file    Stress: Not on file   Relationships    Social connections:     Talks on phone: Not on file     Gets together: Not on file     Attends Pent[employer]al service: Not on file     Active member of club or organization: Not on file     Attends meetings of clubs or organizations: Not on file     Relationship status: Not on file   Other Topics Concern    Not on file   Social History Narrative    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      of Cancer in 2016         Objective:       There were no vitals filed for this visit.    Physical Exam      LABS:  WBC   Date Value Ref Range Status   2020 8.83 3.90 - 12.70 K/uL Final     Hemoglobin   Date Value Ref Range Status   2020 11.0 (L) 12.0 - 16.0 g/dL Final     Hematocrit   Date Value Ref Range Status   2020 35.9 (L) 37.0 - 48.5 % Final     Platelets   Date Value Ref Range Status   2020 240 150 - 350 K/uL Final       Chemistry        Component Value Date/Time     2020 1440    K 4.5 2020 1440     2020 1440    CO2 24 2020 1440    BUN 9 2020 1440    CREATININE 0.8 2020 1440     2020 1440        Component Value Date/Time    CALCIUM 8.7 2020 1440    ALKPHOS 533 (H) 2020 1440    AST 85 (H) 2020 1440     (H) 2020 1440    BILITOT 0.6 2020 1440    ESTGFRAFRICA >60.0 2020 1440    EGFRNONAA >60.0 2020 1440            Assessment:       1. Cancer of head of pancreas    2. Malignant neoplasm metastatic to lung, unspecified laterality    3. Clinical trial participant    4. Chemotherapy induced neutropenia    5. Fever, unspecified fever cause    6. VTE (venous thromboembolism)    7. Malignant obstructive jaundice    8. CKD (chronic kidney disease) stage 3, GFR 30-59 ml/min            Plan:         1,2, 3- Cancer at the head of  the pancreas:    Patient now has progression in distant lesions, but continued control of pancreatic mass.  She was admitted with problem 4,5.  With elevated LFTs, not a candidate for Genzada at this time.  Adding 5-FU/leucovorin bolus back to FOLFIRINOX and continuing TTF.  Will delay treatment 1 week bc of recent bacteremia diagnosis.    4,5: Found to have GNR bacteremia and now on po cipr, 9 days left.  No respiratory symptoms concerning for COVID-19, but told to stay isolated and to call clinic if any respiratory symptoms develop such as SOB or cough.  We will order testing at that time.  She was instructed on where to go for drive through testing if this occurs.    6- On Eliquis.    7- Patient has worsening ALP and GGT but normal bilirubin also with continued elevation in LFTs, but stable.  Will repeat in 1 week before treatment, may need US to evaluate stent if worse in 1 week.    Patient is in agreement with the proposed treatment plan. All questions were answered to the patient's satisfaction. Pt knows to call clinic if anything is needed before the next clinic visit.    Ashish Montenegro MD  Medical Oncology  [facility] and [facility]

## 2020-03-31 ENCOUNTER — RESEARCH ENCOUNTER (OUTPATIENT)
Dept: RESEARCH | Facility: HOSPITAL | Age: 60
End: 2020-03-31

## 2020-03-31 ENCOUNTER — OFFICE VISIT (OUTPATIENT)
Dept: HEMATOLOGY/ONCOLOGY | Facility: CLINIC | Age: 60
End: 2020-03-31
Payer: COMMERCIAL

## 2020-03-31 ENCOUNTER — LAB VISIT (OUTPATIENT)
Dept: LAB | Facility: HOSPITAL | Age: 60
End: 2020-03-31
Attending: INTERNAL MEDICINE
Payer: COMMERCIAL

## 2020-03-31 ENCOUNTER — PATIENT MESSAGE (OUTPATIENT)
Dept: HEMATOLOGY/ONCOLOGY | Facility: CLINIC | Age: 60
End: 2020-03-31

## 2020-03-31 DIAGNOSIS — N18.30 CKD (CHRONIC KIDNEY DISEASE) STAGE 3, GFR 30-59 ML/MIN: ICD-10-CM

## 2020-03-31 DIAGNOSIS — K83.1 MALIGNANT OBSTRUCTIVE JAUNDICE: ICD-10-CM

## 2020-03-31 DIAGNOSIS — C78.00 MALIGNANT NEOPLASM METASTATIC TO LUNG, UNSPECIFIED LATERALITY: ICD-10-CM

## 2020-03-31 DIAGNOSIS — I82.90 VTE (VENOUS THROMBOEMBOLISM): ICD-10-CM

## 2020-03-31 DIAGNOSIS — C25.9 PANCREATIC CANCER: ICD-10-CM

## 2020-03-31 DIAGNOSIS — T45.1X5A CHEMOTHERAPY INDUCED NAUSEA AND VOMITING: ICD-10-CM

## 2020-03-31 DIAGNOSIS — D70.1 CHEMOTHERAPY INDUCED NEUTROPENIA: ICD-10-CM

## 2020-03-31 DIAGNOSIS — Z00.6 EXAMINATION OF PARTICIPANT IN CLINICAL TRIAL: ICD-10-CM

## 2020-03-31 DIAGNOSIS — T45.1X5A CHEMOTHERAPY INDUCED NEUTROPENIA: ICD-10-CM

## 2020-03-31 DIAGNOSIS — C80.1 MALIGNANT OBSTRUCTIVE JAUNDICE: ICD-10-CM

## 2020-03-31 DIAGNOSIS — Z00.6 RESEARCH STUDY PATIENT: ICD-10-CM

## 2020-03-31 DIAGNOSIS — G89.3 NEOPLASM RELATED PAIN (ACUTE) (CHRONIC): ICD-10-CM

## 2020-03-31 DIAGNOSIS — R11.2 CHEMOTHERAPY INDUCED NAUSEA AND VOMITING: ICD-10-CM

## 2020-03-31 DIAGNOSIS — Z00.6 CLINICAL TRIAL PARTICIPANT: ICD-10-CM

## 2020-03-31 DIAGNOSIS — C25.0 CANCER OF HEAD OF PANCREAS: ICD-10-CM

## 2020-03-31 DIAGNOSIS — C25.0 CANCER OF HEAD OF PANCREAS: Primary | ICD-10-CM

## 2020-03-31 LAB
ALBUMIN SERPL BCP-MCNC: 2.8 G/DL (ref 3.5–5.2)
ALP SERPL-CCNC: 649 U/L (ref 55–135)
ALT SERPL W/O P-5'-P-CCNC: 155 U/L (ref 10–44)
ANION GAP SERPL CALC-SCNC: 8 MMOL/L (ref 8–16)
AST SERPL-CCNC: 125 U/L (ref 10–40)
BASOPHILS # BLD AUTO: 0.07 K/UL (ref 0–0.2)
BASOPHILS NFR BLD: 0.8 % (ref 0–1.9)
BILIRUB SERPL-MCNC: 1.3 MG/DL (ref 0.1–1)
BUN SERPL-MCNC: 9 MG/DL (ref 6–20)
CALCIUM SERPL-MCNC: 8.7 MG/DL (ref 8.7–10.5)
CANCER AG19-9 SERPL-ACNC: 3701 U/ML (ref 2–40)
CHLORIDE SERPL-SCNC: 109 MMOL/L (ref 95–110)
CO2 SERPL-SCNC: 22 MMOL/L (ref 23–29)
CREAT SERPL-MCNC: 0.7 MG/DL (ref 0.5–1.4)
DIFFERENTIAL METHOD: ABNORMAL
EOSINOPHIL # BLD AUTO: 0.2 K/UL (ref 0–0.5)
EOSINOPHIL NFR BLD: 1.9 % (ref 0–8)
ERYTHROCYTE [DISTWIDTH] IN BLOOD BY AUTOMATED COUNT: 19.8 % (ref 11.5–14.5)
EST. GFR  (AFRICAN AMERICAN): >60 ML/MIN/1.73 M^2
EST. GFR  (NON AFRICAN AMERICAN): >60 ML/MIN/1.73 M^2
GGT SERPL-CCNC: 1799 U/L (ref 8–55)
GLUCOSE SERPL-MCNC: 109 MG/DL (ref 70–110)
HCT VFR BLD AUTO: 36.5 % (ref 37–48.5)
HGB BLD-MCNC: 11.3 G/DL (ref 12–16)
IMM GRANULOCYTES # BLD AUTO: 0.02 K/UL (ref 0–0.04)
IMM GRANULOCYTES NFR BLD AUTO: 0.2 % (ref 0–0.5)
LDH SERPL L TO P-CCNC: 226 U/L (ref 110–260)
LYMPHOCYTES # BLD AUTO: 1.2 K/UL (ref 1–4.8)
LYMPHOCYTES NFR BLD: 13.1 % (ref 18–48)
MCH RBC QN AUTO: 28.3 PG (ref 27–31)
MCHC RBC AUTO-ENTMCNC: 31 G/DL (ref 32–36)
MCV RBC AUTO: 91 FL (ref 82–98)
MONOCYTES # BLD AUTO: 1.3 K/UL (ref 0.3–1)
MONOCYTES NFR BLD: 14 % (ref 4–15)
NEUTROPHILS # BLD AUTO: 6.3 K/UL (ref 1.8–7.7)
NEUTROPHILS NFR BLD: 70 % (ref 38–73)
NRBC BLD-RTO: 0 /100 WBC
PLATELET # BLD AUTO: 366 K/UL (ref 150–350)
PMV BLD AUTO: 10.5 FL (ref 9.2–12.9)
POTASSIUM SERPL-SCNC: 4.3 MMOL/L (ref 3.5–5.1)
PROT SERPL-MCNC: 6.9 G/DL (ref 6–8.4)
RBC # BLD AUTO: 4 M/UL (ref 4–5.4)
SODIUM SERPL-SCNC: 139 MMOL/L (ref 136–145)
WBC # BLD AUTO: 8.98 K/UL (ref 3.9–12.7)

## 2020-03-31 PROCEDURE — 80053 COMPREHEN METABOLIC PANEL: CPT

## 2020-03-31 PROCEDURE — 86301 IMMUNOASSAY TUMOR CA 19-9: CPT

## 2020-03-31 PROCEDURE — 99213 PR OFFICE/OUTPT VISIT, EST, LEVL III, 20-29 MIN: ICD-10-PCS | Mod: 95,,, | Performed by: INTERNAL MEDICINE

## 2020-03-31 PROCEDURE — 83615 LACTATE (LD) (LDH) ENZYME: CPT

## 2020-03-31 PROCEDURE — 85025 COMPLETE CBC W/AUTO DIFF WBC: CPT

## 2020-03-31 PROCEDURE — 36415 COLL VENOUS BLD VENIPUNCTURE: CPT

## 2020-03-31 PROCEDURE — 82977 ASSAY OF GGT: CPT

## 2020-03-31 PROCEDURE — 99213 OFFICE O/P EST LOW 20 MIN: CPT | Mod: 95,,, | Performed by: INTERNAL MEDICINE

## 2020-03-31 RX ORDER — HEPARIN 100 UNIT/ML
500 SYRINGE INTRAVENOUS
Status: CANCELLED | OUTPATIENT
Start: 2020-04-02

## 2020-03-31 RX ORDER — DIPHENHYDRAMINE HYDROCHLORIDE 50 MG/ML
50 INJECTION INTRAMUSCULAR; INTRAVENOUS ONCE AS NEEDED
Status: CANCELLED | OUTPATIENT
Start: 2020-03-31

## 2020-03-31 RX ORDER — SODIUM CHLORIDE 0.9 % (FLUSH) 0.9 %
10 SYRINGE (ML) INJECTION
Status: CANCELLED | OUTPATIENT
Start: 2020-04-02

## 2020-03-31 RX ORDER — SODIUM CHLORIDE 0.9 % (FLUSH) 0.9 %
10 SYRINGE (ML) INJECTION
Status: CANCELLED | OUTPATIENT
Start: 2020-03-31

## 2020-03-31 RX ORDER — ATROPINE SULFATE 0.4 MG/ML
0.4 INJECTION, SOLUTION ENDOTRACHEAL; INTRAMEDULLARY; INTRAMUSCULAR; INTRAVENOUS; SUBCUTANEOUS ONCE AS NEEDED
Status: CANCELLED | OUTPATIENT
Start: 2020-03-31

## 2020-03-31 RX ORDER — DEXAMETHASONE 4 MG/1
4 TABLET ORAL EVERY 12 HOURS
Qty: 20 TABLET | Refills: 0 | Status: ON HOLD | OUTPATIENT
Start: 2020-03-31 | End: 2020-06-20 | Stop reason: HOSPADM

## 2020-03-31 RX ORDER — FLUOROURACIL 50 MG/ML
400 INJECTION, SOLUTION INTRAVENOUS
Status: CANCELLED
Start: 2020-03-31

## 2020-03-31 RX ORDER — EPINEPHRINE 0.3 MG/.3ML
0.3 INJECTION SUBCUTANEOUS ONCE AS NEEDED
Status: CANCELLED | OUTPATIENT
Start: 2020-03-31

## 2020-03-31 RX ORDER — HEPARIN 100 UNIT/ML
500 SYRINGE INTRAVENOUS
Status: CANCELLED | OUTPATIENT
Start: 2020-03-31

## 2020-03-31 RX ORDER — OLANZAPINE 10 MG/1
10 TABLET ORAL DAILY
Status: CANCELLED
Start: 2020-03-31

## 2020-03-31 NOTE — PROGRESS NOTES
Oncology Telemedicine Follow Up Visit    The patient location is: home  The chief complaint leading to consultation is: Treatment planning for metastatic pancreatic cancer currently on PANOVA-3 trial  Visit type: Virtual visit with synchronous audio and video  Total time spent with patient: 15 minutes  Each patient to whom he or she provides medical services by telemedicine is:  (1) informed of the relationship between the physician and patient and the respective role of any other health care provider with respect to management of the patient; and (2) notified that he or she may decline to receive medical services by telemedicine and may withdraw from such care at any time.    Subjective:       Patient ID: Quyen Moody    Chief Complaint:  Pancreatic cancer    HPI     Quyen Moody is a 59 y.o. female, patient  to clinic for follow up and treatment on PANOVA-3 trial. She is here to discuss treatment options after progression in distant metastatic sites on FOLFIRINOX. Patient had recent admission for GNR bacteremia and neutropenic fever, now improving on po cipro.  She has 9 more days of abx  She is having difficulties with sleep- has night sweats. Feeling well, notes increased fatigue.     ECOG 0.    Oncologic History:  She has had intermittent upper abdominal pain since early June.  She presented to her PCP who originally ordered an US and CT.  US was negative and CT showed some haziness at the pancreatic head.  She saw GI who performed EUS.  On EUS, a 3x4cm pancreatic head mass was seen with possible invasion into the SMA and portal vein.  FNA path s/w pancreatic adenocarcinoma.  CA 19-9 level at outside hospital was >1000 per the patient.  She endorses nausea and inability to tolerate much PO.  She has lost about 10lbs over the last few weeks and is noticing her skin turning yellow.  Denies pruritis.  She is passing gas but has not had a BM in a few days, she attributes this to narcotic use.  She recently started  taking stool softeners.  No previous cardiac or stroke history.  Surgical history significant for open appendectomy when she was in her 20s  - 8/7/2019 - 1/9/2020: [place]+Abraxane and TTF on PANOVA-3, LFTs elevated, but improved.  - Based on CT 12/2019 scans, Dr. Hallman felt pt was potentially resectable after chemoXRT given good response to current therapy.  However, subsequent Chest CT   showed new and growing micronodules in the in lungs c/w met disease in light of rising tumor markers.    - 1/23/2020: Changed to FOLFIRINOX and con't TTF on PANOVA as this seems to be achieving good local control of the primary tumor.     Review of Systems   Constitutional: Positive for fatigue. Negative for activity change, appetite change, chills, fever and unexpected weight change (improving).   HENT: Negative for congestion, hearing loss, mouth sores, sore throat, tinnitus and voice change.    Eyes: Negative for pain and visual disturbance.   Respiratory: Negative for cough, shortness of breath and wheezing.    Cardiovascular: Positive for leg swelling (left). Negative for chest pain and palpitations.   Gastrointestinal: Positive for abdominal pain. Negative for constipation, diarrhea, nausea and vomiting.   Endocrine: Negative for cold intolerance and heat intolerance.   Genitourinary: Negative for difficulty urinating, dyspareunia, dysuria, frequency, menstrual problem, urgency, vaginal bleeding, vaginal discharge and vaginal pain.   Musculoskeletal: Negative for arthralgias, back pain and myalgias.   Skin: Positive for rash. Negative for color change and wound.   Allergic/Immunologic: Negative for environmental allergies and food allergies.   Neurological: Negative for weakness, numbness and headaches.   Hematological: Negative for adenopathy. Does not bruise/bleed easily.   Psychiatric/Behavioral: Positive for sleep disturbance. Negative for agitation, confusion and hallucinations. The patient is nervous/anxious.    All other  systems reviewed and are negative.        Allergies:  Review of patient's allergies indicates:   Allergen Reactions    Sulfa (sulfonamide antibiotics) Swelling and Hives     tongue         Medications:  Current Outpatient Medications   Medication Sig Dispense Refill    apixaban (ELIQUIS) 5 mg Tab Take 1 tablet (5 mg total) by mouth 2 (two) times daily. 60 tablet 6    ciprofloxacin HCl (CIPRO) 500 MG tablet Take 1 tablet (500 mg total) by mouth every 12 (twelve) hours. for 10 days 20 tablet 0    dexAMETHasone (DECADRON) 4 MG Tab Take 1 tablet (4 mg total) by mouth every 12 (twelve) hours. For 2 days following chemotherapy. 20 tablet 0    fentaNYL (DURAGESIC) 25 mcg/hr Place 1 patch onto the skin every 72 hours. 10 patch 0    HYDROcodone-acetaminophen (NORCO) 5-325 mg per tablet Take 1 tablet by mouth every 6 (six) hours as needed for Pain.      hydrocortisone 2.5 % cream Apply topically 2 (two) times daily. (Patient not taking: Reported on 1/9/2020) 453.6 g 1    lidocaine-prilocaine (EMLA) cream Apply to port 45 minutes prior to access. 30 g 0    LORazepam (ATIVAN) 0.5 MG tablet Take 1 tablet (0.5 mg total) by mouth every 6 (six) hours as needed for Anxiety (nausea). (Patient not taking: Reported on 3/19/2020) 60 tablet 0    multivitamin (THERAGRAN) per tablet Take 1 tablet by mouth once daily.      OLANZapine (ZYPREXA) 5 MG tablet Take 1 tablet (5 mg total) by mouth nightly. To prevent nausea (Patient not taking: Reported on 3/19/2020) 30 tablet 2    ondansetron (ZOFRAN) 8 MG tablet Take 1 tablet (8 mg total) by mouth every 8 (eight) hours as needed for Nausea. (Patient not taking: Reported on 3/19/2020) 120 tablet 2    oxyCODONE (ROXICODONE) 15 MG Tab Take 1 tablet (15 mg total) by mouth every 6 (six) hours as needed for Pain. (Patient not taking: Reported on 3/19/2020) 90 tablet 0     No current facility-administered medications for this visit.        PMH:  Past Medical History:   Diagnosis Date     Allergic rhinitis 3/3/2014    CKD (chronic kidney disease) stage 3, GFR 30-59 ml/min 12/26/2015    Hyperlipidemia 3/3/2014    Lung metastasis     Pancreas cancer     Skin rash 8/10/2019       PSH:  Past Surgical History:   Procedure Laterality Date    ERCP N/A 7/16/2019    Procedure: ERCP (ENDOSCOPIC RETROGRADE CHOLANGIOPANCREATOGRAPHY);  Surgeon: Chema Harris MD;  [facility]  Service: Endoscopy;  Laterality: N/A;    ERCP N/A 12/24/2019    Procedure: ERCP (ENDOSCOPIC RETROGRADE CHOLANGIOPANCREATOGRAPHY);  Surgeon: Chema Harris MD;  [facility]  Service: Endoscopy;  Laterality: N/A;    Exporatory lap      INSERTION OF TUNNELED CENTRAL VENOUS CATHETER (CVC) WITH SUBCUTANEOUS PORT Right 8/12/2019    Procedure: [id number];  Surgeon: Cesar Hallman MD;  [facility];  Service: General;  Laterality: Right;  right IJ       FamHx:  Family History   Problem Relation Age of Onset    Diabetes Mother     Diabetes Father     Diabetes Brother     Heart disease Neg Hx        SocHx:  Social History     Socioeconomic History    Marital status:      Spouse name: Not on file    Number of children: 0    Years of education: Not on file    Highest education level: Not on file   Occupational History    Occupation:      Employer: [employer]   Social Needs    Financial resource strain: Not on file    Food insecurity:     Worry: Not on file     Inability: Not on file    Transportation needs:     Medical: Not on file     Non-medical: Not on file   Tobacco Use    Smoking status: Former Smoker     Start date: 12/11/1999    Smokeless tobacco: Never Used   Substance and Sexual Activity    Alcohol use: Not Currently     Alcohol/week: 1.0 standard drinks     Types: 1 Glasses of wine per week     Frequency: 4 or more times a week     Drinks per session: 1 or 2     Binge frequency: Never     Comment: last drink a month ago     Drug use:  No    Sexual activity: Yes   Lifestyle    Physical activity:     Days per week: Not on file     Minutes per session: Not on file    Stress: Not on file   Relationships    Social connections:     Talks on phone: Not on file     Gets together: Not on file     Attends Cheondoism service: Not on file     Active member of club or organization: Not on file     Attends meetings of clubs or organizations: Not on file     Relationship status: Not on file   Other Topics Concern    Not on file   Social History Narrative    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      of Cancer in 2016         Objective:       There were no vitals filed for this visit.    Physical Exam      LABS:  WBC   Date Value Ref Range Status   2020 8.98 3.90 - 12.70 K/uL Final     Hemoglobin   Date Value Ref Range Status   2020 11.3 (L) 12.0 - 16.0 g/dL Final     Hematocrit   Date Value Ref Range Status   2020 36.5 (L) 37.0 - 48.5 % Final     Platelets   Date Value Ref Range Status   2020 366 (H) 150 - 350 K/uL Final       Chemistry        Component Value Date/Time     2020 1035    K 4.3 2020 1035     2020 1035    CO2 22 (L) 2020 1035    BUN 9 2020 1035    CREATININE 0.7 2020 1035     2020 1035        Component Value Date/Time    CALCIUM 8.7 2020 1035    ALKPHOS 649 (H) 2020 1035     (H) 2020 1035     (H) 2020 1035    BILITOT 1.3 (H) 2020 1035    ESTGFRAFRICA >60.0 2020 1035    EGFRNONAA >60.0 2020 1035            Assessment:       1. Cancer of head of pancreas    2. Malignant neoplasm metastatic to lung, unspecified laterality    3. Clinical trial participant    4. Chemotherapy induced neutropenia    5. VTE (venous thromboembolism)    6. Malignant obstructive jaundice    7. CKD (chronic kidney disease) stage 3, GFR 30-59 ml/min            Plan:         1,2, 3- Cancer at the head of the pancreas:    Patient  now has progression in distant lesions, but continued control of pancreatic mass.  She was admitted with problem 4,5.  With elevated LFTs, not a candidate for Genzada at this time.  Adding 5-FU/leucovorin bolus back to FOLFIRINOX and continuing TTF.  Continue with treatment tomorrow, dose reduced irinotecan per LFTs    4: Found to have GNR bacteremia and now on po cipr, will complete soon.  No respiratory symptoms concerning for COVID-19, but told to stay isolated and to call clinic if any respiratory symptoms develop such as SOB or cough.  We will order testing at that time.  She was instructed on where to go for drive through testing if this occurs.    5- On Eliquis.    6- Patient has slightly worsening of ALP and GGT and bilirubin, but other LFTs stable and still safe for chemo.  May need MRCP vs ERCP to evaluate stent if continues to worsen    Patient is in agreement with the proposed treatment plan. All questions were answered to the patient's satisfaction. Pt knows to call clinic if anything is needed before the next clinic visit.    Ashish Montenegro MD  Medical Oncology  [facility] and [facility]

## 2020-04-01 ENCOUNTER — INFUSION (OUTPATIENT)
Dept: INFUSION THERAPY | Facility: HOSPITAL | Age: 60
End: 2020-04-01
Attending: INTERNAL MEDICINE
Payer: COMMERCIAL

## 2020-04-01 VITALS
BODY MASS INDEX: 24.3 KG/M2 | DIASTOLIC BLOOD PRESSURE: 71 MMHG | TEMPERATURE: 98 F | OXYGEN SATURATION: 97 % | SYSTOLIC BLOOD PRESSURE: 133 MMHG | RESPIRATION RATE: 18 BRPM | WEIGHT: 132.06 LBS | HEIGHT: 62 IN | HEART RATE: 78 BPM

## 2020-04-01 DIAGNOSIS — C78.00 MALIGNANT NEOPLASM METASTATIC TO LUNG, UNSPECIFIED LATERALITY: Primary | ICD-10-CM

## 2020-04-01 DIAGNOSIS — C25.0 CANCER OF HEAD OF PANCREAS: ICD-10-CM

## 2020-04-01 PROCEDURE — 96367 TX/PROPH/DG ADDL SEQ IV INF: CPT

## 2020-04-01 PROCEDURE — 63600175 PHARM REV CODE 636 W HCPCS: Performed by: INTERNAL MEDICINE

## 2020-04-01 PROCEDURE — 96413 CHEMO IV INFUSION 1 HR: CPT

## 2020-04-01 PROCEDURE — 96411 CHEMO IV PUSH ADDL DRUG: CPT

## 2020-04-01 PROCEDURE — 96375 TX/PRO/DX INJ NEW DRUG ADDON: CPT

## 2020-04-01 PROCEDURE — 96368 THER/DIAG CONCURRENT INF: CPT

## 2020-04-01 PROCEDURE — 96416 CHEMO PROLONG INFUSE W/PUMP: CPT

## 2020-04-01 PROCEDURE — 96415 CHEMO IV INFUSION ADDL HR: CPT

## 2020-04-01 PROCEDURE — 96417 CHEMO IV INFUS EACH ADDL SEQ: CPT

## 2020-04-01 PROCEDURE — 25000003 PHARM REV CODE 250: Performed by: INTERNAL MEDICINE

## 2020-04-01 RX ORDER — DIPHENHYDRAMINE HYDROCHLORIDE 50 MG/ML
50 INJECTION INTRAMUSCULAR; INTRAVENOUS ONCE AS NEEDED
Status: DISCONTINUED | OUTPATIENT
Start: 2020-04-01 | End: 2020-04-01 | Stop reason: HOSPADM

## 2020-04-01 RX ORDER — ATROPINE SULFATE 0.4 MG/ML
0.4 INJECTION, SOLUTION ENDOTRACHEAL; INTRAMEDULLARY; INTRAMUSCULAR; INTRAVENOUS; SUBCUTANEOUS ONCE AS NEEDED
Status: DISCONTINUED | OUTPATIENT
Start: 2020-04-01 | End: 2020-04-01 | Stop reason: HOSPADM

## 2020-04-01 RX ORDER — EPINEPHRINE 0.3 MG/.3ML
0.3 INJECTION SUBCUTANEOUS ONCE AS NEEDED
Status: DISCONTINUED | OUTPATIENT
Start: 2020-04-01 | End: 2020-04-01 | Stop reason: HOSPADM

## 2020-04-01 RX ORDER — FLUOROURACIL 50 MG/ML
400 INJECTION, SOLUTION INTRAVENOUS
Status: COMPLETED | OUTPATIENT
Start: 2020-04-01 | End: 2020-04-01

## 2020-04-01 RX ORDER — OLANZAPINE 10 MG/1
10 TABLET ORAL DAILY
Status: DISCONTINUED | OUTPATIENT
Start: 2020-04-01 | End: 2020-04-01 | Stop reason: HOSPADM

## 2020-04-01 RX ORDER — SODIUM CHLORIDE 0.9 % (FLUSH) 0.9 %
10 SYRINGE (ML) INJECTION
Status: DISCONTINUED | OUTPATIENT
Start: 2020-04-01 | End: 2020-04-01 | Stop reason: HOSPADM

## 2020-04-01 RX ORDER — HEPARIN 100 UNIT/ML
500 SYRINGE INTRAVENOUS
Status: DISCONTINUED | OUTPATIENT
Start: 2020-04-01 | End: 2020-04-01 | Stop reason: HOSPADM

## 2020-04-01 RX ADMIN — DEXTROSE: 50 INJECTION, SOLUTION INTRAVENOUS at 10:04

## 2020-04-01 RX ADMIN — LEUCOVORIN CALCIUM 35 MG: 200 INJECTION, POWDER, LYOPHILIZED, FOR SOLUTION INTRAMUSCULAR; INTRAVENOUS at 01:04

## 2020-04-01 RX ADMIN — OLANZAPINE 10 MG: 10 TABLET, FILM COATED ORAL at 10:04

## 2020-04-01 RX ADMIN — DEXAMETHASONE SODIUM PHOSPHATE: 4 INJECTION, SOLUTION INTRAMUSCULAR; INTRAVENOUS at 10:04

## 2020-04-01 RX ADMIN — FLUOROURACIL 3910 MG: 50 INJECTION, SOLUTION INTRAVENOUS at 02:04

## 2020-04-01 RX ADMIN — IRINOTECAN HYDROCHLORIDE 244 MG: 20 INJECTION, SOLUTION INTRAVENOUS at 01:04

## 2020-04-01 RX ADMIN — FLUOROURACIL 650 MG: 50 INJECTION, SOLUTION INTRAVENOUS at 02:04

## 2020-04-01 RX ADMIN — OXALIPLATIN 139 MG: 5 INJECTION, SOLUTION, CONCENTRATE INTRAVENOUS at 10:04

## 2020-04-01 RX ADMIN — SODIUM CHLORIDE: 9 INJECTION, SOLUTION INTRAVENOUS at 10:04

## 2020-04-01 RX ADMIN — APREPITANT 130 MG: 130 INJECTION, EMULSION INTRAVENOUS at 10:04

## 2020-04-01 NOTE — PLAN OF CARE
Pt tolerated Folfirinox with no complications. VSS. Pt instructed to call MD with any problems and RTC on Friday at 12:45p for pump dc. NAD. Pt discharged home independently.

## 2020-04-03 ENCOUNTER — INFUSION (OUTPATIENT)
Dept: INFUSION THERAPY | Facility: HOSPITAL | Age: 60
End: 2020-04-03
Attending: INTERNAL MEDICINE
Payer: COMMERCIAL

## 2020-04-03 VITALS
DIASTOLIC BLOOD PRESSURE: 63 MMHG | HEART RATE: 69 BPM | RESPIRATION RATE: 18 BRPM | SYSTOLIC BLOOD PRESSURE: 137 MMHG | TEMPERATURE: 98 F

## 2020-04-03 DIAGNOSIS — C78.00 MALIGNANT NEOPLASM METASTATIC TO LUNG, UNSPECIFIED LATERALITY: Primary | ICD-10-CM

## 2020-04-03 DIAGNOSIS — C25.0 CANCER OF HEAD OF PANCREAS: ICD-10-CM

## 2020-04-03 PROCEDURE — 25000003 PHARM REV CODE 250: Performed by: INTERNAL MEDICINE

## 2020-04-03 PROCEDURE — 63600175 PHARM REV CODE 636 W HCPCS: Performed by: INTERNAL MEDICINE

## 2020-04-03 PROCEDURE — A4216 STERILE WATER/SALINE, 10 ML: HCPCS | Performed by: INTERNAL MEDICINE

## 2020-04-03 PROCEDURE — 96523 IRRIG DRUG DELIVERY DEVICE: CPT

## 2020-04-03 RX ORDER — HEPARIN 100 UNIT/ML
500 SYRINGE INTRAVENOUS
Status: DISCONTINUED | OUTPATIENT
Start: 2020-04-03 | End: 2020-04-03 | Stop reason: HOSPADM

## 2020-04-03 RX ORDER — SODIUM CHLORIDE 0.9 % (FLUSH) 0.9 %
10 SYRINGE (ML) INJECTION
Status: DISCONTINUED | OUTPATIENT
Start: 2020-04-03 | End: 2020-04-03 | Stop reason: HOSPADM

## 2020-04-03 RX ADMIN — Medication 10 ML: at 12:04

## 2020-04-03 RX ADMIN — HEPARIN 500 UNITS: 100 SYRINGE at 12:04

## 2020-04-03 NOTE — NURSING
1240  Pt here for pump d/c, PAC flush, no new complaints or concerns at present; ambulatory infusion completed, pt tolerated well; pt instructed to remain well hydrated, reviewed handwashing, infection prevention; discussed when to contact MD, when to report to ER; AVS declined, pt verbalized understanding of all discussed and when to report next

## 2020-04-08 ENCOUNTER — PATIENT MESSAGE (OUTPATIENT)
Dept: HEMATOLOGY/ONCOLOGY | Facility: CLINIC | Age: 60
End: 2020-04-08

## 2020-04-08 DIAGNOSIS — G89.3 NEOPLASM RELATED PAIN (ACUTE) (CHRONIC): ICD-10-CM

## 2020-04-09 RX ORDER — FENTANYL 25 UG/1
1 PATCH TRANSDERMAL
Qty: 10 PATCH | Refills: 0 | Status: SHIPPED | OUTPATIENT
Start: 2020-04-09 | End: 2020-05-01 | Stop reason: SDUPTHER

## 2020-04-14 DIAGNOSIS — Z13.9 SCREENING FOR CONDITION: Primary | ICD-10-CM

## 2020-04-14 RX ORDER — ESTRADIOL AND NORETHINDRONE ACETATE 1; .5 MG/1; MG/1
0.5 TABLET ORAL DAILY
COMMUNITY
Start: 2019-07-30 | End: 2020-08-03 | Stop reason: CLARIF

## 2020-04-14 NOTE — PROGRESS NOTES
Oncology Telemedicine Follow Up Visit    The patient location is: home (due to COVID global pandemic)   The chief complaint leading to consultation is: Treatment planning for metastatic pancreatic cancer currently on PANOVA-3 trial  Visit type: Virtual visit with synchronous audio and video  Total time spent with patient: 25 minutes  Each patient to whom he or she provides medical services by telemedicine is:  (1) informed of the relationship between the physician and patient and the respective role of any other health care provider with respect to management of the patient; and (2) notified that he or she may decline to receive medical services by telemedicine and may withdraw from such care at any time.    Subjective:       Patient ID: Quyen Moody    Chief Complaint:  Pancreatic cancer    HPI     Quyen Moody is a 59 y.o. female, patient  to clinic for follow up and treatment on PANOVA-3 trial. She is here to discuss treatment options after progression in distant metastatic sites on modified FOLFIRINOX.  Recently started full dose FOLFIRINOX (leucovorin and bolus 5FU added) without any additional toxicity.      ECOG 0.    Oncologic History:  She has had intermittent upper abdominal pain since early June.  She presented to her PCP who originally ordered an US and CT.  US was negative and CT showed some haziness at the pancreatic head.  She saw GI who performed EUS.  On EUS, a 3x4cm pancreatic head mass was seen with possible invasion into the SMA and portal vein.  FNA path s/w pancreatic adenocarcinoma.  CA 19-9 level at outside hospital was >1000 per the patient.  She endorses nausea and inability to tolerate much PO.  She has lost about 10lbs over the last few weeks and is noticing her skin turning yellow.  Denies pruritis.  She is passing gas but has not had a BM in a few days, she attributes this to narcotic use.  She recently started taking stool softeners.  No previous cardiac or stroke history.  Surgical  history significant for open appendectomy when she was in her 20s  - 8/7/2019 - 1/9/2020: Horton+Abraxane and TTF on PANOVA-3, LFTs elevated, but improved.  - Based on CT 12/2019 scans, Dr. Hallman felt pt was potentially resectable after chemoXRT given good response to current therapy.  However, subsequent Chest CT   showed new and growing micronodules in the in lungs c/w met disease in light of rising tumor markers.    - 1/23/2020: Changed to FOLFIRINOX and con't TTF on PANOVA as this seems to be achieving good local control of the primary tumor.     Review of Systems   Constitutional: Positive for fatigue. Negative for activity change, appetite change, chills, fever and unexpected weight change (improving).   HENT: Negative for congestion, hearing loss, mouth sores, sore throat, tinnitus and voice change.    Eyes: Negative for pain and visual disturbance.   Respiratory: Negative for cough, shortness of breath and wheezing.    Cardiovascular: Positive for leg swelling (left). Negative for chest pain and palpitations.   Gastrointestinal: Positive for abdominal pain. Negative for constipation, diarrhea, nausea and vomiting.   Endocrine: Negative for cold intolerance and heat intolerance.   Genitourinary: Negative for difficulty urinating, dyspareunia, dysuria, frequency, menstrual problem, urgency, vaginal bleeding, vaginal discharge and vaginal pain.   Musculoskeletal: Negative for arthralgias, back pain and myalgias.   Skin: Positive for rash. Negative for color change and wound.   Allergic/Immunologic: Negative for environmental allergies and food allergies.   Neurological: Negative for weakness, numbness and headaches.   Hematological: Negative for adenopathy. Does not bruise/bleed easily.   Psychiatric/Behavioral: Positive for sleep disturbance. Negative for agitation, confusion and hallucinations. The patient is nervous/anxious.    All other systems reviewed and are negative.        Allergies:  Review of patient's  allergies indicates:   Allergen Reactions    Sulfa (sulfonamide antibiotics) Swelling and Hives     tongue         Medications:  Current Outpatient Medications   Medication Sig Dispense Refill    apixaban (ELIQUIS) 5 mg Tab Take 1 tablet (5 mg total) by mouth 2 (two) times daily. 60 tablet 6    dexAMETHasone (DECADRON) 4 MG Tab Take 1 tablet (4 mg total) by mouth every 12 (twelve) hours. For 2 days following chemotherapy. 20 tablet 0    estradiol-norethindrone (ACTIVELLA) 1-0.5 mg per tablet Take 0.5 mg by mouth once daily.      fentaNYL (DURAGESIC) 25 mcg/hr Place 1 patch onto the skin every 72 hours. 10 patch 0    HYDROcodone-acetaminophen (NORCO) 5-325 mg per tablet Take 1 tablet by mouth every 6 (six) hours as needed for Pain.      hydrocortisone 2.5 % cream Apply topically 2 (two) times daily. (Patient not taking: Reported on 1/9/2020) 453.6 g 1    lidocaine-prilocaine (EMLA) cream Apply to port 45 minutes prior to access. 30 g 0    LORazepam (ATIVAN) 0.5 MG tablet Take 1 tablet (0.5 mg total) by mouth every 6 (six) hours as needed for Anxiety (nausea). (Patient not taking: Reported on 3/19/2020) 60 tablet 0    multivitamin (THERAGRAN) per tablet Take 1 tablet by mouth once daily.      OLANZapine (ZYPREXA) 5 MG tablet Take 1 tablet (5 mg total) by mouth nightly. To prevent nausea (Patient not taking: Reported on 3/19/2020) 30 tablet 2    ondansetron (ZOFRAN) 8 MG tablet Take 1 tablet (8 mg total) by mouth every 8 (eight) hours as needed for Nausea. (Patient not taking: Reported on 3/19/2020) 120 tablet 2    oxyCODONE (ROXICODONE) 15 MG Tab Take 1 tablet (15 mg total) by mouth every 6 (six) hours as needed for Pain. (Patient not taking: Reported on 3/19/2020) 90 tablet 0     No current facility-administered medications for this visit.        PMH:  Past Medical History:   Diagnosis Date    Allergic rhinitis 3/3/2014    CKD (chronic kidney disease) stage 3, GFR 30-59 ml/min 12/26/2015     Hyperlipidemia 3/3/2014    Lung metastasis     Pancreas cancer     Skin rash 8/10/2019       PSH:  Past Surgical History:   Procedure Laterality Date    ERCP N/A 7/16/2019    Procedure: ERCP (ENDOSCOPIC RETROGRADE CHOLANGIOPANCREATOGRAPHY);  Surgeon: Chema Harris MD;  Location: [address]);  Service: Endoscopy;  Laterality: N/A;    ERCP N/A 12/24/2019    Procedure: ERCP (ENDOSCOPIC RETROGRADE CHOLANGIOPANCREATOGRAPHY);  Surgeon: Chema Harris MD;  Location: [facility] ([address] [place] [address]);  Service: Endoscopy;  Laterality: N/A;    Exporatory lap      INSERTION OF TUNNELED CENTRAL VENOUS CATHETER (CVC) WITH SUBCUTANEOUS PORT Right 8/12/2019    Procedure: [id number];  Surgeon: Cesar Hallman MD;  Location: [facility] OR [address] [place] [address];  Service: General;  Laterality: Right;  right IJ       FamHx:  Family History   Problem Relation Age of Onset    Diabetes Mother     Diabetes Father     Diabetes Brother     Heart disease Neg Hx        SocHx:  Social History     Socioeconomic History    Marital status:      Spouse name: Not on file    Number of children: 0    Years of education: Not on file    Highest education level: Not on file   Occupational History    Occupation:      Employer: [employer]   Social Needs    Financial resource strain: Not on file    Food insecurity:     Worry: Not on file     Inability: Not on file    Transportation needs:     Medical: Not on file     Non-medical: Not on file   Tobacco Use    Smoking status: Former Smoker     Start date: 12/11/1999    Smokeless tobacco: Never Used   Substance and Sexual Activity    Alcohol use: Not Currently     Alcohol/week: 1.0 standard drinks     Types: 1 Glasses of wine per week     Frequency: 4 or more times a week     Drinks per session: 1 or 2     Binge frequency: Never     Comment: last drink a month ago     Drug use: No    Sexual activity: Yes   Lifestyle    Physical activity:     Days per week: Not on file      Minutes per session: Not on file    Stress: Not on file   Relationships    Social connections:     Talks on phone: Not on file     Gets together: Not on file     Attends Alevism service: Not on file     Active member of club or organization: Not on file     Attends meetings of clubs or organizations: Not on file     Relationship status: Not on file   Other Topics Concern    Not on file   Social History Narrative    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      of Cancer in 2016         Objective:         LABS:  WBC   Date Value Ref Range Status   2020 8.98 3.90 - 12.70 K/uL Final     Hemoglobin   Date Value Ref Range Status   2020 11.3 (L) 12.0 - 16.0 g/dL Final     Hematocrit   Date Value Ref Range Status   2020 36.5 (L) 37.0 - 48.5 % Final     Platelets   Date Value Ref Range Status   2020 366 (H) 150 - 350 K/uL Final       Chemistry        Component Value Date/Time     2020 1035    K 4.3 2020 1035     2020 1035    CO2 22 (L) 2020 1035    BUN 9 2020 1035    CREATININE 0.7 2020 1035     2020 1035        Component Value Date/Time    CALCIUM 8.7 2020 1035    ALKPHOS 649 (H) 2020 1035     (H) 2020 1035     (H) 2020 1035    BILITOT 1.3 (H) 2020 1035    ESTGFRAFRICA >60.0 2020 1035    EGFRNONAA >60.0 2020 1035            Assessment:       1. Clinical trial participant    2. Neoplasm related pain (acute) (chronic)    3. Malignant neoplasm metastatic to lung, unspecified laterality    4. Cancer of head of pancreas            Plan:         1.  Cancer at the head of the pancreas:    Patient now has progression in distant lesions, but continued control of pancreatic mass.  She was admitted with problem 4,5.  With elevated LFTs, not a candidate for CanBas (due to peritoneal mets) or Genzada (no slots at this time).  Adding 5-FU/leucovorin bolus back to FOLFIRINOX and continuing  TTF.  Continue with treatment tomorrow, dose reduced irinotecan per LFTs.      Will plan to restage in about 4 weeks.      2. Thrombosis:     On Eliquis.      3. LFTs:      Improving.      RTC 2 weeks with labs (CBC, CMP, ), virtual visit with Karen or me, and FOLFIRINOX.      Patient is in agreement with the proposed treatment plan. All questions were answered to the patient's satisfaction. Pt knows to call clinic if anything is needed before the next clinic visit.    More than 25 mins were spent during this encounter, greater than 50% was spent in direct counseling and/or coordination of care.     Eliot Barrett M.D., M.S., F.A.C.P.  Hematology and Oncology Attending  [facility]

## 2020-04-14 NOTE — PROGRESS NOTES
04/14/2020      In an effort to protect our immunocompromised patients from potential exposure to COVID-19, [facility] will now require all patients receiving an infusion, an injection, and/or radiation therapy to be tested for COVID-19 prior to their appointment.  All patients currently under treatment will be tested immediately, and patients initiating new treatment cycles or with one-time appointments (injections, transfusions, etc.) must be tested within 72 hours of their appointment.     Placed COVID-19 test order for patient.  A member of our team is to contact the patient in the near future to explain this process and the rationale behind it, to ask the COVID-19 screening questions, and to get the patient scheduled for their COVID-19 test.     The above was completed in accordance with instructions and guidelines set forth by [facility].     Signed,    Toy Parekh, ADINA     Date:  04/14/2020

## 2020-04-15 ENCOUNTER — OFFICE VISIT (OUTPATIENT)
Dept: HEMATOLOGY/ONCOLOGY | Facility: CLINIC | Age: 60
End: 2020-04-15
Payer: COMMERCIAL

## 2020-04-15 ENCOUNTER — TELEPHONE (OUTPATIENT)
Dept: HEMATOLOGY/ONCOLOGY | Facility: CLINIC | Age: 60
End: 2020-04-15

## 2020-04-15 ENCOUNTER — RESEARCH ENCOUNTER (OUTPATIENT)
Dept: RESEARCH | Facility: HOSPITAL | Age: 60
End: 2020-04-15

## 2020-04-15 ENCOUNTER — LAB VISIT (OUTPATIENT)
Dept: INTERNAL MEDICINE | Facility: CLINIC | Age: 60
End: 2020-04-15
Payer: COMMERCIAL

## 2020-04-15 DIAGNOSIS — Z00.6 CLINICAL TRIAL PARTICIPANT: Primary | ICD-10-CM

## 2020-04-15 DIAGNOSIS — G89.3 NEOPLASM RELATED PAIN (ACUTE) (CHRONIC): ICD-10-CM

## 2020-04-15 DIAGNOSIS — C25.0 CANCER OF HEAD OF PANCREAS: ICD-10-CM

## 2020-04-15 DIAGNOSIS — Z13.9 SCREENING FOR CONDITION: ICD-10-CM

## 2020-04-15 DIAGNOSIS — C78.00 MALIGNANT NEOPLASM METASTATIC TO LUNG, UNSPECIFIED LATERALITY: ICD-10-CM

## 2020-04-15 LAB — SARS-COV-2 RNA RESP QL NAA+PROBE: NOT DETECTED

## 2020-04-15 PROCEDURE — 99214 PR OFFICE/OUTPT VISIT, EST, LEVL IV, 30-39 MIN: ICD-10-PCS | Mod: 95,,, | Performed by: INTERNAL MEDICINE

## 2020-04-15 PROCEDURE — U0002 COVID-19 LAB TEST NON-CDC: HCPCS

## 2020-04-15 PROCEDURE — 99214 OFFICE O/P EST MOD 30 MIN: CPT | Mod: 95,,, | Performed by: INTERNAL MEDICINE

## 2020-04-15 RX ORDER — ATROPINE SULFATE 0.4 MG/ML
0.4 INJECTION, SOLUTION ENDOTRACHEAL; INTRAMEDULLARY; INTRAMUSCULAR; INTRAVENOUS; SUBCUTANEOUS ONCE AS NEEDED
Status: CANCELLED | OUTPATIENT
Start: 2020-04-16

## 2020-04-15 RX ORDER — OLANZAPINE 10 MG/1
10 TABLET ORAL
Status: CANCELLED
Start: 2020-04-16

## 2020-04-15 RX ORDER — SODIUM CHLORIDE 0.9 % (FLUSH) 0.9 %
10 SYRINGE (ML) INJECTION
Status: CANCELLED | OUTPATIENT
Start: 2020-04-16

## 2020-04-15 RX ORDER — HEPARIN 100 UNIT/ML
500 SYRINGE INTRAVENOUS
Status: CANCELLED | OUTPATIENT
Start: 2020-04-18

## 2020-04-15 RX ORDER — SODIUM CHLORIDE 0.9 % (FLUSH) 0.9 %
10 SYRINGE (ML) INJECTION
Status: CANCELLED | OUTPATIENT
Start: 2020-04-18

## 2020-04-15 RX ORDER — FLUOROURACIL 50 MG/ML
400 INJECTION, SOLUTION INTRAVENOUS
Status: CANCELLED
Start: 2020-04-16

## 2020-04-15 RX ORDER — EPINEPHRINE 0.3 MG/.3ML
0.3 INJECTION SUBCUTANEOUS ONCE AS NEEDED
Status: CANCELLED | OUTPATIENT
Start: 2020-04-16

## 2020-04-15 RX ORDER — HEPARIN 100 UNIT/ML
500 SYRINGE INTRAVENOUS
Status: CANCELLED | OUTPATIENT
Start: 2020-04-16

## 2020-04-15 RX ORDER — DIPHENHYDRAMINE HYDROCHLORIDE 50 MG/ML
50 INJECTION INTRAMUSCULAR; INTRAVENOUS ONCE AS NEEDED
Status: CANCELLED | OUTPATIENT
Start: 2020-04-16

## 2020-04-15 NOTE — PROGRESS NOTES
4/15/2020  Sponsor: [employer]  PI: Eliot Barrett MD  Study #: [id number]  WIRB: [id number]  IRB: 2018.096  Pt ID: [id number]  ARM 1 w/ Salvage Therapy: TTFields + FOLFIRINOX (5FU/leucovorin bolus, Irinotecan, Oxaliplatin)     PANOVA-3: Pivotal, randomized, open-label study of Tumor Treating Fields (TTFields, 150kHz) concomitant with gemcitabine and nab-paclitaxel for front-line treatment of locally-advanced pancreatic adenocarcinoma     Week 36/ Cycle 4 Day 15 - Virtual Visit     Due to COVID-19 [facility] Guidelines, patient was transitioned to a virtual visit instead of in clinic visit. She denies fever, vomiting, pain, diarrhea, constipation, headaches, and dizziness.       Physical exam not performed d/t COVID-19. ECOG 0 per Dr. Barrett. Patient had labs done this morning prior to virtual appointment. Patients labs showed grade 4 GGT. Patients labs show grade 1 ALT and AST. Patient has had grade 2 Alkaline Phosphate since 18Mar2020.  She is continuing treatment tomorrow dose reduced irinotecan per LFTs and adding 5-FU/leucovorin bolus back to FOLFIRINOX. Patient continues to wear TTFields without issue.       Reviewed current medications with patient, see Concomitant Medication sheet, and AE's were completed over the phone, no changes from 2 weeks ago.       Instructed patient to notify physician and/or me with any questions, concerns, or changes in health status. Patient verbalized understanding.            AEs  Grade 4 GGT  Grade 2 Alkaline Phosphate   Grade 1 ALT  Garde 1 AST

## 2020-04-16 ENCOUNTER — TELEPHONE (OUTPATIENT)
Dept: HEMATOLOGY/ONCOLOGY | Facility: CLINIC | Age: 60
End: 2020-04-16

## 2020-04-16 ENCOUNTER — INFUSION (OUTPATIENT)
Dept: INFUSION THERAPY | Facility: HOSPITAL | Age: 60
End: 2020-04-16
Attending: INTERNAL MEDICINE
Payer: COMMERCIAL

## 2020-04-16 VITALS
WEIGHT: 137.38 LBS | HEART RATE: 70 BPM | TEMPERATURE: 99 F | RESPIRATION RATE: 18 BRPM | DIASTOLIC BLOOD PRESSURE: 68 MMHG | BODY MASS INDEX: 25.28 KG/M2 | SYSTOLIC BLOOD PRESSURE: 132 MMHG | HEIGHT: 62 IN | OXYGEN SATURATION: 99 %

## 2020-04-16 DIAGNOSIS — C25.0 CANCER OF HEAD OF PANCREAS: ICD-10-CM

## 2020-04-16 DIAGNOSIS — C78.00 MALIGNANT NEOPLASM METASTATIC TO LUNG, UNSPECIFIED LATERALITY: Primary | ICD-10-CM

## 2020-04-16 PROCEDURE — 96375 TX/PRO/DX INJ NEW DRUG ADDON: CPT

## 2020-04-16 PROCEDURE — 96417 CHEMO IV INFUS EACH ADDL SEQ: CPT

## 2020-04-16 PROCEDURE — 63600175 PHARM REV CODE 636 W HCPCS: Performed by: INTERNAL MEDICINE

## 2020-04-16 PROCEDURE — 96415 CHEMO IV INFUSION ADDL HR: CPT

## 2020-04-16 PROCEDURE — 96413 CHEMO IV INFUSION 1 HR: CPT

## 2020-04-16 PROCEDURE — 96367 TX/PROPH/DG ADDL SEQ IV INF: CPT

## 2020-04-16 PROCEDURE — 96416 CHEMO PROLONG INFUSE W/PUMP: CPT

## 2020-04-16 PROCEDURE — 96368 THER/DIAG CONCURRENT INF: CPT

## 2020-04-16 PROCEDURE — 96411 CHEMO IV PUSH ADDL DRUG: CPT

## 2020-04-16 PROCEDURE — 25000003 PHARM REV CODE 250: Performed by: INTERNAL MEDICINE

## 2020-04-16 RX ORDER — OLANZAPINE 10 MG/1
10 TABLET ORAL
Status: COMPLETED | OUTPATIENT
Start: 2020-04-16 | End: 2020-04-16

## 2020-04-16 RX ORDER — DIPHENHYDRAMINE HYDROCHLORIDE 50 MG/ML
50 INJECTION INTRAMUSCULAR; INTRAVENOUS ONCE AS NEEDED
Status: DISCONTINUED | OUTPATIENT
Start: 2020-04-16 | End: 2020-04-16 | Stop reason: HOSPADM

## 2020-04-16 RX ORDER — EPINEPHRINE 0.3 MG/.3ML
0.3 INJECTION SUBCUTANEOUS ONCE AS NEEDED
Status: DISCONTINUED | OUTPATIENT
Start: 2020-04-16 | End: 2020-04-16 | Stop reason: HOSPADM

## 2020-04-16 RX ORDER — ATROPINE SULFATE 0.4 MG/ML
0.4 INJECTION, SOLUTION ENDOTRACHEAL; INTRAMEDULLARY; INTRAMUSCULAR; INTRAVENOUS; SUBCUTANEOUS ONCE AS NEEDED
Status: COMPLETED | OUTPATIENT
Start: 2020-04-16 | End: 2020-04-16

## 2020-04-16 RX ORDER — FLUOROURACIL 50 MG/ML
400 INJECTION, SOLUTION INTRAVENOUS
Status: COMPLETED | OUTPATIENT
Start: 2020-04-16 | End: 2020-04-16

## 2020-04-16 RX ADMIN — LEUCOVORIN CALCIUM 35 MG: 100 INJECTION, POWDER, LYOPHILIZED, FOR SOLUTION INTRAMUSCULAR; INTRAVENOUS at 01:04

## 2020-04-16 RX ADMIN — FLUOROURACIL 3910 MG: 50 INJECTION, SOLUTION INTRAVENOUS at 03:04

## 2020-04-16 RX ADMIN — ATROPINE SULFATE 0.4 MG: 0.4 INJECTION, SOLUTION INTRAMUSCULAR; INTRAVENOUS; SUBCUTANEOUS at 03:04

## 2020-04-16 RX ADMIN — SODIUM CHLORIDE 240 MG: 0.9 INJECTION, SOLUTION INTRAVENOUS at 01:04

## 2020-04-16 RX ADMIN — OXALIPLATIN 139 MG: 5 INJECTION, SOLUTION INTRAVENOUS at 11:04

## 2020-04-16 RX ADMIN — APREPITANT 130 MG: 130 INJECTION, EMULSION INTRAVENOUS at 10:04

## 2020-04-16 RX ADMIN — OLANZAPINE 10 MG: 10 TABLET, FILM COATED ORAL at 10:04

## 2020-04-16 RX ADMIN — DEXTROSE: 50 INJECTION, SOLUTION INTRAVENOUS at 10:04

## 2020-04-16 RX ADMIN — FLUOROURACIL 650 MG: 50 INJECTION, SOLUTION INTRAVENOUS at 03:04

## 2020-04-16 RX ADMIN — SODIUM CHLORIDE: 9 INJECTION, SOLUTION INTRAVENOUS at 10:04

## 2020-04-16 NOTE — PLAN OF CARE
"Pt tolerated without adverse effects until end of irinotecan. Pt reported nausea and abdominal cramping relieved with atropine.  VSS. Verbalized understanding of RTC date on Saturday at 1:15. DC via wheelchair with RN to caregiver"s car.     "

## 2020-04-18 ENCOUNTER — INFUSION (OUTPATIENT)
Dept: INFUSION THERAPY | Facility: HOSPITAL | Age: 60
End: 2020-04-18
Attending: INTERNAL MEDICINE
Payer: COMMERCIAL

## 2020-04-18 VITALS
DIASTOLIC BLOOD PRESSURE: 73 MMHG | HEART RATE: 74 BPM | SYSTOLIC BLOOD PRESSURE: 153 MMHG | RESPIRATION RATE: 18 BRPM | TEMPERATURE: 98 F

## 2020-04-18 DIAGNOSIS — C25.0 CANCER OF HEAD OF PANCREAS: ICD-10-CM

## 2020-04-18 DIAGNOSIS — C78.00 MALIGNANT NEOPLASM METASTATIC TO LUNG, UNSPECIFIED LATERALITY: Primary | ICD-10-CM

## 2020-04-18 PROCEDURE — 63600175 PHARM REV CODE 636 W HCPCS: Performed by: INTERNAL MEDICINE

## 2020-04-18 PROCEDURE — 25000003 PHARM REV CODE 250: Performed by: INTERNAL MEDICINE

## 2020-04-18 PROCEDURE — A4216 STERILE WATER/SALINE, 10 ML: HCPCS | Performed by: INTERNAL MEDICINE

## 2020-04-18 PROCEDURE — 96523 IRRIG DRUG DELIVERY DEVICE: CPT

## 2020-04-18 RX ORDER — SODIUM CHLORIDE 0.9 % (FLUSH) 0.9 %
10 SYRINGE (ML) INJECTION
Status: DISCONTINUED | OUTPATIENT
Start: 2020-04-18 | End: 2020-04-18 | Stop reason: HOSPADM

## 2020-04-18 RX ORDER — HEPARIN 100 UNIT/ML
500 SYRINGE INTRAVENOUS
Status: DISCONTINUED | OUTPATIENT
Start: 2020-04-18 | End: 2020-04-18 | Stop reason: HOSPADM

## 2020-04-18 RX ADMIN — Medication 10 ML: at 12:04

## 2020-04-18 RX ADMIN — HEPARIN 500 UNITS: 100 SYRINGE at 12:04

## 2020-04-18 NOTE — NURSING
Pt disconnected from CADD pump. Infusion completed with no complications. VSS. Pt instructed to call MD with any problems. NAD. Pt discharged home independently.

## 2020-04-22 DIAGNOSIS — Z00.6 CLINICAL TRIAL PARTICIPANT: Primary | ICD-10-CM

## 2020-04-22 DIAGNOSIS — C25.0 CANCER OF HEAD OF PANCREAS: Primary | ICD-10-CM

## 2020-04-22 DIAGNOSIS — Z00.6 CLINICAL TRIAL PARTICIPANT: ICD-10-CM

## 2020-04-23 ENCOUNTER — HOSPITAL ENCOUNTER (OUTPATIENT)
Dept: RADIOLOGY | Facility: HOSPITAL | Age: 60
Discharge: HOME | End: 2020-04-23
Attending: INTERNAL MEDICINE
Payer: COMMERCIAL

## 2020-04-23 DIAGNOSIS — Z00.6 CLINICAL TRIAL PARTICIPANT: ICD-10-CM

## 2020-04-23 PROCEDURE — 74177 CT CHEST ABDOMEN PELVIS WITH CONTRAST (XPD): ICD-10-PCS | Mod: 26,,, | Performed by: RADIOLOGY

## 2020-04-23 PROCEDURE — 71260 CT CHEST ABDOMEN PELVIS WITH CONTRAST (XPD): ICD-10-PCS | Mod: 26,,, | Performed by: RADIOLOGY

## 2020-04-23 PROCEDURE — 25500020 PHARM REV CODE 255: Performed by: INTERNAL MEDICINE

## 2020-04-23 PROCEDURE — 74177 CT ABD & PELVIS W/CONTRAST: CPT | Mod: 26,,, | Performed by: RADIOLOGY

## 2020-04-23 PROCEDURE — 71260 CT THORAX DX C+: CPT | Mod: 26,,, | Performed by: RADIOLOGY

## 2020-04-23 PROCEDURE — 74177 CT ABD & PELVIS W/CONTRAST: CPT | Mod: TC

## 2020-04-23 RX ADMIN — IOHEXOL 15 ML: 350 INJECTION, SOLUTION INTRAVENOUS at 03:04

## 2020-04-23 RX ADMIN — IOHEXOL 75 ML: 350 INJECTION, SOLUTION INTRAVENOUS at 03:04

## 2020-04-24 DIAGNOSIS — Z00.6 PATIENT IN CLINICAL RESEARCH STUDY: ICD-10-CM

## 2020-04-24 DIAGNOSIS — D49.0 PANCREAS NEOPLASM: Primary | ICD-10-CM

## 2020-04-27 DIAGNOSIS — C25.0 CANCER OF HEAD OF PANCREAS: Primary | ICD-10-CM

## 2020-04-28 DIAGNOSIS — D49.0 PANCREAS NEOPLASM: Primary | ICD-10-CM

## 2020-04-28 DIAGNOSIS — Z00.6 PATIENT IN CLINICAL RESEARCH STUDY: ICD-10-CM

## 2020-04-28 NOTE — PROGRESS NOTES
ONCOLOGY / PCTP VISIT     Subjective:       Patient ID: Quyen Moody    Chief Complaint:  Pancreatic cancer    HPI     Quyen Moody is a 59 y.o. female, patient  to clinic for follow up and treatment on PANOVA-3 trial. She is here to discuss treatment options after progression in distant metastatic sites on modified FOLFIRINOX.  Recently started full dose FOLFIRINOX (leucovorin and bolus 5FU added), but cancer seems to be progressing.      Here to consent for Genzada trial.     ECOG 0.    Oncologic History:  She has had intermittent upper abdominal pain since early June.  She presented to her PCP who originally ordered an US and CT.  US was negative and CT showed some haziness at the pancreatic head.  She saw GI who performed EUS.  On EUS, a 3x4cm pancreatic head mass was seen with possible invasion into the SMA and portal vein.  FNA path s/w pancreatic adenocarcinoma.  CA 19-9 level at outside hospital was >1000 per the patient.  She endorses nausea and inability to tolerate much PO.  She has lost about 10lbs over the last few weeks and is noticing her skin turning yellow.  Denies pruritis.  She is passing gas but has not had a BM in a few days, she attributes this to narcotic use.  She recently started taking stool softeners.  No previous cardiac or stroke history.  Surgical history significant for open appendectomy when she was in her 20s  - 8/7/2019 - 1/9/2020: [place]+Abraxane and TTF on PANOVA-3, LFTs elevated, but improved.  - Based on CT 12/2019 scans, Dr. Hallman felt pt was potentially resectable after chemoXRT given good response to current therapy.  However, subsequent Chest CT   showed new and growing micronodules in the in lungs c/w met disease in light of rising tumor markers.    - 1/23/2020: Changed to FOLFIRINOX and con't TTF on PANOVA as this seems to be achieving good local control of the primary tumor.     Review of Systems   Constitutional: Positive for fatigue. Negative for activity change, appetite  change, chills, fever and unexpected weight change (improving).   HENT: Negative for congestion, hearing loss, mouth sores, sore throat, tinnitus and voice change.    Eyes: Negative for pain and visual disturbance.   Respiratory: Negative for cough, shortness of breath and wheezing.    Cardiovascular: Positive for leg swelling (left). Negative for chest pain and palpitations.   Gastrointestinal: Positive for abdominal pain. Negative for constipation, diarrhea, nausea and vomiting.   Endocrine: Negative for cold intolerance and heat intolerance.   Genitourinary: Negative for difficulty urinating, dyspareunia, dysuria, frequency, menstrual problem, urgency, vaginal bleeding, vaginal discharge and vaginal pain.   Musculoskeletal: Negative for arthralgias, back pain and myalgias.   Skin: Positive for rash. Negative for color change and wound.   Allergic/Immunologic: Negative for environmental allergies and food allergies.   Neurological: Negative for weakness, numbness and headaches.   Hematological: Negative for adenopathy. Does not bruise/bleed easily.   Psychiatric/Behavioral: Positive for sleep disturbance. Negative for agitation, confusion and hallucinations. The patient is nervous/anxious.    All other systems reviewed and are negative.        Allergies:  Review of patient's allergies indicates:   Allergen Reactions    Sulfa (sulfonamide antibiotics) Swelling and Hives     tongue         Medications:  Current Outpatient Medications   Medication Sig Dispense Refill    apixaban (ELIQUIS) 5 mg Tab Take 1 tablet (5 mg total) by mouth 2 (two) times daily. 60 tablet 6    dexAMETHasone (DECADRON) 4 MG Tab Take 1 tablet (4 mg total) by mouth every 12 (twelve) hours. For 2 days following chemotherapy. 20 tablet 0    estradiol-norethindrone (ACTIVELLA) 1-0.5 mg per tablet Take 0.5 mg by mouth once daily.      fentaNYL (DURAGESIC) 25 mcg/hr Place 1 patch onto the skin every 72 hours. 10 patch 0     HYDROcodone-acetaminophen (NORCO) 5-325 mg per tablet Take 1 tablet by mouth every 6 (six) hours as needed for Pain.      hydrocortisone 2.5 % cream Apply topically 2 (two) times daily. (Patient not taking: Reported on 1/9/2020) 453.6 g 1    lidocaine-prilocaine (EMLA) cream Apply to port 45 minutes prior to access. 30 g 0    LORazepam (ATIVAN) 0.5 MG tablet Take 1 tablet (0.5 mg total) by mouth every 6 (six) hours as needed for Anxiety (nausea). (Patient not taking: Reported on 3/19/2020) 60 tablet 0    multivitamin (THERAGRAN) per tablet Take 1 tablet by mouth once daily.      OLANZapine (ZYPREXA) 5 MG tablet Take 1 tablet (5 mg total) by mouth nightly. To prevent nausea (Patient not taking: Reported on 3/19/2020) 30 tablet 2    ondansetron (ZOFRAN) 8 MG tablet Take 1 tablet (8 mg total) by mouth every 8 (eight) hours as needed for Nausea. (Patient not taking: Reported on 3/19/2020) 120 tablet 2    oxyCODONE (ROXICODONE) 15 MG Tab Take 1 tablet (15 mg total) by mouth every 6 (six) hours as needed for Pain. (Patient not taking: Reported on 3/19/2020) 90 tablet 0     No current facility-administered medications for this visit.        PMH:  Past Medical History:   Diagnosis Date    Allergic rhinitis 3/3/2014    CKD (chronic kidney disease) stage 3, GFR 30-59 ml/min 12/26/2015    Hyperlipidemia 3/3/2014    Lung metastasis     Pancreas cancer     Skin rash 8/10/2019       PSH:  Past Surgical History:   Procedure Laterality Date    ERCP N/A 7/16/2019    Procedure: ERCP (ENDOSCOPIC RETROGRADE CHOLANGIOPANCREATOGRAPHY);  Surgeon: Chema Harris MD;  Location: [address]);  Service: Endoscopy;  Laterality: N/A;    ERCP N/A 12/24/2019    Procedure: ERCP (ENDOSCOPIC RETROGRADE CHOLANGIOPANCREATOGRAPHY);  Surgeon: Chema Harris MD;  Location: [address]);  Service: Endoscopy;  Laterality: N/A;    Exporatory lap      INSERTION OF TUNNELED CENTRAL VENOUS CATHETER (CVC) WITH SUBCUTANEOUS PORT  Right 2019    Procedure: [id number];  Surgeon: Cesar Hallman MD;  Location: [facility] OR [address] [place] [address];  Service: General;  Laterality: Right;  right IJ       FamHx:  Family History   Problem Relation Age of Onset    Diabetes Mother     Diabetes Father     Diabetes Brother     Heart disease Neg Hx        SocHx:  Social History     Socioeconomic History    Marital status:      Spouse name: Not on file    Number of children: 0    Years of education: Not on file    Highest education level: Not on file   Occupational History    Occupation:      Employer: [employer]   Social Needs    Financial resource strain: Not on file    Food insecurity:     Worry: Not on file     Inability: Not on file    Transportation needs:     Medical: Not on file     Non-medical: Not on file   Tobacco Use    Smoking status: Former Smoker     Start date: 1999    Smokeless tobacco: Never Used   Substance and Sexual Activity    Alcohol use: Not Currently     Alcohol/week: 1.0 standard drinks     Types: 1 Glasses of wine per week     Frequency: 4 or more times a week     Drinks per session: 1 or 2     Binge frequency: Never     Comment: last drink a month ago     Drug use: No    Sexual activity: Yes   Lifestyle    Physical activity:     Days per week: Not on file     Minutes per session: Not on file    Stress: Not on file   Relationships    Social connections:     Talks on phone: Not on file     Gets together: Not on file     Attends Protestant service: Not on file     Active member of club or organization: Not on file     Attends meetings of clubs or organizations: Not on file     Relationship status: Not on file   Other Topics Concern    Not on file   Social History Narrative    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      of Cancer in 2016         Objective:       Physical Exam   Constitutional: She is oriented to person, place, and time and well-developed, well-nourished, and in no  distress. No distress.   HENT:   Head: Normocephalic and atraumatic.   Mouth/Throat: Oropharynx is clear and moist. No oropharyngeal exudate.   Eyes: Pupils are equal, round, and reactive to light. Conjunctivae are normal. Right eye exhibits no discharge. Left eye exhibits no discharge. No scleral icterus.   Neck: Normal range of motion. No tracheal deviation present. No thyromegaly present.   Cardiovascular: Normal rate and regular rhythm. Exam reveals no gallop.   No murmur heard.  Pulmonary/Chest: Effort normal and breath sounds normal. No respiratory distress. She has no wheezes. She has no rales.   Abdominal: Soft. She exhibits no distension and no mass. There is no tenderness. There is no rebound and no guarding.   Musculoskeletal: Normal range of motion. She exhibits no edema, tenderness or deformity.   Lymphadenopathy:     She has no cervical adenopathy.   Neurological: She is alert and oriented to person, place, and time. She displays normal reflexes. No cranial nerve deficit. She exhibits normal muscle tone. Gait normal. Coordination normal.   Skin: Skin is warm and dry. No rash noted. She is not diaphoretic. No erythema. No pallor.   Psychiatric: Mood, memory, affect and judgment normal.         LABS:  WBC   Date Value Ref Range Status   04/15/2020 3.93 3.90 - 12.70 K/uL Final     Hemoglobin   Date Value Ref Range Status   04/15/2020 11.2 (L) 12.0 - 16.0 g/dL Final     Hematocrit   Date Value Ref Range Status   04/15/2020 36.6 (L) 37.0 - 48.5 % Final     Platelets   Date Value Ref Range Status   04/15/2020 115 (L) 150 - 350 K/uL Final       Chemistry        Component Value Date/Time     04/15/2020 0910    K 4.1 04/15/2020 0910     04/15/2020 0910    CO2 24 04/15/2020 0910    BUN 10 04/15/2020 0910    CREATININE 0.7 04/15/2020 0910     (H) 04/15/2020 0910        Component Value Date/Time    CALCIUM 8.8 04/15/2020 0910    ALKPHOS 420 (H) 04/15/2020 0910    AST 62 (H) 04/15/2020 0910     ALT 69 (H) 04/15/2020 0910    BILITOT 0.4 04/15/2020 0910    ESTGFRAFRICA >60.0 04/15/2020 0910    EGFRNONAA >60.0 04/15/2020 0910            Assessment:       1. Pancreas neoplasm    2. Patient in clinical research study            Plan:         1.  Cancer at the head of the pancreas:    Progression on [place]/Abraxane and FOLFIRINOX.   Will remove from Novocure trial and proceed with Genzada trial.      Consented today.        2. Thrombosis:     On Eliquis.      3. LFTs:      Improving.      RTC per research RN       Patient is in agreement with the proposed treatment plan. All questions were answered to the patient's satisfaction. Pt knows to call clinic if anything is needed before the next clinic visit.    More than 25 mins were spent during this encounter, greater than 50% was spent in direct counseling and/or coordination of care.     Eliot Barrett M.D., M.S., F.A.C.P.  Hematology and Oncology Attending  [facility] and [facility]

## 2020-04-29 ENCOUNTER — OFFICE VISIT (OUTPATIENT)
Dept: HEMATOLOGY/ONCOLOGY | Facility: CLINIC | Age: 60
End: 2020-04-29
Payer: COMMERCIAL

## 2020-04-29 ENCOUNTER — HOSPITAL ENCOUNTER (OUTPATIENT)
Dept: RADIOLOGY | Facility: HOSPITAL | Age: 60
Discharge: HOME | End: 2020-04-29
Attending: INTERNAL MEDICINE
Payer: COMMERCIAL

## 2020-04-29 ENCOUNTER — TELEPHONE (OUTPATIENT)
Dept: HEMATOLOGY/ONCOLOGY | Facility: CLINIC | Age: 60
End: 2020-04-29

## 2020-04-29 ENCOUNTER — RESEARCH ENCOUNTER (OUTPATIENT)
Dept: RESEARCH | Facility: HOSPITAL | Age: 60
End: 2020-04-29

## 2020-04-29 ENCOUNTER — HOSPITAL ENCOUNTER (OUTPATIENT)
Dept: CARDIOLOGY | Facility: CLINIC | Age: 60
Discharge: HOME | End: 2020-04-29
Payer: COMMERCIAL

## 2020-04-29 VITALS
HEART RATE: 86 BPM | RESPIRATION RATE: 18 BRPM | WEIGHT: 138.69 LBS | DIASTOLIC BLOOD PRESSURE: 60 MMHG | HEIGHT: 63 IN | OXYGEN SATURATION: 100 % | SYSTOLIC BLOOD PRESSURE: 125 MMHG | BODY MASS INDEX: 24.57 KG/M2 | TEMPERATURE: 99 F

## 2020-04-29 DIAGNOSIS — T45.1X5A CHEMOTHERAPY INDUCED NEUTROPENIA: ICD-10-CM

## 2020-04-29 DIAGNOSIS — D49.0 PANCREAS NEOPLASM: ICD-10-CM

## 2020-04-29 DIAGNOSIS — Z00.6 PATIENT IN CLINICAL RESEARCH STUDY: ICD-10-CM

## 2020-04-29 DIAGNOSIS — C25.0 CANCER OF HEAD OF PANCREAS: ICD-10-CM

## 2020-04-29 DIAGNOSIS — D70.1 CHEMOTHERAPY INDUCED NEUTROPENIA: ICD-10-CM

## 2020-04-29 DIAGNOSIS — Z13.9 SCREENING FOR CONDITION: Primary | ICD-10-CM

## 2020-04-29 LAB
BILIRUB UR QL STRIP: NEGATIVE
CAOX CRY UR QL COMP ASSIST: ABNORMAL
CLARITY UR REFRACT.AUTO: ABNORMAL
COLOR UR AUTO: ABNORMAL
GLUCOSE UR QL STRIP: NEGATIVE
HGB UR QL STRIP: NEGATIVE
KETONES UR QL STRIP: NEGATIVE
LEUKOCYTE ESTERASE UR QL STRIP: NEGATIVE
MICROSCOPIC COMMENT: ABNORMAL
NITRITE UR QL STRIP: NEGATIVE
PH UR STRIP: 6 [PH] (ref 5–8)
POCT GLUCOSE: 87 MG/DL (ref 70–110)
PROT UR QL STRIP: NEGATIVE
RBC #/AREA URNS AUTO: 2 /HPF (ref 0–4)
SP GR UR STRIP: 1.03 (ref 1–1.03)
SQUAMOUS #/AREA URNS AUTO: 4 /HPF
URN SPEC COLLECT METH UR: ABNORMAL
WBC #/AREA URNS AUTO: 0 /HPF (ref 0–5)

## 2020-04-29 PROCEDURE — 78815 PET IMAGE W/CT SKULL-THIGH: CPT | Mod: 26,PS,, | Performed by: RADIOLOGY

## 2020-04-29 PROCEDURE — 93010 ELECTROCARDIOGRAM REPORT: CPT | Mod: S$PBB,,, | Performed by: INTERNAL MEDICINE

## 2020-04-29 PROCEDURE — 99214 OFFICE O/P EST MOD 30 MIN: CPT | Mod: S$GLB,,, | Performed by: INTERNAL MEDICINE

## 2020-04-29 PROCEDURE — 3008F BODY MASS INDEX DOCD: CPT | Mod: CPTII,S$GLB,, | Performed by: INTERNAL MEDICINE

## 2020-04-29 PROCEDURE — 78815 NM PET CT ROUTINE: ICD-10-PCS | Mod: 26,PS,, | Performed by: RADIOLOGY

## 2020-04-29 PROCEDURE — 93010 EKG 12-LEAD: ICD-10-PCS | Mod: 76,S$GLB,, | Performed by: INTERNAL MEDICINE

## 2020-04-29 PROCEDURE — 93005 ELECTROCARDIOGRAM TRACING: CPT | Mod: PBBFAC | Performed by: INTERNAL MEDICINE

## 2020-04-29 PROCEDURE — 3008F PR BODY MASS INDEX (BMI) DOCUMENTED: ICD-10-PCS | Mod: CPTII,S$GLB,, | Performed by: INTERNAL MEDICINE

## 2020-04-29 PROCEDURE — 99999 PR PBB SHADOW E&M-EST. PATIENT-LVL IV: CPT | Mod: PBBFAC,,, | Performed by: INTERNAL MEDICINE

## 2020-04-29 PROCEDURE — 93005 ELECTROCARDIOGRAM TRACING: CPT | Mod: S$GLB,,, | Performed by: INTERNAL MEDICINE

## 2020-04-29 PROCEDURE — 93010 EKG 12-LEAD: ICD-10-PCS | Mod: S$PBB,,, | Performed by: INTERNAL MEDICINE

## 2020-04-29 PROCEDURE — 99214 PR OFFICE/OUTPT VISIT, EST, LEVL IV, 30-39 MIN: ICD-10-PCS | Mod: S$GLB,,, | Performed by: INTERNAL MEDICINE

## 2020-04-29 PROCEDURE — 78815 PET IMAGE W/CT SKULL-THIGH: CPT | Mod: TC

## 2020-04-29 PROCEDURE — 93005 EKG 12-LEAD: ICD-10-PCS | Mod: 76,S$GLB,, | Performed by: INTERNAL MEDICINE

## 2020-04-29 PROCEDURE — 99999 PR PBB SHADOW E&M-EST. PATIENT-LVL IV: ICD-10-PCS | Mod: PBBFAC,,, | Performed by: INTERNAL MEDICINE

## 2020-04-29 PROCEDURE — 93010 ELECTROCARDIOGRAM REPORT: CPT | Mod: S$GLB,,, | Performed by: INTERNAL MEDICINE

## 2020-04-29 PROCEDURE — 81001 URINALYSIS AUTO W/SCOPE: CPT

## 2020-04-29 PROCEDURE — A9552 F18 FDG: HCPCS

## 2020-04-29 NOTE — PROGRESS NOTES
04/29/2020      In an effort to protect our immunocompromised patients from potential exposure to COVID-19, [facility] will now require all patients receiving an infusion, an injection, and/or radiation therapy to be tested for COVID-19 prior to their appointment.  All patients currently under treatment will be tested immediately, and patients initiating new treatment cycles or with one-time appointments (injections, transfusions, etc.) must be tested within 72 hours of their appointment.     Placed COVID-19 test order for patient.  A member of our team is to contact the patient in the near future to explain this process and the rationale behind it, to ask the COVID-19 screening questions, and to get the patient scheduled for their COVID-19 test.     The above was completed in accordance with instructions and guidelines set forth by [facility].     Signed,    Toy Parekh, ADINA     Date:  04/29/2020

## 2020-04-29 NOTE — TELEPHONE ENCOUNTER
----- Message from Dominic Rodríguez RN sent at 4/29/2020  4:41 PM CDT -----  Neupogen approved please help schedule   ----- Message -----  From: Karen Hightower NP  Sent: 4/29/2020   1:20 PM CDT  To: Dominic Rodríguez RN    Please schedule neupogen x 3 doses. Auth needed.

## 2020-04-30 ENCOUNTER — TELEPHONE (OUTPATIENT)
Dept: HEMATOLOGY/ONCOLOGY | Facility: CLINIC | Age: 60
End: 2020-04-30

## 2020-04-30 ENCOUNTER — LAB VISIT (OUTPATIENT)
Dept: INTERNAL MEDICINE | Facility: CLINIC | Age: 60
End: 2020-04-30
Payer: COMMERCIAL

## 2020-04-30 ENCOUNTER — PATIENT MESSAGE (OUTPATIENT)
Dept: HEMATOLOGY/ONCOLOGY | Facility: CLINIC | Age: 60
End: 2020-04-30

## 2020-04-30 DIAGNOSIS — Z00.6 PATIENT IN CLINICAL RESEARCH STUDY: ICD-10-CM

## 2020-04-30 DIAGNOSIS — D49.0 PANCREAS NEOPLASM: Primary | ICD-10-CM

## 2020-04-30 DIAGNOSIS — Z13.9 SCREENING FOR CONDITION: ICD-10-CM

## 2020-04-30 LAB — SARS-COV-2 RNA RESP QL NAA+PROBE: NOT DETECTED

## 2020-04-30 PROCEDURE — U0002 COVID-19 LAB TEST NON-CDC: HCPCS

## 2020-04-30 NOTE — PROGRESS NOTES
To:  Staff of Dr. Barrett:    This patient will also need to be phoned with her COVID-19 test results, as she may be expecting a call whether positive or negative.  Please phone this patient to let her know that her COVID-19 test came back negative and that, based on that result, she can proceed with her appointment(s) as indicated by her treatment provider(s).  If my assistance is needed, don't hesitate to reach out.      Thanks,  Toy Parekh, DNP, APRN, FNP-BC, AOCNP  [facility], 3rd Floor  [facility] [address], [place]  [address]  [phone]

## 2020-04-30 NOTE — TELEPHONE ENCOUNTER
----- Message from Toy Parekh DNP sent at 4/30/2020  3:15 PM CDT -----  To:  Staff of Dr. Barrett:    This patient will also need to be phoned with her COVID-19 test results, as she may be expecting a call whether positive or negative.  Please phone this patient to let her know that her COVID-19 test came back negative and that, based on that result, she can proceed with her appointment(s) as indicated by her treatment provider(s).  If my assistance is needed, don't hesitate to reach out.      Thanks,  Toy Parekh DNP, APRN, FNP-BC, AOCNP  [facility], 3rd Floor  [facility] [address], [place]  [address]  [phone]

## 2020-05-01 ENCOUNTER — PATIENT MESSAGE (OUTPATIENT)
Dept: HEMATOLOGY/ONCOLOGY | Facility: CLINIC | Age: 60
End: 2020-05-01

## 2020-05-01 ENCOUNTER — INFUSION (OUTPATIENT)
Dept: INFUSION THERAPY | Facility: HOSPITAL | Age: 60
End: 2020-05-01
Attending: INTERNAL MEDICINE
Payer: COMMERCIAL

## 2020-05-01 DIAGNOSIS — D70.1 CHEMOTHERAPY INDUCED NEUTROPENIA: Primary | ICD-10-CM

## 2020-05-01 DIAGNOSIS — G89.3 NEOPLASM RELATED PAIN (ACUTE) (CHRONIC): ICD-10-CM

## 2020-05-01 DIAGNOSIS — T45.1X5A CHEMOTHERAPY INDUCED NEUTROPENIA: Primary | ICD-10-CM

## 2020-05-01 PROCEDURE — 63600175 PHARM REV CODE 636 W HCPCS: Mod: JG | Performed by: NURSE PRACTITIONER

## 2020-05-01 PROCEDURE — 96372 THER/PROPH/DIAG INJ SC/IM: CPT

## 2020-05-01 RX ORDER — FENTANYL 25 UG/1
1 PATCH TRANSDERMAL
Qty: 10 PATCH | Refills: 0 | Status: SHIPPED | OUTPATIENT
Start: 2020-05-01 | End: 2020-05-25

## 2020-05-01 RX ADMIN — FILGRASTIM 480 MCG: 480 INJECTION, SOLUTION INTRAVENOUS; SUBCUTANEOUS at 11:05

## 2020-05-01 NOTE — NURSING
Pt tolerated Neupogen injection in the BYRON Arm today. NAD.  declined AVS. Uses my Ochnser. Discharged home. Ambulated independently.

## 2020-05-02 ENCOUNTER — INFUSION (OUTPATIENT)
Dept: INFUSION THERAPY | Facility: HOSPITAL | Age: 60
End: 2020-05-02
Attending: INTERNAL MEDICINE
Payer: COMMERCIAL

## 2020-05-02 VITALS
DIASTOLIC BLOOD PRESSURE: 62 MMHG | HEART RATE: 90 BPM | RESPIRATION RATE: 18 BRPM | BODY MASS INDEX: 24.45 KG/M2 | HEIGHT: 63 IN | TEMPERATURE: 98 F | SYSTOLIC BLOOD PRESSURE: 132 MMHG | WEIGHT: 138 LBS

## 2020-05-02 DIAGNOSIS — C25.0 CANCER OF HEAD OF PANCREAS: ICD-10-CM

## 2020-05-02 DIAGNOSIS — C78.00 MALIGNANT NEOPLASM METASTATIC TO LUNG, UNSPECIFIED LATERALITY: Primary | ICD-10-CM

## 2020-05-02 PROCEDURE — 63600175 PHARM REV CODE 636 W HCPCS: Mod: TB | Performed by: INTERNAL MEDICINE

## 2020-05-02 PROCEDURE — 96372 THER/PROPH/DIAG INJ SC/IM: CPT

## 2020-05-02 RX ADMIN — PEGFILGRASTIM-CBQV 6 MG: 6 INJECTION, SOLUTION SUBCUTANEOUS at 11:05

## 2020-05-04 ENCOUNTER — INFUSION (OUTPATIENT)
Dept: INFUSION THERAPY | Facility: HOSPITAL | Age: 60
End: 2020-05-04
Attending: INTERNAL MEDICINE
Payer: COMMERCIAL

## 2020-05-04 VITALS
HEART RATE: 88 BPM | BODY MASS INDEX: 24.45 KG/M2 | HEIGHT: 63 IN | RESPIRATION RATE: 18 BRPM | WEIGHT: 138 LBS | SYSTOLIC BLOOD PRESSURE: 126 MMHG | DIASTOLIC BLOOD PRESSURE: 60 MMHG | TEMPERATURE: 99 F

## 2020-05-04 DIAGNOSIS — D70.1 CHEMOTHERAPY INDUCED NEUTROPENIA: Primary | ICD-10-CM

## 2020-05-04 DIAGNOSIS — T45.1X5A CHEMOTHERAPY INDUCED NEUTROPENIA: Primary | ICD-10-CM

## 2020-05-04 PROCEDURE — 63600175 PHARM REV CODE 636 W HCPCS: Mod: JG | Performed by: NURSE PRACTITIONER

## 2020-05-04 PROCEDURE — 96372 THER/PROPH/DIAG INJ SC/IM: CPT

## 2020-05-04 RX ADMIN — FILGRASTIM 480 MCG: 480 INJECTION, SOLUTION INTRAVENOUS; SUBCUTANEOUS at 11:05

## 2020-05-04 NOTE — NURSING
1125 pt here for neupogen injection, tolerated well to right arm withouti ssue, no distress noted upon d/c to home

## 2020-05-05 ENCOUNTER — RESEARCH ENCOUNTER (OUTPATIENT)
Dept: RESEARCH | Facility: HOSPITAL | Age: 60
End: 2020-05-05

## 2020-05-05 DIAGNOSIS — D49.0 PANCREAS NEOPLASM: Primary | ICD-10-CM

## 2020-05-05 DIAGNOSIS — Z00.6 PATIENT IN CLINICAL RESEARCH STUDY: ICD-10-CM

## 2020-05-05 NOTE — PROGRESS NOTES
4/29/2020     Protocol: A Phase 1, Multicenter, Open-label, Dose Escalation, Safety, Pharmacodynamic and Pharmacokinetic Study of .02 Given orally on a daily x 28 day schedule in patients with Advanced Solid Tumors or Lymphoma  Protocol # [id number]  IRB # [id number]  PI: Eliot Barrett MD  Version Date: 4.0       Informed Consent Process:      met with the patient to discuss the above mentioned protocol due to COVID-19 [facility]. Dr. Barrett reviewed clinical trial schedule, potential side effects of study drug, financial and medical risks, all reasons that could result in patient being removed from trial. Pt given signed copy of consent form.     No study specific testing was done prior to patient consent.     Called patient to review above mentioned protocol and answer any additional questions. Pt verbalized understanding of participating in a research trial; and all questions were answered to their satisfaction.

## 2020-05-07 ENCOUNTER — LAB VISIT (OUTPATIENT)
Dept: LAB | Facility: HOSPITAL | Age: 60
End: 2020-05-07
Attending: INTERNAL MEDICINE
Payer: COMMERCIAL

## 2020-05-07 DIAGNOSIS — C25.9 PANCREATIC CANCER: ICD-10-CM

## 2020-05-07 DIAGNOSIS — Z00.6 EXAMINATION OF PARTICIPANT IN CLINICAL TRIAL: ICD-10-CM

## 2020-05-07 DIAGNOSIS — Z00.6 PATIENT IN CLINICAL RESEARCH STUDY: ICD-10-CM

## 2020-05-07 DIAGNOSIS — D49.0 PANCREAS NEOPLASM: ICD-10-CM

## 2020-05-07 LAB
BASO STIPL BLD QL SMEAR: ABNORMAL
BASOPHILS NFR BLD: 0 % (ref 0–1.9)
DIFFERENTIAL METHOD: ABNORMAL
EOSINOPHIL NFR BLD: 0 % (ref 0–8)
ERYTHROCYTE [DISTWIDTH] IN BLOOD BY AUTOMATED COUNT: 17.5 % (ref 11.5–14.5)
HCG INTACT+B SERPL-ACNC: <1.2 MIU/ML
HCT VFR BLD AUTO: 39.1 % (ref 37–48.5)
HGB BLD-MCNC: 11.7 G/DL (ref 12–16)
IMM GRANULOCYTES # BLD AUTO: ABNORMAL K/UL (ref 0–0.04)
IMM GRANULOCYTES NFR BLD AUTO: ABNORMAL % (ref 0–0.5)
LYMPHOCYTES NFR BLD: 9 % (ref 18–48)
MCH RBC QN AUTO: 28.3 PG (ref 27–31)
MCHC RBC AUTO-ENTMCNC: 29.9 G/DL (ref 32–36)
MCV RBC AUTO: 94 FL (ref 82–98)
MONOCYTES NFR BLD: 13 % (ref 4–15)
NEUTROPHILS NFR BLD: 78 % (ref 38–73)
NRBC BLD-RTO: 0 /100 WBC
PLATELET # BLD AUTO: 97 K/UL (ref 150–350)
PLATELET BLD QL SMEAR: ABNORMAL
PMV BLD AUTO: 12.6 FL (ref 9.2–12.9)
RBC # BLD AUTO: 4.14 M/UL (ref 4–5.4)
TOXIC GRANULES BLD QL SMEAR: PRESENT
WBC # BLD AUTO: 29.04 K/UL (ref 3.9–12.7)

## 2020-05-07 PROCEDURE — 84702 CHORIONIC GONADOTROPIN TEST: CPT

## 2020-05-07 PROCEDURE — 85007 BL SMEAR W/DIFF WBC COUNT: CPT

## 2020-05-07 PROCEDURE — 85027 COMPLETE CBC AUTOMATED: CPT

## 2020-05-07 PROCEDURE — 36415 COLL VENOUS BLD VENIPUNCTURE: CPT

## 2020-05-08 DIAGNOSIS — C25.0 CANCER OF HEAD OF PANCREAS: Primary | ICD-10-CM

## 2020-05-08 DIAGNOSIS — Z00.6 CLINICAL TRIAL PARTICIPANT: ICD-10-CM

## 2020-05-11 ENCOUNTER — LAB VISIT (OUTPATIENT)
Dept: LAB | Facility: HOSPITAL | Age: 60
End: 2020-05-11
Attending: INTERNAL MEDICINE
Payer: COMMERCIAL

## 2020-05-11 DIAGNOSIS — C25.9 PANCREATIC CANCER: ICD-10-CM

## 2020-05-11 DIAGNOSIS — Z00.6 EXAMINATION OF PARTICIPANT IN CLINICAL TRIAL: ICD-10-CM

## 2020-05-11 LAB
BASOPHILS # BLD AUTO: 0.07 K/UL (ref 0–0.2)
BASOPHILS NFR BLD: 0.4 % (ref 0–1.9)
DIFFERENTIAL METHOD: ABNORMAL
EOSINOPHIL # BLD AUTO: 0 K/UL (ref 0–0.5)
EOSINOPHIL NFR BLD: 0.1 % (ref 0–8)
ERYTHROCYTE [DISTWIDTH] IN BLOOD BY AUTOMATED COUNT: 17.2 % (ref 11.5–14.5)
HCT VFR BLD AUTO: 40.3 % (ref 37–48.5)
HGB BLD-MCNC: 12.1 G/DL (ref 12–16)
IMM GRANULOCYTES # BLD AUTO: 0.2 K/UL (ref 0–0.04)
IMM GRANULOCYTES NFR BLD AUTO: 1.1 % (ref 0–0.5)
LYMPHOCYTES # BLD AUTO: 1.1 K/UL (ref 1–4.8)
LYMPHOCYTES NFR BLD: 5.7 % (ref 18–48)
MCH RBC QN AUTO: 28.3 PG (ref 27–31)
MCHC RBC AUTO-ENTMCNC: 30 G/DL (ref 32–36)
MCV RBC AUTO: 94 FL (ref 82–98)
MONOCYTES # BLD AUTO: 1 K/UL (ref 0.3–1)
MONOCYTES NFR BLD: 5.6 % (ref 4–15)
NEUTROPHILS # BLD AUTO: 16.2 K/UL (ref 1.8–7.7)
NEUTROPHILS NFR BLD: 87.1 % (ref 38–73)
NRBC BLD-RTO: 0 /100 WBC
PLATELET # BLD AUTO: 86 K/UL (ref 150–350)
PMV BLD AUTO: 10.3 FL (ref 9.2–12.9)
RBC # BLD AUTO: 4.27 M/UL (ref 4–5.4)
WBC # BLD AUTO: 18.54 K/UL (ref 3.9–12.7)

## 2020-05-11 PROCEDURE — 36415 COLL VENOUS BLD VENIPUNCTURE: CPT

## 2020-05-11 PROCEDURE — 85025 COMPLETE CBC W/AUTO DIFF WBC: CPT

## 2020-05-12 DIAGNOSIS — Z13.9 SCREENING FOR CONDITION: Primary | ICD-10-CM

## 2020-05-13 ENCOUNTER — LAB VISIT (OUTPATIENT)
Dept: LAB | Facility: HOSPITAL | Age: 60
End: 2020-05-13
Attending: INTERNAL MEDICINE
Payer: COMMERCIAL

## 2020-05-13 DIAGNOSIS — C25.9 PANCREATIC CANCER: ICD-10-CM

## 2020-05-13 DIAGNOSIS — Z00.6 EXAMINATION OF PARTICIPANT IN CLINICAL TRIAL: ICD-10-CM

## 2020-05-13 LAB
BASOPHILS # BLD AUTO: 0.04 K/UL (ref 0–0.2)
BASOPHILS NFR BLD: 0.3 % (ref 0–1.9)
DIFFERENTIAL METHOD: ABNORMAL
EOSINOPHIL # BLD AUTO: 0.1 K/UL (ref 0–0.5)
EOSINOPHIL NFR BLD: 0.4 % (ref 0–8)
ERYTHROCYTE [DISTWIDTH] IN BLOOD BY AUTOMATED COUNT: 17.1 % (ref 11.5–14.5)
HCT VFR BLD AUTO: 39.9 % (ref 37–48.5)
HGB BLD-MCNC: 11.9 G/DL (ref 12–16)
IMM GRANULOCYTES # BLD AUTO: 0.11 K/UL (ref 0–0.04)
IMM GRANULOCYTES NFR BLD AUTO: 0.9 % (ref 0–0.5)
LYMPHOCYTES # BLD AUTO: 1.3 K/UL (ref 1–4.8)
LYMPHOCYTES NFR BLD: 11.3 % (ref 18–48)
MCH RBC QN AUTO: 28.6 PG (ref 27–31)
MCHC RBC AUTO-ENTMCNC: 29.8 G/DL (ref 32–36)
MCV RBC AUTO: 96 FL (ref 82–98)
MONOCYTES # BLD AUTO: 0.8 K/UL (ref 0.3–1)
MONOCYTES NFR BLD: 7.3 % (ref 4–15)
NEUTROPHILS # BLD AUTO: 9.2 K/UL (ref 1.8–7.7)
NEUTROPHILS NFR BLD: 79.8 % (ref 38–73)
NRBC BLD-RTO: 0 /100 WBC
PLATELET # BLD AUTO: 118 K/UL (ref 150–350)
PMV BLD AUTO: 10.7 FL (ref 9.2–12.9)
RBC # BLD AUTO: 4.16 M/UL (ref 4–5.4)
WBC # BLD AUTO: 11.58 K/UL (ref 3.9–12.7)

## 2020-05-13 PROCEDURE — 36415 COLL VENOUS BLD VENIPUNCTURE: CPT

## 2020-05-13 PROCEDURE — 85025 COMPLETE CBC W/AUTO DIFF WBC: CPT

## 2020-05-13 NOTE — PROGRESS NOTES
ONCOLOGY / PCTP VISIT     Subjective:       Patient ID: Quyen Moody    Chief Complaint:  Pancreatic cancer    HPI     Quyen Moody is a 59 y.o. female, patient of Dr. Barrett, to clinic for follow up and to begin treatment on Genzada trial. Patient feeling well today but notes hot flashes.     ECOG 0. Life expectancy greater than 3 months. She is resuming work today.    Oncologic History:  She has had intermittent upper abdominal pain since early June.  She presented to her PCP who originally ordered an US and CT.  US was negative and CT showed some haziness at the pancreatic head.  She saw GI who performed EUS.  On EUS, a 3x4cm pancreatic head mass was seen with possible invasion into the SMA and portal vein.  FNA path s/w pancreatic adenocarcinoma.  CA 19-9 level at outside hospital was >1000 per the patient.  She endorses nausea and inability to tolerate much PO.  She has lost about 10lbs over the last few weeks and is noticing her skin turning yellow.  Denies pruritis.  She is passing gas but has not had a BM in a few days, she attributes this to narcotic use.  She recently started taking stool softeners.  No previous cardiac or stroke history.  Surgical history significant for open appendectomy when she was in her 20s  - 8/7/2019 - 1/9/2020: [place]+Abraxane and TTF on PANOVA-3, LFTs elevated, but improved.  - Based on CT 12/2019 scans, Dr. Hallman felt pt was potentially resectable after chemoXRT given good response to current therapy.  However, subsequent Chest CT   showed new and growing micronodules in the in lungs c/w met disease in light of rising tumor markers.    - 1/23/2020: Changed to FOLFIRINOX and con't TTF on PANOVA as this seems to be achieving good local control of the primary tumor.     Review of Systems   Constitutional: Positive for fatigue. Negative for activity change, appetite change, chills, fever and unexpected weight change.   HENT: Negative for congestion, hearing loss, mouth sores, sore  throat, tinnitus and voice change.    Eyes: Negative for pain and visual disturbance.   Respiratory: Negative for cough, shortness of breath and wheezing.    Cardiovascular: Negative for chest pain, palpitations and leg swelling.   Gastrointestinal: Positive for abdominal pain. Negative for constipation, diarrhea, nausea and vomiting.   Endocrine: Negative for cold intolerance and heat intolerance.   Genitourinary: Negative for difficulty urinating, dyspareunia, dysuria, frequency, menstrual problem, urgency, vaginal bleeding, vaginal discharge and vaginal pain.   Musculoskeletal: Negative for arthralgias, back pain and myalgias.   Skin: Negative for color change, rash and wound.   Allergic/Immunologic: Negative for environmental allergies and food allergies.   Neurological: Negative for weakness, numbness and headaches.   Hematological: Negative for adenopathy. Does not bruise/bleed easily.   Psychiatric/Behavioral: Positive for sleep disturbance. Negative for agitation, confusion and hallucinations. The patient is nervous/anxious.    All other systems reviewed and are negative.        Allergies:  Review of patient's allergies indicates:   Allergen Reactions    Sulfa (sulfonamide antibiotics) Swelling and Hives     tongue         Medications:  Current Outpatient Medications   Medication Sig Dispense Refill    apixaban (ELIQUIS) 5 mg Tab Take 1 tablet (5 mg total) by mouth 2 (two) times daily. 60 tablet 6    dexAMETHasone (DECADRON) 4 MG Tab Take 1 tablet (4 mg total) by mouth every 12 (twelve) hours. For 2 days following chemotherapy. 20 tablet 0    estradiol-norethindrone (ACTIVELLA) 1-0.5 mg per tablet Take 0.5 mg by mouth once daily.      fentaNYL (DURAGESIC) 25 mcg/hr Place 1 patch onto the skin every 72 hours. 10 patch 0    HYDROcodone-acetaminophen (NORCO) 5-325 mg per tablet Take 1 tablet by mouth every 6 (six) hours as needed for Pain.      INV gz17-6.02 300 mg capsule Take 1 capsule (300 mg total)  by mouth 2 (two) times daily. Take 2 hours before or 1 hour after a meal with 8 oz of water. Do  not chew or open capsule. For Investigational Use Only. 28 capsule 0    INV gz17-6.02 75 mg capsule Take 1 capsule (75 mg total) by mouth 2 (two) times daily. Take 2 hours before or 1 hour after a meal with 8 oz of water. Do  not chew or open capsule. For Investigational Use Only. 28 capsule 0    INV peptamen jim hp Soln solution Take 250 mLs by mouth 2 (two) times daily. FOR INVESTIGATIONAL USE ONLY 28 vial 0    lidocaine-prilocaine (EMLA) cream Apply to port 45 minutes prior to access. 30 g 0    multivitamin (THERAGRAN) per tablet Take 1 tablet by mouth once daily.      ondansetron (ZOFRAN) 8 MG tablet Take 1 tablet (8 mg total) by mouth every 8 (eight) hours as needed for Nausea. 120 tablet 2    hydrocortisone 2.5 % cream Apply topically 2 (two) times daily. (Patient not taking: Reported on 1/9/2020) 453.6 g 1    LORazepam (ATIVAN) 0.5 MG tablet Take 1 tablet (0.5 mg total) by mouth every 6 (six) hours as needed for Anxiety (nausea). (Patient not taking: Reported on 3/19/2020) 60 tablet 0    OLANZapine (ZYPREXA) 5 MG tablet Take 1 tablet (5 mg total) by mouth nightly. To prevent nausea (Patient not taking: Reported on 4/29/2020) 30 tablet 2    oxyCODONE (ROXICODONE) 15 MG Tab Take 1 tablet (15 mg total) by mouth every 6 (six) hours as needed for Pain. 90 tablet 0     No current facility-administered medications for this visit.        PMH:  Past Medical History:   Diagnosis Date    Allergic rhinitis 3/3/2014    CKD (chronic kidney disease) stage 3, GFR 30-59 ml/min 12/26/2015    Hyperlipidemia 3/3/2014    Lung metastasis     Pancreas cancer     Skin rash 8/10/2019       PSH:  Past Surgical History:   Procedure Laterality Date    ERCP N/A 7/16/2019    Procedure: ERCP (ENDOSCOPIC RETROGRADE CHOLANGIOPANCREATOGRAPHY);  Surgeon: Chema Harris MD;  Location: [address]);  Service: Endoscopy;   Laterality: N/A;    ERCP N/A 12/24/2019    Procedure: ERCP (ENDOSCOPIC RETROGRADE CHOLANGIOPANCREATOGRAPHY);  Surgeon: Chema Harris MD;  [facility]  Service: Endoscopy;  Laterality: N/A;    Exporatory lap      INSERTION OF TUNNELED CENTRAL VENOUS CATHETER (CVC) WITH SUBCUTANEOUS PORT Right 8/12/2019    Procedure: [id number];  Surgeon: Cesar Hallman MD;  [facility];  Service: General;  Laterality: Right;  right IJ       FamHx:  Family History   Problem Relation Age of Onset    Diabetes Mother     Diabetes Father     Diabetes Brother     Heart disease Neg Hx        SocHx:  Social History     Socioeconomic History    Marital status:      Spouse name: Not on file    Number of children: 0    Years of education: Not on file    Highest education level: Not on file   Occupational History    Occupation:      Employer: [employer]   Social Needs    Financial resource strain: Not on file    Food insecurity:     Worry: Not on file     Inability: Not on file    Transportation needs:     Medical: Not on file     Non-medical: Not on file   Tobacco Use    Smoking status: Former Smoker     Start date: 12/11/1999    Smokeless tobacco: Never Used   Substance and Sexual Activity    Alcohol use: Not Currently     Alcohol/week: 1.0 standard drinks     Types: 1 Glasses of wine per week     Frequency: 4 or more times a week     Drinks per session: 1 or 2     Binge frequency: Never     Comment: last drink a month ago     Drug use: No    Sexual activity: Yes   Lifestyle    Physical activity:     Days per week: Not on file     Minutes per session: Not on file    Stress: Not on file   Relationships    Social connections:     Talks on phone: Not on file     Gets together: Not on file     Attends Jew service: Not on file     Active member of club or organization: Not on file     Attends meetings of clubs or organizations: Not on file      Relationship status: Not on file   Other Topics Concern    Not on file   Social History Narrative    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      of Cancer in 2016         Objective:       Vitals:    05/15/20 0827   BP: 129/70   Pulse: 85   Resp: 20       Physical Exam   Constitutional: She is oriented to person, place, and time and well-developed, well-nourished, and in no distress. No distress.   HENT:   Head: Normocephalic and atraumatic.   Mouth/Throat: Oropharynx is clear and moist. No oropharyngeal exudate.   Eyes: Pupils are equal, round, and reactive to light. Conjunctivae are normal. Right eye exhibits no discharge. Left eye exhibits no discharge. No scleral icterus.   Neck: Normal range of motion. No tracheal deviation present. No thyromegaly present.   Cardiovascular: Normal rate and regular rhythm. Exam reveals no gallop.   No murmur heard.  Pulmonary/Chest: Effort normal and breath sounds normal. No respiratory distress. She has no wheezes. She has no rales.   Abdominal: Soft. She exhibits no distension and no mass. There is no tenderness. There is no rebound and no guarding.   Musculoskeletal: Normal range of motion. She exhibits no edema, tenderness or deformity.   Lymphadenopathy:     She has no cervical adenopathy.   Neurological: She is alert and oriented to person, place, and time. She displays normal reflexes. No cranial nerve deficit. She exhibits normal muscle tone. Gait normal. Coordination normal.   Skin: Skin is warm and dry. No rash noted. She is not diaphoretic. No erythema. No pallor.   Psychiatric: Mood, memory, affect and judgment normal.     LABS:  WBC   Date Value Ref Range Status   05/15/2020 11.32 3.90 - 12.70 K/uL Final     Hemoglobin   Date Value Ref Range Status   05/15/2020 12.3 12.0 - 16.0 g/dL Final     Hematocrit   Date Value Ref Range Status   05/15/2020 40.0 37.0 - 48.5 % Final     Platelets   Date Value Ref Range Status   05/15/2020 173 150 - 350 K/uL Final        Chemistry        Component Value Date/Time     05/15/2020 0749    K 4.6 05/15/2020 0749     (H) 05/15/2020 0749    CO2 23 05/15/2020 0749    BUN 10 05/15/2020 0749    CREATININE 0.7 05/15/2020 0749     (H) 05/15/2020 0749        Component Value Date/Time    CALCIUM 8.8 05/15/2020 0749    ALKPHOS 245 (H) 05/15/2020 0749    AST 35 05/15/2020 0749    ALT 31 05/15/2020 0749    BILITOT 0.7 05/15/2020 0749    ESTGFRAFRICA >60.0 05/15/2020 0749    EGFRNONAA >60.0 05/15/2020 0749          Assessment:       1. Cancer of head of pancreas    2. Malignant neoplasm metastatic to lung, unspecified laterality    3. Clinical trial participant    4. Liver metastases    5. Neoplasm related pain (acute) (chronic)    6. VTE (venous thromboembolism)    7. Anticoagulated            Plan:   1,2,3,4- Cancer at the head of the pancreas:     Discussed SOC chemo and chemoXRT with FOLFIRINOX vs our PANOVA-3 trial with [place]-Abraxane +/- TTFields.  Extensively reviewed the rationale of the study and reviewed her eligibility criteria with the research nurse and discussed case with Dr. Hallman as well.  She is interested in this study, and signed consent with our research nurse.      Patient was treated with [place]+Abraxane and TTF on PANOVA-3.  Dose reduce [place] to 800 and Abraxane to 100 per pt request due to chemo related AEs.  Based on initial scans, Dr. Hallman felt pt may actually be resectable after chemoXRT given good response to current therapy.  However, subsequent Chest CT showed new and growing micronodules in the in lungs c/w met disease in light of rising tumor markers.       We switched chemo to FOLFIRINOX and con't TTF on PANOVA as this seemed to be achieving good local control of the primary tumor. She developed progression on [place]/Abraxane and FOLFIRINOX.   Will remove from Novocure trial and proceed with Genzada trial.      Patient here to start Genzada trial. Feeling well and labs acceptable to proceed with cycle #1.       RTC per research RN      5-  Continue current regimen. Rx refilled.    6,7- On Eliquis.     More than 25 mins were spent during this encounter, greater than 50% was spent in direct counseling and/or coordination of care.     Patient is in agreement with the proposed treatment plan. All questions were answered to the patient's satisfaction. Pt knows to call clinic if anything is needed before the next clinic visit.    Karen Hightower, MSN, APRN, FNP-C  Hematology and Medical Oncology  Nurse Practitioner to Dr. Eliot Barrett  Nurse Practitioner, [facility]

## 2020-05-15 ENCOUNTER — OFFICE VISIT (OUTPATIENT)
Dept: HEMATOLOGY/ONCOLOGY | Facility: CLINIC | Age: 60
End: 2020-05-15
Payer: COMMERCIAL

## 2020-05-15 ENCOUNTER — TELEPHONE (OUTPATIENT)
Dept: HEMATOLOGY/ONCOLOGY | Facility: CLINIC | Age: 60
End: 2020-05-15

## 2020-05-15 ENCOUNTER — RESEARCH ENCOUNTER (OUTPATIENT)
Dept: RESEARCH | Facility: HOSPITAL | Age: 60
End: 2020-05-15

## 2020-05-15 ENCOUNTER — LAB VISIT (OUTPATIENT)
Dept: LAB | Facility: HOSPITAL | Age: 60
End: 2020-05-15
Attending: INTERNAL MEDICINE
Payer: COMMERCIAL

## 2020-05-15 ENCOUNTER — CLINICAL SUPPORT (OUTPATIENT)
Dept: HEMATOLOGY/ONCOLOGY | Facility: CLINIC | Age: 60
End: 2020-05-15
Payer: COMMERCIAL

## 2020-05-15 VITALS
WEIGHT: 135.13 LBS | DIASTOLIC BLOOD PRESSURE: 70 MMHG | SYSTOLIC BLOOD PRESSURE: 129 MMHG | HEIGHT: 63 IN | RESPIRATION RATE: 20 BRPM | HEART RATE: 85 BPM | BODY MASS INDEX: 23.94 KG/M2 | OXYGEN SATURATION: 98 %

## 2020-05-15 DIAGNOSIS — Z79.01 ANTICOAGULATED: ICD-10-CM

## 2020-05-15 DIAGNOSIS — C78.00 MALIGNANT NEOPLASM METASTATIC TO LUNG, UNSPECIFIED LATERALITY: ICD-10-CM

## 2020-05-15 DIAGNOSIS — Z13.9 SCREENING FOR CONDITION: ICD-10-CM

## 2020-05-15 DIAGNOSIS — Z00.6 EXAMINATION OF PARTICIPANT IN CLINICAL TRIAL: ICD-10-CM

## 2020-05-15 DIAGNOSIS — G89.3 NEOPLASM RELATED PAIN (ACUTE) (CHRONIC): ICD-10-CM

## 2020-05-15 DIAGNOSIS — Z00.6 PATIENT IN CLINICAL RESEARCH STUDY: ICD-10-CM

## 2020-05-15 DIAGNOSIS — Z00.6 CLINICAL TRIAL PARTICIPANT: ICD-10-CM

## 2020-05-15 DIAGNOSIS — I82.90 VTE (VENOUS THROMBOEMBOLISM): ICD-10-CM

## 2020-05-15 DIAGNOSIS — D49.0 PANCREAS NEOPLASM: ICD-10-CM

## 2020-05-15 DIAGNOSIS — Z00.6 RESEARCH STUDY PATIENT: ICD-10-CM

## 2020-05-15 DIAGNOSIS — C25.9 PANCREATIC CANCER: ICD-10-CM

## 2020-05-15 DIAGNOSIS — C25.0 CANCER OF HEAD OF PANCREAS: Primary | ICD-10-CM

## 2020-05-15 DIAGNOSIS — C78.7 LIVER METASTASES: ICD-10-CM

## 2020-05-15 DIAGNOSIS — C25.0 CANCER OF HEAD OF PANCREAS: ICD-10-CM

## 2020-05-15 LAB
ALBUMIN SERPL BCP-MCNC: 3.1 G/DL (ref 3.5–5.2)
ALP SERPL-CCNC: 245 U/L (ref 55–135)
ALT SERPL W/O P-5'-P-CCNC: 31 U/L (ref 10–44)
ANION GAP SERPL CALC-SCNC: 8 MMOL/L (ref 8–16)
APTT BLDCRRT: 24.3 SEC (ref 21–32)
AST SERPL-CCNC: 35 U/L (ref 10–40)
BASOPHILS # BLD AUTO: 0.03 K/UL (ref 0–0.2)
BASOPHILS NFR BLD: 0.3 % (ref 0–1.9)
BILIRUB SERPL-MCNC: 0.7 MG/DL (ref 0.1–1)
BUN SERPL-MCNC: 10 MG/DL (ref 6–20)
CALCIUM SERPL-MCNC: 8.8 MG/DL (ref 8.7–10.5)
CANCER AG19-9 SERPL-ACNC: 2854 U/ML (ref 2–40)
CHLORIDE SERPL-SCNC: 111 MMOL/L (ref 95–110)
CO2 SERPL-SCNC: 23 MMOL/L (ref 23–29)
CREAT SERPL-MCNC: 0.7 MG/DL (ref 0.5–1.4)
DIFFERENTIAL METHOD: ABNORMAL
EOSINOPHIL # BLD AUTO: 0 K/UL (ref 0–0.5)
EOSINOPHIL NFR BLD: 0.3 % (ref 0–8)
ERYTHROCYTE [DISTWIDTH] IN BLOOD BY AUTOMATED COUNT: 17 % (ref 11.5–14.5)
EST. GFR  (AFRICAN AMERICAN): >60 ML/MIN/1.73 M^2
EST. GFR  (NON AFRICAN AMERICAN): >60 ML/MIN/1.73 M^2
GGT SERPL-CCNC: 323 U/L (ref 8–55)
GLUCOSE SERPL-MCNC: 129 MG/DL (ref 70–110)
HCG INTACT+B SERPL-ACNC: <1.2 MIU/ML
HCT VFR BLD AUTO: 40 % (ref 37–48.5)
HGB BLD-MCNC: 12.3 G/DL (ref 12–16)
IMM GRANULOCYTES # BLD AUTO: 0.05 K/UL (ref 0–0.04)
IMM GRANULOCYTES NFR BLD AUTO: 0.4 % (ref 0–0.5)
INR PPP: 1.1 (ref 0.8–1.2)
LDH SERPL L TO P-CCNC: 248 U/L (ref 110–260)
LYMPHOCYTES # BLD AUTO: 1.1 K/UL (ref 1–4.8)
LYMPHOCYTES NFR BLD: 9.3 % (ref 18–48)
MAGNESIUM SERPL-MCNC: 1.4 MG/DL (ref 1.6–2.6)
MCH RBC QN AUTO: 29.1 PG (ref 27–31)
MCHC RBC AUTO-ENTMCNC: 30.8 G/DL (ref 32–36)
MCV RBC AUTO: 95 FL (ref 82–98)
MONOCYTES # BLD AUTO: 0.5 K/UL (ref 0.3–1)
MONOCYTES NFR BLD: 4.6 % (ref 4–15)
NEUTROPHILS # BLD AUTO: 9.6 K/UL (ref 1.8–7.7)
NEUTROPHILS NFR BLD: 85.1 % (ref 38–73)
NRBC BLD-RTO: 0 /100 WBC
PLATELET # BLD AUTO: 173 K/UL (ref 150–350)
PMV BLD AUTO: 10.4 FL (ref 9.2–12.9)
POTASSIUM SERPL-SCNC: 4.6 MMOL/L (ref 3.5–5.1)
PROT SERPL-MCNC: 7 G/DL (ref 6–8.4)
PROTHROMBIN TIME: 10.9 SEC (ref 9–12.5)
RBC # BLD AUTO: 4.22 M/UL (ref 4–5.4)
SARS-COV-2 RNA RESP QL NAA+PROBE: NOT DETECTED
SODIUM SERPL-SCNC: 142 MMOL/L (ref 136–145)
WBC # BLD AUTO: 11.32 K/UL (ref 3.9–12.7)

## 2020-05-15 PROCEDURE — 83735 ASSAY OF MAGNESIUM: CPT

## 2020-05-15 PROCEDURE — 3008F BODY MASS INDEX DOCD: CPT | Mod: CPTII,S$GLB,, | Performed by: NURSE PRACTITIONER

## 2020-05-15 PROCEDURE — 99214 OFFICE O/P EST MOD 30 MIN: CPT | Mod: S$PBB,,, | Performed by: NURSE PRACTITIONER

## 2020-05-15 PROCEDURE — 3008F PR BODY MASS INDEX (BMI) DOCUMENTED: ICD-10-PCS | Mod: CPTII,S$GLB,, | Performed by: NURSE PRACTITIONER

## 2020-05-15 PROCEDURE — 86301 IMMUNOASSAY TUMOR CA 19-9: CPT

## 2020-05-15 PROCEDURE — 99999 PR PBB SHADOW E&M-EST. PATIENT-LVL III: CPT | Mod: PBBFAC,,, | Performed by: NURSE PRACTITIONER

## 2020-05-15 PROCEDURE — 82977 ASSAY OF GGT: CPT

## 2020-05-15 PROCEDURE — 83615 LACTATE (LD) (LDH) ENZYME: CPT

## 2020-05-15 PROCEDURE — 85730 THROMBOPLASTIN TIME PARTIAL: CPT

## 2020-05-15 PROCEDURE — 84702 CHORIONIC GONADOTROPIN TEST: CPT

## 2020-05-15 PROCEDURE — U0002 COVID-19 LAB TEST NON-CDC: HCPCS

## 2020-05-15 PROCEDURE — 85610 PROTHROMBIN TIME: CPT

## 2020-05-15 PROCEDURE — 85025 COMPLETE CBC W/AUTO DIFF WBC: CPT

## 2020-05-15 PROCEDURE — 80053 COMPREHEN METABOLIC PANEL: CPT

## 2020-05-15 PROCEDURE — 36415 COLL VENOUS BLD VENIPUNCTURE: CPT

## 2020-05-15 PROCEDURE — 99214 PR OFFICE/OUTPT VISIT, EST, LEVL IV, 30-39 MIN: ICD-10-PCS | Mod: S$PBB,,, | Performed by: NURSE PRACTITIONER

## 2020-05-15 PROCEDURE — 99999 PR PBB SHADOW E&M-EST. PATIENT-LVL III: ICD-10-PCS | Mod: PBBFAC,,, | Performed by: NURSE PRACTITIONER

## 2020-05-15 RX ORDER — OXYCODONE HYDROCHLORIDE 15 MG/1
15 TABLET ORAL EVERY 6 HOURS PRN
Qty: 90 TABLET | Refills: 0 | Status: SHIPPED | OUTPATIENT
Start: 2020-05-15 | End: 2020-06-05

## 2020-05-15 NOTE — TELEPHONE ENCOUNTER
----- Message from Jackelin Dougherty sent at 5/14/2020  3:51 PM CDT -----  Regarding: Genzada rx  Hi Quyen Mathews is to start early Monday but I would like to  the drug Friday and have it ready, can you please send the prescription to the investigational pharmacy?     We are able to do 2 weeks so #28 300mg, #28 75mg, and #28 Peptamen jim.       Thank you  Jackelin

## 2020-05-15 NOTE — PROGRESS NOTES
5/14/2020     Protocol: A Phase 1, Multicenter, Open-label, Dose Escalation, Safety, Pharmacodynamic and Pharmacokinetic Study of .02 Given orally on a daily x 28 day schedule in patients with Advanced Solid Tumors or Lymphoma  Protocol # [id number]  IRB # 2018.428  PI: Eliot Barrett MD  Version Date: 5.0    Pt #     Eligibility Note:  Pt deemed eligible for the above mentioned study by CRC and patients physician by meeting all the following criteria:  Pt has histologically confirmed advanced solid tumor per path report 7/11/2019  Pt has had tumor progression after receiving all standard of care/approved therapies or has no approved therapy per chart note 4/29/2020  Pt has measurable and evaluable metastatic tumors per RECIST per scan dated 4/23/2020  Pt has an ECOG of less than or equal to 1 per chart note 4/29/2020  Pt has a life expectancy of at least 3 months per Dr. Guerra EPIC message 5/7/2020  Pt is at least 18 years of age with a date of birth of 1960  Pt has signed an approved IRB consent on 4/29/2020  Pt had negative serum pregnancy test and is post menopausal   Pt has acceptable liver function as required:   Bilirubin of 0.4   AST of 39   ALT of 47   Alkaline phosphatase of 69  Pt has acceptable renal function as required:   Creatinine of 0.7   Creatinine clearance of 85  Pt has acceptable hematologic status required:   ANC of 9.2 K/uL   Platelet of 118 k/uL   Hemoglobin of 11.9 g/dL  Pt has no clinically significant abnormalities on Urinalysis   Pt has acceptable coagulation status:   PT of 10.6   PTT of 25.6  Pt has agreed to use effective contraceptive methods during the study  Pt does not have [place] Heart association class III or IV, myocardial infarction, or cardiac disease within the last 6 months, unstable angina or evidence of ischemia on ECG  Pt is not currently taking MAOIs  Pt does not have a baseline QTc exceeding 450 msec and is not taking a class IA or  class III antiarrhythmic   Pt does not have a known active, uncontrolled bacterial, viral or fungal infection requiring systemic therapy  Pt is not a pregnant or nursing female either female or male agrees to us effective contraception through the study participation  Pt has not had radiation therapy, surgery, chemotherapy or investigational therapy within one month of study entry  Pt states their willingness to comply with study procedures  Pt has no active infection with HIV, HTLV-1, Hep B or C, or any viral infection that would interfere with the study.   Pt does not have a serious nonmalignant disease  Pt is not currently receiving any other investigational agent  Pt does not have a cows milk allergy or galactosemia  Pt does not have a primary CNS malignancy or active CNS mets  Pt does not require systemic steroids for neurologic stabilization   Pt is not on any prohibited medications  Guaiac test resulted May 4th 2020 12:55pm - Negative

## 2020-05-18 ENCOUNTER — LAB VISIT (OUTPATIENT)
Dept: LAB | Facility: HOSPITAL | Age: 60
End: 2020-05-18
Attending: INTERNAL MEDICINE
Payer: COMMERCIAL

## 2020-05-18 ENCOUNTER — RESEARCH ENCOUNTER (OUTPATIENT)
Dept: RESEARCH | Facility: HOSPITAL | Age: 60
End: 2020-05-18

## 2020-05-18 ENCOUNTER — INFUSION (OUTPATIENT)
Dept: INFUSION THERAPY | Facility: HOSPITAL | Age: 60
End: 2020-05-18
Attending: INTERNAL MEDICINE
Payer: COMMERCIAL

## 2020-05-18 VITALS
RESPIRATION RATE: 16 BRPM | HEART RATE: 75 BPM | SYSTOLIC BLOOD PRESSURE: 120 MMHG | TEMPERATURE: 99 F | BODY MASS INDEX: 24.25 KG/M2 | DIASTOLIC BLOOD PRESSURE: 59 MMHG | WEIGHT: 136.88 LBS | HEIGHT: 63 IN

## 2020-05-18 DIAGNOSIS — C25.0 CANCER OF HEAD OF PANCREAS: Primary | ICD-10-CM

## 2020-05-18 DIAGNOSIS — Z00.6 CLINICAL TRIAL PARTICIPANT: ICD-10-CM

## 2020-05-18 DIAGNOSIS — D49.0 PANCREAS NEOPLASM: ICD-10-CM

## 2020-05-18 DIAGNOSIS — Z00.6 PATIENT IN CLINICAL RESEARCH STUDY: ICD-10-CM

## 2020-05-18 LAB
BILIRUB UR QL STRIP: NEGATIVE
CLARITY UR REFRACT.AUTO: ABNORMAL
COLOR UR AUTO: YELLOW
GLUCOSE UR QL STRIP: NEGATIVE
HGB UR QL STRIP: NEGATIVE
KETONES UR QL STRIP: NEGATIVE
LEUKOCYTE ESTERASE UR QL STRIP: NEGATIVE
NITRITE UR QL STRIP: NEGATIVE
PH UR STRIP: 7 [PH] (ref 5–8)
PROT UR QL STRIP: NEGATIVE
SP GR UR STRIP: 1.01 (ref 1–1.03)
URN SPEC COLLECT METH UR: ABNORMAL

## 2020-05-18 PROCEDURE — 93005 ELECTROCARDIOGRAM TRACING: CPT | Mod: PBBFAC | Performed by: INTERNAL MEDICINE

## 2020-05-18 PROCEDURE — 36591 DRAW BLOOD OFF VENOUS DEVICE: CPT

## 2020-05-18 PROCEDURE — 93010 EKG 12-LEAD: ICD-10-PCS | Mod: 59,S$PBB,, | Performed by: INTERNAL MEDICINE

## 2020-05-18 PROCEDURE — 93010 ELECTROCARDIOGRAM REPORT: CPT | Mod: S$PBB,,, | Performed by: INTERNAL MEDICINE

## 2020-05-18 PROCEDURE — A4216 STERILE WATER/SALINE, 10 ML: HCPCS | Performed by: INTERNAL MEDICINE

## 2020-05-18 PROCEDURE — 81003 URINALYSIS AUTO W/O SCOPE: CPT

## 2020-05-18 PROCEDURE — 93010 ELECTROCARDIOGRAM REPORT: CPT | Mod: 59,S$PBB,, | Performed by: INTERNAL MEDICINE

## 2020-05-18 PROCEDURE — 25000003 PHARM REV CODE 250: Performed by: INTERNAL MEDICINE

## 2020-05-18 PROCEDURE — 63600175 PHARM REV CODE 636 W HCPCS: Performed by: INTERNAL MEDICINE

## 2020-05-18 PROCEDURE — 93010 EKG 12-LEAD: ICD-10-PCS | Mod: S$PBB,,, | Performed by: INTERNAL MEDICINE

## 2020-05-18 RX ORDER — SODIUM CHLORIDE 0.9 % (FLUSH) 0.9 %
10 SYRINGE (ML) INJECTION
Status: COMPLETED | OUTPATIENT
Start: 2020-05-18 | End: 2020-05-18

## 2020-05-18 RX ORDER — SODIUM CHLORIDE 0.9 % (FLUSH) 0.9 %
10 SYRINGE (ML) INJECTION
Status: CANCELLED | OUTPATIENT
Start: 2020-05-18

## 2020-05-18 RX ORDER — HEPARIN 100 UNIT/ML
500 SYRINGE INTRAVENOUS
Status: COMPLETED | OUTPATIENT
Start: 2020-05-18 | End: 2020-05-18

## 2020-05-18 RX ORDER — HEPARIN 100 UNIT/ML
500 SYRINGE INTRAVENOUS
Status: CANCELLED | OUTPATIENT
Start: 2020-05-18

## 2020-05-18 RX ADMIN — HEPARIN 500 UNITS: 100 SYRINGE at 04:05

## 2020-05-18 RX ADMIN — Medication 10 ML: at 04:05

## 2020-05-18 NOTE — PLAN OF CARE
Problem: Adult Inpatient Plan of Care  Goal: Optimal Comfort and Wellbeing  Intervention: Provide Person-Centered Care  Flowsheets (Taken 5/18/2020 0910)  Trust Relationship/Rapport: thoughts/feelings acknowledged; care explained; choices provided; emotional support provided; empathic listening provided; questions answered; questions encouraged; reassurance provided

## 2020-05-18 NOTE — PLAN OF CARE
Patient tolerated C1D1 of Genzada trial well today. Oral drug given by Research assistant, Jackelin Dougherty. All Vitals and blood draws completed per Research protocol. EKGs performed by Jackelin. Port flushed, hep locked and deaccessed. Verbalized understanding to call MD for any questions/concerns. Plan to rtc in 2 weeks for next cycle. Discharged home, ambulated independently.

## 2020-05-18 NOTE — PROGRESS NOTES
5/18/2020     Protocol: A Phase 1, Multicenter, Open-label, Dose Escalation, Safety, Pharmacodynamic and Pharmacokinetic Study of .02 Given orally on a daily x 28 day schedule in patients with Advanced Solid Tumors or Lymphoma  Protocol # [id number]  IRB # 2018.428  PI: Eliot Barrett MD  Version Date: 5.0    Pt #      Cycle 1 Day 1:      Patient presented to the clinic today for the above mentioned treatment.  Patient alert and oriented x 3. Mood and affect are appropriate to the situation.  Patient denies any fever, pain or shortness of breath. Denies any vomiting, diarrhea or constipation at this time.   Pt denies any mouth sores.  Reviewed and confirmed which concomitant medications with the patient she is currently taking. See concomitant therapy worksheet. Patient had in clinic visit with Karen Hightower 5/15/2020, no clinically significant findings noted during visit; lab work resulted and reviewed. Lab work out of range = not clinically significant. ECOG 0. Pregnancy test negative.  Patient [facility] to [facility]. Patient c/o some occassional hot flashes before days visit. The patient verbalized understanding of this information and is in agreement of this treatment plan.   Patient was dosed (375mg p.o. and 1 Peptamen jim carton) in clinic 8:23 AM, and all PK blood draws and EKG's, were preformed per protocol.  24 hour PK will be omitted due to COVID 19 precautions.  All side effects discussed with patient who voices understanding.  Pt given directions on taking study drug, drug diary, and calendar for upcoming visits.  Pt agrees to do this. Pt to return back 5/25/2020 in clinic.     Drug dispense :  #28 tabs 300mg .02   #28 tabs 75mg .02  #28 Peptamen Jim cartons.      Baseline log  Venous Thromboembolism Grade 2  Menopause Grade 1  CA related pain Grade 2  Nausea Grade 2  Edema- limb Grade 1- resolved as of 5/15/2020  Insomnia Grade 1  Allergic reaction  (skin rash) Grade 2

## 2020-05-19 DIAGNOSIS — Z00.6 CLINICAL TRIAL PARTICIPANT: Primary | ICD-10-CM

## 2020-05-19 DIAGNOSIS — C25.0 CANCER OF HEAD OF PANCREAS: ICD-10-CM

## 2020-05-20 ENCOUNTER — PATIENT MESSAGE (OUTPATIENT)
Dept: HEMATOLOGY/ONCOLOGY | Facility: CLINIC | Age: 60
End: 2020-05-20

## 2020-05-20 DIAGNOSIS — C25.0 MALIGNANT NEOPLASM OF HEAD OF PANCREAS: ICD-10-CM

## 2020-05-20 DIAGNOSIS — R60.9 SWELLING: Primary | ICD-10-CM

## 2020-05-21 ENCOUNTER — HOSPITAL ENCOUNTER (OUTPATIENT)
Dept: RADIOLOGY | Facility: HOSPITAL | Age: 60
Discharge: HOME | End: 2020-05-21
Attending: NURSE PRACTITIONER
Payer: COMMERCIAL

## 2020-05-21 DIAGNOSIS — R60.9 SWELLING: ICD-10-CM

## 2020-05-21 DIAGNOSIS — C25.0 MALIGNANT NEOPLASM OF HEAD OF PANCREAS: ICD-10-CM

## 2020-05-21 PROCEDURE — 93971 US UPPER EXTREMITY VEINS RIGHT: ICD-10-PCS | Mod: 26,RT,, | Performed by: RADIOLOGY

## 2020-05-21 PROCEDURE — 93971 EXTREMITY STUDY: CPT | Mod: 26,RT,, | Performed by: RADIOLOGY

## 2020-05-21 PROCEDURE — 93971 EXTREMITY STUDY: CPT | Mod: TC,RT

## 2020-05-22 ENCOUNTER — TELEPHONE (OUTPATIENT)
Dept: HEMATOLOGY/ONCOLOGY | Facility: CLINIC | Age: 60
End: 2020-05-22

## 2020-05-22 DIAGNOSIS — I82.90 VTE (VENOUS THROMBOEMBOLISM): Primary | ICD-10-CM

## 2020-05-22 RX ORDER — ENOXAPARIN SODIUM 100 MG/ML
100 INJECTION SUBCUTANEOUS DAILY
Qty: 30 ML | Refills: 6 | Status: SHIPPED | OUTPATIENT
Start: 2020-05-22 | End: 2020-06-15 | Stop reason: SDUPTHER

## 2020-05-22 NOTE — TELEPHONE ENCOUNTER
Spoke with patient and reviewed ultrasound. Discussed stopping Eliquis and starting once daily Lovenox at 1.5mg/kg. Patient will  prescription and bring to clinic this afternoon to have education with nurse.

## 2020-05-22 NOTE — PROGRESS NOTES
ONCOLOGY / PCTP VISIT     Subjective:       Patient ID: Quyen Moody    Chief Complaint:  Pancreatic cancer    HPI     Quyen Moody is a 59 y.o. female, patient of Dr. Barrett, to clinic for follow up and to continue treatment on Genzada trial. Patient reports increased nausea and back pain. Zofran took yesterday but did not work. For pain, she is on 25 mcg/hr every 72 hours and oxycodone PRN. Reports pain worse at night and wraps around to the front. She has started Lovenox injections, noticing bruising.    ECOG 0-1. Life expectancy greater than 3 months.     Oncologic History:  She has had intermittent upper abdominal pain since early June.  She presented to her PCP who originally ordered an US and CT.  US was negative and CT showed some haziness at the pancreatic head.  She saw GI who performed EUS.  On EUS, a 3x4cm pancreatic head mass was seen with possible invasion into the SMA and portal vein.  FNA path s/w pancreatic adenocarcinoma.  CA 19-9 level at outside hospital was >1000 per the patient.  She endorses nausea and inability to tolerate much PO.  She has lost about 10lbs over the last few weeks and is noticing her skin turning yellow.  Denies pruritis.  She is passing gas but has not had a BM in a few days, she attributes this to narcotic use.  She recently started taking stool softeners.  No previous cardiac or stroke history.  Surgical history significant for open appendectomy when she was in her 20s  - 8/7/2019 - 1/9/2020: [place]+Abraxane and TTF on PANOVA-3, LFTs elevated, but improved.  - Based on CT 12/2019 scans, Dr. Hallman felt pt was potentially resectable after chemoXRT given good response to current therapy.  However, subsequent Chest CT   showed new and growing micronodules in the in lungs c/w met disease in light of rising tumor markers.    - 1/23/2020: Changed to FOLFIRINOX and con't TTF on PANOVA as this seems to be achieving good local control of the primary tumor.     Review of Systems    Constitutional: Positive for fatigue. Negative for activity change, appetite change, chills, fever and unexpected weight change.   HENT: Negative for congestion, hearing loss, mouth sores, sore throat, tinnitus and voice change.    Eyes: Negative for pain and visual disturbance.   Respiratory: Negative for cough, shortness of breath and wheezing.    Cardiovascular: Negative for chest pain, palpitations and leg swelling.   Gastrointestinal: Positive for abdominal pain. Negative for constipation, diarrhea, nausea and vomiting.   Endocrine: Negative for cold intolerance and heat intolerance.   Genitourinary: Negative for difficulty urinating, dyspareunia, dysuria, frequency, menstrual problem, urgency, vaginal bleeding, vaginal discharge and vaginal pain.   Musculoskeletal: Positive for back pain. Negative for arthralgias and myalgias.   Skin: Negative for color change, rash and wound.   Allergic/Immunologic: Negative for environmental allergies and food allergies.   Neurological: Negative for weakness, numbness and headaches.   Hematological: Negative for adenopathy. Does not bruise/bleed easily.   Psychiatric/Behavioral: Positive for sleep disturbance. Negative for agitation, confusion and hallucinations. The patient is nervous/anxious.    All other systems reviewed and are negative.        Allergies:  Review of patient's allergies indicates:   Allergen Reactions    Sulfa (sulfonamide antibiotics) Swelling and Hives     tongue         Medications:  Current Outpatient Medications   Medication Sig Dispense Refill    dexAMETHasone (DECADRON) 4 MG Tab Take 1 tablet (4 mg total) by mouth every 12 (twelve) hours. For 2 days following chemotherapy. 20 tablet 0    enoxaparin (LOVENOX) 100 mg/mL Syrg Inject 1 mL (100 mg total) into the skin once daily. 30 mL 6    estradiol-norethindrone (ACTIVELLA) 1-0.5 mg per tablet Take 0.5 mg by mouth once daily.      HYDROcodone-acetaminophen (NORCO) 5-325 mg per tablet Take 1  tablet by mouth every 6 (six) hours as needed for Pain.      INV gz17-6.02 300 mg capsule Take 1 capsule (300 mg total) by mouth 2 (two) times daily. Take dose after fasting for at least 30 minutes prior to .02 ,with a recommended 90 mins fast post dose  Do not chew or open capsule. For Investigational Use Only.Protocol: GEN-602-CT-101.Subject# . 28 capsule 0    INV gz17-6.02 75 mg capsule Take 1 capsule (75 mg total) by mouth 2 (two) times daily.Take dose after fasting for at least 30 minutes prior to .02 , with a recommended 90 minutes fast post dose. Do not chew or open capsule. For Investigational Use Only.Protocol:GEN-602-CT-101.Subject # . 28 capsule 0    INV peptamen jim hp Soln solution Take 250 mLs by mouth 2 (two) times daily. Drink immediately after dosing of .02. For Investigional Use Only. Protocol: GEN-602-CT-101. Subject # . 28 vial 0    lidocaine-prilocaine (EMLA) cream Apply to port 45 minutes prior to access. 30 g 0    multivitamin (THERAGRAN) per tablet Take 1 tablet by mouth once daily.      ondansetron (ZOFRAN) 8 MG tablet Take 1 tablet (8 mg total) by mouth every 8 (eight) hours as needed for Nausea. 120 tablet 2    oxyCODONE (ROXICODONE) 15 MG Tab Take 1 tablet (15 mg total) by mouth every 6 (six) hours as needed for Pain. 90 tablet 0    fentaNYL (DURAGESIC) 12 mcg/hr PT72 Place 1 patch onto the skin every 72 hours. Please apply in addition to 25 mcg patch. 10 patch 0    LORazepam (ATIVAN) 0.5 MG tablet Take 1 tablet (0.5 mg total) by mouth every 6 (six) hours as needed for Anxiety (nausea). (Patient not taking: Reported on 3/19/2020) 60 tablet 0    OLANZapine (ZYPREXA) 5 MG tablet Take 1 tablet (5 mg total) by mouth nightly. To prevent nausea (Patient not taking: Reported on 4/29/2020) 30 tablet 2     No current facility-administered medications for this visit.        PMH:  Past Medical History:   Diagnosis Date    Allergic rhinitis 3/3/2014     CKD (chronic kidney disease) stage 3, GFR 30-59 ml/min 12/26/2015    Hyperlipidemia 3/3/2014    Lung metastasis     Pancreas cancer     Skin rash 8/10/2019       PSH:  Past Surgical History:   Procedure Laterality Date    ERCP N/A 7/16/2019    Procedure: ERCP (ENDOSCOPIC RETROGRADE CHOLANGIOPANCREATOGRAPHY);  Surgeon: Chema Harris MD;  Location: [address]);  Service: Endoscopy;  Laterality: N/A;    ERCP N/A 12/24/2019    Procedure: ERCP (ENDOSCOPIC RETROGRADE CHOLANGIOPANCREATOGRAPHY);  Surgeon: Chema Harris MD;  Location: [facility] ([address] [place] [address]);  Service: Endoscopy;  Laterality: N/A;    Exporatory lap      INSERTION OF TUNNELED CENTRAL VENOUS CATHETER (CVC) WITH SUBCUTANEOUS PORT Right 8/12/2019    Procedure: [id number];  Surgeon: Cesar Hallman MD;  Location: [facility] OR [address] [place] [address];  Service: General;  Laterality: Right;  right IJ       FamHx:  Family History   Problem Relation Age of Onset    Diabetes Mother     Diabetes Father     Diabetes Brother     Heart disease Neg Hx        SocHx:  Social History     Socioeconomic History    Marital status:      Spouse name: Not on file    Number of children: 0    Years of education: Not on file    Highest education level: Not on file   Occupational History    Occupation:      Employer: [employer]   Social Needs    Financial resource strain: Not on file    Food insecurity:     Worry: Not on file     Inability: Not on file    Transportation needs:     Medical: Not on file     Non-medical: Not on file   Tobacco Use    Smoking status: Former Smoker     Start date: 12/11/1999    Smokeless tobacco: Never Used   Substance and Sexual Activity    Alcohol use: Not Currently     Alcohol/week: 1.0 standard drinks     Types: 1 Glasses of wine per week     Frequency: 4 or more times a week     Drinks per session: 1 or 2     Binge frequency: Never     Comment: last drink a month ago     Drug use: No    Sexual activity: Yes    Lifestyle    Physical activity:     Days per week: Not on file     Minutes per session: Not on file    Stress: Not on file   Relationships    Social connections:     Talks on phone: Not on file     Gets together: Not on file     Attends Islam service: Not on file     Active member of club or organization: Not on file     Attends meetings of clubs or organizations: Not on file     Relationship status: Not on file   Other Topics Concern    Not on file   Social History Narrative    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      of Cancer in 2016         Objective:       Vitals:    20 0937   BP: 120/61   Pulse: 78   Resp: 18   Temp: 98.3 °F (36.8 °C)       Physical Exam   Constitutional: She is oriented to person, place, and time and well-developed, well-nourished, and in no distress. No distress.   HENT:   Head: Normocephalic and atraumatic.   Mouth/Throat: Oropharynx is clear and moist. No oropharyngeal exudate.   Eyes: Pupils are equal, round, and reactive to light. Conjunctivae are normal. Right eye exhibits no discharge. Left eye exhibits no discharge. No scleral icterus.   Neck: Normal range of motion. No tracheal deviation present. No thyromegaly present.   Cardiovascular: Normal rate and regular rhythm. Exam reveals no gallop.   No murmur heard.  Pulmonary/Chest: Effort normal and breath sounds normal. No respiratory distress. She has no wheezes. She has no rales.   Abdominal: Soft. She exhibits no distension and no mass. There is no tenderness. There is no rebound and no guarding.   Musculoskeletal: Normal range of motion. She exhibits no edema, tenderness or deformity.   Lymphadenopathy:     She has no cervical adenopathy.   Neurological: She is alert and oriented to person, place, and time. She displays normal reflexes. No cranial nerve deficit. She exhibits normal muscle tone. Gait normal. Coordination normal.   Skin: Skin is warm and dry. No rash noted. She is not diaphoretic. No erythema.  No pallor.   Psychiatric: Mood, memory, affect and judgment normal.     LABS:  WBC   Date Value Ref Range Status   05/25/2020 6.61 3.90 - 12.70 K/uL Final     Hemoglobin   Date Value Ref Range Status   05/25/2020 12.0 12.0 - 16.0 g/dL Final     Hematocrit   Date Value Ref Range Status   05/25/2020 39.5 37.0 - 48.5 % Final     Platelets   Date Value Ref Range Status   05/25/2020 165 150 - 350 K/uL Final       Chemistry        Component Value Date/Time     05/25/2020 0916    K 4.1 05/25/2020 0916     05/25/2020 0916    CO2 23 05/25/2020 0916    BUN 10 05/25/2020 0916    CREATININE 0.8 05/25/2020 0916     (H) 05/25/2020 0916        Component Value Date/Time    CALCIUM 9.1 05/25/2020 0916    ALKPHOS 292 (H) 05/25/2020 0916    AST 62 (H) 05/25/2020 0916    ALT 69 (H) 05/25/2020 0916    BILITOT 0.8 05/25/2020 0916    ESTGFRAFRICA >60.0 05/25/2020 0916    EGFRNONAA >60.0 05/25/2020 0916          Assessment:       1. Malignant neoplasm of head of pancreas    2. Clinical trial participant    3. Liver metastases    4. Malignant neoplasm metastatic to lung, unspecified laterality    5. Neoplasm related pain (acute) (chronic)    6. VTE (venous thromboembolism)    7. Anticoagulated    8. Chemotherapy induced nausea and vomiting    9. Elevated LFTs            Plan:   1,2,3,4- Cancer at the head of the pancreas:     Discussed SOC chemo and chemoXRT with FOLFIRINOX vs our PANOVA-3 trial with [place]-Abraxane +/- TTFields.  Extensively reviewed the rationale of the study and reviewed her eligibility criteria with the research nurse and discussed case with Dr. Hallman as well.  She is interested in this study, and signed consent with our research nurse.      Patient was treated with [place]+Abraxane and TTF on PANOVA-3.  Dose reduce [place] to 800 and Abraxane to 100 per pt request due to chemo related AEs.  Based on initial scans, Dr. Hallman felt pt may actually be resectable after chemoXRT given good response to current  therapy.  However, subsequent Chest CT showed new and growing micronodules in the in lungs c/w met disease in light of rising tumor markers.       We switched chemo to FOLFIRINOX and con't TTF on PANOVA as this seemed to be achieving good local control of the primary tumor. She developed progression on [place]/Abraxane and FOLFIRINOX.   Will remove from Novocure trial and proceed with Genzada trial.      Patient here to start Genzada trial. Feeling well and labs acceptable to proceed with cycle #1.      RTC per research RN      5- Increase to 37.5 mcg (25 + 12mcg until she runs out of 25mcg patches). Discussed only using oxycodone 15mg every 4-6 hours PRN. Do not take MS Contin on top of Fentanyl.    6,7- New RUE DVT. Switched to Lovenox 1.5mg/kg daily.     8- Discussed use of Zofran and phenergan.    9- Mild, monitor.    More than 25 mins were spent during this encounter, greater than 50% was spent in direct counseling and/or coordination of care.     Patient is in agreement with the proposed treatment plan. All questions were answered to the patient's satisfaction. Pt knows to call clinic if anything is needed before the next clinic visit.    Karen Hightower, MSN, APRN, FNP-C  Hematology and Medical Oncology  Nurse Practitioner to Dr. Eliot Barrett  Nurse Practitioner, [facility]

## 2020-05-25 ENCOUNTER — LAB VISIT (OUTPATIENT)
Dept: LAB | Facility: HOSPITAL | Age: 60
End: 2020-05-25
Attending: INTERNAL MEDICINE
Payer: COMMERCIAL

## 2020-05-25 ENCOUNTER — RESEARCH ENCOUNTER (OUTPATIENT)
Dept: RESEARCH | Facility: HOSPITAL | Age: 60
End: 2020-05-25

## 2020-05-25 ENCOUNTER — OFFICE VISIT (OUTPATIENT)
Dept: HEMATOLOGY/ONCOLOGY | Facility: CLINIC | Age: 60
End: 2020-05-25
Payer: COMMERCIAL

## 2020-05-25 VITALS
HEIGHT: 63 IN | BODY MASS INDEX: 24.1 KG/M2 | OXYGEN SATURATION: 96 % | WEIGHT: 136 LBS | DIASTOLIC BLOOD PRESSURE: 61 MMHG | SYSTOLIC BLOOD PRESSURE: 120 MMHG | TEMPERATURE: 98 F | RESPIRATION RATE: 18 BRPM | HEART RATE: 78 BPM

## 2020-05-25 DIAGNOSIS — C25.0 CANCER OF HEAD OF PANCREAS: ICD-10-CM

## 2020-05-25 DIAGNOSIS — I82.90 VTE (VENOUS THROMBOEMBOLISM): ICD-10-CM

## 2020-05-25 DIAGNOSIS — G89.3 NEOPLASM RELATED PAIN (ACUTE) (CHRONIC): ICD-10-CM

## 2020-05-25 DIAGNOSIS — C25.9 PANCREATIC CANCER: ICD-10-CM

## 2020-05-25 DIAGNOSIS — C25.0 MALIGNANT NEOPLASM OF HEAD OF PANCREAS: Primary | ICD-10-CM

## 2020-05-25 DIAGNOSIS — C78.00 MALIGNANT NEOPLASM METASTATIC TO LUNG, UNSPECIFIED LATERALITY: ICD-10-CM

## 2020-05-25 DIAGNOSIS — R11.2 CHEMOTHERAPY INDUCED NAUSEA AND VOMITING: ICD-10-CM

## 2020-05-25 DIAGNOSIS — T45.1X5A CHEMOTHERAPY INDUCED NAUSEA AND VOMITING: ICD-10-CM

## 2020-05-25 DIAGNOSIS — Z79.01 ANTICOAGULATED: ICD-10-CM

## 2020-05-25 DIAGNOSIS — Z00.6 EXAMINATION OF PARTICIPANT IN CLINICAL TRIAL: ICD-10-CM

## 2020-05-25 DIAGNOSIS — C78.7 LIVER METASTASES: ICD-10-CM

## 2020-05-25 DIAGNOSIS — Z00.6 CLINICAL TRIAL PARTICIPANT: ICD-10-CM

## 2020-05-25 DIAGNOSIS — R79.89 ELEVATED LFTS: ICD-10-CM

## 2020-05-25 LAB
ALBUMIN SERPL BCP-MCNC: 3.3 G/DL (ref 3.5–5.2)
ALP SERPL-CCNC: 292 U/L (ref 55–135)
ALT SERPL W/O P-5'-P-CCNC: 69 U/L (ref 10–44)
ANION GAP SERPL CALC-SCNC: 10 MMOL/L (ref 8–16)
AST SERPL-CCNC: 62 U/L (ref 10–40)
BASOPHILS # BLD AUTO: 0.04 K/UL (ref 0–0.2)
BASOPHILS NFR BLD: 0.6 % (ref 0–1.9)
BILIRUB SERPL-MCNC: 0.8 MG/DL (ref 0.1–1)
BUN SERPL-MCNC: 10 MG/DL (ref 6–20)
CALCIUM SERPL-MCNC: 9.1 MG/DL (ref 8.7–10.5)
CHLORIDE SERPL-SCNC: 106 MMOL/L (ref 95–110)
CO2 SERPL-SCNC: 23 MMOL/L (ref 23–29)
CREAT SERPL-MCNC: 0.8 MG/DL (ref 0.5–1.4)
DIFFERENTIAL METHOD: ABNORMAL
DRUG STUDY SPECIMEN TYPE: NORMAL
DRUG STUDY TEST NAME: NORMAL
DRUG STUDY TEST RESULT: NORMAL
EOSINOPHIL # BLD AUTO: 0.1 K/UL (ref 0–0.5)
EOSINOPHIL NFR BLD: 0.8 % (ref 0–8)
ERYTHROCYTE [DISTWIDTH] IN BLOOD BY AUTOMATED COUNT: 15.9 % (ref 11.5–14.5)
EST. GFR  (AFRICAN AMERICAN): >60 ML/MIN/1.73 M^2
EST. GFR  (NON AFRICAN AMERICAN): >60 ML/MIN/1.73 M^2
GLUCOSE SERPL-MCNC: 132 MG/DL (ref 70–110)
HCT VFR BLD AUTO: 39.5 % (ref 37–48.5)
HGB BLD-MCNC: 12 G/DL (ref 12–16)
IMM GRANULOCYTES # BLD AUTO: 0.01 K/UL (ref 0–0.04)
IMM GRANULOCYTES NFR BLD AUTO: 0.2 % (ref 0–0.5)
LDH SERPL L TO P-CCNC: 186 U/L (ref 110–260)
LYMPHOCYTES # BLD AUTO: 0.8 K/UL (ref 1–4.8)
LYMPHOCYTES NFR BLD: 12.3 % (ref 18–48)
MAGNESIUM SERPL-MCNC: 1.7 MG/DL (ref 1.6–2.6)
MCH RBC QN AUTO: 29 PG (ref 27–31)
MCHC RBC AUTO-ENTMCNC: 30.4 G/DL (ref 32–36)
MCV RBC AUTO: 95 FL (ref 82–98)
MONOCYTES # BLD AUTO: 0.7 K/UL (ref 0.3–1)
MONOCYTES NFR BLD: 10.6 % (ref 4–15)
NEUTROPHILS # BLD AUTO: 5 K/UL (ref 1.8–7.7)
NEUTROPHILS NFR BLD: 75.5 % (ref 38–73)
NRBC BLD-RTO: 0 /100 WBC
PLATELET # BLD AUTO: 165 K/UL (ref 150–350)
PMV BLD AUTO: 11 FL (ref 9.2–12.9)
POTASSIUM SERPL-SCNC: 4.1 MMOL/L (ref 3.5–5.1)
PROT SERPL-MCNC: 7 G/DL (ref 6–8.4)
RBC # BLD AUTO: 4.14 M/UL (ref 4–5.4)
SODIUM SERPL-SCNC: 139 MMOL/L (ref 136–145)
WBC # BLD AUTO: 6.61 K/UL (ref 3.9–12.7)

## 2020-05-25 PROCEDURE — 80053 COMPREHEN METABOLIC PANEL: CPT

## 2020-05-25 PROCEDURE — 3008F BODY MASS INDEX DOCD: CPT | Mod: CPTII,S$GLB,, | Performed by: NURSE PRACTITIONER

## 2020-05-25 PROCEDURE — 3008F PR BODY MASS INDEX (BMI) DOCUMENTED: ICD-10-PCS | Mod: CPTII,S$GLB,, | Performed by: NURSE PRACTITIONER

## 2020-05-25 PROCEDURE — 99999 PR PBB SHADOW E&M-EST. PATIENT-LVL IV: ICD-10-PCS | Mod: PBBFAC,,, | Performed by: NURSE PRACTITIONER

## 2020-05-25 PROCEDURE — 99000 SPECIMEN HANDLING OFFICE-LAB: CPT

## 2020-05-25 PROCEDURE — 99999 PR PBB SHADOW E&M-EST. PATIENT-LVL IV: CPT | Mod: PBBFAC,,, | Performed by: NURSE PRACTITIONER

## 2020-05-25 PROCEDURE — 83615 LACTATE (LD) (LDH) ENZYME: CPT

## 2020-05-25 PROCEDURE — 83735 ASSAY OF MAGNESIUM: CPT

## 2020-05-25 PROCEDURE — 36415 COLL VENOUS BLD VENIPUNCTURE: CPT

## 2020-05-25 PROCEDURE — 99214 PR OFFICE/OUTPT VISIT, EST, LEVL IV, 30-39 MIN: ICD-10-PCS | Mod: S$PBB,,, | Performed by: NURSE PRACTITIONER

## 2020-05-25 PROCEDURE — 99214 OFFICE O/P EST MOD 30 MIN: CPT | Mod: S$PBB,,, | Performed by: NURSE PRACTITIONER

## 2020-05-25 PROCEDURE — 85025 COMPLETE CBC W/AUTO DIFF WBC: CPT

## 2020-05-25 RX ORDER — FENTANYL 12.5 UG/1
1 PATCH TRANSDERMAL
Qty: 10 PATCH | Refills: 0 | Status: SHIPPED | OUTPATIENT
Start: 2020-05-25 | End: 2020-05-26 | Stop reason: SDUPTHER

## 2020-05-25 NOTE — PROGRESS NOTES
"5/25/2020     Protocol: A Phase 1, Multicenter, Open-label, Dose Escalation, Safety, Pharmacodynamic and Pharmacokinetic Study of .02 Given orally on a daily x 28 day schedule in patients with Advanced Solid Tumors or Lymphoma  Protocol # GEN-602-CT-101  IRB # 2018.428  PI: Eliot Barrett MD  Version Date: 5.0    Pt #      Cycle 1 Day 8:      Patient presented to the clinic today for Cycle 1 Day 8 of the above mentioned treatment.  Patient is AAO x's 3 with appropriate mood and affect. Pt expresses willingness to continue to participate in the above mentioned treatment. Pt had local and send out labs drawn per protocol at 916am.   Patient denies any fever, shortness of breath, vomiting, diarrhea, constipation, and mouth sores.  Reviewed and confirmed which concomitant medications with the patient she is currently taking. See concomitant therapy worksheet. Pt was seen and examined by Karen Hightower NP. Labs also reviewed by NP.  Lab work out of range = not clinically significant. Pt states that she has not missed a dose of the study drug and the Peptamen. Drug diary was given at Cycle 1 Day 1 visit. Pt main concerns today are her pain level and nausea. Per NP, " pt to increase fentanyl patch to  37.5 mcg (25 + 12mcg until she runs out of 25mcg patches). Discussed only using oxycodone 15mg every 4-6 hours PRN. Do not take MS Contin on top of Fentanyl.". Pt is in agreement with the plan.   Pt to also take zofran at first sign of nausea and to take Phenergan if she vomits. Pt voiced understanding. Pt has appts for Cycle 1 Day 15.      Drug dispense :  None today      Baseline log  Venous Thromboembolism Grade 2  Menopause Grade 1  CA related pain Grade 2  Nausea Grade 2  Edema- limb Grade 1- resolved as of 5/15/2020  Insomnia Grade 1  Allergic reaction (skin rash) Grade 2  "

## 2020-05-26 ENCOUNTER — TELEPHONE (OUTPATIENT)
Dept: HEMATOLOGY/ONCOLOGY | Facility: CLINIC | Age: 60
End: 2020-05-26

## 2020-05-26 ENCOUNTER — PATIENT MESSAGE (OUTPATIENT)
Dept: HEMATOLOGY/ONCOLOGY | Facility: CLINIC | Age: 60
End: 2020-05-26

## 2020-05-26 ENCOUNTER — TELEPHONE (OUTPATIENT)
Dept: PHARMACY | Facility: CLINIC | Age: 60
End: 2020-05-26

## 2020-05-26 DIAGNOSIS — Z13.9 SCREENING FOR CONDITION: Primary | ICD-10-CM

## 2020-05-26 DIAGNOSIS — G89.3 NEOPLASM RELATED PAIN (ACUTE) (CHRONIC): ICD-10-CM

## 2020-05-26 RX ORDER — FENTANYL 12.5 UG/1
1 PATCH TRANSDERMAL
Qty: 10 PATCH | Refills: 0 | Status: SHIPPED | OUTPATIENT
Start: 2020-05-26 | End: 2020-06-05

## 2020-05-28 ENCOUNTER — TELEPHONE (OUTPATIENT)
Dept: PHARMACY | Facility: CLINIC | Age: 60
End: 2020-05-28

## 2020-05-28 DIAGNOSIS — Z00.6 CLINICAL TRIAL PARTICIPANT: ICD-10-CM

## 2020-05-28 DIAGNOSIS — C25.0 CANCER OF HEAD OF PANCREAS: Primary | ICD-10-CM

## 2020-05-29 ENCOUNTER — TELEPHONE (OUTPATIENT)
Dept: HEMATOLOGY/ONCOLOGY | Facility: CLINIC | Age: 60
End: 2020-05-29

## 2020-05-29 ENCOUNTER — PATIENT MESSAGE (OUTPATIENT)
Dept: HEMATOLOGY/ONCOLOGY | Facility: CLINIC | Age: 60
End: 2020-05-29

## 2020-05-29 ENCOUNTER — CLINICAL SUPPORT (OUTPATIENT)
Dept: HEMATOLOGY/ONCOLOGY | Facility: CLINIC | Age: 60
End: 2020-05-29
Payer: COMMERCIAL

## 2020-05-29 DIAGNOSIS — Z13.9 SCREENING FOR CONDITION: ICD-10-CM

## 2020-05-29 DIAGNOSIS — Z13.9 SCREENING FOR CONDITION: Primary | ICD-10-CM

## 2020-05-29 DIAGNOSIS — C25.0 MALIGNANT NEOPLASM OF HEAD OF PANCREAS: ICD-10-CM

## 2020-05-29 DIAGNOSIS — G89.3 NEOPLASM RELATED PAIN (ACUTE) (CHRONIC): ICD-10-CM

## 2020-05-29 LAB — SARS-COV-2 RNA RESP QL NAA+PROBE: NOT DETECTED

## 2020-05-29 PROCEDURE — U0003 INFECTIOUS AGENT DETECTION BY NUCLEIC ACID (DNA OR RNA); SEVERE ACUTE RESPIRATORY SYNDROME CORONAVIRUS 2 (SARS-COV-2) (CORONAVIRUS DISEASE [COVID-19]), AMPLIFIED PROBE TECHNIQUE, MAKING USE OF HIGH THROUGHPUT TECHNOLOGIES AS DESCRIBED BY CMS-2020-01-R: HCPCS

## 2020-05-29 RX ORDER — HYDROMORPHONE HYDROCHLORIDE 4 MG/1
4 TABLET ORAL EVERY 6 HOURS PRN
Qty: 60 TABLET | Refills: 0 | Status: SHIPPED | OUTPATIENT
Start: 2020-05-29 | End: 2020-06-05 | Stop reason: SDUPTHER

## 2020-05-29 NOTE — PROGRESS NOTES
ONCOLOGY / PCTP VISIT     Subjective:       Patient ID: Quyen Moody    Chief Complaint:  Pancreatic cancer    HPI     Quyen Moody is a 59 y.o. female, patient of Dr. Barrett, to clinic for follow up and to continue treatment on Genzada trial. Patient reports increased nausea and back pain. Zofran working for nausea, denies emesis. Has not needed phenergan. For pain, she is on 37 mcg/hr every 72 hours and dilaudid 6mg every 4 hours PRN. Dilaudid is helping more than oxycodone. Reports pain worse at night and wraps around to the front.    ECOG 0-1. Life expectancy greater than 3 months.     Oncologic History:  She has had intermittent upper abdominal pain since early June.  She presented to her PCP who originally ordered an US and CT.  US was negative and CT showed some haziness at the pancreatic head.  She saw GI who performed EUS.  On EUS, a 3x4cm pancreatic head mass was seen with possible invasion into the SMA and portal vein.  FNA path s/w pancreatic adenocarcinoma.  CA 19-9 level at outside hospital was >1000 per the patient.  She endorses nausea and inability to tolerate much PO.  She has lost about 10lbs over the last few weeks and is noticing her skin turning yellow.  Denies pruritis.  She is passing gas but has not had a BM in a few days, she attributes this to narcotic use.  She recently started taking stool softeners.  No previous cardiac or stroke history.  Surgical history significant for open appendectomy when she was in her 20s  - 8/7/2019 - 1/9/2020: [place]+Abraxane and TTF on PANOVA-3, LFTs elevated, but improved.  - Based on CT 12/2019 scans, Dr. Hallman felt pt was potentially resectable after chemoXRT given good response to current therapy.  However, subsequent Chest CT   showed new and growing micronodules in the in lungs c/w met disease in light of rising tumor markers.    - 1/23/2020: Changed to FOLFIRINOX and con't TTF on PANOVA as this seems to be achieving good local control of the primary  tumor.     Review of Systems   Constitutional: Positive for fatigue. Negative for activity change, appetite change, chills, fever and unexpected weight change.   HENT: Negative for congestion, hearing loss, mouth sores, sore throat, tinnitus and voice change.    Eyes: Negative for pain and visual disturbance.   Respiratory: Negative for cough, shortness of breath and wheezing.    Cardiovascular: Negative for chest pain, palpitations and leg swelling.   Gastrointestinal: Positive for abdominal pain and nausea. Negative for constipation, diarrhea and vomiting.   Endocrine: Negative for cold intolerance and heat intolerance.   Genitourinary: Negative for difficulty urinating, dyspareunia, dysuria, frequency, menstrual problem, urgency, vaginal bleeding, vaginal discharge and vaginal pain.   Musculoskeletal: Positive for back pain. Negative for arthralgias and myalgias.   Skin: Negative for color change, rash and wound.   Allergic/Immunologic: Negative for environmental allergies and food allergies.   Neurological: Negative for weakness, numbness and headaches.   Hematological: Negative for adenopathy. Does not bruise/bleed easily.   Psychiatric/Behavioral: Positive for sleep disturbance. Negative for agitation, confusion and hallucinations. The patient is nervous/anxious.    All other systems reviewed and are negative.        Allergies:  Review of patient's allergies indicates:   Allergen Reactions    Sulfa (sulfonamide antibiotics) Swelling and Hives     tongue         Medications:  Current Outpatient Medications   Medication Sig Dispense Refill    enoxaparin (LOVENOX) 100 mg/mL Syrg Inject 1 mL (100 mg total) into the skin once daily. 30 mL 6    estradiol-norethindrone (ACTIVELLA) 1-0.5 mg per tablet Take 0.5 mg by mouth once daily.      fentaNYL (DURAGESIC) 12 mcg/hr PT72 Place 1 patch onto the skin every 72 hours. Please apply in addition to 25 mcg patch. 10 patch 0    HYDROcodone-acetaminophen (NORCO) 5-325  mg per tablet Take 1 tablet by mouth every 6 (six) hours as needed for Pain.      HYDROmorphone (DILAUDID) 4 MG tablet Take 1 tablet (4 mg total) by mouth every 6 (six) hours as needed for Pain. 60 tablet 0    INV gz17-6.02 300 mg capsule Take 1 capsule (300 mg total) by mouth 2 (two) times daily. Take dose after fasting for at least 30 minutes prior to .02 ,with a recommended 90 mins fast post dose  Do not chew or open capsule. For Investigational Use Only.Protocol: GEN-602-CT-101.Subject# . 28 capsule 0    INV gz17-6.02 75 mg capsule Take 1 capsule (75 mg total) by mouth 2 (two) times daily.Take dose after fasting for at least 30 minutes prior to .02 , with a recommended 90 minutes fast post dose. Do not chew or open capsule. For Investigational Use Only.Protocol:GEN-602-CT-101.Subject # . 28 capsule 0    INV peptamen jim hp Soln solution Take 250 mLs by mouth 2 (two) times daily. Drink immediately after dosing of .02. For Investigional Use Only. Protocol: GEN-602-CT-101. Subject # . 28 vial 0    lidocaine-prilocaine (EMLA) cream Apply to port 45 minutes prior to access. 30 g 0    multivitamin (THERAGRAN) per tablet Take 1 tablet by mouth once daily.      OLANZapine (ZYPREXA) 5 MG tablet Take 1 tablet (5 mg total) by mouth nightly. To prevent nausea 30 tablet 2    ondansetron (ZOFRAN) 8 MG tablet Take 1 tablet (8 mg total) by mouth every 8 (eight) hours as needed for Nausea. 120 tablet 2    dexAMETHasone (DECADRON) 4 MG Tab Take 1 tablet (4 mg total) by mouth every 12 (twelve) hours. For 2 days following chemotherapy. (Patient not taking: Reported on 6/1/2020) 20 tablet 0    LORazepam (ATIVAN) 0.5 MG tablet Take 1 tablet (0.5 mg total) by mouth every 6 (six) hours as needed for Anxiety (nausea). (Patient not taking: Reported on 3/19/2020) 60 tablet 0    oxyCODONE (ROXICODONE) 15 MG Tab Take 1 tablet (15 mg total) by mouth every 6 (six) hours as needed for Pain.  (Patient not taking: Reported on 6/1/2020) 90 tablet 0     No current facility-administered medications for this visit.        PMH:  Past Medical History:   Diagnosis Date    Allergic rhinitis 3/3/2014    CKD (chronic kidney disease) stage 3, GFR 30-59 ml/min 12/26/2015    Hyperlipidemia 3/3/2014    Lung metastasis     Pancreas cancer     Skin rash 8/10/2019       PSH:  Past Surgical History:   Procedure Laterality Date    ERCP N/A 7/16/2019    Procedure: ERCP (ENDOSCOPIC RETROGRADE CHOLANGIOPANCREATOGRAPHY);  Surgeon: Chema Harris MD;  Location: [facility] ([address] [place] [address]);  Service: Endoscopy;  Laterality: N/A;    ERCP N/A 12/24/2019    Procedure: ERCP (ENDOSCOPIC RETROGRADE CHOLANGIOPANCREATOGRAPHY);  Surgeon: Chema Harris MD;  Location: [facility] ([address] [place] [address]);  Service: Endoscopy;  Laterality: N/A;    Exporatory lap      INSERTION OF TUNNELED CENTRAL VENOUS CATHETER (CVC) WITH SUBCUTANEOUS PORT Right 8/12/2019    Procedure: [id number];  Surgeon: Cesar Hallman MD;  Location: [facility] OR [address] [place] [address];  Service: General;  Laterality: Right;  right IJ       FamHx:  Family History   Problem Relation Age of Onset    Diabetes Mother     Diabetes Father     Diabetes Brother     Heart disease Neg Hx        SocHx:  Social History     Socioeconomic History    Marital status:      Spouse name: Not on file    Number of children: 0    Years of education: Not on file    Highest education level: Not on file   Occupational History    Occupation:      Employer: [employer]   Social Needs    Financial resource strain: Not on file    Food insecurity:     Worry: Not on file     Inability: Not on file    Transportation needs:     Medical: Not on file     Non-medical: Not on file   Tobacco Use    Smoking status: Former Smoker     Start date: 12/11/1999    Smokeless tobacco: Never Used   Substance and Sexual Activity    Alcohol use: Not Currently     Alcohol/week: 1.0 standard drinks      Types: 1 Glasses of wine per week     Frequency: 4 or more times a week     Drinks per session: 1 or 2     Binge frequency: Never     Comment: last drink a month ago     Drug use: No    Sexual activity: Yes   Lifestyle    Physical activity:     Days per week: Not on file     Minutes per session: Not on file    Stress: Not on file   Relationships    Social connections:     Talks on phone: Not on file     Gets together: Not on file     Attends Holiness service: Not on file     Active member of club or organization: Not on file     Attends meetings of clubs or organizations: Not on file     Relationship status: Not on file   Other Topics Concern    Not on file   Social History Narrative    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      of Cancer in 2016         Objective:       Vitals:    20 0821   BP: 138/64   Pulse: 73   Resp: 18   Temp: 97.9 °F (36.6 °C)       Physical Exam   Constitutional: She is oriented to person, place, and time and well-developed, well-nourished, and in no distress. No distress.   HENT:   Head: Normocephalic and atraumatic.   Mouth/Throat: Oropharynx is clear and moist. No oropharyngeal exudate.   Eyes: Pupils are equal, round, and reactive to light. Conjunctivae are normal. Right eye exhibits no discharge. Left eye exhibits no discharge. No scleral icterus.   Neck: Normal range of motion. No tracheal deviation present. No thyromegaly present.   Cardiovascular: Normal rate and regular rhythm. Exam reveals no gallop.   No murmur heard.  Pulmonary/Chest: Effort normal and breath sounds normal. No respiratory distress. She has no wheezes. She has no rales.   Abdominal: Soft. She exhibits no distension and no mass. There is no tenderness. There is no rebound and no guarding.   Musculoskeletal: Normal range of motion. She exhibits no edema, tenderness or deformity.   Lymphadenopathy:     She has no cervical adenopathy.   Neurological: She is alert and oriented to person, place, and  time. She displays normal reflexes. No cranial nerve deficit. She exhibits normal muscle tone. Gait normal. Coordination normal.   Skin: Skin is warm and dry. No rash noted. She is not diaphoretic. No erythema. No pallor.   Psychiatric: Mood, memory, affect and judgment normal.     LABS:  WBC   Date Value Ref Range Status   06/01/2020 6.20 3.90 - 12.70 K/uL Final     Hemoglobin   Date Value Ref Range Status   06/01/2020 12.4 12.0 - 16.0 g/dL Final     Hematocrit   Date Value Ref Range Status   06/01/2020 40.6 37.0 - 48.5 % Final     Platelets   Date Value Ref Range Status   06/01/2020 183 150 - 350 K/uL Final       Chemistry        Component Value Date/Time     06/01/2020 0740    K 4.9 06/01/2020 0740     06/01/2020 0740    CO2 25 06/01/2020 0740    BUN 12 06/01/2020 0740    CREATININE 0.7 06/01/2020 0740     (H) 06/01/2020 0740        Component Value Date/Time    CALCIUM 9.2 06/01/2020 0740    ALKPHOS 287 (H) 06/01/2020 0740    AST 33 06/01/2020 0740    ALT 52 (H) 06/01/2020 0740    BILITOT 0.4 06/01/2020 0740    ESTGFRAFRICA >60.0 06/01/2020 0740    EGFRNONAA >60.0 06/01/2020 0740          Assessment:       1. Malignant neoplasm of head of pancreas    2. Liver metastases    3. Malignant neoplasm metastatic to lung, unspecified laterality    4. Clinical trial participant    5. Neoplasm related pain (acute) (chronic)    6. VTE (venous thromboembolism)    7. Anticoagulated    8. Chemotherapy induced nausea and vomiting    9. Elevated LFTs            Plan:   1,2,3,4- Cancer at the head of the pancreas:     Discussed SOC chemo and chemoXRT with FOLFIRINOX vs our PANOVA-3 trial with [place]-Abraxane +/- TTFields.  Extensively reviewed the rationale of the study and reviewed her eligibility criteria with the research nurse and discussed case with Dr. Hallman as well.  She is interested in this study, and signed consent with our research nurse.      Patient was treated with [place]+Abraxane and TTF on PANOVA-3.   Dose reduce [place] to 800 and Abraxane to 100 per pt request due to chemo related AEs.  Based on initial scans, Dr. Hallman felt pt may actually be resectable after chemoXRT given good response to current therapy.  However, subsequent Chest CT showed new and growing micronodules in the in lungs c/w met disease in light of rising tumor markers.       We switched chemo to FOLFIRINOX and con't TTF on PANOVA as this seemed to be achieving good local control of the primary tumor. She developed progression on [place]/Abraxane and FOLFIRINOX.   Will remove from Novocure trial and proceed with Genzada trial.      Patient here to start Genzada trial. Feeling well and labs acceptable to continue on Genzada.     RTC per research RN      5- Increase to 50 mcg as it has been more than 72 hours since increased dose. Now on Dilaudid PRN. Refer to palliative care.    6,7- New RUE DVT. Switched to Lovenox 1.5mg/kg daily.     8- Discussed use of Zofran and phenergan.    9- Resolved.    More than 25 mins were spent during this encounter, greater than 50% was spent in direct counseling and/or coordination of care.     Patient is in agreement with the proposed treatment plan. All questions were answered to the patient's satisfaction. Pt knows to call clinic if anything is needed before the next clinic visit.    Karen Hightower, MSN, APRN, FNP-C  Hematology and Medical Oncology  Nurse Practitioner to Dr. Eliot Barrett  Nurse Practitioner, [facility]

## 2020-06-01 ENCOUNTER — INFUSION (OUTPATIENT)
Dept: INFUSION THERAPY | Facility: HOSPITAL | Age: 60
End: 2020-06-01
Attending: INTERNAL MEDICINE
Payer: COMMERCIAL

## 2020-06-01 ENCOUNTER — TELEPHONE (OUTPATIENT)
Dept: HEMATOLOGY/ONCOLOGY | Facility: CLINIC | Age: 60
End: 2020-06-01

## 2020-06-01 ENCOUNTER — OFFICE VISIT (OUTPATIENT)
Dept: HEMATOLOGY/ONCOLOGY | Facility: CLINIC | Age: 60
End: 2020-06-01
Payer: COMMERCIAL

## 2020-06-01 ENCOUNTER — RESEARCH ENCOUNTER (OUTPATIENT)
Dept: RESEARCH | Facility: HOSPITAL | Age: 60
End: 2020-06-01

## 2020-06-01 ENCOUNTER — LAB VISIT (OUTPATIENT)
Dept: LAB | Facility: HOSPITAL | Age: 60
End: 2020-06-01
Attending: INTERNAL MEDICINE
Payer: COMMERCIAL

## 2020-06-01 VITALS
WEIGHT: 138 LBS | OXYGEN SATURATION: 98 % | HEART RATE: 73 BPM | TEMPERATURE: 98 F | HEIGHT: 63 IN | DIASTOLIC BLOOD PRESSURE: 64 MMHG | SYSTOLIC BLOOD PRESSURE: 138 MMHG | RESPIRATION RATE: 18 BRPM | BODY MASS INDEX: 24.45 KG/M2

## 2020-06-01 VITALS
RESPIRATION RATE: 14 BRPM | DIASTOLIC BLOOD PRESSURE: 72 MMHG | SYSTOLIC BLOOD PRESSURE: 149 MMHG | TEMPERATURE: 98 F | HEART RATE: 71 BPM | OXYGEN SATURATION: 98 %

## 2020-06-01 DIAGNOSIS — C25.9 PANCREATIC CANCER: ICD-10-CM

## 2020-06-01 DIAGNOSIS — C25.0 CANCER OF HEAD OF PANCREAS: ICD-10-CM

## 2020-06-01 DIAGNOSIS — C78.7 LIVER METASTASES: ICD-10-CM

## 2020-06-01 DIAGNOSIS — Z00.6 EXAMINATION OF PARTICIPANT IN CLINICAL TRIAL: ICD-10-CM

## 2020-06-01 DIAGNOSIS — C25.0 CANCER OF HEAD OF PANCREAS: Primary | ICD-10-CM

## 2020-06-01 DIAGNOSIS — Z00.6 CLINICAL TRIAL PARTICIPANT: ICD-10-CM

## 2020-06-01 DIAGNOSIS — T45.1X5A CHEMOTHERAPY INDUCED NAUSEA AND VOMITING: ICD-10-CM

## 2020-06-01 DIAGNOSIS — C25.0 MALIGNANT NEOPLASM OF HEAD OF PANCREAS: Primary | ICD-10-CM

## 2020-06-01 DIAGNOSIS — C78.00 MALIGNANT NEOPLASM METASTATIC TO LUNG, UNSPECIFIED LATERALITY: ICD-10-CM

## 2020-06-01 DIAGNOSIS — R79.89 ELEVATED LFTS: ICD-10-CM

## 2020-06-01 DIAGNOSIS — R11.2 CHEMOTHERAPY INDUCED NAUSEA AND VOMITING: ICD-10-CM

## 2020-06-01 DIAGNOSIS — I82.90 VTE (VENOUS THROMBOEMBOLISM): ICD-10-CM

## 2020-06-01 DIAGNOSIS — Z79.01 ANTICOAGULATED: ICD-10-CM

## 2020-06-01 DIAGNOSIS — G89.3 NEOPLASM RELATED PAIN (ACUTE) (CHRONIC): ICD-10-CM

## 2020-06-01 LAB
ALBUMIN SERPL BCP-MCNC: 3.1 G/DL (ref 3.5–5.2)
ALP SERPL-CCNC: 287 U/L (ref 55–135)
ALT SERPL W/O P-5'-P-CCNC: 52 U/L (ref 10–44)
ANION GAP SERPL CALC-SCNC: 8 MMOL/L (ref 8–16)
AST SERPL-CCNC: 33 U/L (ref 10–40)
BASOPHILS # BLD AUTO: 0.03 K/UL (ref 0–0.2)
BASOPHILS NFR BLD: 0.5 % (ref 0–1.9)
BILIRUB SERPL-MCNC: 0.4 MG/DL (ref 0.1–1)
BUN SERPL-MCNC: 12 MG/DL (ref 6–20)
CALCIUM SERPL-MCNC: 9.2 MG/DL (ref 8.7–10.5)
CHLORIDE SERPL-SCNC: 107 MMOL/L (ref 95–110)
CO2 SERPL-SCNC: 25 MMOL/L (ref 23–29)
CREAT SERPL-MCNC: 0.7 MG/DL (ref 0.5–1.4)
DIFFERENTIAL METHOD: ABNORMAL
EOSINOPHIL # BLD AUTO: 0.2 K/UL (ref 0–0.5)
EOSINOPHIL NFR BLD: 3.2 % (ref 0–8)
ERYTHROCYTE [DISTWIDTH] IN BLOOD BY AUTOMATED COUNT: 15.2 % (ref 11.5–14.5)
EST. GFR  (AFRICAN AMERICAN): >60 ML/MIN/1.73 M^2
EST. GFR  (NON AFRICAN AMERICAN): >60 ML/MIN/1.73 M^2
GLUCOSE SERPL-MCNC: 120 MG/DL (ref 70–110)
HCT VFR BLD AUTO: 40.6 % (ref 37–48.5)
HGB BLD-MCNC: 12.4 G/DL (ref 12–16)
IMM GRANULOCYTES # BLD AUTO: 0.02 K/UL (ref 0–0.04)
IMM GRANULOCYTES NFR BLD AUTO: 0.3 % (ref 0–0.5)
LDH SERPL L TO P-CCNC: 170 U/L (ref 110–260)
LYMPHOCYTES # BLD AUTO: 1 K/UL (ref 1–4.8)
LYMPHOCYTES NFR BLD: 16.6 % (ref 18–48)
MAGNESIUM SERPL-MCNC: 1.7 MG/DL (ref 1.6–2.6)
MCH RBC QN AUTO: 29 PG (ref 27–31)
MCHC RBC AUTO-ENTMCNC: 30.5 G/DL (ref 32–36)
MCV RBC AUTO: 95 FL (ref 82–98)
MONOCYTES # BLD AUTO: 0.7 K/UL (ref 0.3–1)
MONOCYTES NFR BLD: 11.1 % (ref 4–15)
NEUTROPHILS # BLD AUTO: 4.2 K/UL (ref 1.8–7.7)
NEUTROPHILS NFR BLD: 68.3 % (ref 38–73)
NRBC BLD-RTO: 0 /100 WBC
PLATELET # BLD AUTO: 183 K/UL (ref 150–350)
PMV BLD AUTO: 11.8 FL (ref 9.2–12.9)
POTASSIUM SERPL-SCNC: 4.9 MMOL/L (ref 3.5–5.1)
PROT SERPL-MCNC: 6.7 G/DL (ref 6–8.4)
RBC # BLD AUTO: 4.28 M/UL (ref 4–5.4)
SODIUM SERPL-SCNC: 140 MMOL/L (ref 136–145)
WBC # BLD AUTO: 6.2 K/UL (ref 3.9–12.7)

## 2020-06-01 PROCEDURE — 63600175 PHARM REV CODE 636 W HCPCS: Performed by: NURSE PRACTITIONER

## 2020-06-01 PROCEDURE — A4216 STERILE WATER/SALINE, 10 ML: HCPCS | Performed by: INTERNAL MEDICINE

## 2020-06-01 PROCEDURE — 3008F PR BODY MASS INDEX (BMI) DOCUMENTED: ICD-10-PCS | Mod: CPTII,S$GLB,, | Performed by: NURSE PRACTITIONER

## 2020-06-01 PROCEDURE — 63600175 PHARM REV CODE 636 W HCPCS: Performed by: INTERNAL MEDICINE

## 2020-06-01 PROCEDURE — 99214 PR OFFICE/OUTPT VISIT, EST, LEVL IV, 30-39 MIN: ICD-10-PCS | Mod: S$PBB,,, | Performed by: NURSE PRACTITIONER

## 2020-06-01 PROCEDURE — 36415 COLL VENOUS BLD VENIPUNCTURE: CPT

## 2020-06-01 PROCEDURE — 93010 ELECTROCARDIOGRAM REPORT: CPT | Mod: S$PBB,77,, | Performed by: INTERNAL MEDICINE

## 2020-06-01 PROCEDURE — 99214 OFFICE O/P EST MOD 30 MIN: CPT | Mod: S$PBB,,, | Performed by: NURSE PRACTITIONER

## 2020-06-01 PROCEDURE — 80053 COMPREHEN METABOLIC PANEL: CPT

## 2020-06-01 PROCEDURE — 36593 DECLOT VASCULAR DEVICE: CPT

## 2020-06-01 PROCEDURE — 93010 EKG 12-LEAD: ICD-10-PCS | Mod: 76,S$PBB,, | Performed by: INTERNAL MEDICINE

## 2020-06-01 PROCEDURE — 93010 ELECTROCARDIOGRAM REPORT: CPT | Mod: 76,S$PBB,, | Performed by: INTERNAL MEDICINE

## 2020-06-01 PROCEDURE — 85025 COMPLETE CBC W/AUTO DIFF WBC: CPT

## 2020-06-01 PROCEDURE — 25000003 PHARM REV CODE 250: Performed by: INTERNAL MEDICINE

## 2020-06-01 PROCEDURE — 3008F BODY MASS INDEX DOCD: CPT | Mod: CPTII,S$GLB,, | Performed by: NURSE PRACTITIONER

## 2020-06-01 PROCEDURE — 93005 ELECTROCARDIOGRAM TRACING: CPT | Mod: PBBFAC | Performed by: INTERNAL MEDICINE

## 2020-06-01 PROCEDURE — 93010 EKG 12-LEAD: ICD-10-PCS | Mod: S$PBB,77,, | Performed by: INTERNAL MEDICINE

## 2020-06-01 PROCEDURE — 83735 ASSAY OF MAGNESIUM: CPT

## 2020-06-01 PROCEDURE — 36591 DRAW BLOOD OFF VENOUS DEVICE: CPT

## 2020-06-01 PROCEDURE — 99999 PR PBB SHADOW E&M-EST. PATIENT-LVL IV: ICD-10-PCS | Mod: PBBFAC,,, | Performed by: NURSE PRACTITIONER

## 2020-06-01 PROCEDURE — 99999 PR PBB SHADOW E&M-EST. PATIENT-LVL IV: CPT | Mod: PBBFAC,,, | Performed by: NURSE PRACTITIONER

## 2020-06-01 PROCEDURE — 96374 THER/PROPH/DIAG INJ IV PUSH: CPT

## 2020-06-01 PROCEDURE — 83615 LACTATE (LD) (LDH) ENZYME: CPT

## 2020-06-01 RX ORDER — SODIUM CHLORIDE 0.9 % (FLUSH) 0.9 %
10 SYRINGE (ML) INJECTION
Status: CANCELLED | OUTPATIENT
Start: 2020-06-01

## 2020-06-01 RX ORDER — HYDROMORPHONE HYDROCHLORIDE 1 MG/ML
1 INJECTION, SOLUTION INTRAMUSCULAR; INTRAVENOUS; SUBCUTANEOUS ONCE
Status: COMPLETED | OUTPATIENT
Start: 2020-06-01 | End: 2020-06-01

## 2020-06-01 RX ORDER — HEPARIN 100 UNIT/ML
500 SYRINGE INTRAVENOUS
Status: COMPLETED | OUTPATIENT
Start: 2020-06-01 | End: 2020-06-01

## 2020-06-01 RX ORDER — SODIUM CHLORIDE 0.9 % (FLUSH) 0.9 %
10 SYRINGE (ML) INJECTION
Status: COMPLETED | OUTPATIENT
Start: 2020-06-01 | End: 2020-06-01

## 2020-06-01 RX ORDER — HEPARIN 100 UNIT/ML
500 SYRINGE INTRAVENOUS
Status: CANCELLED | OUTPATIENT
Start: 2020-06-01

## 2020-06-01 RX ADMIN — Medication 10 ML: at 04:06

## 2020-06-01 RX ADMIN — HEPARIN 500 UNITS: 100 SYRINGE at 04:06

## 2020-06-01 RX ADMIN — ALTEPLASE 2 MG: 2.2 INJECTION, POWDER, LYOPHILIZED, FOR SOLUTION INTRAVENOUS at 01:06

## 2020-06-01 RX ADMIN — HYDROMORPHONE HYDROCHLORIDE 1 MG: 1 INJECTION, SOLUTION INTRAMUSCULAR; INTRAVENOUS; SUBCUTANEOUS at 03:06

## 2020-06-01 NOTE — PLAN OF CARE
Patient tolerated Genzada trial well today. All vitals and labs drawn per Research protocol. EKGs completed by research tech Jackelin Dougherty. Patient does have increased abdominal pain 8/10. Karen Hightower NP aware. 1 mg of dilaudid ivp given for relief. Patient states felt much better and haven't felt that much relief in a while. Patient states now has an appetite and will plan to go home to eat dinner. Patient will have consult with palliative care on Friday for pain management. Verbalized understanding to call MD for any questions/concerns. Discharged home, ambulated independently with '[employer] by side.

## 2020-06-01 NOTE — Clinical Note
Refer to palliative care ASAP- this week if possible with a virtual visit.RTC per research Jackelin STEVENS.

## 2020-06-01 NOTE — TELEPHONE ENCOUNTER
----- Message from Karen Hightower NP sent at 6/1/2020  8:42 AM CDT -----  Refer to palliative care ASAP- this week if possible with a virtual visit.    RTC per research Jackelin STEVENS.

## 2020-06-01 NOTE — PROGRESS NOTES
6/1/2020     Protocol: A Phase 1, Multicenter, Open-label, Dose Escalation, Safety, Pharmacodynamic and Pharmacokinetic Study of .02 Given orally on a daily x 28 day schedule in patients with Advanced Solid Tumors or Lymphoma  Protocol # GEN-602-CT-101  IRB # 2018.428  PI: Eliot Barrett MD  Version Date: 5.0    Pt #      Cycle 1 Day 15:      Patient presented to the clinic today for the above mentioned treatment.  Patient alert and oriented x 3. Mood and affect are appropriate to the situation.  Patient denies any fever, pain or shortness of breath. Denies any vomiting, diarrhea or constipation at this time.  Pt denies any mouth sores.  Reviewed and confirmed which concomitant medications with the patient she is currently taking. See concomitant therapy worksheet. Patient had physical exam, vitals, and visit with Karen Hightower NP. Lab work resulted and reviewed. Lab work out of range = not clinically significant. Patient c/o still having back pain (AE#3) with altered pain regime. Karen Hightower NP states to increase Fentanyl patch to 50 mcg as it has been more than 72 hours since increased dose. Now on Dilaudid PRN. The patient verbalized understanding of this information and is in agreement of this treatment plan. Patient then arrived to 5th floor [facility]; patient was dosed (375mg p.o. and 1 Peptamen jim carton) in infusion at 9:03 AM. Pt returned previous drug dairies. All PK blood draws and EKG's, were preformed per protocol.  24 hour PK will be omitted due to COVID 19 precautions.  All side effects discussed with patient who voices understanding.  Pt given directions on taking study drug and drug diary. Pt agrees to do this. Pt to return back 6/8/2020 in clinic for visit with Karen Hightower NP.     Drug dispense :  #28 tabs 300mg .02   #28 tabs 75mg .02  #28 Peptamen Jim cartons.      Drug return: #0 tablets or Peptamen    AE's  Increased ALT Grade 1 (started  5/25/2020-cont)  Increased AST Grade 1 (5/25/2020-resolved)  Back pain Grade 2 (started 5/25/2020)    Baseline log  Venous Thromboembolism Grade 2  Menopause Grade 1  CA related pain Grade 2  Nausea Grade 2  Edema- limb Grade 1- resolved as of 5/15/2020  Insomnia Grade 1  Allergic reaction (skin rash) Grade 2

## 2020-06-01 NOTE — PLAN OF CARE
Problem: Adult Inpatient Plan of Care  Goal: Optimal Comfort and Wellbeing  Intervention: Provide Person-Centered Care  Flowsheets (Taken 6/1/2020 8876)  Trust Relationship/Rapport: thoughts/feelings acknowledged;care explained;choices provided;emotional support provided;empathic listening provided;questions answered;questions encouraged;reassurance provided

## 2020-06-04 ENCOUNTER — TELEPHONE (OUTPATIENT)
Dept: PALLIATIVE MEDICINE | Facility: CLINIC | Age: 60
End: 2020-06-04

## 2020-06-05 ENCOUNTER — TELEPHONE (OUTPATIENT)
Dept: HEMATOLOGY/ONCOLOGY | Facility: CLINIC | Age: 60
End: 2020-06-05

## 2020-06-05 ENCOUNTER — TELEPHONE (OUTPATIENT)
Dept: PALLIATIVE MEDICINE | Facility: CLINIC | Age: 60
End: 2020-06-05

## 2020-06-05 ENCOUNTER — PATIENT MESSAGE (OUTPATIENT)
Dept: HEMATOLOGY/ONCOLOGY | Facility: CLINIC | Age: 60
End: 2020-06-05

## 2020-06-05 ENCOUNTER — TELEPHONE (OUTPATIENT)
Dept: PHARMACY | Facility: CLINIC | Age: 60
End: 2020-06-05

## 2020-06-05 DIAGNOSIS — G89.3 NEOPLASM RELATED PAIN (ACUTE) (CHRONIC): Primary | ICD-10-CM

## 2020-06-05 DIAGNOSIS — C25.0 MALIGNANT NEOPLASM OF HEAD OF PANCREAS: ICD-10-CM

## 2020-06-05 DIAGNOSIS — G89.3 NEOPLASM RELATED PAIN (ACUTE) (CHRONIC): ICD-10-CM

## 2020-06-05 RX ORDER — HYDROMORPHONE HYDROCHLORIDE 4 MG/1
8 TABLET ORAL EVERY 4 HOURS PRN
Qty: 60 TABLET | Refills: 0 | Status: SHIPPED | OUTPATIENT
Start: 2020-06-05 | End: 2020-06-05

## 2020-06-05 RX ORDER — HYDROMORPHONE HYDROCHLORIDE 4 MG/1
8 TABLET ORAL EVERY 6 HOURS PRN
Qty: 60 TABLET | Refills: 0 | Status: SHIPPED | OUTPATIENT
Start: 2020-06-05 | End: 2020-06-05 | Stop reason: SDUPTHER

## 2020-06-05 RX ORDER — FENTANYL 75 UG/H
1 PATCH TRANSDERMAL
Qty: 10 PATCH | Refills: 0 | Status: SHIPPED | OUTPATIENT
Start: 2020-06-05 | End: 2020-06-05 | Stop reason: SDUPTHER

## 2020-06-05 RX ORDER — HYDROMORPHONE HYDROCHLORIDE 8 MG/1
8 TABLET ORAL EVERY 4 HOURS PRN
Qty: 180 TABLET | Refills: 0 | Status: SHIPPED | OUTPATIENT
Start: 2020-06-05

## 2020-06-05 RX ORDER — FENTANYL 75 UG/H
1 PATCH TRANSDERMAL
Qty: 10 PATCH | Refills: 0 | Status: SHIPPED | OUTPATIENT
Start: 2020-06-05 | End: 2020-06-08

## 2020-06-05 NOTE — TELEPHONE ENCOUNTER
Was it dr jones or dr bella she was seeing? (I don't know how to tell.)  I can route this message over to them, if you'd like.  jordan

## 2020-06-05 NOTE — TELEPHONE ENCOUNTER
Called to reschedule patient for Monday with Dr. [place]. She states she will be on Virtual visit at 1:00pm.

## 2020-06-08 ENCOUNTER — OFFICE VISIT (OUTPATIENT)
Dept: HEMATOLOGY/ONCOLOGY | Facility: CLINIC | Age: 60
End: 2020-06-08
Payer: COMMERCIAL

## 2020-06-08 ENCOUNTER — OFFICE VISIT (OUTPATIENT)
Dept: PALLIATIVE MEDICINE | Facility: CLINIC | Age: 60
End: 2020-06-08
Payer: COMMERCIAL

## 2020-06-08 ENCOUNTER — RESEARCH ENCOUNTER (OUTPATIENT)
Dept: RESEARCH | Facility: HOSPITAL | Age: 60
End: 2020-06-08

## 2020-06-08 ENCOUNTER — TELEPHONE (OUTPATIENT)
Dept: PHARMACY | Facility: CLINIC | Age: 60
End: 2020-06-08

## 2020-06-08 ENCOUNTER — TELEPHONE (OUTPATIENT)
Dept: PALLIATIVE MEDICINE | Facility: CLINIC | Age: 60
End: 2020-06-08

## 2020-06-08 VITALS
HEIGHT: 63 IN | WEIGHT: 138.88 LBS | SYSTOLIC BLOOD PRESSURE: 142 MMHG | RESPIRATION RATE: 18 BRPM | BODY MASS INDEX: 24.61 KG/M2 | TEMPERATURE: 98 F | OXYGEN SATURATION: 96 % | DIASTOLIC BLOOD PRESSURE: 71 MMHG | HEART RATE: 77 BPM

## 2020-06-08 DIAGNOSIS — R79.89 ELEVATED LFTS: ICD-10-CM

## 2020-06-08 DIAGNOSIS — C78.00 MALIGNANT NEOPLASM METASTATIC TO LUNG, UNSPECIFIED LATERALITY: ICD-10-CM

## 2020-06-08 DIAGNOSIS — C25.0 MALIGNANT NEOPLASM OF HEAD OF PANCREAS: Primary | ICD-10-CM

## 2020-06-08 DIAGNOSIS — Z79.01 ANTICOAGULATED: ICD-10-CM

## 2020-06-08 DIAGNOSIS — R11.2 CHEMOTHERAPY INDUCED NAUSEA AND VOMITING: ICD-10-CM

## 2020-06-08 DIAGNOSIS — G89.3 NEOPLASM RELATED PAIN (ACUTE) (CHRONIC): ICD-10-CM

## 2020-06-08 DIAGNOSIS — C78.7 LIVER METASTASES: ICD-10-CM

## 2020-06-08 DIAGNOSIS — T45.1X5A CHEMOTHERAPY INDUCED NAUSEA AND VOMITING: ICD-10-CM

## 2020-06-08 DIAGNOSIS — Z51.5 PALLIATIVE CARE BY SPECIALIST: ICD-10-CM

## 2020-06-08 DIAGNOSIS — K59.00 CONSTIPATION, UNSPECIFIED CONSTIPATION TYPE: ICD-10-CM

## 2020-06-08 DIAGNOSIS — Z00.6 CLINICAL TRIAL PARTICIPANT: ICD-10-CM

## 2020-06-08 DIAGNOSIS — I82.90 VTE (VENOUS THROMBOEMBOLISM): ICD-10-CM

## 2020-06-08 PROCEDURE — 3008F PR BODY MASS INDEX (BMI) DOCUMENTED: ICD-10-PCS | Mod: CPTII,S$GLB,, | Performed by: NURSE PRACTITIONER

## 2020-06-08 PROCEDURE — 99999 PR PBB SHADOW E&M-EST. PATIENT-LVL I: ICD-10-PCS | Mod: PBBFAC,,, | Performed by: EMERGENCY MEDICINE

## 2020-06-08 PROCEDURE — 99205 PR OFFICE/OUTPT VISIT, NEW, LEVL V, 60-74 MIN: ICD-10-PCS | Mod: S$GLB,,, | Performed by: EMERGENCY MEDICINE

## 2020-06-08 PROCEDURE — 99214 OFFICE O/P EST MOD 30 MIN: CPT | Mod: S$PBB,,, | Performed by: NURSE PRACTITIONER

## 2020-06-08 PROCEDURE — 99205 OFFICE O/P NEW HI 60 MIN: CPT | Mod: S$GLB,,, | Performed by: EMERGENCY MEDICINE

## 2020-06-08 PROCEDURE — 3008F BODY MASS INDEX DOCD: CPT | Mod: CPTII,S$GLB,, | Performed by: NURSE PRACTITIONER

## 2020-06-08 PROCEDURE — 99999 PR PBB SHADOW E&M-EST. PATIENT-LVL IV: ICD-10-PCS | Mod: PBBFAC,,, | Performed by: NURSE PRACTITIONER

## 2020-06-08 PROCEDURE — 99999 PR PBB SHADOW E&M-EST. PATIENT-LVL I: CPT | Mod: PBBFAC,,, | Performed by: EMERGENCY MEDICINE

## 2020-06-08 PROCEDURE — 99999 PR PBB SHADOW E&M-EST. PATIENT-LVL IV: CPT | Mod: PBBFAC,,, | Performed by: NURSE PRACTITIONER

## 2020-06-08 PROCEDURE — 99214 PR OFFICE/OUTPT VISIT, EST, LEVL IV, 30-39 MIN: ICD-10-PCS | Mod: S$PBB,,, | Performed by: NURSE PRACTITIONER

## 2020-06-08 RX ORDER — METHADONE HYDROCHLORIDE 5 MG/1
5 TABLET ORAL 2 TIMES DAILY
Qty: 60 TABLET | Refills: 0 | Status: ON HOLD | OUTPATIENT
Start: 2020-06-08 | End: 2020-06-20 | Stop reason: HOSPADM

## 2020-06-08 RX ORDER — SENNOSIDES 8.6 MG/1
2 TABLET ORAL 2 TIMES DAILY
Qty: 120 TABLET | Refills: 6 | Status: ON HOLD | OUTPATIENT
Start: 2020-06-08 | End: 2020-06-20 | Stop reason: HOSPADM

## 2020-06-08 NOTE — PROGRESS NOTES
Consult Note  Palliative Care      Consult Requested By: Karen Hightower NP  Reason for Consult: pain and symptom mgmt in the setting of advanced pancreatic cancer      ASSESSMENT/PLAN:     Plan/Recommendations:  Diagnoses and all orders for this visit:    Malignant neoplasm of head of pancreasMalignant neoplasm metastatic to lung, unspecified laterality/Abdomina pain:  -started on methadone 5 mg bid today; will reassess by end of week to gauge response  -counseled pt on risks and benefits of medication as well as the need to have patience to allow for steady state of drug to occur   -cont hydromorphone 8 mg po q 3 hours prn breakthrough pain  -d/c lortab  -will cont to titrate methadone as needed; may be candidate for celiac plexus neurolysis if symptoms continue to worsen despite tx    Constipation, unspecified constipation type  -start senna 2 tabs bid; daily miralax  -increase water intake    Palliative care by specialist  -did not specifically discuss pt's understanding of illness or goals in the setting of terminal cancer due to severity of pain symptoms  -patient is open to further discussions at subsequent visits  -  within the last year from cancer    Ethical / Legal Advance Care Planning      - surrogate decision maker:  HemakishoreYanira Mother     [phone]      - Code Status: FC   - No advanced care planning documents     Discussed with Karen Hightower NP.     Will follow up with pt later this week by phone and in clinic within a month..    SUBJECTIVE:     History of Present Illness:  Quyen Moody is a 59 y.o. female, patient of Dr. Barrett, to clinic for follow up and to continue treatment on Genzada trial. Patient reports increased back pain. Reports pain worse at night and wraps around to the front.  For pain, she is on 50 mcg/hr every 72 hours and dilaudid 6mg every 4-6 hours PRN. Patient reports 1 episode of vomiting yesterday. Decreased appetite but weight is stable.  +constipation. She did half  dose of MIralax BID and enema yesterday without relief.      ECOG 1. Life expectancy greater than 3 months.      Oncologic History:  She has had intermittent upper abdominal pain since early June.  She presented to her PCP who originally ordered an US and CT.  US was negative and CT showed some haziness at the pancreatic head.  She saw GI who performed EUS.  On EUS, a 3x4cm pancreatic head mass was seen with possible invasion into the SMA and portal vein.  FNA path s/w pancreatic adenocarcinoma.  CA 19-9 level at outside hospital was >1000 per the patient.  She endorses nausea and inability to tolerate much PO.  She has lost about 10lbs over the last few weeks and is noticing her skin turning yellow.  Denies pruritis.  She is passing gas but has not had a BM in a few days, she attributes this to narcotic use.  She recently started taking stool softeners.  No previous cardiac or stroke history.  Surgical history significant for open appendectomy when she was in 20s  - 8/7/2019 - 1/9/2020: [place]+Abraxane and TTF on PANOVA-3, LFTs elevated, but improved.  - Based on CT 12/2019 scans, Dr. Hallman felt pt was potentially resectable after chemoXRT given good response to current therapy.  However, subsequent Chest CT   showed new and growing micronodules in the in lungs c/w met disease in light of rising tumor markers.    - 1/23/2020: Changed to FOLFIRINOX and con't TTF on PANOVA as this seems to be achieving good local control of the primary tumor.     She is referred today as a participant in Phase 1 clinical trial and for worsening abd pain despite rapid titration of fentanyl patches and frequent use of hydromorphone 8 mg po prn.  Severe constipation    In this setting, palliative medicine was consulted to help with symptom management          Past Medical History:   Diagnosis Date    Allergic rhinitis 3/3/2014    CKD (chronic kidney disease) stage 3, GFR 30-59 ml/min 12/26/2015    Hyperlipidemia 3/3/2014    Lung  metastasis     Pancreas cancer     Skin rash 8/10/2019     Past Surgical History:   Procedure Laterality Date    ERCP N/A 7/16/2019    Procedure: ERCP (ENDOSCOPIC RETROGRADE CHOLANGIOPANCREATOGRAPHY);  Surgeon: Chema Harris MD;  Location: [facility] ENDO (2ND FLR);  Service: Endoscopy;  Laterality: N/A;    ERCP N/A 12/24/2019    Procedure: ERCP (ENDOSCOPIC RETROGRADE CHOLANGIOPANCREATOGRAPHY);  Surgeon: Chema Harris MD;  Location: [facility] ENDO (2ND FLR);  Service: Endoscopy;  Laterality: N/A;    Exporatory lap      INSERTION OF TUNNELED CENTRAL VENOUS CATHETER (CVC) WITH SUBCUTANEOUS PORT Right 8/12/2019    Procedure: [id number]-CATH;  Surgeon: Cesar Hallman MD;  [facility];  Service: General;  Laterality: Right;  right IJ     Family History   Problem Relation Age of Onset    Diabetes Mother     Diabetes Father     Diabetes Brother     Heart disease Neg Hx      Review of patient's allergies indicates:   Allergen Reactions    Sulfa (sulfonamide antibiotics) Swelling and Hives     tongue         Medications:    Current Outpatient Medications:     dexAMETHasone (DECADRON) 4 MG Tab, Take 1 tablet (4 mg total) by mouth every 12 (twelve) hours. For 2 days following chemotherapy., Disp: 20 tablet, Rfl: 0    enoxaparin (LOVENOX) 100 mg/mL Syrg, Inject 1 mL (100 mg total) into the skin once daily., Disp: 30 mL, Rfl: 6    estradiol-norethindrone (ACTIVELLA) 1-0.5 mg per tablet, Take 0.5 mg by mouth once daily., Disp: , Rfl:     HYDROcodone-acetaminophen (NORCO) 5-325 mg per tablet, Take 1 tablet by mouth every 6 (six) hours as needed for Pain., Disp: , Rfl:     HYDROmorphone (DILAUDID) 8 MG tablet, Take 1 tablet (8 mg total) by mouth every 4 (four) hours as needed for Pain., Disp: 180 tablet, Rfl: 0    INV gz17-6.02 300 mg capsule, Take 1 capsule (300 mg total) by mouth 2 (two) times daily. Take dose after fasting for at least 30 minutes prior to .02 ,with a recommended 90 mins fast  post dose  Do not chew or open capsule. For Investigational Use Only.Protocol: GEN-602-CT-101.Subject# Alicia., Disp: 28 capsule, Rfl: 0    INV gz17-6.02 75 mg capsule, Take 1 capsule (75 mg total) by mouth 2 (two) times daily.Take dose after fasting for at least 30 minutes prior to .02 , with a recommended 90 minutes fast post dose. Do not chew or open capsule. For Investigational Use Only.Protocol:GEN-602-CT-101.Subject # Alicia., Disp: 28 capsule, Rfl: 0    INV peptamen jim hp Soln solution, Take 250 mLs by mouth 2 (two) times daily. Drink immediately after dosing of .02. For Investigional Use Only. Protocol: GEN-602-CT-101. Subject # Alicia., Disp: 28 vial, Rfl: 0    lidocaine-prilocaine (EMLA) cream, Apply to port 45 minutes prior to access., Disp: 30 g, Rfl: 0    LORazepam (ATIVAN) 0.5 MG tablet, Take 1 tablet (0.5 mg total) by mouth every 6 (six) hours as needed for Anxiety (nausea)., Disp: 60 tablet, Rfl: 0    methadone (DOLOPHINE) 5 MG tablet, Take 1 tablet (5 mg total) by mouth 2 (two) times a day., Disp: 60 tablet, Rfl: 0    multivitamin (THERAGRAN) per tablet, Take 1 tablet by mouth once daily., Disp: , Rfl:     OLANZapine (ZYPREXA) 5 MG tablet, Take 1 tablet (5 mg total) by mouth nightly. To prevent nausea, Disp: 30 tablet, Rfl: 2    ondansetron (ZOFRAN) 8 MG tablet, Take 1 tablet (8 mg total) by mouth every 8 (eight) hours as needed for Nausea., Disp: 120 tablet, Rfl: 2    senna (SENNA) 8.6 mg tablet, Take 2 tablets by mouth 2 (two) times daily., Disp: 120 tablet, Rfl: 6      24h Oral Morphine Equivalents (OME):     OBJECTIVE:     Symptom Assessment (ESAS 0-10 scale)    ESAS 0 1 2 3 4 5 6 7 8 9 10   Pain []  []  []  []  []  []  []  []  [x]  []  []    Dyspnea [x]  []  []  []  []  []  []  []  []  []  []    Anxiety []  []  [x]  []  []  []  []  []  []  []  []    Nausea []  []  [x]  []  []  []  []  []  []  []  []    Depression  [x]  []  []  []  []  []  []  []  []  []  []    Anorexia  []  []  []  [x]  []  []  []  []  []  []  []    Fatigue [x]  []  []  []  []  []  []  []  []  []  []    Insomnia [x]  []  []  []  []  []  []  []  []  []  []    Restlessness  [x]  []  []  []  []  []  []  []  []  []  []    Agitation [x]  []  []  []  []  []  []  []  []  []  []        Constipation    yes  Bowel Management Plan (BMP): no  Diarrhea        no  Comments:   Perfromance Status: PPS Score 90  ROS:  Review of Systems   Constitutional: Positive for activity change, appetite change, fatigue and unexpected weight change.   HENT: Negative.    Eyes: Negative.    Respiratory: Negative.  Negative for cough, chest tightness and shortness of breath.    Cardiovascular: Negative.    Gastrointestinal: Positive for abdominal distention, abdominal pain, nausea and vomiting.        Abd pain radiating to back   Genitourinary: Negative.    Musculoskeletal: Positive for back pain.   Skin: Negative.    Allergic/Immunologic: Negative.    Neurological: Negative.    Hematological: Negative.    Psychiatric/Behavioral: Negative.        Physical Exam:  Vitals: There were no vitals taken for this visit.    Physical Exam   Constitutional: She is oriented to person, place, and time and well-developed, well-nourished, and in no distress. No distress.   HENT:   Head: Normocephalic and atraumatic.   Mouth/Throat: Oropharynx is clear and moist. No oropharyngeal exudate.   Eyes: Pupils are equal, round, and reactive to light. Conjunctivae are normal. Right eye exhibits no discharge. Left eye exhibits no discharge. No scleral icterus.   Neck: Normal range of motion. No tracheal deviation present. No thyromegaly present.   Cardiovascular: Normal rate and regular rhythm. Exam reveals no gallop.   No murmur heard.  Pulmonary/Chest: Effort normal and breath sounds normal. No respiratory distress. She has no wheezes. She has no rales.   Abdominal: Soft. She exhibits no distension and no mass. There is no tenderness. There is no rebound and no  guarding.   Musculoskeletal: Normal range of motion. She exhibits no edema, tenderness or deformity.   Lymphadenopathy:     She has no cervical adenopathy.   Neurological: She is alert and oriented to person, place, and time. She displays normal reflexes. No cranial nerve deficit. She exhibits normal muscle tone. Gait normal. Coordination normal.   Skin: Skin is warm and dry. No rash noted. She is not diaphoretic. No erythema. No pallor.   Psychiatric: Mood, memory, affect and judgment normal.   Labs:  CBC:   WBC   Date Value Ref Range Status   06/08/2020 6.32 3.90 - 12.70 K/uL Final     Hemoglobin   Date Value Ref Range Status   06/08/2020 13.0 12.0 - 16.0 g/dL Final     Hematocrit   Date Value Ref Range Status   06/08/2020 43.5 37.0 - 48.5 % Final     Mean Corpuscular Volume   Date Value Ref Range Status   06/08/2020 95 82 - 98 fL Final     Platelets   Date Value Ref Range Status   06/08/2020 179 150 - 350 K/uL Final           LFT:   Lab Results   Component Value Date    AST 38 06/08/2020     (H) 05/15/2020    ALKPHOS 304 (H) 06/08/2020    BILITOT 0.3 06/08/2020       Albumin:   Albumin   Date Value Ref Range Status   06/08/2020 3.3 (L) 3.5 - 5.2 g/dL Final     Protein:   Total Protein   Date Value Ref Range Status   06/08/2020 7.2 6.0 - 8.4 g/dL Final       Radiology:CT: I have reviewed all pertinent results/findings within the past 24 hours:  4.22   I have reviewed all pertinent imaging results/findings within the past 24 hours.   4.22 Abd pelv/chest CT:  Impression       History of pancreatic cancer with grossly stable findings including ill-defined pancreatic head mass, multiple bilateral pulmonary nodules, scattered peritoneal soft tissue opacities, and right retroperitoneal nodule.  RECIST lesions are detailed at the end of this report.    Status post right CBD stent with mild intrahepatic biliary ductal dilatation and pneumobilia.    Increased size of low-attenuation right adnexal mass now measuring  4.2 cm.  Recommend further evaluation with dedicated pelvic ultrasound.    Additional stable findings as above.    RECIST SUMMARY:    Date of prior examination for comparison: 03/16/2020    Lesion 1: Pancreatic head.  3.7 cm. Series 2 image 111.  Prior measurement 4.0 cm.    Lesion 2: Left lower lobe nodule.  1.4 cm. Series 2 image 83.  Prior measurement 1.3 cm.    Lesion 3: Anterior peritoneal implant.  1.5 cm. Series 2 image 143.  Prior measurement 1.5 cm.    Lesion 4: Right retroperitoneal opacity.  1.1 cm. Series 2 image 108.  Prior measurement 1.1 cm.         Psychosocial/Cultural/Spiritual: did not discuss in detail during today's visit due to pain      > 50% of 65 min visit spent in chart review, face to face discussion of goals of care,  symptom assessment, coordination of care and emotional support.      Signature: Gal Sheehan MD

## 2020-06-08 NOTE — PROGRESS NOTES
6/8/2020     Protocol: A Phase 1, Multicenter, Open-label, Dose Escalation, Safety, Pharmacodynamic and Pharmacokinetic Study of .02 Given orally on a daily x 28 day schedule in patients with Advanced Solid Tumors or Lymphoma  Protocol # GEN-602-CT-101  IRB # 2018.428  PI: Eliot Barrett MD  Version Date: 5.0    Pt #      Cycle 1 Day 22:      Patient presented to the clinic today for the above mentioned treatment.  Patient alert and oriented x 3. Mood and affect are appropriate to the situation.  Patient denies any fever, pain or shortness of breath.  Pt denies any mouth sores.  Reviewed and confirmed which concomitant medications with the patient she is currently taking. See concomitant therapy worksheet. Patient had physical exam, vitals, and visit with Karen Hightower NP. Lab work resulted and reviewed. Lab work out of range = not clinically significant. Patient c/o still having back pain (AE#3-increased to Grade 3). She never started 75mg Fentanyl patch due to PA. Pt also c/o constipation with no bowel movement in 3 days. Pt had nausea over the weekend and vomited once; most likely due to pain medication. Karen Hightower NP states to stop Fentanyl patch and start Methadone 5mg po BID. Pt to see palliative care; also prescribed sennosides 8.6 mg BID po to help with constipation (AE#4). The patient verbalized understanding of this information and is in agreement of this treatment plan.  All side effects discussed with patient who voices understanding.  Pt given directions on taking study drug and drug diary. Pt agrees to do this. Pt to return back 6/15/2020 in clinic for visit with Karen Hightower NP.      Drug dispense :  None during this visit     Drug return: none during this visit     AE's  Increased ALT Grade 1 (started 5/25/2020-cont)  Increased AST Grade 1 (5/25/2020-resolved)  Back pain Grade 2-increased to Grade 3 (started 5/25/2020)  Constipation Grade 2 (started 6/7/2020)     Baseline log  Venous  Thromboembolism Grade 2  Menopause Grade 1  CA related pain Grade 2  Nausea Grade 2  Insomnia Grade 1  Allergic reaction (skin rash) Grade 2  Increased Alkaline Phosphate Grade 1

## 2020-06-08 NOTE — PROGRESS NOTES
ONCOLOGY / PCTP VISIT     Subjective:       Patient ID: Quyen Moody    Chief Complaint:  Pancreatic cancer    HPI     Quyen Moody is a 59 y.o. female, patient of Dr. Barrett, to clinic for follow up and to continue treatment on Genzada trial. Patient reports increased back pain. Reports pain worse at night and wraps around to the front.  For pain, she is on 50 mcg/hr every 72 hours and dilaudid 6mg every 4-6 hours PRN. Patient reports 1 episode of vomiting yesterday. Decreased appetite but weight is stable.  +constipation. She did half dose of MIralax BID and enema yesterday without relief.     ECOG 1. Life expectancy greater than 3 months.     Oncologic History:  She has had intermittent upper abdominal pain since early June.  She presented to her PCP who originally ordered an US and CT.  US was negative and CT showed some haziness at the pancreatic head.  She saw GI who performed EUS.  On EUS, a 3x4cm pancreatic head mass was seen with possible invasion into the SMA and portal vein.  FNA path s/w pancreatic adenocarcinoma.  CA 19-9 level at outside hospital was >1000 per the patient.  She endorses nausea and inability to tolerate much PO.  She has lost about 10lbs over the last few weeks and is noticing her skin turning yellow.  Denies pruritis.  She is passing gas but has not had a BM in a few days, she attributes this to narcotic use.  She recently started taking stool softeners.  No previous cardiac or stroke history.  Surgical history significant for open appendectomy when she was in her 20s  - 8/7/2019 - 1/9/2020: Marinette+Abraxane and TTF on PANOVA-3, LFTs elevated, but improved.  - Based on CT 12/2019 scans, Dr. Hallman felt pt was potentially resectable after chemoXRT given good response to current therapy.  However, subsequent Chest CT   showed new and growing micronodules in the in lungs c/w met disease in light of rising tumor markers.    - 1/23/2020: Changed to FOLFIRINOX and con't TTF on PANOVA as this  seems to be achieving good local control of the primary tumor.     Review of Systems   Constitutional: Positive for fatigue. Negative for activity change, appetite change, chills, fever and unexpected weight change.   HENT: Negative for congestion, hearing loss, mouth sores, sore throat, tinnitus and voice change.    Eyes: Negative for pain and visual disturbance.   Respiratory: Negative for cough, shortness of breath and wheezing.    Cardiovascular: Negative for chest pain, palpitations and leg swelling.   Gastrointestinal: Positive for abdominal pain, constipation, nausea and vomiting. Negative for diarrhea.   Endocrine: Negative for cold intolerance and heat intolerance.   Genitourinary: Negative for difficulty urinating, dyspareunia, dysuria, frequency, menstrual problem, urgency, vaginal bleeding, vaginal discharge and vaginal pain.   Musculoskeletal: Positive for back pain. Negative for arthralgias and myalgias.   Skin: Negative for color change, rash and wound.   Allergic/Immunologic: Negative for environmental allergies and food allergies.   Neurological: Negative for weakness, numbness and headaches.   Hematological: Negative for adenopathy. Does not bruise/bleed easily.   Psychiatric/Behavioral: Positive for sleep disturbance. Negative for agitation, confusion and hallucinations. The patient is nervous/anxious.    All other systems reviewed and are negative.        Allergies:  Review of patient's allergies indicates:   Allergen Reactions    Sulfa (sulfonamide antibiotics) Swelling and Hives     tongue         Medications:  Current Outpatient Medications   Medication Sig Dispense Refill    dexAMETHasone (DECADRON) 4 MG Tab Take 1 tablet (4 mg total) by mouth every 12 (twelve) hours. For 2 days following chemotherapy. 20 tablet 0    enoxaparin (LOVENOX) 100 mg/mL Syrg Inject 1 mL (100 mg total) into the skin once daily. 30 mL 6    estradiol-norethindrone (ACTIVELLA) 1-0.5 mg per tablet Take 0.5 mg by  mouth once daily.      HYDROmorphone (DILAUDID) 8 MG tablet Take 1 tablet (8 mg total) by mouth every 4 (four) hours as needed for Pain. 180 tablet 0    INV gz17-6.02 300 mg capsule Take 1 capsule (300 mg total) by mouth 2 (two) times daily. Take dose after fasting for at least 30 minutes prior to .02 ,with a recommended 90 mins fast post dose  Do not chew or open capsule. For Investigational Use Only.Protocol: GEN-602-CT-101.Subject# . 28 capsule 0    INV gz17-6.02 75 mg capsule Take 1 capsule (75 mg total) by mouth 2 (two) times daily.Take dose after fasting for at least 30 minutes prior to .02 , with a recommended 90 minutes fast post dose. Do not chew or open capsule. For Investigational Use Only.Protocol:GEN-602-CT-101.Subject # . 28 capsule 0    INV peptamen jmi hp Soln solution Take 250 mLs by mouth 2 (two) times daily. Drink immediately after dosing of .02. For Investigional Use Only. Protocol: GEN-602-CT-101. Subject # . 28 vial 0    lidocaine-prilocaine (EMLA) cream Apply to port 45 minutes prior to access. 30 g 0    multivitamin (THERAGRAN) per tablet Take 1 tablet by mouth once daily.      OLANZapine (ZYPREXA) 5 MG tablet Take 1 tablet (5 mg total) by mouth nightly. To prevent nausea 30 tablet 2    ondansetron (ZOFRAN) 8 MG tablet Take 1 tablet (8 mg total) by mouth every 8 (eight) hours as needed for Nausea. 120 tablet 2    HYDROcodone-acetaminophen (NORCO) 5-325 mg per tablet Take 1 tablet by mouth every 6 (six) hours as needed for Pain.      LORazepam (ATIVAN) 0.5 MG tablet Take 1 tablet (0.5 mg total) by mouth every 6 (six) hours as needed for Anxiety (nausea). 60 tablet 0    methadone (DOLOPHINE) 5 MG tablet Take 1 tablet (5 mg total) by mouth 2 (two) times a day. 60 tablet 0    senna (SENNA) 8.6 mg tablet Take 2 tablets by mouth 2 (two) times daily. 120 tablet 6     No current facility-administered medications for this visit.        PMH:  Past  Medical History:   Diagnosis Date    Allergic rhinitis 3/3/2014    CKD (chronic kidney disease) stage 3, GFR 30-59 ml/min 12/26/2015    Hyperlipidemia 3/3/2014    Lung metastasis     Pancreas cancer     Skin rash 8/10/2019       PSH:  Past Surgical History:   Procedure Laterality Date    ERCP N/A 7/16/2019    Procedure: ERCP (ENDOSCOPIC RETROGRADE CHOLANGIOPANCREATOGRAPHY);  Surgeon: Chema Harris MD;  [facility]  Service: Endoscopy;  Laterality: N/A;    ERCP N/A 12/24/2019    Procedure: ERCP (ENDOSCOPIC RETROGRADE CHOLANGIOPANCREATOGRAPHY);  Surgeon: Chema Harris MD;  [facility]  Service: Endoscopy;  Laterality: N/A;    Exporatory lap      INSERTION OF TUNNELED CENTRAL VENOUS CATHETER (CVC) WITH SUBCUTANEOUS PORT Right 8/12/2019    Procedure: [id number];  Surgeon: Cesar Hallman MD;  Location: [facility] OR [address] [place] [address];  Service: General;  Laterality: Right;  right IJ       FamHx:  Family History   Problem Relation Age of Onset    Diabetes Mother     Diabetes Father     Diabetes Brother     Heart disease Neg Hx        SocHx:  Social History     Socioeconomic History    Marital status:      Spouse name: Not on file    Number of children: 0    Years of education: Not on file    Highest education level: Not on file   Occupational History    Occupation:      Employer: [employer]   Social Needs    Financial resource strain: Not on file    Food insecurity:     Worry: Not on file     Inability: Not on file    Transportation needs:     Medical: Not on file     Non-medical: Not on file   Tobacco Use    Smoking status: Former Smoker     Start date: 12/11/1999    Smokeless tobacco: Never Used   Substance and Sexual Activity    Alcohol use: Not Currently     Alcohol/week: 1.0 standard drinks     Types: 1 Glasses of wine per week     Frequency: 4 or more times a week     Drinks per session: 1 or 2     Binge frequency: Never     Comment:  last drink a month ago     Drug use: No    Sexual activity: Yes   Lifestyle    Physical activity:     Days per week: Not on file     Minutes per session: Not on file    Stress: Not on file   Relationships    Social connections:     Talks on phone: Not on file     Gets together: Not on file     Attends [employer] service: Not on file     Active member of club or organization: Not on file     Attends meetings of clubs or organizations: Not on file     Relationship status: Not on file   Other Topics Concern    Not on file   Social History Narrative    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      of Cancer in          Objective:       Vitals:    20 1039   BP: (!) 142/71   Pulse: 77   Resp: 18   Temp: 98.2 °F (36.8 °C)       Physical Exam   Constitutional: She is oriented to person, place, and time and well-developed, well-nourished, and in no distress. No distress.   HENT:   Head: Normocephalic and atraumatic.   Mouth/Throat: Oropharynx is clear and moist. No oropharyngeal exudate.   Eyes: Pupils are equal, round, and reactive to light. Conjunctivae are normal. Right eye exhibits no discharge. Left eye exhibits no discharge. No scleral icterus.   Neck: Normal range of motion. No tracheal deviation present. No thyromegaly present.   Cardiovascular: Normal rate and regular rhythm. Exam reveals no gallop.   No murmur heard.  Pulmonary/Chest: Effort normal and breath sounds normal. No respiratory distress. She has no wheezes. She has no rales.   Abdominal: Soft. She exhibits no distension and no mass. There is no tenderness. There is no rebound and no guarding.   Musculoskeletal: Normal range of motion. She exhibits no edema, tenderness or deformity.   Lymphadenopathy:     She has no cervical adenopathy.   Neurological: She is alert and oriented to person, place, and time. She displays normal reflexes. No cranial nerve deficit. She exhibits normal muscle tone. Gait normal. Coordination normal.   Skin:  Skin is warm and dry. No rash noted. She is not diaphoretic. No erythema. No pallor.   Psychiatric: Mood, memory, affect and judgment normal.     LABS:  WBC   Date Value Ref Range Status   06/08/2020 6.32 3.90 - 12.70 K/uL Final     Hemoglobin   Date Value Ref Range Status   06/08/2020 13.0 12.0 - 16.0 g/dL Final     Hematocrit   Date Value Ref Range Status   06/08/2020 43.5 37.0 - 48.5 % Final     Platelets   Date Value Ref Range Status   06/08/2020 179 150 - 350 K/uL Final       Chemistry        Component Value Date/Time     06/08/2020 1006    K 5.3 (H) 06/08/2020 1006     06/08/2020 1006    CO2 26 06/08/2020 1006    BUN 12 06/08/2020 1006    CREATININE 0.7 06/08/2020 1006     (H) 06/08/2020 1006        Component Value Date/Time    CALCIUM 9.2 06/08/2020 1006    ALKPHOS 304 (H) 06/08/2020 1006    AST 38 06/08/2020 1006    ALT 45 (H) 06/08/2020 1006    BILITOT 0.3 06/08/2020 1006    ESTGFRAFRICA >60.0 06/08/2020 1006    EGFRNONAA >60.0 06/08/2020 1006          Assessment:       1. Malignant neoplasm of head of pancreas    2. Liver metastases    3. Malignant neoplasm metastatic to lung, unspecified laterality    4. Clinical trial participant    5. Neoplasm related pain (acute) (chronic)    6. VTE (venous thromboembolism)    7. Anticoagulated    8. Chemotherapy induced nausea and vomiting    9. Elevated LFTs    10. Constipation, unspecified constipation type            Plan:   1,2,3,4- Cancer at the head of the pancreas:     Discussed SOC chemo and chemoXRT with FOLFIRINOX vs our PANOVA-3 trial with Pratt-Abraxane +/- TTFields.  Extensively reviewed the rationale of the study and reviewed her eligibility criteria with the research nurse and discussed case with Dr. Hallman as well.  She is interested in this study, and signed consent with our research nurse.      Patient was treated with Pratt+Abraxane and TTF on PANOVA-3.  Dose reduce Pratt to 800 and Abraxane to 100 per pt request due to chemo related  AEs.  Based on initial scans, Dr. Hallman felt pt may actually be resectable after chemoXRT given good response to current therapy.  However, subsequent Chest CT showed new and growing micronodules in the in lungs c/w met disease in light of rising tumor markers.       We switched chemo to FOLFIRINOX and con't TTF on PANOVA as this seemed to be achieving good local control of the primary tumor. She developed progression on Weber/Abraxane and FOLFIRINOX.   Will remove from Novocure trial and proceed with Genzada trial.       Feeling well and labs acceptable to continue on Genzada.     RTC per research RN      5- Stop Fentanyl. Start Methadone 5mg BID. Recent EKG reviewed. Seeing palliative care today. Now on Dilaudid PRN.     6,7- New RUE DVT. Switched to Lovenox 1.5mg/kg daily.     8- Discussed use of Zofran and phenergan.    9- Mild, monitor.    10- Miralax BID and add Colace.    More than 25 mins were spent during this encounter, greater than 50% was spent in direct counseling and/or coordination of care.     Patient is in agreement with the proposed treatment plan. All questions were answered to the patient's satisfaction. Pt knows to call clinic if anything is needed before the next clinic visit.    Karen Hightower, MSN, APRN, FNP-C  Hematology and Medical Oncology  Nurse Practitioner to Dr. Eliot Barrett  Nurse Practitioner, [facility]

## 2020-06-11 NOTE — PROGRESS NOTES
4/29/2020  Sponsor: [employer]  PI: Eliot Barrett MD  Study #: [id number]  WIRB: [id number]  IRB: 2018.096  Pt ID: -[id number]  ARM 1 w/ Salvage Therapy: TTFields + FOLFIRINOX (5FU/leucovorin bolus, Irinotecan, Oxaliplatin)     PANOVA-3: Pivotal, randomized, open-label study of Tumor Treating Fields (TTFields, 150kHz) concomitant with gemcitabine and nab-paclitaxel for front-line treatment of locally-advanced pancreatic adenocarcinoma     Pt ending active participation; Pt withdrawing consent for Panova trial to pursue other clinical trial to treat distant metastasis.     Spoke with patient and P.I., Eliot Barrett MD. Pt had CT scan 4/23/2020 and has not put the TTF arrays back on. Patient will be starting other clinical trial 5/18/2020 and have end of study labs 5/15/2020.   Patient will contact [facility] for TTF array  and return.      Reviewed current medications with patient, see Concomitant Medication sheet, and AE's.     Instructed patient to notify physician and/or me with any questions, concerns, or changes in health status. Patient verbalized understanding.         AEs- will continue to monitor   Grade 4 GGT  Grade 2 Alkaline Phosphate   Grade 1 ALT  Garde 1 AST

## 2020-06-12 ENCOUNTER — TELEPHONE (OUTPATIENT)
Dept: PALLIATIVE MEDICINE | Facility: CLINIC | Age: 60
End: 2020-06-12

## 2020-06-12 DIAGNOSIS — Z00.6 CLINICAL TRIAL PARTICIPANT: ICD-10-CM

## 2020-06-12 DIAGNOSIS — C25.0 CANCER OF HEAD OF PANCREAS: Primary | ICD-10-CM

## 2020-06-12 DIAGNOSIS — C25.0 MALIGNANT NEOPLASM OF HEAD OF PANCREAS: Primary | ICD-10-CM

## 2020-06-12 NOTE — TELEPHONE ENCOUNTER
"Called to check on pain control since initiating Methadone. Patient states that she started taking it Monday evening and has noticed "maybe a slight improvement" but she still needs to take 4-6 dilaudid during the day. Will relay to Dr. Sheehan.  "

## 2020-06-12 NOTE — PROGRESS NOTES
ONCOLOGY / PCTP VISIT     Subjective:       Patient ID: Quyen Moody    Chief Complaint:  Pancreatic cancer    HPI     Quyen Moody is a 59 y.o. female, patient of Dr. Barrett, to clinic for follow up and to continue treatment on Genzada trial. Patient is now on methadone 5mg TID since Friday and dilaudid 4mg QID as need. Pain level 2/10 during today's visit to her lower abdomen, which is improved from last week. She was also previously taking higher doses of dilaudid. Patient remains with severe nausea- taking Zofran sparingly per patient log and phenergan at night. She has held her study drug with some improvement in nausea. Decreased appetite and weight down 8 lbs. Using Senna now with ok bowel movements. +significant fatigue. Patient states she felt so bad over the weekend she almost went to the ER.    ECOG 1. Life expectancy greater than 3 months.     Oncologic History:  She has had intermittent upper abdominal pain since early June.  She presented to her PCP who originally ordered an US and CT.  US was negative and CT showed some haziness at the pancreatic head.  She saw GI who performed EUS.  On EUS, a 3x4cm pancreatic head mass was seen with possible invasion into the SMA and portal vein.  FNA path s/w pancreatic adenocarcinoma.  CA 19-9 level at outside hospital was >1000 per the patient.  She endorses nausea and inability to tolerate much PO.  She has lost about 10lbs over the last few weeks and is noticing her skin turning yellow.  Denies pruritis.  She is passing gas but has not had a BM in a few days, she attributes this to narcotic use.  She recently started taking stool softeners.  No previous cardiac or stroke history.  Surgical history significant for open appendectomy when she was in her 20s  - 8/7/2019 - 1/9/2020: [place]+Abraxane and TTF on PANOVA-3, LFTs elevated, but improved.  - Based on CT 12/2019 scans, Dr. Hallman felt pt was potentially resectable after chemoXRT given good response to current  therapy.  However, subsequent Chest CT   showed new and growing micronodules in the in lungs c/w met disease in light of rising tumor markers.    - 1/23/2020: Changed to FOLFIRINOX and con't TTF on PANOVA as this seems to be achieving good local control of the primary tumor.     Review of Systems   Constitutional: Positive for activity change, appetite change and fatigue. Negative for chills, fever and unexpected weight change.   HENT: Negative for congestion, hearing loss, mouth sores, sore throat, tinnitus and voice change.    Eyes: Negative for pain and visual disturbance.   Respiratory: Negative for cough, shortness of breath and wheezing.    Cardiovascular: Negative for chest pain, palpitations and leg swelling.   Gastrointestinal: Positive for abdominal pain, constipation, nausea and vomiting. Negative for diarrhea.   Endocrine: Negative for cold intolerance and heat intolerance.   Genitourinary: Positive for urgency. Negative for difficulty urinating, dyspareunia, dysuria, frequency, menstrual problem, vaginal bleeding, vaginal discharge and vaginal pain.   Musculoskeletal: Positive for back pain. Negative for arthralgias and myalgias.   Skin: Negative for color change, rash and wound.   Allergic/Immunologic: Negative for environmental allergies and food allergies.   Neurological: Negative for weakness, numbness and headaches.   Hematological: Negative for adenopathy. Does not bruise/bleed easily.   Psychiatric/Behavioral: Positive for sleep disturbance. Negative for agitation, confusion and hallucinations. The patient is nervous/anxious.    All other systems reviewed and are negative.        Allergies:  Review of patient's allergies indicates:   Allergen Reactions    Sulfa (sulfonamide antibiotics) Swelling and Hives     tongue         Medications:  Current Outpatient Medications   Medication Sig Dispense Refill    dexAMETHasone (DECADRON) 4 MG Tab Take 1 tablet (4 mg total) by mouth every 12 (twelve)  hours. For 2 days following chemotherapy. 20 tablet 0    enoxaparin (LOVENOX) 100 mg/mL Syrg Inject 1 mL (100 mg total) into the skin once daily. 30 mL 6    estradiol-norethindrone (ACTIVELLA) 1-0.5 mg per tablet Take 0.5 mg by mouth once daily.      HYDROmorphone (DILAUDID) 8 MG tablet Take 1 tablet (8 mg total) by mouth every 4 (four) hours as needed for Pain. 180 tablet 0    INV gz17-6.02 300 mg capsule Take 1 capsule (300 mg total) by mouth 2 (two) times daily. Take dose after fasting for at least 30 minutes prior to .02 ,with a recommended 90 mins fast post dose  Do not chew or open capsule. For Investigational Use Only.Protocol: GEN-602-CT-101.Subject# . 28 capsule 0    INV gz17-6.02 75 mg capsule Take 1 capsule (75 mg total) by mouth 2 (two) times daily.Take dose after fasting for at least 30 minutes prior to .02 , with a recommended 90 minutes fast post dose. Do not chew or open capsule. For Investigational Use Only.Protocol:GEN-602-CT-101.Subject # . 28 capsule 0    INV peptamen jim hp Soln solution Take 250 mLs by mouth 2 (two) times daily. Drink immediately after dosing of .02. For Investigional Use Only. Protocol: GEN-602-CT-101. Subject # . 28 vial 0    lidocaine-prilocaine (EMLA) cream Apply to port 45 minutes prior to access. 30 g 0    LORazepam (ATIVAN) 0.5 MG tablet Take 1 tablet (0.5 mg total) by mouth every 6 (six) hours as needed for Anxiety (nausea). 60 tablet 0    methadone (DOLOPHINE) 5 MG tablet Take 1 tablet (5 mg total) by mouth 2 (two) times a day. 60 tablet 0    multivitamin (THERAGRAN) per tablet Take 1 tablet by mouth once daily.      OLANZapine (ZYPREXA) 5 MG tablet Take 1 tablet (5 mg total) by mouth nightly. To prevent nausea 30 tablet 2    ondansetron (ZOFRAN) 8 MG tablet Take 1 tablet (8 mg total) by mouth every 8 (eight) hours as needed for Nausea. 120 tablet 2    ondansetron (ZOFRAN-ODT) 8 MG TbDL Dissolve 1 tablet (8 mg total)  by mouth every 6 (six) hours as needed. 120 tablet 2    senna (SENNA) 8.6 mg tablet Take 2 tablets by mouth 2 (two) times daily. 120 tablet 6     No current facility-administered medications for this visit.        PMH:  Past Medical History:   Diagnosis Date    Allergic rhinitis 3/3/2014    CKD (chronic kidney disease) stage 3, GFR 30-59 ml/min 12/26/2015    Hyperlipidemia 3/3/2014    Lung metastasis     Pancreas cancer     Skin rash 8/10/2019       PSH:  Past Surgical History:   Procedure Laterality Date    ERCP N/A 7/16/2019    Procedure: ERCP (ENDOSCOPIC RETROGRADE CHOLANGIOPANCREATOGRAPHY);  Surgeon: Chema Harris MD;  Location: [facility] ENDO ([address] [place] [address]);  Service: Endoscopy;  Laterality: N/A;    ERCP N/A 12/24/2019    Procedure: ERCP (ENDOSCOPIC RETROGRADE CHOLANGIOPANCREATOGRAPHY);  Surgeon: Chema Harris MD;  Location: [facility] ([address] [place] [address]);  Service: Endoscopy;  Laterality: N/A;    Exporatory lap      INSERTION OF TUNNELED CENTRAL VENOUS CATHETER (CVC) WITH SUBCUTANEOUS PORT Right 8/12/2019    Procedure: [id number];  Surgeon: Cesar Hallman MD;  Location: [facility] OR [address] [place] [address];  Service: General;  Laterality: Right;  right IJ       FamHx:  Family History   Problem Relation Age of Onset    Diabetes Mother     Diabetes Father     Diabetes Brother     Heart disease Neg Hx        SocHx:  Social History     Socioeconomic History    Marital status:      Spouse name: Not on file    Number of children: 0    Years of education: Not on file    Highest education level: Not on file   Occupational History    Occupation:      Employer: [employer]   Social Needs    Financial resource strain: Not on file    Food insecurity     Worry: Not on file     Inability: Not on file    Transportation needs     Medical: Not on file     Non-medical: Not on file   Tobacco Use    Smoking status: Former Smoker     Start date: 12/11/1999    Smokeless tobacco: Never Used   Substance and Sexual  Activity    Alcohol use: Not Currently     Alcohol/week: 1.0 standard drinks     Types: 1 Glasses of wine per week     Frequency: 4 or more times a week     Drinks per session: 1 or 2     Binge frequency: Never     Comment: last drink a month ago     Drug use: No    Sexual activity: Yes   Lifestyle    Physical activity     Days per week: Not on file     Minutes per session: Not on file    Stress: Not on file   Relationships    Social connections     Talks on phone: Not on file     Gets together: Not on file     Attends Jewish service: Not on file     Active member of club or organization: Not on file     Attends meetings of clubs or organizations: Not on file     Relationship status: Not on file   Other Topics Concern    Not on file   Social History Narrative    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      of Cancer in 2016         Objective:       Vitals:    06/15/20 1101   BP: (!) 128/95   Pulse: 82   Resp: 18   Temp: 98.3 °F (36.8 °C)       Physical Exam   Constitutional: She is oriented to person, place, and time and well-developed, well-nourished, and in no distress. No distress.   HENT:   Head: Normocephalic and atraumatic.   Mouth/Throat: Oropharynx is clear and moist. No oropharyngeal exudate.   Eyes: Pupils are equal, round, and reactive to light. Conjunctivae are normal. Right eye exhibits no discharge. Left eye exhibits no discharge. No scleral icterus.   Neck: Normal range of motion. No tracheal deviation present. No thyromegaly present.   Cardiovascular: Normal rate and regular rhythm. Exam reveals no gallop.   No murmur heard.  Pulmonary/Chest: Effort normal and breath sounds normal. No respiratory distress. She has no wheezes. She has no rales.   Abdominal: Soft. She exhibits no distension and no mass. There is no tenderness. There is no rebound and no guarding.   Musculoskeletal: Normal range of motion. She exhibits no edema, tenderness or deformity.   Lymphadenopathy:     She has no  cervical adenopathy.   Neurological: She is alert and oriented to person, place, and time. She displays normal reflexes. No cranial nerve deficit. She exhibits normal muscle tone. Gait normal. Coordination normal.   Skin: Skin is warm and dry. No rash noted. She is not diaphoretic. No erythema. No pallor.   Psychiatric: Mood, memory, affect and judgment normal.     LABS:  WBC   Date Value Ref Range Status   06/15/2020 4.94 3.90 - 12.70 K/uL Final     Hemoglobin   Date Value Ref Range Status   06/15/2020 13.6 12.0 - 16.0 g/dL Final     Hematocrit   Date Value Ref Range Status   06/15/2020 44.9 37.0 - 48.5 % Final     Platelets   Date Value Ref Range Status   06/15/2020 199 150 - 350 K/uL Final       Chemistry        Component Value Date/Time     06/15/2020 1018    K 4.4 06/15/2020 1018     06/15/2020 1018    CO2 28 06/15/2020 1018    BUN 9 06/15/2020 1018    CREATININE 0.8 06/15/2020 1018     (H) 06/15/2020 1018        Component Value Date/Time    CALCIUM 9.7 06/15/2020 1018    ALKPHOS 190 (H) 06/15/2020 1018    AST 30 06/15/2020 1018    ALT 36 06/15/2020 1018    BILITOT 0.6 06/15/2020 1018    ESTGFRAFRICA >60.0 06/15/2020 1018    EGFRNONAA >60.0 06/15/2020 1018          Assessment:       1. Malignant neoplasm of head of pancreas    2. Liver metastases    3. Malignant neoplasm metastatic to lung, unspecified laterality    4. Clinical trial participant    5. Neoplasm related pain (acute) (chronic)    6. VTE (venous thromboembolism)    7. Anticoagulated    8. Chemotherapy induced nausea and vomiting    9. Elevated LFTs    10. Constipation, unspecified constipation type        Plan:   1,2,3,4- Cancer at the head of the pancreas:     Discussed SOC chemo and chemoXRT with FOLFIRINOX vs our PANOVA-3 trial with [place]-Abraxane +/- TTFields.  Extensively reviewed the rationale of the study and reviewed her eligibility criteria with the research nurse and discussed case with Dr. Hallman as well.  She is  interested in this study, and signed consent with our research nurse.      Patient was treated with [place]+Abraxane and TTF on PANOVA-3.  Dose reduce [place] to 800 and Abraxane to 100 per pt request due to chemo related AEs.  Based on initial scans, Dr. Hallman felt pt may actually be resectable after chemoXRT given good response to current therapy.  However, subsequent Chest CT showed new and growing micronodules in the in lungs c/w met disease in light of rising tumor markers.       We switched chemo to FOLFIRINOX and con't TTF on PANOVA as this seemed to be achieving good local control of the primary tumor. She developed progression on [place]/Abraxane and FOLFIRINOX.   Will remove from Novocure trial and proceed with Genzada trial.       Scans are due next in 1 month. Ca19-9 from today pending. Hold study drug 1 week to allow for symptoms to improve. Will dose reduce if symptoms improve next week.     RTC per research RN      5- On Methadone 5mg TID and requiring less amount of dilaudid PRN with improvement in pain. She was previously on fentanyl but receiving no benefit despite increased dosing. Continue to follow up with palliative care.    6,7- Previous RUE DVT. Switched to Lovenox 1.5mg/kg daily. Rx refilled.     8- Hold study drug. Discussed use of Zofran and phenergan.Take Zofran every 6 hours with phenergan at bedtime. Ensure timing of zofran prior to pain medication. Resume taking Zyprexa nightly.     9-Resolved.    10- On Senna. Monitor.     More than 45 mins were spent during this encounter, greater than 50% was spent in direct counseling and/or coordination of care.     Patient is in agreement with the proposed treatment plan. All questions were answered to the patient's satisfaction. Pt knows to call clinic if anything is needed before the next clinic visit.    Karen Hightower, MSN, APRN, FNP-C  Hematology and Medical Oncology  Nurse Practitioner to Dr. Eliot Barrett  Nurse Practitioner, [facility]

## 2020-06-15 ENCOUNTER — RESEARCH ENCOUNTER (OUTPATIENT)
Dept: RESEARCH | Facility: HOSPITAL | Age: 60
End: 2020-06-15

## 2020-06-15 ENCOUNTER — OFFICE VISIT (OUTPATIENT)
Dept: HEMATOLOGY/ONCOLOGY | Facility: CLINIC | Age: 60
DRG: 948 | End: 2020-06-15
Payer: COMMERCIAL

## 2020-06-15 VITALS
HEIGHT: 63 IN | WEIGHT: 130.06 LBS | RESPIRATION RATE: 18 BRPM | OXYGEN SATURATION: 98 % | BODY MASS INDEX: 23.04 KG/M2 | TEMPERATURE: 98 F | HEART RATE: 82 BPM | SYSTOLIC BLOOD PRESSURE: 128 MMHG | DIASTOLIC BLOOD PRESSURE: 95 MMHG

## 2020-06-15 DIAGNOSIS — K59.00 CONSTIPATION, UNSPECIFIED CONSTIPATION TYPE: ICD-10-CM

## 2020-06-15 DIAGNOSIS — R79.89 ELEVATED LFTS: ICD-10-CM

## 2020-06-15 DIAGNOSIS — C25.0 MALIGNANT NEOPLASM OF HEAD OF PANCREAS: Primary | ICD-10-CM

## 2020-06-15 DIAGNOSIS — G89.3 NEOPLASM RELATED PAIN (ACUTE) (CHRONIC): ICD-10-CM

## 2020-06-15 DIAGNOSIS — C78.7 LIVER METASTASES: ICD-10-CM

## 2020-06-15 DIAGNOSIS — C78.00 MALIGNANT NEOPLASM METASTATIC TO LUNG, UNSPECIFIED LATERALITY: ICD-10-CM

## 2020-06-15 DIAGNOSIS — Z79.01 ANTICOAGULATED: ICD-10-CM

## 2020-06-15 DIAGNOSIS — Z00.6 CLINICAL TRIAL PARTICIPANT: ICD-10-CM

## 2020-06-15 DIAGNOSIS — R11.2 CHEMOTHERAPY INDUCED NAUSEA AND VOMITING: ICD-10-CM

## 2020-06-15 DIAGNOSIS — T45.1X5A CHEMOTHERAPY INDUCED NAUSEA AND VOMITING: ICD-10-CM

## 2020-06-15 DIAGNOSIS — I82.90 VTE (VENOUS THROMBOEMBOLISM): ICD-10-CM

## 2020-06-15 PROCEDURE — 99999 PR PBB SHADOW E&M-EST. PATIENT-LVL IV: CPT | Mod: PBBFAC,,, | Performed by: NURSE PRACTITIONER

## 2020-06-15 PROCEDURE — 99999 PR PBB SHADOW E&M-EST. PATIENT-LVL IV: ICD-10-PCS | Mod: PBBFAC,,, | Performed by: NURSE PRACTITIONER

## 2020-06-15 PROCEDURE — 99215 OFFICE O/P EST HI 40 MIN: CPT | Mod: S$PBB,,, | Performed by: NURSE PRACTITIONER

## 2020-06-15 PROCEDURE — 3008F PR BODY MASS INDEX (BMI) DOCUMENTED: ICD-10-PCS | Mod: CPTII,S$GLB,, | Performed by: NURSE PRACTITIONER

## 2020-06-15 PROCEDURE — 3008F BODY MASS INDEX DOCD: CPT | Mod: CPTII,S$GLB,, | Performed by: NURSE PRACTITIONER

## 2020-06-15 PROCEDURE — 99215 PR OFFICE/OUTPT VISIT, EST, LEVL V, 40-54 MIN: ICD-10-PCS | Mod: S$PBB,,, | Performed by: NURSE PRACTITIONER

## 2020-06-15 RX ORDER — ONDANSETRON 8 MG/1
8 TABLET, ORALLY DISINTEGRATING ORAL EVERY 6 HOURS PRN
Qty: 120 TABLET | Refills: 2 | Status: ON HOLD | OUTPATIENT
Start: 2020-06-15 | End: 2020-06-20 | Stop reason: HOSPADM

## 2020-06-15 RX ORDER — ENOXAPARIN SODIUM 100 MG/ML
100 INJECTION SUBCUTANEOUS DAILY
Qty: 30 ML | Refills: 6 | Status: ON HOLD | OUTPATIENT
Start: 2020-06-15 | End: 2020-06-20 | Stop reason: SDUPTHER

## 2020-06-15 NOTE — PROGRESS NOTES
6/15/2020     Protocol: A Phase 1, Multicenter, Open-label, Dose Escalation, Safety, Pharmacodynamic and Pharmacokinetic Study of .02 Given orally on a daily x 28 day schedule in patients with Advanced Solid Tumors or Lymphoma  Protocol # GEN-602-CT-101  IRB # 2018.428  PI: Eliot Barrett MD  Version Date: 5.0    Pt #      Cycle 2 Day 1:      Patient presented to the clinic today for the above mentioned treatment.  Patient alert and oriented x 3. Mood and affect are appropriate to the situation. Patient denies any fever, pain or shortness of breath.  Pt denies any mouth sores.  Reviewed and confirmed which concomitant medications with the patient she is currently taking. See concomitant therapy worksheet. Patient had physical exam, vitals, and visit with Karen Hightower NP. Lab work resulted and reviewed. Lab work out of range = not clinically significant. Senna seems to be working for patient constipation, but patient states she has no appetite and has lost 8 lbs since last week. Patient c/o still having back pain (AE#3-increased to Grade 3). Pt had nausea over the weekend and vomited twice. Pt missed two doses, PM 6/13 and 6/14 due to excessive nausea. Karen Hightower NP states to increase Methadone 5mg TID po to help with pain and to hold study drug 1 week. If symptoms decrease will restart with reduced dose 300mg po BID.   The patient verbalized understanding of this information and is in agreement of this treatment plan.   Pt agrees to do this. Pt to return back 6/22/2020 in clinic for visit with Karen Hightower NP.      Drug dispense :  None during this visit     Drug return: 2 tablets 75mg p.o., 2 tablets 300mg p.o. and 2 peptamen jim cartons     AE's  Weight loss Grade 1 (started 6/15/2020)  Vomiting Grade 1 (started 6/13/2020)  Increased ALT Grade 1 (started 5/25/2020-resolved as of)  Back pain Grade 3 (started 5/25/2020)  Constipation Grade 2 (started 6/7/2020-resolved as of today)     Baseline  log  Venous Thromboembolism Grade 2  Menopause Grade 1  CA related pain Grade 2  Nausea Grade 2  Insomnia Grade 1  Allergic reaction (skin rash) Grade 2  Increased Alkaline Phosphate Grade 1

## 2020-06-16 ENCOUNTER — PATIENT MESSAGE (OUTPATIENT)
Dept: HEMATOLOGY/ONCOLOGY | Facility: CLINIC | Age: 60
End: 2020-06-16

## 2020-06-16 ENCOUNTER — HOSPITAL ENCOUNTER (INPATIENT)
Facility: HOSPITAL | Age: 60
LOS: 4 days | Discharge: HOSPICE | DRG: 948 | End: 2020-06-20
Attending: INTERNAL MEDICINE | Admitting: INTERNAL MEDICINE
Payer: COMMERCIAL

## 2020-06-16 DIAGNOSIS — C25.0 MALIGNANT NEOPLASM OF HEAD OF PANCREAS: ICD-10-CM

## 2020-06-16 DIAGNOSIS — I82.90 VTE (VENOUS THROMBOEMBOLISM): ICD-10-CM

## 2020-06-16 DIAGNOSIS — G89.3 NEOPLASM RELATED PAIN (ACUTE) (CHRONIC): ICD-10-CM

## 2020-06-16 DIAGNOSIS — R11.2 INTRACTABLE NAUSEA AND VOMITING: Primary | ICD-10-CM

## 2020-06-16 DIAGNOSIS — C25.9 PANCREATIC CANCER: ICD-10-CM

## 2020-06-16 DIAGNOSIS — R10.9 INTRACTABLE ABDOMINAL PAIN: ICD-10-CM

## 2020-06-16 DIAGNOSIS — C25.0 MALIGNANT NEOPLASM OF HEAD OF PANCREAS: Primary | ICD-10-CM

## 2020-06-16 DIAGNOSIS — Z00.6 CLINICAL TRIAL PARTICIPANT: ICD-10-CM

## 2020-06-16 DIAGNOSIS — C78.00 MALIGNANT NEOPLASM METASTATIC TO LUNG, UNSPECIFIED LATERALITY: ICD-10-CM

## 2020-06-16 DIAGNOSIS — Z91.89 AT RISK FOR LONG QT SYNDROME: ICD-10-CM

## 2020-06-16 PROBLEM — M54.50 LOW BACK PAIN: Status: ACTIVE | Noted: 2020-06-16

## 2020-06-16 PROBLEM — K59.00 CONSTIPATION: Status: ACTIVE | Noted: 2020-06-16

## 2020-06-16 LAB
ALBUMIN SERPL BCP-MCNC: 3.3 G/DL (ref 3.5–5.2)
ALP SERPL-CCNC: 162 U/L (ref 55–135)
ALT SERPL W/O P-5'-P-CCNC: 34 U/L (ref 10–44)
ANION GAP SERPL CALC-SCNC: 7 MMOL/L (ref 8–16)
APTT BLDCRRT: 29 SEC (ref 21–32)
AST SERPL-CCNC: 29 U/L (ref 10–40)
BASOPHILS # BLD AUTO: 0.02 K/UL (ref 0–0.2)
BASOPHILS NFR BLD: 0.4 % (ref 0–1.9)
BILIRUB SERPL-MCNC: 0.7 MG/DL (ref 0.1–1)
BILIRUB UR QL STRIP: NEGATIVE
BUN SERPL-MCNC: 8 MG/DL (ref 6–20)
CALCIUM SERPL-MCNC: 9.1 MG/DL (ref 8.7–10.5)
CHLORIDE SERPL-SCNC: 105 MMOL/L (ref 95–110)
CLARITY UR REFRACT.AUTO: ABNORMAL
CO2 SERPL-SCNC: 26 MMOL/L (ref 23–29)
COLOR UR AUTO: ABNORMAL
CREAT SERPL-MCNC: 0.7 MG/DL (ref 0.5–1.4)
DIFFERENTIAL METHOD: ABNORMAL
EOSINOPHIL # BLD AUTO: 0.1 K/UL (ref 0–0.5)
EOSINOPHIL NFR BLD: 1.3 % (ref 0–8)
ERYTHROCYTE [DISTWIDTH] IN BLOOD BY AUTOMATED COUNT: 13.5 % (ref 11.5–14.5)
EST. GFR  (AFRICAN AMERICAN): >60 ML/MIN/1.73 M^2
EST. GFR  (NON AFRICAN AMERICAN): >60 ML/MIN/1.73 M^2
GLUCOSE SERPL-MCNC: 95 MG/DL (ref 70–110)
GLUCOSE UR QL STRIP: NEGATIVE
HCT VFR BLD AUTO: 38.7 % (ref 37–48.5)
HGB BLD-MCNC: 12.3 G/DL (ref 12–16)
HGB UR QL STRIP: NEGATIVE
HYALINE CASTS UR QL AUTO: 4 /LPF
IMM GRANULOCYTES # BLD AUTO: 0.03 K/UL (ref 0–0.04)
IMM GRANULOCYTES NFR BLD AUTO: 0.6 % (ref 0–0.5)
INR PPP: 1.2 (ref 0.8–1.2)
KETONES UR QL STRIP: ABNORMAL
LDH SERPL L TO P-CCNC: 178 U/L (ref 110–260)
LEUKOCYTE ESTERASE UR QL STRIP: NEGATIVE
LYMPHOCYTES # BLD AUTO: 0.9 K/UL (ref 1–4.8)
LYMPHOCYTES NFR BLD: 16.1 % (ref 18–48)
MAGNESIUM SERPL-MCNC: 1.5 MG/DL (ref 1.6–2.6)
MCH RBC QN AUTO: 28.3 PG (ref 27–31)
MCHC RBC AUTO-ENTMCNC: 31.8 G/DL (ref 32–36)
MCV RBC AUTO: 89 FL (ref 82–98)
MICROSCOPIC COMMENT: ABNORMAL
MONOCYTES # BLD AUTO: 0.6 K/UL (ref 0.3–1)
MONOCYTES NFR BLD: 11.8 % (ref 4–15)
NEUTROPHILS # BLD AUTO: 3.7 K/UL (ref 1.8–7.7)
NEUTROPHILS NFR BLD: 69.8 % (ref 38–73)
NITRITE UR QL STRIP: NEGATIVE
NRBC BLD-RTO: 0 /100 WBC
PH UR STRIP: 6 [PH] (ref 5–8)
PHOSPHATE SERPL-MCNC: 3.3 MG/DL (ref 2.7–4.5)
PLATELET # BLD AUTO: 171 K/UL (ref 150–350)
PMV BLD AUTO: 10.6 FL (ref 9.2–12.9)
POTASSIUM SERPL-SCNC: 3.7 MMOL/L (ref 3.5–5.1)
PROT SERPL-MCNC: 6.9 G/DL (ref 6–8.4)
PROT UR QL STRIP: NEGATIVE
PROTHROMBIN TIME: 12.2 SEC (ref 9–12.5)
RBC # BLD AUTO: 4.35 M/UL (ref 4–5.4)
SODIUM SERPL-SCNC: 138 MMOL/L (ref 136–145)
SP GR UR STRIP: 1.02 (ref 1–1.03)
SQUAMOUS #/AREA URNS AUTO: 6 /HPF
URN SPEC COLLECT METH UR: ABNORMAL
WBC # BLD AUTO: 5.34 K/UL (ref 3.9–12.7)
WBC #/AREA URNS AUTO: 2 /HPF (ref 0–5)

## 2020-06-16 PROCEDURE — 94761 N-INVAS EAR/PLS OXIMETRY MLT: CPT

## 2020-06-16 PROCEDURE — 80053 COMPREHEN METABOLIC PANEL: CPT

## 2020-06-16 PROCEDURE — 85025 COMPLETE CBC W/AUTO DIFF WBC: CPT

## 2020-06-16 PROCEDURE — 25000003 PHARM REV CODE 250: Performed by: STUDENT IN AN ORGANIZED HEALTH CARE EDUCATION/TRAINING PROGRAM

## 2020-06-16 PROCEDURE — 93010 ELECTROCARDIOGRAM REPORT: CPT | Mod: ,,, | Performed by: INTERNAL MEDICINE

## 2020-06-16 PROCEDURE — 20600001 HC STEP DOWN PRIVATE ROOM

## 2020-06-16 PROCEDURE — 93005 ELECTROCARDIOGRAM TRACING: CPT

## 2020-06-16 PROCEDURE — 81001 URINALYSIS AUTO W/SCOPE: CPT

## 2020-06-16 PROCEDURE — 83615 LACTATE (LD) (LDH) ENZYME: CPT

## 2020-06-16 PROCEDURE — 99221 1ST HOSP IP/OBS SF/LOW 40: CPT | Mod: ,,, | Performed by: INTERNAL MEDICINE

## 2020-06-16 PROCEDURE — 94770 HC EXHALED C02 TEST: CPT

## 2020-06-16 PROCEDURE — 84100 ASSAY OF PHOSPHORUS: CPT

## 2020-06-16 PROCEDURE — 99223 1ST HOSP IP/OBS HIGH 75: CPT | Mod: ,,, | Performed by: INTERNAL MEDICINE

## 2020-06-16 PROCEDURE — 99221 PR INITIAL HOSPITAL CARE,LEVL I: ICD-10-PCS | Mod: ,,, | Performed by: INTERNAL MEDICINE

## 2020-06-16 PROCEDURE — 93010 EKG 12-LEAD: ICD-10-PCS | Mod: ,,, | Performed by: INTERNAL MEDICINE

## 2020-06-16 PROCEDURE — 63600175 PHARM REV CODE 636 W HCPCS: Performed by: STUDENT IN AN ORGANIZED HEALTH CARE EDUCATION/TRAINING PROGRAM

## 2020-06-16 PROCEDURE — 85610 PROTHROMBIN TIME: CPT

## 2020-06-16 PROCEDURE — 99223 PR INITIAL HOSPITAL CARE,LEVL III: ICD-10-PCS | Mod: ,,, | Performed by: INTERNAL MEDICINE

## 2020-06-16 PROCEDURE — 99900035 HC TECH TIME PER 15 MIN (STAT)

## 2020-06-16 PROCEDURE — 85730 THROMBOPLASTIN TIME PARTIAL: CPT

## 2020-06-16 PROCEDURE — 83735 ASSAY OF MAGNESIUM: CPT

## 2020-06-16 RX ORDER — ONDANSETRON 4 MG/1
8 TABLET, FILM COATED ORAL EVERY 6 HOURS PRN
Status: DISCONTINUED | OUTPATIENT
Start: 2020-06-16 | End: 2020-06-16

## 2020-06-16 RX ORDER — HYDROMORPHONE HCL IN 0.9% NACL 6 MG/30 ML
PATIENT CONTROLLED ANALGESIA SYRINGE INTRAVENOUS CONTINUOUS
Status: DISCONTINUED | OUTPATIENT
Start: 2020-06-16 | End: 2020-06-18

## 2020-06-16 RX ORDER — MAGNESIUM SULFATE HEPTAHYDRATE 40 MG/ML
2 INJECTION, SOLUTION INTRAVENOUS ONCE
Status: COMPLETED | OUTPATIENT
Start: 2020-06-16 | End: 2020-06-16

## 2020-06-16 RX ORDER — ONDANSETRON 2 MG/ML
8 INJECTION INTRAMUSCULAR; INTRAVENOUS ONCE
Status: COMPLETED | OUTPATIENT
Start: 2020-06-16 | End: 2020-06-16

## 2020-06-16 RX ORDER — POLYETHYLENE GLYCOL 3350 17 G/17G
17 POWDER, FOR SOLUTION ORAL DAILY
Status: DISCONTINUED | OUTPATIENT
Start: 2020-06-17 | End: 2020-06-17

## 2020-06-16 RX ORDER — GLUCAGON 1 MG
1 KIT INJECTION
Status: DISCONTINUED | OUTPATIENT
Start: 2020-06-16 | End: 2020-06-20 | Stop reason: HOSPADM

## 2020-06-16 RX ORDER — ONDANSETRON 2 MG/ML
4 INJECTION INTRAMUSCULAR; INTRAVENOUS EVERY 6 HOURS
Status: DISCONTINUED | OUTPATIENT
Start: 2020-06-16 | End: 2020-06-17

## 2020-06-16 RX ORDER — INSULIN ASPART 100 [IU]/ML
0-5 INJECTION, SOLUTION INTRAVENOUS; SUBCUTANEOUS
Status: DISCONTINUED | OUTPATIENT
Start: 2020-06-16 | End: 2020-06-20 | Stop reason: HOSPADM

## 2020-06-16 RX ORDER — ENOXAPARIN SODIUM 100 MG/ML
90 INJECTION SUBCUTANEOUS DAILY
Status: DISCONTINUED | OUTPATIENT
Start: 2020-06-17 | End: 2020-06-20 | Stop reason: HOSPADM

## 2020-06-16 RX ORDER — IBUPROFEN 200 MG
24 TABLET ORAL
Status: DISCONTINUED | OUTPATIENT
Start: 2020-06-16 | End: 2020-06-20 | Stop reason: HOSPADM

## 2020-06-16 RX ORDER — PROCHLORPERAZINE EDISYLATE 5 MG/ML
10 INJECTION INTRAMUSCULAR; INTRAVENOUS ONCE
Status: COMPLETED | OUTPATIENT
Start: 2020-06-16 | End: 2020-06-16

## 2020-06-16 RX ORDER — NALOXONE HCL 0.4 MG/ML
0.02 VIAL (ML) INJECTION
Status: DISCONTINUED | OUTPATIENT
Start: 2020-06-16 | End: 2020-06-20 | Stop reason: HOSPADM

## 2020-06-16 RX ORDER — HYDROMORPHONE HYDROCHLORIDE 1 MG/ML
1 INJECTION, SOLUTION INTRAMUSCULAR; INTRAVENOUS; SUBCUTANEOUS ONCE
Status: COMPLETED | OUTPATIENT
Start: 2020-06-16 | End: 2020-06-16

## 2020-06-16 RX ORDER — PROCHLORPERAZINE EDISYLATE 5 MG/ML
10 INJECTION INTRAMUSCULAR; INTRAVENOUS EVERY 6 HOURS PRN
Status: DISCONTINUED | OUTPATIENT
Start: 2020-06-16 | End: 2020-06-17

## 2020-06-16 RX ORDER — SENNOSIDES 8.6 MG/1
8.6 TABLET ORAL DAILY
Status: DISCONTINUED | OUTPATIENT
Start: 2020-06-17 | End: 2020-06-17

## 2020-06-16 RX ORDER — OXYCODONE HYDROCHLORIDE 5 MG/1
5 TABLET ORAL EVERY 6 HOURS PRN
Status: DISCONTINUED | OUTPATIENT
Start: 2020-06-16 | End: 2020-06-16

## 2020-06-16 RX ORDER — SODIUM CHLORIDE 0.9 % (FLUSH) 0.9 %
10 SYRINGE (ML) INJECTION
Status: DISCONTINUED | OUTPATIENT
Start: 2020-06-16 | End: 2020-06-20 | Stop reason: HOSPADM

## 2020-06-16 RX ORDER — HYDROMORPHONE HYDROCHLORIDE 1 MG/ML
1 INJECTION, SOLUTION INTRAMUSCULAR; INTRAVENOUS; SUBCUTANEOUS EVERY 6 HOURS PRN
Status: DISCONTINUED | OUTPATIENT
Start: 2020-06-16 | End: 2020-06-16

## 2020-06-16 RX ORDER — IBUPROFEN 200 MG
16 TABLET ORAL
Status: DISCONTINUED | OUTPATIENT
Start: 2020-06-16 | End: 2020-06-20 | Stop reason: HOSPADM

## 2020-06-16 RX ORDER — ACETAMINOPHEN 500 MG
1000 TABLET ORAL EVERY 8 HOURS PRN
Status: DISCONTINUED | OUTPATIENT
Start: 2020-06-16 | End: 2020-06-20 | Stop reason: HOSPADM

## 2020-06-16 RX ADMIN — PROCHLORPERAZINE EDISYLATE 10 MG: 5 INJECTION INTRAMUSCULAR; INTRAVENOUS at 02:06

## 2020-06-16 RX ADMIN — OXYCODONE HYDROCHLORIDE 5 MG: 5 TABLET ORAL at 03:06

## 2020-06-16 RX ADMIN — HYDROMORPHONE HYDROCHLORIDE 1 MG: 1 INJECTION, SOLUTION INTRAMUSCULAR; INTRAVENOUS; SUBCUTANEOUS at 01:06

## 2020-06-16 RX ADMIN — ONDANSETRON 4 MG: 2 INJECTION INTRAMUSCULAR; INTRAVENOUS at 06:06

## 2020-06-16 RX ADMIN — MAGNESIUM SULFATE 2 G: 2 INJECTION INTRAVENOUS at 03:06

## 2020-06-16 RX ADMIN — Medication: at 05:06

## 2020-06-16 RX ADMIN — ONDANSETRON 8 MG: 2 INJECTION INTRAMUSCULAR; INTRAVENOUS at 01:06

## 2020-06-16 NOTE — ASSESSMENT & PLAN NOTE
Patient complains of constipation in the past couple of days.    -- give she will get dilauded PCA pump, she would need aggressive bowel regimen.

## 2020-06-16 NOTE — CONSULTS
[facility]  Palliative Medicine  Consult Note    Patient Name: Quyen Moody  MRN: [id number]  Admission Date: 6/16/2020  Hospital Length of Stay: 0 days  Code Status: Full Code   Attending Provider: Pierce Stein MD  Consulting Provider: MAUREEN Zamorano  Primary Care Physician: Eliot Barrett MD  Principal Problem:<principal problem not specified>    Patient information was obtained from patient and past medical records.      Consults  Assessment/Plan:     Low back pain  patient seen by paliative in clinic 6/8/2020   started on methadone 5 BID and increased to TID for better pain control on top of dilaudid oral 8 q 3 hours .  patient reported no pain relief with current regimen, and she has been having nausea and vomintg, she vomited her pain pills yesterday and unable to tolerate oral intake, admitted for pain control.    Recommendations:  - start PCA diludeded pump to achinve pain control then tranastion to oral once patient is able to tolerate   - aggressive bowel regimen and consider enema, patient sever back pain can be due to constipation vs mets   - follow up CT abdomin/Pelvic   - anti-emitic for nausea/vominting          Thank you for your consult. I will follow-up with patient. Please contact us if you have any additional questions.    Subjective:     HPI:   59 years old female patient with medical history of stage 4 pancreatic cancer wtih lung mets on Genzada trial trial, came as a direct admit due to increase low back burning bone pain for 2 weeks, associated with nausea, vomiting and decrease appetite, pateitn was seen by palliative care clinic on 06/08/2020 started on methadone 5 mg bid which was increased to TID and conitnued her hydromorphone 8 mg po q 3 hours for breakthrough pain. Denies fever, chills, abdominal pain and diarrhea, has chronic constipation on miralax and recently started on senna. paliative care consulted for pain management.     Hospital Course:  No notes on  file        Past Medical History:   Diagnosis Date    Allergic rhinitis 3/3/2014    CKD (chronic kidney disease) stage 3, GFR 30-59 ml/min 12/26/2015    Hyperlipidemia 3/3/2014    Lung metastasis     Pancreas cancer     Skin rash 8/10/2019       Past Surgical History:   Procedure Laterality Date    ERCP N/A 7/16/2019    Procedure: ERCP (ENDOSCOPIC RETROGRADE CHOLANGIOPANCREATOGRAPHY);  Surgeon: Chema Harris MD;  Location: [facility] ENDO (2ND FLR);  Service: Endoscopy;  Laterality: N/A;    ERCP N/A 12/24/2019    Procedure: ERCP (ENDOSCOPIC RETROGRADE CHOLANGIOPANCREATOGRAPHY);  Surgeon: Chema Harris MD;  Location: [facility] ENDO (2ND FLR);  Service: Endoscopy;  Laterality: N/A;    Exporatory lap      INSERTION OF TUNNELED CENTRAL VENOUS CATHETER (CVC) WITH SUBCUTANEOUS PORT Right 8/12/2019    Procedure: [id number];  Surgeon: Cesar Hallman MD;  [facility];  Service: General;  Laterality: Right;  right IJ       Review of patient's allergies indicates:   Allergen Reactions    Sulfa (sulfonamide antibiotics) Swelling and Hives     tongue         Medications:  Continuous Infusions:  Scheduled Meds:   [START ON 6/17/2020] enoxaparin  90 mg Subcutaneous Daily    magnesium sulfate IVPB  2 g Intravenous Once    ondansetron  4 mg Intravenous Q6H     PRN Meds:acetaminophen, Dextrose 10% Bolus, Dextrose 10% Bolus, glucagon (human recombinant), glucose, glucose, HYDROmorphone, insulin aspart U-100, oxyCODONE, prochlorperazine, sodium chloride 0.9%    Family History     Problem Relation (Age of Onset)    Diabetes Mother, Father, Brother        Tobacco Use    Smoking status: Former Smoker     Start date: 12/11/1999    Smokeless tobacco: Never Used   Substance and Sexual Activity    Alcohol use: Not Currently     Alcohol/week: 1.0 standard drinks     Types: 1 Glasses of wine per week     Frequency: 4 or more times a week     Drinks per session: 1 or 2     Binge frequency: Never     Comment:  last drink a month ago     Drug use: No    Sexual activity: Yes       Review of Systems   Constitutional: Positive for activity change and appetite change. Negative for fever.   HENT: Positive for sore throat. Negative for congestion.    Eyes: Negative for photophobia.   Respiratory: Negative for cough and shortness of breath.    Cardiovascular: Negative for leg swelling.   Gastrointestinal: Positive for nausea and vomiting.   Genitourinary: Negative for difficulty urinating and dyspareunia.   Musculoskeletal: Positive for back pain.   Skin: Negative for pallor and rash.   Psychiatric/Behavioral: Negative for agitation and behavioral problems.     Objective:     Vital Signs (Most Recent):  Temp: 98.2 °F (36.8 °C) (06/16/20 1313)  Pulse: 96 (06/16/20 1313)  Resp: (!) 22 (06/16/20 1313)  BP: (!) 140/71 (06/16/20 1313)  SpO2: 95 % (06/16/20 1313) Vital Signs (24h Range):  Temp:  [98.2 °F (36.8 °C)] 98.2 °F (36.8 °C)  Pulse:  [96] 96  Resp:  [22] 22  SpO2:  [95 %] 95 %  BP: (140)/(71) 140/71        Body mass index is 23.79 kg/m².  Physical Exam  Vitals signs and nursing note reviewed.   Constitutional:       Appearance: Normal appearance. She is not ill-appearing or toxic-appearing.   HENT:      Head: Normocephalic and atraumatic.      Nose: Nose normal.   Eyes:      General: No scleral icterus.  Neck:      Musculoskeletal: Neck supple. No muscular tenderness.   Cardiovascular:      Rate and Rhythm: Normal rate.   Pulmonary:      Effort: Pulmonary effort is normal.   Abdominal:      General: There is no distension.      Tenderness: There is no abdominal tenderness.   Musculoskeletal:         General: No swelling or tenderness.   Skin:     Coloration: Skin is not pale.      Findings: No erythema.   Neurological:      Mental Status: She is alert and oriented to person, place, and time.   Psychiatric:         Mood and Affect: Mood normal.         Behavior: Behavior normal.         Significant Labs:  CBC:   Recent Labs    Lab 06/16/20  1427   WBC 5.34   HGB 12.3   HCT 38.7   MCV 89        BMP:  Recent Labs   Lab 06/16/20  1427   GLU 95      K 3.7      CO2 26   BUN 8   CREATININE 0.7   CALCIUM 9.1   MG 1.5*     LFT:  Lab Results   Component Value Date    AST 29 06/16/2020     (H) 05/15/2020    ALKPHOS 162 (H) 06/16/2020    BILITOT 0.7 06/16/2020     Albumin:   Albumin   Date Value Ref Range Status   06/16/2020 3.3 (L) 3.5 - 5.2 g/dL Final     Protein:   Total Protein   Date Value Ref Range Status   06/16/2020 6.9 6.0 - 8.4 g/dL Final     Lactic acid:   Lab Results   Component Value Date    LACTATE 1.9 03/19/2020    LACTATE 1.1 03/18/2020       Significant Imaging: None      > 50% of 45 min visit spent in chart review, face to face discussion of goals of care,  symptom assessment, coordination of care and emotional support.    MAUREEN Zamorano  Palliative Medicine  [facility]

## 2020-06-16 NOTE — SUBJECTIVE & OBJECTIVE
Past Medical History:   Diagnosis Date    Allergic rhinitis 3/3/2014    CKD (chronic kidney disease) stage 3, GFR 30-59 ml/min 12/26/2015    Hyperlipidemia 3/3/2014    Lung metastasis     Pancreas cancer     Skin rash 8/10/2019       Past Surgical History:   Procedure Laterality Date    ERCP N/A 7/16/2019    Procedure: ERCP (ENDOSCOPIC RETROGRADE CHOLANGIOPANCREATOGRAPHY);  Surgeon: Chema Harris MD;  Location: [facility] ENDO (2ND FLR);  Service: Endoscopy;  Laterality: N/A;    ERCP N/A 12/24/2019    Procedure: ERCP (ENDOSCOPIC RETROGRADE CHOLANGIOPANCREATOGRAPHY);  Surgeon: Chema Harris MD;  Location: [facility] ENDO (2ND FLR);  Service: Endoscopy;  Laterality: N/A;    Exporatory lap      INSERTION OF TUNNELED CENTRAL VENOUS CATHETER (CVC) WITH SUBCUTANEOUS PORT Right 8/12/2019    Procedure: [id number];  Surgeon: Cesar Hallman MD;  [facility];  Service: General;  Laterality: Right;  right IJ       Review of patient's allergies indicates:   Allergen Reactions    Sulfa (sulfonamide antibiotics) Swelling and Hives     tongue         Medications:  Continuous Infusions:  Scheduled Meds:   [START ON 6/17/2020] enoxaparin  90 mg Subcutaneous Daily    magnesium sulfate IVPB  2 g Intravenous Once    ondansetron  4 mg Intravenous Q6H     PRN Meds:acetaminophen, Dextrose 10% Bolus, Dextrose 10% Bolus, glucagon (human recombinant), glucose, glucose, HYDROmorphone, insulin aspart U-100, oxyCODONE, prochlorperazine, sodium chloride 0.9%    Family History     Problem Relation (Age of Onset)    Diabetes Mother, Father, Brother        Tobacco Use    Smoking status: Former Smoker     Start date: 12/11/1999    Smokeless tobacco: Never Used   Substance and Sexual Activity    Alcohol use: Not Currently     Alcohol/week: 1.0 standard drinks     Types: 1 Glasses of wine per week     Frequency: 4 or more times a week     Drinks per session: 1 or 2     Binge frequency: Never     Comment: last  drink a month ago     Drug use: No    Sexual activity: Yes       Review of Systems   Constitutional: Positive for activity change and appetite change. Negative for fever.   HENT: Positive for sore throat. Negative for congestion.    Eyes: Negative for photophobia.   Respiratory: Negative for cough and shortness of breath.    Cardiovascular: Negative for leg swelling.   Gastrointestinal: Positive for nausea and vomiting.   Genitourinary: Negative for difficulty urinating and dyspareunia.   Musculoskeletal: Positive for back pain.   Skin: Negative for pallor and rash.   Psychiatric/Behavioral: Negative for agitation and behavioral problems.     Objective:     Vital Signs (Most Recent):  Temp: 98.2 °F (36.8 °C) (06/16/20 1313)  Pulse: 96 (06/16/20 1313)  Resp: (!) 22 (06/16/20 1313)  BP: (!) 140/71 (06/16/20 1313)  SpO2: 95 % (06/16/20 1313) Vital Signs (24h Range):  Temp:  [98.2 °F (36.8 °C)] 98.2 °F (36.8 °C)  Pulse:  [96] 96  Resp:  [22] 22  SpO2:  [95 %] 95 %  BP: (140)/(71) 140/71        Body mass index is 23.79 kg/m².  Physical Exam  Vitals signs and nursing note reviewed.   Constitutional:       Appearance: Normal appearance. She is not ill-appearing or toxic-appearing.   HENT:      Head: Normocephalic and atraumatic.      Nose: Nose normal.   Eyes:      General: No scleral icterus.  Neck:      Musculoskeletal: Neck supple. No muscular tenderness.   Cardiovascular:      Rate and Rhythm: Normal rate.   Pulmonary:      Effort: Pulmonary effort is normal.   Abdominal:      General: There is no distension.      Tenderness: There is no abdominal tenderness.   Musculoskeletal:         General: No swelling or tenderness.   Skin:     Coloration: Skin is not pale.      Findings: No erythema.   Neurological:      Mental Status: She is alert and oriented to person, place, and time.   Psychiatric:         Mood and Affect: Mood normal.         Behavior: Behavior normal.         Significant Labs:  CBC:   Recent Labs   Lab  06/16/20  1427   WBC 5.34   HGB 12.3   HCT 38.7   MCV 89        BMP:  Recent Labs   Lab 06/16/20  1427   GLU 95      K 3.7      CO2 26   BUN 8   CREATININE 0.7   CALCIUM 9.1   MG 1.5*     LFT:  Lab Results   Component Value Date    AST 29 06/16/2020     (H) 05/15/2020    ALKPHOS 162 (H) 06/16/2020    BILITOT 0.7 06/16/2020     Albumin:   Albumin   Date Value Ref Range Status   06/16/2020 3.3 (L) 3.5 - 5.2 g/dL Final     Protein:   Total Protein   Date Value Ref Range Status   06/16/2020 6.9 6.0 - 8.4 g/dL Final     Lactic acid:   Lab Results   Component Value Date    LACTATE 1.9 03/19/2020    LACTATE 1.1 03/18/2020       Significant Imaging: None

## 2020-06-16 NOTE — CONSULTS
Consult received, chart reviewed. Pt. Known to [facility], saw Dr. Sheehan in the past. Started on Methadone recently.   Full consult to follow.     Thanks for the consult.     Shaka Canada MD  Palliative Medicine   [facility]  [phone] (cell)

## 2020-06-16 NOTE — HPI
59 years old female patient with medical history of stage 4 pancreatic cancer wtih lung mets on Genzada trial trial, came as a direct admit due to increase low back burning bone pain for 2 weeks, associated with nausea, vomiting and decrease appetite, andrei was seen by palliative care clinic on 06/08/2020 started on methadone 5 mg bid which was increased to TID and conitnued her hydromorphone 8 mg po q 3 hours for breakthrough pain. Denies fever, chills, abdominal pain and diarrhea, has chronic constipation on miralax and recently started on senna. paliative care consulted for pain management.

## 2020-06-16 NOTE — ASSESSMENT & PLAN NOTE
Patient came with intractable n/v, abdominal and back pain. Patient was unable to get her pain meds d/t n/v.    -- Scheduled zofran 4 mg IV q6h.  -- compazine 10 q6h PRN  -- palliative care consult

## 2020-06-16 NOTE — HPI
Ms. Moody is 59 years old female, patient of Dr. Barrett, with past medical history of stage IV pancreatic cancer in Genzada trial and  left UE DVT on lovenox. Presented as direct admit after she called oncology clinic for severe abdominal pain and intractable nausea and vomiting. Patient states that for the past couple of days she was unable to keep anything in her stomach including pain meds. She has been complaining of mild abdominal in the past weeks but this morning it was 10/10 and associated with back pain. Her pain regimen was changed on 6/9 to methadone 5 BID and dilauded 8 mg PO q3h prn for breakthrough pain. Patient endorses 6-8 lbs weight loss in the past week, constipation, urinary incontinence, and chills. She denies fever, night sweats, cough, sob, chest pain, and urinary syptoms.    Oncologic History:  She has had intermittent upper abdominal pain since early June.  She presented to her PCP who originally ordered an US and CT.  US was negative and CT showed some haziness at the pancreatic head.  She saw GI who performed EUS.  On EUS, a 3x4cm pancreatic head mass was seen with possible invasion into the SMA and portal vein.  FNA path s/w pancreatic adenocarcinoma.  CA 19-9 level at outside hospital was >1000 per the patient.  She endorses nausea and inability to tolerate much PO.  She has lost about 10lbs over the last few weeks and is noticing her skin turning yellow.  Denies pruritis.  She is passing gas but has not had a BM in a few days, she attributes this to narcotic use.  She recently started taking stool softeners.  No previous cardiac or stroke history.  Surgical history significant for open appendectomy when she was in her 20s  - 8/7/2019 - 1/9/2020: [place]+Abraxane and TTF on PANOVA-3, LFTs elevated, but improved.  - Based on CT 12/2019 scans, Dr. Hallman felt pt was potentially resectable after chemoXRT given good response to current therapy.  However, subsequent Chest CT   showed new and  growing micronodules in the in lungs c/w met disease in light of rising tumor markers.    - 1/23/2020: Changed to FOLFIRINOX and con't TTF on PANOVA as this seems to be achieving good local control of the primary tumor.

## 2020-06-16 NOTE — TELEPHONE ENCOUNTER
Patient called. Patient requesting admission, states pain 10/10 despite medications. +Persistent nausea and vomiting. Will direct admit. I also discussed rising Ca19-9, which was not back yesterday. Based on this and conjunction with her degree of pain, I do believe she is progressing. Will remove from Genzada protocol and get repeat imaging.

## 2020-06-16 NOTE — SUBJECTIVE & OBJECTIVE
Oncology Treatment Plan:   [No treatment plan]    Medications:  Continuous Infusions:  Scheduled Meds:   [START ON 6/17/2020] enoxaparin  100 mg Subcutaneous Daily     PRN Meds:Dextrose 10% Bolus, Dextrose 10% Bolus, glucagon (human recombinant), glucose, glucose, insulin aspart U-100, sodium chloride 0.9%     Review of patient's allergies indicates:   Allergen Reactions    Sulfa (sulfonamide antibiotics) Swelling and Hives     tongue          Past Medical History:   Diagnosis Date    Allergic rhinitis 3/3/2014    CKD (chronic kidney disease) stage 3, GFR 30-59 ml/min 12/26/2015    Hyperlipidemia 3/3/2014    Lung metastasis     Pancreas cancer     Skin rash 8/10/2019     Past Surgical History:   Procedure Laterality Date    ERCP N/A 7/16/2019    Procedure: ERCP (ENDOSCOPIC RETROGRADE CHOLANGIOPANCREATOGRAPHY);  Surgeon: Chema Harris MD;  Location: [facility] ([address] [place] [address]);  Service: Endoscopy;  Laterality: N/A;    ERCP N/A 12/24/2019    Procedure: ERCP (ENDOSCOPIC RETROGRADE CHOLANGIOPANCREATOGRAPHY);  Surgeon: Chema Harris MD;  Location: [facility] ([address] [place] [address]);  Service: Endoscopy;  Laterality: N/A;    Exporatory lap      INSERTION OF TUNNELED CENTRAL VENOUS CATHETER (CVC) WITH SUBCUTANEOUS PORT Right 8/12/2019    Procedure: [id number];  Surgeon: Cesar Hallman MD;  Location: [facility] OR [address] [place] [address];  Service: General;  Laterality: Right;  right IJ     Family History     Problem Relation (Age of Onset)    Diabetes Mother, Father, Brother        Tobacco Use    Smoking status: Former Smoker     Start date: 12/11/1999    Smokeless tobacco: Never Used   Substance and Sexual Activity    Alcohol use: Not Currently     Alcohol/week: 1.0 standard drinks     Types: 1 Glasses of wine per week     Frequency: 4 or more times a week     Drinks per session: 1 or 2     Binge frequency: Never     Comment: last drink a month ago     Drug use: No    Sexual activity: Yes       Review of Systems   Constitutional:  Positive for activity change, appetite change, chills and unexpected weight change. Negative for fatigue and fever.   HENT: Negative for congestion and sore throat.    Eyes: Negative for visual disturbance.   Respiratory: Negative for cough and shortness of breath.    Cardiovascular: Negative for chest pain and leg swelling.   Gastrointestinal: Positive for abdominal pain, constipation, nausea and vomiting. Negative for blood in stool and diarrhea.   Genitourinary: Negative for difficulty urinating, dysuria, flank pain and hematuria.   Musculoskeletal: Positive for back pain. Negative for myalgias.   Neurological: Negative for dizziness, tremors, numbness and headaches.   Psychiatric/Behavioral: Negative for confusion.     Objective:     Vital Signs (Most Recent):  Temp: 98.2 °F (36.8 °C) (06/16/20 1313)  Pulse: 96 (06/16/20 1313)  Resp: (!) 22 (06/16/20 1313)  BP: (!) 140/71 (06/16/20 1313)  SpO2: 95 % (06/16/20 1313) Vital Signs (24h Range):  Temp:  [98.2 °F (36.8 °C)] 98.2 °F (36.8 °C)  Pulse:  [96] 96  Resp:  [22] 22  SpO2:  [95 %] 95 %  BP: (140)/(71) 140/71        Body mass index is 23.79 kg/m².  Body surface area is 1.61 meters squared.    No intake or output data in the 24 hours ending 06/16/20 1510    Physical Exam  Vitals signs and nursing note reviewed.   Constitutional:       General: She is in acute distress (in pain).   HENT:      Head: Normocephalic and atraumatic.   Eyes:      Extraocular Movements: Extraocular movements intact.      Pupils: Pupils are equal, round, and reactive to light.   Cardiovascular:      Rate and Rhythm: Normal rate and regular rhythm.      Pulses: Normal pulses.      Heart sounds: Normal heart sounds.   Pulmonary:      Effort: Pulmonary effort is normal. No respiratory distress.      Breath sounds: Normal breath sounds. No wheezing or rales.   Abdominal:      General: Abdomen is flat. Bowel sounds are normal. There is no distension.      Palpations: Abdomen is soft.       Tenderness: There is no abdominal tenderness.   Neurological:      Mental Status: She is alert and oriented to person, place, and time.         Significant Labs:   All pertinent labs from the last 24 hours have been reviewed.    Diagnostic Results:  I have reviewed all pertinent imaging results/findings within the past 24 hours.

## 2020-06-16 NOTE — ASSESSMENT & PLAN NOTE
Patient have stage IV unresectable pancreatic cancer with lung mets. Enrolled in Genzada trial.  Patient of Dr. Barrett.  Ca-19 on 6/15 [id number] which significantly increased compared to previous one 2880 on 5/15

## 2020-06-16 NOTE — ASSESSMENT & PLAN NOTE
Patient presented with severe abdominal pain and intractable n/v. She was unable to tolerate oral intake and couldn't get her oral pain meds  Seen on 6/9 by paliative, started on methadone 5 BID and increased to TID for better pain control on top of dilaudid oral 8 q 3 hours for breakthrough pain. Patient reports no relief with current pain.  CBC, CMP, PT, PTT, LD are unremarkable.    -- Pal care consult, appreciate assistance  -- dilauded pca pump.  -- aggressive bowel regimen.  -- Antiemetics  -- follow CT CAP.

## 2020-06-16 NOTE — H&P
[facility]  Hematology/Oncology  H&P    Patient Name: Quyen Moody  MRN: [id number]  Admission Date: 6/16/2020  Code Status: Full Code   Attending Provider: Pierce Stein MD  Primary Care Physician: Eliot Barrett MD  Principal Problem:Low back pain    Subjective:     HPI: Ms. Moody is 59 years old female, patient of Dr. Barrett, with past medical history of stage IV pancreatic cancer in Genzada trial and  left UE DVT on lovenox. Presented as direct admit after she called oncology clinic for severe abdominal pain and intractable nausea and vomiting. Patient states that for the past couple of days she was unable to keep anything in her stomach including pain meds. She has been complaining of mild abdominal in the past weeks but this morning it was 10/10 and associated with back pain. Her pain regimen was changed on 6/9 to methadone 5 BID and dilauded 8 mg PO q3h prn for breakthrough pain. Patient endorses 6-8 lbs weight loss in the past week, constipation, urinary incontinence, and chills. She denies fever, night sweats, cough, sob, chest pain, and urinary syptoms.    Oncologic History:  She has had intermittent upper abdominal pain since early June.  She presented to her PCP who originally ordered an US and CT.  US was negative and CT showed some haziness at the pancreatic head.  She saw GI who performed EUS.  On EUS, a 3x4cm pancreatic head mass was seen with possible invasion into the SMA and portal vein.  FNA path s/w pancreatic adenocarcinoma.  CA 19-9 level at outside hospital was >1000 per the patient.  She endorses nausea and inability to tolerate much PO.  She has lost about 10lbs over the last few weeks and is noticing her skin turning yellow.  Denies pruritis.  She is passing gas but has not had a BM in a few days, she attributes this to narcotic use.  She recently started taking stool softeners.  No previous cardiac or stroke history.  Surgical history significant for open appendectomy  when she was in her 20s  - 8/7/2019 - 1/9/2020: [place]+Abraxane and TTF on PANOVA-3, LFTs elevated, but improved.  - Based on CT 12/2019 scans, Dr. Hallman felt pt was potentially resectable after chemoXRT given good response to current therapy.  However, subsequent Chest CT   showed new and growing micronodules in the in lungs c/w met disease in light of rising tumor markers.    - 1/23/2020: Changed to FOLFIRINOX and con't TTF on PANOVA as this seems to be achieving good local control of the primary tumor.      Oncology Treatment Plan:   [No treatment plan]    Medications:  Continuous Infusions:  Scheduled Meds:   [START ON 6/17/2020] enoxaparin  100 mg Subcutaneous Daily     PRN Meds:Dextrose 10% Bolus, Dextrose 10% Bolus, glucagon (human recombinant), glucose, glucose, insulin aspart U-100, sodium chloride 0.9%     Review of patient's allergies indicates:   Allergen Reactions    Sulfa (sulfonamide antibiotics) Swelling and Hives     tongue          Past Medical History:   Diagnosis Date    Allergic rhinitis 3/3/2014    CKD (chronic kidney disease) stage 3, GFR 30-59 ml/min 12/26/2015    Hyperlipidemia 3/3/2014    Lung metastasis     Pancreas cancer     Skin rash 8/10/2019     Past Surgical History:   Procedure Laterality Date    ERCP N/A 7/16/2019    Procedure: ERCP (ENDOSCOPIC RETROGRADE CHOLANGIOPANCREATOGRAPHY);  Surgeon: Chema Harris MD;  Location: [address]);  Service: Endoscopy;  Laterality: N/A;    ERCP N/A 12/24/2019    Procedure: ERCP (ENDOSCOPIC RETROGRADE CHOLANGIOPANCREATOGRAPHY);  Surgeon: Chema Harris MD;  Location: [address]);  Service: Endoscopy;  Laterality: N/A;    Exporatory lap      INSERTION OF TUNNELED CENTRAL VENOUS CATHETER (CVC) WITH SUBCUTANEOUS PORT Right 8/12/2019    Procedure: [id number];  Surgeon: Cesar Hallman MD;  Location: [address];  Service: General;  Laterality: Right;  right IJ     Family History     Problem Relation (Age of  Onset)    Diabetes Mother, Father, Brother        Tobacco Use    Smoking status: Former Smoker     Start date: 12/11/1999    Smokeless tobacco: Never Used   Substance and Sexual Activity    Alcohol use: Not Currently     Alcohol/week: 1.0 standard drinks     Types: 1 Glasses of wine per week     Frequency: 4 or more times a week     Drinks per session: 1 or 2     Binge frequency: Never     Comment: last drink a month ago     Drug use: No    Sexual activity: Yes       Review of Systems   Constitutional: Positive for activity change, appetite change, chills and unexpected weight change. Negative for fatigue and fever.   HENT: Negative for congestion and sore throat.    Eyes: Negative for visual disturbance.   Respiratory: Negative for cough and shortness of breath.    Cardiovascular: Negative for chest pain and leg swelling.   Gastrointestinal: Positive for abdominal pain, constipation, nausea and vomiting. Negative for blood in stool and diarrhea.   Genitourinary: Negative for difficulty urinating, dysuria, flank pain and hematuria.   Musculoskeletal: Positive for back pain. Negative for myalgias.   Neurological: Negative for dizziness, tremors, numbness and headaches.   Psychiatric/Behavioral: Negative for confusion.     Objective:     Vital Signs (Most Recent):  Temp: 98.2 °F (36.8 °C) (06/16/20 1313)  Pulse: 96 (06/16/20 1313)  Resp: (!) 22 (06/16/20 1313)  BP: (!) 140/71 (06/16/20 1313)  SpO2: 95 % (06/16/20 1313) Vital Signs (24h Range):  Temp:  [98.2 °F (36.8 °C)] 98.2 °F (36.8 °C)  Pulse:  [96] 96  Resp:  [22] 22  SpO2:  [95 %] 95 %  BP: (140)/(71) 140/71        Body mass index is 23.79 kg/m².  Body surface area is 1.61 meters squared.    No intake or output data in the 24 hours ending 06/16/20 1510    Physical Exam  Vitals signs and nursing note reviewed.   Constitutional:       General: She is in acute distress (in pain).   HENT:      Head: Normocephalic and atraumatic.   Eyes:      Extraocular  Movements: Extraocular movements intact.      Pupils: Pupils are equal, round, and reactive to light.   Cardiovascular:      Rate and Rhythm: Normal rate and regular rhythm.      Pulses: Normal pulses.      Heart sounds: Normal heart sounds.   Pulmonary:      Effort: Pulmonary effort is normal. No respiratory distress.      Breath sounds: Normal breath sounds. No wheezing or rales.   Abdominal:      General: Abdomen is flat. Bowel sounds are normal. There is no distension.      Palpations: Abdomen is soft.      Tenderness: There is no abdominal tenderness.   Neurological:      Mental Status: She is alert and oriented to person, place, and time.         Significant Labs:   All pertinent labs from the last 24 hours have been reviewed.    Diagnostic Results:  I have reviewed all pertinent imaging results/findings within the past 24 hours.    Assessment/Plan:     * Abdominal and low back pain  Patient presented with severe abdominal pain and intractable n/v. She was unable to tolerate oral intake and couldn't get her oral pain meds  Seen on 6/9 by paliative, started on methadone 5 BID and increased to TID for better pain control on top of dilaudid oral 8 q 3 hours for breakthrough pain. Patient reports no relief with current pain.  CBC, CMP, PT, PTT, LD are unremarkable.    -- Pal care consult, appreciate assistance  -- dilauded pca pump.  -- aggressive bowel regimen.  -- Antiemetics  -- follow CT CAP.    Constipation  Patient complains of constipation in the past couple of days.    -- give she will get dilauded PCA pump, she would need aggressive bowel regimen.    Intractable nausea and vomiting  Patient came with intractable n/v, abdominal and back pain. Patient was unable to get her pain meds d/t n/v.    -- Scheduled zofran 4 mg IV q6h.  -- compazine 10 q6h PRN  -- palliative care consult    Cancer of head of pancreas  Patient have stage IV unresectable pancreatic cancer with lung mets. Enrolled in [employer]Plus  trial.  Patient of Dr. Barrett.  Ca-19 on 6/15 [id number] which significantly increased compared to previous one 2880 on 5/15        Pancho Srivastava MD  Hematology/Oncology  [facility]

## 2020-06-16 NOTE — ASSESSMENT & PLAN NOTE
patient seen by carmelo in clinic 6/8/2020   started on methadone 5 BID and increased to TID for better pain control on top of dilaudid oral 8 q 3 hours .  patient reported no pain relief with current regimen, and she has been having nausea and vomintg, she vomited her pain pills yesterday and unable to tolerate oral intake, admitted for pain control.    Recommendations:  - start PCA diludeded pump to achinve pain control then tranastion to oral once patient is able to tolerate   - aggressive bowel regimen and consider enema, patient sever back pain can be due to constipation vs mets   - follow up CT abdomin/Pelvic   - anti-emitic for nausea/vominting

## 2020-06-17 LAB
ALBUMIN SERPL BCP-MCNC: 3.1 G/DL (ref 3.5–5.2)
ALP SERPL-CCNC: 151 U/L (ref 55–135)
ALT SERPL W/O P-5'-P-CCNC: 32 U/L (ref 10–44)
ANION GAP SERPL CALC-SCNC: 9 MMOL/L (ref 8–16)
AST SERPL-CCNC: 30 U/L (ref 10–40)
BASOPHILS # BLD AUTO: 0.04 K/UL (ref 0–0.2)
BASOPHILS NFR BLD: 0.8 % (ref 0–1.9)
BILIRUB SERPL-MCNC: 0.5 MG/DL (ref 0.1–1)
BUN SERPL-MCNC: 7 MG/DL (ref 6–20)
CALCIUM SERPL-MCNC: 8.5 MG/DL (ref 8.7–10.5)
CHLORIDE SERPL-SCNC: 107 MMOL/L (ref 95–110)
CO2 SERPL-SCNC: 24 MMOL/L (ref 23–29)
CREAT SERPL-MCNC: 0.7 MG/DL (ref 0.5–1.4)
DIFFERENTIAL METHOD: ABNORMAL
EOSINOPHIL # BLD AUTO: 0.3 K/UL (ref 0–0.5)
EOSINOPHIL NFR BLD: 6 % (ref 0–8)
ERYTHROCYTE [DISTWIDTH] IN BLOOD BY AUTOMATED COUNT: 13.6 % (ref 11.5–14.5)
EST. GFR  (AFRICAN AMERICAN): >60 ML/MIN/1.73 M^2
EST. GFR  (NON AFRICAN AMERICAN): >60 ML/MIN/1.73 M^2
GLUCOSE SERPL-MCNC: 89 MG/DL (ref 70–110)
HCT VFR BLD AUTO: 38.9 % (ref 37–48.5)
HGB BLD-MCNC: 11.6 G/DL (ref 12–16)
IMM GRANULOCYTES # BLD AUTO: 0.02 K/UL (ref 0–0.04)
IMM GRANULOCYTES NFR BLD AUTO: 0.4 % (ref 0–0.5)
LYMPHOCYTES # BLD AUTO: 0.9 K/UL (ref 1–4.8)
LYMPHOCYTES NFR BLD: 17.4 % (ref 18–48)
MAGNESIUM SERPL-MCNC: 1.7 MG/DL (ref 1.6–2.6)
MCH RBC QN AUTO: 27.7 PG (ref 27–31)
MCHC RBC AUTO-ENTMCNC: 29.8 G/DL (ref 32–36)
MCV RBC AUTO: 93 FL (ref 82–98)
MONOCYTES # BLD AUTO: 0.6 K/UL (ref 0.3–1)
MONOCYTES NFR BLD: 11 % (ref 4–15)
NEUTROPHILS # BLD AUTO: 3.2 K/UL (ref 1.8–7.7)
NEUTROPHILS NFR BLD: 64.4 % (ref 38–73)
NRBC BLD-RTO: 0 /100 WBC
PHOSPHATE SERPL-MCNC: 4.3 MG/DL (ref 2.7–4.5)
PLATELET # BLD AUTO: 160 K/UL (ref 150–350)
PMV BLD AUTO: 10.6 FL (ref 9.2–12.9)
POTASSIUM SERPL-SCNC: 3.8 MMOL/L (ref 3.5–5.1)
PROT SERPL-MCNC: 6.4 G/DL (ref 6–8.4)
RBC # BLD AUTO: 4.19 M/UL (ref 4–5.4)
SODIUM SERPL-SCNC: 140 MMOL/L (ref 136–145)
WBC # BLD AUTO: 5 K/UL (ref 3.9–12.7)

## 2020-06-17 PROCEDURE — 94761 N-INVAS EAR/PLS OXIMETRY MLT: CPT

## 2020-06-17 PROCEDURE — 83735 ASSAY OF MAGNESIUM: CPT

## 2020-06-17 PROCEDURE — 99232 SBSQ HOSP IP/OBS MODERATE 35: CPT | Mod: ,,, | Performed by: INTERNAL MEDICINE

## 2020-06-17 PROCEDURE — 84100 ASSAY OF PHOSPHORUS: CPT

## 2020-06-17 PROCEDURE — 25000003 PHARM REV CODE 250: Performed by: STUDENT IN AN ORGANIZED HEALTH CARE EDUCATION/TRAINING PROGRAM

## 2020-06-17 PROCEDURE — 94770 HC EXHALED C02 TEST: CPT

## 2020-06-17 PROCEDURE — 85025 COMPLETE CBC W/AUTO DIFF WBC: CPT

## 2020-06-17 PROCEDURE — 99233 PR SUBSEQUENT HOSPITAL CARE,LEVL III: ICD-10-PCS | Mod: ,,, | Performed by: INTERNAL MEDICINE

## 2020-06-17 PROCEDURE — 80053 COMPREHEN METABOLIC PANEL: CPT

## 2020-06-17 PROCEDURE — 63600175 PHARM REV CODE 636 W HCPCS: Performed by: STUDENT IN AN ORGANIZED HEALTH CARE EDUCATION/TRAINING PROGRAM

## 2020-06-17 PROCEDURE — 99233 SBSQ HOSP IP/OBS HIGH 50: CPT | Mod: ,,, | Performed by: INTERNAL MEDICINE

## 2020-06-17 PROCEDURE — 20600001 HC STEP DOWN PRIVATE ROOM

## 2020-06-17 PROCEDURE — 99232 PR SUBSEQUENT HOSPITAL CARE,LEVL II: ICD-10-PCS | Mod: ,,, | Performed by: INTERNAL MEDICINE

## 2020-06-17 PROCEDURE — 99900035 HC TECH TIME PER 15 MIN (STAT)

## 2020-06-17 PROCEDURE — 63600175 PHARM REV CODE 636 W HCPCS: Performed by: INTERNAL MEDICINE

## 2020-06-17 RX ORDER — OXYCODONE HYDROCHLORIDE 10 MG/1
10 TABLET ORAL
Status: DISCONTINUED | OUTPATIENT
Start: 2020-06-17 | End: 2020-06-18

## 2020-06-17 RX ORDER — ONDANSETRON 2 MG/ML
8 INJECTION INTRAMUSCULAR; INTRAVENOUS EVERY 8 HOURS
Status: DISCONTINUED | OUTPATIENT
Start: 2020-06-17 | End: 2020-06-18

## 2020-06-17 RX ORDER — CALCIUM CARBONATE 200(500)MG
500 TABLET,CHEWABLE ORAL 2 TIMES DAILY PRN
Status: DISCONTINUED | OUTPATIENT
Start: 2020-06-17 | End: 2020-06-20 | Stop reason: HOSPADM

## 2020-06-17 RX ORDER — MAGNESIUM SULFATE HEPTAHYDRATE 40 MG/ML
2 INJECTION, SOLUTION INTRAVENOUS ONCE
Status: COMPLETED | OUTPATIENT
Start: 2020-06-17 | End: 2020-06-17

## 2020-06-17 RX ORDER — AMOXICILLIN 250 MG
2 CAPSULE ORAL 2 TIMES DAILY
Status: DISCONTINUED | OUTPATIENT
Start: 2020-06-17 | End: 2020-06-20 | Stop reason: HOSPADM

## 2020-06-17 RX ORDER — PROCHLORPERAZINE EDISYLATE 5 MG/ML
10 INJECTION INTRAMUSCULAR; INTRAVENOUS EVERY 6 HOURS
Status: DISCONTINUED | OUTPATIENT
Start: 2020-06-17 | End: 2020-06-18

## 2020-06-17 RX ORDER — FENTANYL 50 UG/1
1 PATCH TRANSDERMAL
Status: DISCONTINUED | OUTPATIENT
Start: 2020-06-17 | End: 2020-06-20 | Stop reason: HOSPADM

## 2020-06-17 RX ORDER — LORAZEPAM 0.5 MG/1
0.5 TABLET ORAL EVERY 6 HOURS PRN
Status: DISCONTINUED | OUTPATIENT
Start: 2020-06-17 | End: 2020-06-20 | Stop reason: HOSPADM

## 2020-06-17 RX ORDER — OLANZAPINE 2.5 MG/1
5 TABLET ORAL NIGHTLY
Status: DISCONTINUED | OUTPATIENT
Start: 2020-06-17 | End: 2020-06-19

## 2020-06-17 RX ORDER — POLYETHYLENE GLYCOL 3350 17 G/17G
17 POWDER, FOR SOLUTION ORAL 2 TIMES DAILY
Status: DISCONTINUED | OUTPATIENT
Start: 2020-06-17 | End: 2020-06-20 | Stop reason: HOSPADM

## 2020-06-17 RX ORDER — SCOLOPAMINE TRANSDERMAL SYSTEM 1 MG/1
1 PATCH, EXTENDED RELEASE TRANSDERMAL
Status: DISCONTINUED | OUTPATIENT
Start: 2020-06-17 | End: 2020-06-20 | Stop reason: HOSPADM

## 2020-06-17 RX ADMIN — PROCHLORPERAZINE EDISYLATE 10 MG: 5 INJECTION INTRAMUSCULAR; INTRAVENOUS at 06:06

## 2020-06-17 RX ADMIN — CALCIUM CARBONATE (ANTACID) CHEW TAB 500 MG 500 MG: 500 CHEW TAB at 06:06

## 2020-06-17 RX ADMIN — PROCHLORPERAZINE EDISYLATE 10 MG: 5 INJECTION INTRAMUSCULAR; INTRAVENOUS at 11:06

## 2020-06-17 RX ADMIN — POLYETHYLENE GLYCOL 3350 17 G: 17 POWDER, FOR SOLUTION ORAL at 09:06

## 2020-06-17 RX ADMIN — Medication: at 07:06

## 2020-06-17 RX ADMIN — MAGNESIUM SULFATE 2 G: 2 INJECTION INTRAVENOUS at 09:06

## 2020-06-17 RX ADMIN — PROCHLORPERAZINE EDISYLATE 10 MG: 5 INJECTION INTRAMUSCULAR; INTRAVENOUS at 12:06

## 2020-06-17 RX ADMIN — OLANZAPINE 5 MG: 2.5 TABLET, FILM COATED ORAL at 09:06

## 2020-06-17 RX ADMIN — ONDANSETRON HYDROCHLORIDE 8 MG: 2 SOLUTION INTRAMUSCULAR; INTRAVENOUS at 03:06

## 2020-06-17 RX ADMIN — ENOXAPARIN SODIUM 90 MG: 100 INJECTION SUBCUTANEOUS at 09:06

## 2020-06-17 RX ADMIN — FENTANYL 1 PATCH: 50 PATCH, EXTENDED RELEASE TRANSDERMAL at 06:06

## 2020-06-17 RX ADMIN — LORAZEPAM 0.5 MG: 0.5 TABLET ORAL at 09:06

## 2020-06-17 RX ADMIN — Medication: at 03:06

## 2020-06-17 RX ADMIN — ONDANSETRON 4 MG: 2 INJECTION INTRAMUSCULAR; INTRAVENOUS at 06:06

## 2020-06-17 RX ADMIN — ONDANSETRON HYDROCHLORIDE 8 MG: 2 SOLUTION INTRAMUSCULAR; INTRAVENOUS at 09:06

## 2020-06-17 RX ADMIN — METHYLNALTREXONE BROMIDE 45 MG: 12 INJECTION, SOLUTION SUBCUTANEOUS at 09:06

## 2020-06-17 RX ADMIN — ONDANSETRON 4 MG: 2 INJECTION INTRAMUSCULAR; INTRAVENOUS at 12:06

## 2020-06-17 RX ADMIN — SCOPALAMINE 1 PATCH: 1 PATCH, EXTENDED RELEASE TRANSDERMAL at 09:06

## 2020-06-17 NOTE — CHAPLAIN
Pt currently processing news that she only has months to live. While expected based on prior history, news was still shocking. 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 Now contemplating setting affairs in order, including saying goodbye to clients, many of whom have become friends, and closing her portion of the business. We discussed feelings around hospice care and what legacy work might look like during her final days. Pt would like to be remembered as a nice, caring person above all.     Pt has supportive mother, brother, sister and friends. Pt's sister is a therapist who provides additional emotional support.    Pt practices no defined Rastafarian, but very spiritual and appreciates prayer. She believes that there is something after this life as she was present for her father's death (7 years ago) and 's death (4 years ago).     Biggest concern is getting her pain controlled and gaining more energy if possible so she can go home.

## 2020-06-17 NOTE — SUBJECTIVE & OBJECTIVE
Interval History: patient pain better control after increasing PCA pump to 0.2 mg dilaudid overnight, CT abdomin/Pelvice yesterday consistent with disease progression, after GOC discussion with Dr. Stein patient decided to go home with home hospice once pain is under control, changed code status to DNR.    Medications:  Continuous Infusions:   hydromorphone in 0.9 % NaCl 6 mg/30 ml       Scheduled Meds:   enoxaparin  90 mg Subcutaneous Daily    OLANZapine  5 mg Oral Nightly    ondansetron  8 mg Intravenous Q8H    polyethylene glycol  17 g Oral BID    prochlorperazine  10 mg Intravenous Q6H    scopolamine  1 patch Transdermal Q3 Days    senna-docusate 8.6-50 mg  2 tablet Oral BID     PRN Meds:acetaminophen, calcium carbonate, Dextrose 10% Bolus, Dextrose 10% Bolus, glucagon (human recombinant), glucose, glucose, insulin aspart U-100, LORazepam, naloxone, sodium chloride 0.9%    Objective:     Vital Signs (Most Recent):  Temp: 98.1 °F (36.7 °C) (06/17/20 0737)  Pulse: 85 (06/17/20 0737)  Resp: 17 (06/17/20 0737)  BP: 137/74 (06/17/20 0737)  SpO2: (!) 94 % (06/17/20 0737) Vital Signs (24h Range):  Temp:  [98.1 °F (36.7 °C)-98.4 °F (36.9 °C)] 98.1 °F (36.7 °C)  Pulse:  [85-89] 85  Resp:  [13-20] 17  SpO2:  [92 %-95 %] 94 %  BP: (129-146)/(63-74) 137/74     Weight: 57.1 kg (125 lb 14.1 oz)  Body mass index is 23.02 kg/m².    Review of Symptoms  Symptom Assessment (ESAS 0-10 scale)  ESAS 0 1 2 3 4 5 6 7 8 9 10   Pain     4         Dyspnea              Anxiety              Nausea 0             Depression               Anorexia              Fatigue              Insomnia              Restlessness               Agitation                  Physical Exam  Vitals signs and nursing note reviewed.   Constitutional:       Appearance: Normal appearance. She is not ill-appearing or toxic-appearing.   HENT:      Head: Normocephalic and atraumatic.      Nose: Nose normal.   Eyes:      General: No scleral icterus.  Neck:       Musculoskeletal: Neck supple. No muscular tenderness.   Cardiovascular:      Rate and Rhythm: Normal rate.   Pulmonary:      Effort: Pulmonary effort is normal.   Abdominal:      General: There is no distension.      Tenderness: There is no abdominal tenderness.   Musculoskeletal:         General: No swelling or tenderness.   Skin:     Coloration: Skin is not pale.      Findings: No erythema.   Neurological:      Mental Status: She is alert and oriented to person, place, and time.   Psychiatric:         Mood and Affect: Mood normal.         Behavior: Behavior normal.         Significant Labs:  CBC:   Recent Labs   Lab 06/17/20  0357   WBC 5.00   HGB 11.6*   HCT 38.9   MCV 93        BMP:  Recent Labs   Lab 06/17/20  0357   GLU 89      K 3.8      CO2 24   BUN 7   CREATININE 0.7   CALCIUM 8.5*   MG 1.7     LFT:  Lab Results   Component Value Date    AST 30 06/17/2020     (H) 05/15/2020    ALKPHOS 151 (H) 06/17/2020    BILITOT 0.5 06/17/2020     Albumin:   Albumin   Date Value Ref Range Status   06/17/2020 3.1 (L) 3.5 - 5.2 g/dL Final     Protein:   Total Protein   Date Value Ref Range Status   06/17/2020 6.4 6.0 - 8.4 g/dL Final     Lactic acid:   Lab Results   Component Value Date    LACTATE 1.9 03/19/2020    LACTATE 1.1 03/18/2020       Significant Imaging: I have reviewed all pertinent imaging results/findings within the past 24 hours.

## 2020-06-17 NOTE — PROGRESS NOTES
[facility]  Palliative Medicine  Progress Note    Patient Name: Quyen Moody  MRN: [id number]  Admission Date: 6/16/2020  Hospital Length of Stay: 1 days  Code Status: DNR   Attending Provider: Pierce Stein MD  Consulting Provider: MAUREEN Zamorano  Primary Care Physician: Eliot Barrett MD  Principal Problem:Low back pain    Patient information was obtained from patient and past medical records.      Assessment/Plan:     * Abdominal and low back pain  patient seen by paleleonora in clinic 6/8/2020   started on methadone 5 BID and increased to TID for better pain control on top of dilaudid oral 8 q 3 hours .  patient reported no pain relief with current regimen, and she has been having nausea and vomintg, she vomited her pain pills yesterday and unable to tolerate oral intake, admitted for pain control.    CT abdomin/Pelvice consistent with disease progression, after GOC discussion with Dr. Stein patient decided to go home with home hospice once pain is under control.  Code status changed to DNR  Primary team will arrange hospice transition.     Recommendations:  - continue PCA dilaudid pump and start fentanyl patch 50 mcg, 10 mg oxycodone every 3 hr PRN for breakthrough pain   - aggressive bowel regimen and consider enema, give 1 dose of Methylnaltrexone 45 mg .  - anti-emitic for nausea/vominting          I will follow-up with patient. Please contact us if you have any additional questions.    Subjective:     Chief Complaint: No chief complaint on file.      HPI:   59 years old female patient with medical history of stage 4 pancreatic cancer wtih lung mets on Genzada trial trial, came as a direct admit due to increase low back burning bone pain for 2 weeks, associated with nausea, vomiting and decrease appetite, pateitn was seen by palliative care clinic on 06/08/2020 started on methadone 5 mg bid which was increased to TID and conitnued her hydromorphone 8 mg po q 3 hours for breakthrough pain.  Denies fever, chills, abdominal pain and diarrhea, has chronic constipation on miralax and recently started on senna. paliative care consulted for pain management.     Hospital Course:  No notes on file    Interval History: patient pain better control after increasing PCA pump to 0.2 mg dilaudid overnight, CT abdomin/Pelvice yesterday consistent with disease progression, after GOC discussion with Dr. Stein patient decided to go home with home hospice once pain is under control, changed code status to DNR.    Medications:  Continuous Infusions:   hydromorphone in 0.9 % NaCl 6 mg/30 ml       Scheduled Meds:   enoxaparin  90 mg Subcutaneous Daily    OLANZapine  5 mg Oral Nightly    ondansetron  8 mg Intravenous Q8H    polyethylene glycol  17 g Oral BID    prochlorperazine  10 mg Intravenous Q6H    scopolamine  1 patch Transdermal Q3 Days    senna-docusate 8.6-50 mg  2 tablet Oral BID     PRN Meds:acetaminophen, calcium carbonate, Dextrose 10% Bolus, Dextrose 10% Bolus, glucagon (human recombinant), glucose, glucose, insulin aspart U-100, LORazepam, naloxone, sodium chloride 0.9%    Objective:     Vital Signs (Most Recent):  Temp: 98.1 °F (36.7 °C) (06/17/20 0737)  Pulse: 85 (06/17/20 0737)  Resp: 17 (06/17/20 0737)  BP: 137/74 (06/17/20 0737)  SpO2: (!) 94 % (06/17/20 0737) Vital Signs (24h Range):  Temp:  [98.1 °F (36.7 °C)-98.4 °F (36.9 °C)] 98.1 °F (36.7 °C)  Pulse:  [85-89] 85  Resp:  [13-20] 17  SpO2:  [92 %-95 %] 94 %  BP: (129-146)/(63-74) 137/74     Weight: 57.1 kg (125 lb 14.1 oz)  Body mass index is 23.02 kg/m².    Review of Symptoms  Symptom Assessment (ESAS 0-10 scale)  ESAS 0 1 2 3 4 5 6 7 8 9 10   Pain     4         Dyspnea              Anxiety              Nausea 0             Depression               Anorexia              Fatigue              Insomnia              Restlessness               Agitation                  Physical Exam  Vitals signs and nursing note reviewed.   Constitutional:        Appearance: Normal appearance. She is not ill-appearing or toxic-appearing.   HENT:      Head: Normocephalic and atraumatic.      Nose: Nose normal.   Eyes:      General: No scleral icterus.  Neck:      Musculoskeletal: Neck supple. No muscular tenderness.   Cardiovascular:      Rate and Rhythm: Normal rate.   Pulmonary:      Effort: Pulmonary effort is normal.   Abdominal:      General: There is no distension.      Tenderness: There is no abdominal tenderness.   Musculoskeletal:         General: No swelling or tenderness.   Skin:     Coloration: Skin is not pale.      Findings: No erythema.   Neurological:      Mental Status: She is alert and oriented to person, place, and time.   Psychiatric:         Mood and Affect: Mood normal.         Behavior: Behavior normal.         Significant Labs:  CBC:   Recent Labs   Lab 06/17/20  0357   WBC 5.00   HGB 11.6*   HCT 38.9   MCV 93        BMP:  Recent Labs   Lab 06/17/20  0357   GLU 89      K 3.8      CO2 24   BUN 7   CREATININE 0.7   CALCIUM 8.5*   MG 1.7     LFT:  Lab Results   Component Value Date    AST 30 06/17/2020     (H) 05/15/2020    ALKPHOS 151 (H) 06/17/2020    BILITOT 0.5 06/17/2020     Albumin:   Albumin   Date Value Ref Range Status   06/17/2020 3.1 (L) 3.5 - 5.2 g/dL Final     Protein:   Total Protein   Date Value Ref Range Status   06/17/2020 6.4 6.0 - 8.4 g/dL Final     Lactic acid:   Lab Results   Component Value Date    LACTATE 1.9 03/19/2020    LACTATE 1.1 03/18/2020       Significant Imaging: I have reviewed all pertinent imaging results/findings within the past 24 hours.          > 50% of 45 min visit spent in chart review, face to face discussion of goals of care,  symptom assessment, coordination of care and emotional support.    MAUREEN Zamorano  Palliative Medicine  [facility]

## 2020-06-17 NOTE — PLAN OF CARE
POC reviewed with patient; understanding verbalized. Dilaudid PCA in use. Fentanyl patch applied to left arm; scheduled compazine and zofran administered with full relief of nausea. Regular diet; poor appetite. Pt voids in hat; no BMs noted this shift. Pt. with nonskid footwear on, bed in lowest position, and locked with bed rails up x2. Pt. has call light and personal items within reach. Patient ambulates in room independently. VSS and afebrile this shift. All questions and concerns addressed at this time. Will continue to monitor.

## 2020-06-17 NOTE — ASSESSMENT & PLAN NOTE
Patient complains of constipation in the past couple of days but she passes gas. She was started on dilauded PCA pump.    -- senna/doc BID  -- miralax BID  -- consider fleet enema prn  .

## 2020-06-17 NOTE — PROGRESS NOTES
[facility]  Hematology/Oncology  Progress Note    Patient Name: Quyen Moody  Admission Date: 6/16/2020  Hospital Length of Stay: 1 days  Code Status: DNR     Subjective:     HPI:  Ms. Moody is 59 years old female, patient of Dr. Barrett, with past medical history of stage IV pancreatic cancer in Genzada trial and  left UE DVT on lovenox. Presented as direct admit after she called oncology clinic for severe abdominal pain and intractable nausea and vomiting. Patient states that for the past couple of days she was unable to keep anything in her stomach including pain meds. She has been complaining of mild abdominal in the past weeks but this morning it was 10/10 and associated with back pain. Her pain regimen was changed on 6/9 to methadone 5 BID and dilauded 8 mg PO q3h prn for breakthrough pain. Patient endorses 6-8 lbs weight loss in the past week, constipation, urinary incontinence, and chills. She denies fever, night sweats, cough, sob, chest pain, and urinary syptoms.    Oncologic History:  She has had intermittent upper abdominal pain since early June.  She presented to her PCP who originally ordered an US and CT.  US was negative and CT showed some haziness at the pancreatic head.  She saw GI who performed EUS.  On EUS, a 3x4cm pancreatic head mass was seen with possible invasion into the SMA and portal vein.  FNA path s/w pancreatic adenocarcinoma.  CA 19-9 level at outside hospital was >1000 per the patient.  She endorses nausea and inability to tolerate much PO.  She has lost about 10lbs over the last few weeks and is noticing her skin turning yellow.  Denies pruritis.  She is passing gas but has not had a BM in a few days, she attributes this to narcotic use.  She recently started taking stool softeners.  No previous cardiac or stroke history.  Surgical history significant for open appendectomy when she was in her 20s  - 8/7/2019 - 1/9/2020: Prentiss+Abraxane and TTF on PANOVA-3, LFTs  elevated, but improved.  - Based on CT 12/2019 scans, Dr. Hallman felt pt was potentially resectable after chemoXRT given good response to current therapy.  However, subsequent Chest CT   showed new and growing micronodules in the in lungs c/w met disease in light of rising tumor markers.    - 1/23/2020: Changed to FOLFIRINOX and con't TTF on PANOVA as this seems to be achieving good local control of the primary tumor.     Interval History: see hospital course    Oncology Treatment Plan:   [No treatment plan]    Medications:  Continuous Infusions:   hydromorphone in 0.9 % NaCl 6 mg/30 ml       Scheduled Meds:   enoxaparin  90 mg Subcutaneous Daily    OLANZapine  5 mg Oral Nightly    ondansetron  8 mg Intravenous Q8H    polyethylene glycol  17 g Oral BID    prochlorperazine  10 mg Intravenous Q6H    scopolamine  1 patch Transdermal Q3 Days    senna-docusate 8.6-50 mg  2 tablet Oral BID     PRN Meds:acetaminophen, calcium carbonate, Dextrose 10% Bolus, Dextrose 10% Bolus, glucagon (human recombinant), glucose, glucose, insulin aspart U-100, LORazepam, naloxone, sodium chloride 0.9%     Review of Systems   Constitutional: Positive for activity change, appetite change, chills and unexpected weight change. Negative for fatigue and fever.   HENT: Negative for congestion and sore throat.    Eyes: Negative for visual disturbance.   Respiratory: Negative for cough and shortness of breath.    Cardiovascular: Negative for chest pain and leg swelling.   Gastrointestinal: Positive for abdominal pain, constipation, nausea and vomiting. Negative for blood in stool and diarrhea.   Genitourinary: Negative for difficulty urinating, dysuria, flank pain and hematuria.   Musculoskeletal: Positive for back pain. Negative for myalgias.   Neurological: Negative for dizziness, tremors, numbness and headaches.   Psychiatric/Behavioral: Negative for confusion.     Objective:     Vital Signs (Most Recent):  Temp: 98.1 °F (36.7 °C)  (06/17/20 0737)  Pulse: 85 (06/17/20 0737)  Resp: 17 (06/17/20 0737)  BP: 137/74 (06/17/20 0737)  SpO2: (!) 94 % (06/17/20 0737) Vital Signs (24h Range):  Temp:  [98.1 °F (36.7 °C)-98.4 °F (36.9 °C)] 98.1 °F (36.7 °C)  Pulse:  [85-96] 85  Resp:  [13-22] 17  SpO2:  [92 %-95 %] 94 %  BP: (129-146)/(63-74) 137/74     Weight: 57.1 kg (125 lb 14.1 oz)  Body mass index is 23.02 kg/m².  Body surface area is 1.58 meters squared.      Intake/Output Summary (Last 24 hours) at 6/17/2020 1127  Last data filed at 6/17/2020 0618  Gross per 24 hour   Intake 271.5 ml   Output 400 ml   Net -128.5 ml       Physical Exam  Vitals signs and nursing note reviewed.   Constitutional:       General: She is in acute distress.   HENT:      Head: Normocephalic and atraumatic.   Eyes:      Extraocular Movements: Extraocular movements intact.      Pupils: Pupils are equal, round, and reactive to light.   Cardiovascular:      Rate and Rhythm: Normal rate and regular rhythm.      Pulses: Normal pulses.      Heart sounds: Normal heart sounds.   Pulmonary:      Effort: Pulmonary effort is normal. No respiratory distress.      Breath sounds: Normal breath sounds. No wheezing or rales.   Abdominal:      General: Abdomen is flat. Bowel sounds are normal. There is no distension.      Palpations: Abdomen is soft.      Tenderness: There is no abdominal tenderness.   Neurological:      Mental Status: She is alert and oriented to person, place, and time.         Significant Labs:   All pertinent labs from the last 24 hours have been reviewed.    Diagnostic Results:  I have reviewed all pertinent imaging results/findings within the past 24 hours.    Assessment/Plan:     * Abdominal and low back pain  Patient presented with severe abdominal pain and intractable n/v. She was unable to tolerate oral intake and couldn't get her oral pain meds  Seen on 6/9 by carmelo, started on methadone 5 BID and increased to TID for better pain control on top of dilaudid  oral 8 q 3 hours for breakthrough pain. Patient reports no relief with current pain.  CBC, CMP, PT, PTT, LD are unremarkable.    -- Pal care consult, appreciate assistance  -- dilauded pca pump.  -- aggressive bowel regimen.  -- Antiemetics      Constipation  Patient complains of constipation in the past couple of days but she passes gas. She was started on dilauded PCA pump.    -- senna/doc BID  -- miralax BID  -- consider fleet enema prn  .    Intractable nausea and vomiting  Patient came with intractable n/v, abdominal and back pain. Patient was unable to get her pain meds d/t n/v.    -- Scheduled zofran 4 mg IV q6h.  -- compazine 10 q6h.  -- resume home zyprexa and lorazepam  -- palliative care consult, appreciate reccs    Cancer of head of pancreas  Patient have stage IV unresectable pancreatic cancer with lung mets. Enrolled in Genzada trial.  Patient of Dr. Barrett.  Ca-19 on 6/15 [id number] which significantly increased compared to previous one 2880 on 5/15    -- CT CAP showed progression of malignancy with bilateral pulmonary nodules, and scattered peritoneal soft tissue opacities. Also right adnexal mass increased from 4 cm to 10 cm.   -- after goals of care discussion with patient, she decided to be discharged with home hospice once pain and n/v are controlled.  -- Code status changed to DNR             Pancho Srivastava MD  Hematology/Oncology  [facility]

## 2020-06-17 NOTE — PLAN OF CARE
Pt AAOx4 and ambulatory. Pt repeatedly maxing out dilaudid PCA dose per hour. MD notified and dose adjusted to 0.2 mg Pt bolus and 2 mg/hr dose limit. Pt reports pain better managed overnight. Denies any nausea at this time. UA specimen collected. No other complaints at this time. Will continue to monitor.

## 2020-06-17 NOTE — ASSESSMENT & PLAN NOTE
Patient presented with severe abdominal pain and intractable n/v. She was unable to tolerate oral intake and couldn't get her oral pain meds  Seen on 6/9 by paliative, started on methadone 5 BID and increased to TID for better pain control on top of dilaudid oral 8 q 3 hours for breakthrough pain. Patient reports no relief with current pain.  CBC, CMP, PT, PTT, LD are unremarkable.    -- Pal care consult, appreciate assistance  -- dilauded pca pump.  -- aggressive bowel regimen.  -- Antiemetics

## 2020-06-17 NOTE — ASSESSMENT & PLAN NOTE
patient seen by carmelo in clinic 6/8/2020   started on methadone 5 BID and increased to TID for better pain control on top of dilaudid oral 8 q 3 hours .  patient reported no pain relief with current regimen, and she has been having nausea and vomintg, she vomited her pain pills yesterday and unable to tolerate oral intake, admitted for pain control.    CT abdomin/Pelvice consistent with disease progression, after GOC discussion with Dr. Stein patient decided to go home with home hospice once pain is under control.  Code status changed to DNR  Primary team will arrange hospice transition.     Recommendations:  - continue PCA dilaudid pump and start fentanyl patch 50 mcg, 10 mg oxycodone every 3 hr PRN for breakthrough pain   - aggressive bowel regimen and consider enema, give 1 dose of Methylnaltrexone 45 mg .  - anti-emitic for nausea/vominting

## 2020-06-17 NOTE — ASSESSMENT & PLAN NOTE
Patient came with intractable n/v, abdominal and back pain. Patient was unable to get her pain meds d/t n/v.    -- Scheduled zofran 4 mg IV q6h.  -- compazine 10 q6h.  -- resume home zyprexa and lorazepam  -- palliative care consult, appreciate reccs

## 2020-06-17 NOTE — PLAN OF CARE
[facility]  Department of Hospital Medicine  [address] [place] [address]  [phone] after hours  [phone] fax    HOSPICE  ORDERS    06/17/2020    Admit to Hospice:  Home Service     Diagnoses:   Active Hospital Problems    Diagnosis  POA    *Abdominal and low back pain [M54.5]  Yes    Pancreatic cancer [C25.9]  Yes    Intractable nausea and vomiting [R11.2]  Unknown    Constipation [K59.00]  Unknown    Cancer of head of pancreas [C25.0]  Yes      Resolved Hospital Problems   No resolved problems to display.       Hospice Qualifying Diagnoses:        Patient has a life expectancy < 6 months due to:  1) Primary Hospice Diagnosis: progressed unresectable stage 4 pancreatic adenocarcinoma.  2) Comorbid Conditions Contributing to Decline: debility, intractable nausea and vomiting, left UE DVT.       Vital Signs: Routine per Hospice Protocol.    Code Status: DNR    Allergies:   Review of patient's allergies indicates:   Allergen Reactions    Sulfa (sulfonamide antibiotics) Swelling and Hives     tongue         Diet: regular diet     Activities: As tolerated    Nursing: Per Hospice Routine.        Oxygen: n/a    Other Miscellaneous Care: none    Medications:         Quyen Moody   Home Medication Instructions MÓNICA:[id number]    Printed on:06/20/20 1040   Medication Information                      dexAMETHasone (DECADRON) 4 MG Tab  Take 1 tablet (4 mg total) by mouth every 12 (twelve) hours. for 10 days             estradiol-norethindrone (ACTIVELLA) 1-0.5 mg per tablet  Take 0.5 mg by mouth once daily.             fentaNYL (DURAGESIC) 50 mcg/hr  Place 1 patch onto the skin every 72 hours.             HYDROmorphone (DILAUDID) 1 MG tablet  Take 1 tablet (8 mg total) by mouth every 4 (four) hours as needed for Pain.             lidocaine-prilocaine (EMLA) cream  Apply to port 45 minutes prior to access.             LORazepam (ATIVAN) 0.5 MG tablet  Take 1 tablet  (0.5 mg total) by mouth every 6 (six) hours as needed for Anxiety (nausea).             multivitamin (THERAGRAN) per tablet  Take 1 tablet by mouth once daily.             OLANZapine (ZYPREXA) 5 MG tablet  Take 1 tablet (5 mg total) by mouth nightly. To prevent nausea             ondansetron (ZOFRAN-ODT) 8 MG TbDL  Take 1 tablet (8 mg total) by mouth 4 (four) times daily.             oxyCODONE (ROXICODONE) 10 mg Tab immediate release tablet  Take 1 tablet (10 mg total) by mouth every 3 (three) hours as needed for Pain.             pantoprazole (PROTONIX) 40 MG tablet  Take 1 tablet (40 mg total) by mouth once daily.             polyethylene glycol (GLYCOLAX) 17 gram PwPk  Take 17 g by mouth 2 (two) times daily.             promethazine (PHENERGAN) 25 MG tablet  Take 1 tablet (25 mg total) by mouth every 6 (six) hours.             senna-docusate 8.6-50 mg (PERICOLACE) 8.6-50 mg per tablet  Take 2 tablets by mouth 2 (two) times daily.               _____________________________  Pancho Srivastava MD  06/20/2020

## 2020-06-17 NOTE — PLAN OF CARE
Patient AAOx4, VSS, afebrile, and without injury. Fall precautions maintained. Patient instructed on how to contact the nurse, oriented to the unit. Patient on room air without distress; on regular diet with moderate appetite. Emesis x1, PRN medication administered. Pain controlled with PCA. Patient left unit for CT abdomen and pelvis. Magnesium replaced IV. Patient up independently. Palliative care consulted. Awaiting urine to collect. Questions and concerns have been addressed; will continue to monitor.

## 2020-06-17 NOTE — ASSESSMENT & PLAN NOTE
Patient have stage IV unresectable pancreatic cancer with lung mets. Enrolled in Genzada trial.  Patient of Dr. Barrett.  Ca-19 on 6/15 [id number] which significantly increased compared to previous one 2880 on 5/15    -- CT CAP showed progression of malignancy with bilateral pulmonary nodules, and scattered peritoneal soft tissue opacities. Also right adnexal mass increased from 4 cm to 10 cm.   -- after goals of care discussion with patient, she decided to be discharged with home hospice once pain and n/v are controlled.  -- Code status changed to DNR

## 2020-06-17 NOTE — SUBJECTIVE & OBJECTIVE
Interval History: see hospital course    Oncology Treatment Plan:   [No treatment plan]    Medications:  Continuous Infusions:   hydromorphone in 0.9 % NaCl 6 mg/30 ml       Scheduled Meds:   enoxaparin  90 mg Subcutaneous Daily    OLANZapine  5 mg Oral Nightly    ondansetron  8 mg Intravenous Q8H    polyethylene glycol  17 g Oral BID    prochlorperazine  10 mg Intravenous Q6H    scopolamine  1 patch Transdermal Q3 Days    senna-docusate 8.6-50 mg  2 tablet Oral BID     PRN Meds:acetaminophen, calcium carbonate, Dextrose 10% Bolus, Dextrose 10% Bolus, glucagon (human recombinant), glucose, glucose, insulin aspart U-100, LORazepam, naloxone, sodium chloride 0.9%     Review of Systems   Constitutional: Positive for activity change, appetite change, chills and unexpected weight change. Negative for fatigue and fever.   HENT: Negative for congestion and sore throat.    Eyes: Negative for visual disturbance.   Respiratory: Negative for cough and shortness of breath.    Cardiovascular: Negative for chest pain and leg swelling.   Gastrointestinal: Positive for abdominal pain, constipation, nausea and vomiting. Negative for blood in stool and diarrhea.   Genitourinary: Negative for difficulty urinating, dysuria, flank pain and hematuria.   Musculoskeletal: Positive for back pain. Negative for myalgias.   Neurological: Negative for dizziness, tremors, numbness and headaches.   Psychiatric/Behavioral: Negative for confusion.     Objective:     Vital Signs (Most Recent):  Temp: 98.1 °F (36.7 °C) (06/17/20 0737)  Pulse: 85 (06/17/20 0737)  Resp: 17 (06/17/20 0737)  BP: 137/74 (06/17/20 0737)  SpO2: (!) 94 % (06/17/20 0737) Vital Signs (24h Range):  Temp:  [98.1 °F (36.7 °C)-98.4 °F (36.9 °C)] 98.1 °F (36.7 °C)  Pulse:  [85-96] 85  Resp:  [13-22] 17  SpO2:  [92 %-95 %] 94 %  BP: (129-146)/(63-74) 137/74     Weight: 57.1 kg (125 lb 14.1 oz)  Body mass index is 23.02 kg/m².  Body surface area is 1.58 meters  squared.      Intake/Output Summary (Last 24 hours) at 6/17/2020 1127  Last data filed at 6/17/2020 0618  Gross per 24 hour   Intake 271.5 ml   Output 400 ml   Net -128.5 ml       Physical Exam  Vitals signs and nursing note reviewed.   Constitutional:       General: She is in acute distress.   HENT:      Head: Normocephalic and atraumatic.   Eyes:      Extraocular Movements: Extraocular movements intact.      Pupils: Pupils are equal, round, and reactive to light.   Cardiovascular:      Rate and Rhythm: Normal rate and regular rhythm.      Pulses: Normal pulses.      Heart sounds: Normal heart sounds.   Pulmonary:      Effort: Pulmonary effort is normal. No respiratory distress.      Breath sounds: Normal breath sounds. No wheezing or rales.   Abdominal:      General: Abdomen is flat. Bowel sounds are normal. There is no distension.      Palpations: Abdomen is soft.      Tenderness: There is no abdominal tenderness.   Neurological:      Mental Status: She is alert and oriented to person, place, and time.         Significant Labs:   All pertinent labs from the last 24 hours have been reviewed.    Diagnostic Results:  I have reviewed all pertinent imaging results/findings within the past 24 hours.

## 2020-06-17 NOTE — HOSPITAL COURSE
Patient was admitted for intractable n/v and severe back and abdominal pain. She was started on dilaudid PCA pump, antiemetics, and aggressive bowel regimen. CT CAP showed progression of malignancy with bilateral pulmonary nodules, and scattered peritoneal soft tissue opacities. Also right adnexal mass increased from 4 cm to 10 cm. Goal of discussions with patient concluded that she wants to go to home hospice and code to be DNR. Patient had bowel movements after she got methyl naltrexone.  Antiemetics changed to zofran sublignual and phenergan PO. Patient turned out that she fail fentanyl patch 25 mcg and was planned to get 50 but doesn't remmeber whether she had it or not. PCA dc'd and switched to fentanyl patch 50 mcg, oxycodone 10 mg q3h PRN, and dilauded 1 mg IV q3h PRN for breakthrough pain. Dexamethasone 8 PO daily initially, now switched to 4 mg BID. Patient pain is controlled and n/v improved. She was discharged home with home hospice in good condition.

## 2020-06-18 ENCOUNTER — RESEARCH ENCOUNTER (OUTPATIENT)
Dept: RESEARCH | Facility: HOSPITAL | Age: 60
End: 2020-06-18

## 2020-06-18 LAB
ALBUMIN SERPL BCP-MCNC: 3.1 G/DL (ref 3.5–5.2)
ALP SERPL-CCNC: 153 U/L (ref 55–135)
ALT SERPL W/O P-5'-P-CCNC: 32 U/L (ref 10–44)
ANION GAP SERPL CALC-SCNC: 9 MMOL/L (ref 8–16)
AST SERPL-CCNC: 28 U/L (ref 10–40)
BASOPHILS # BLD AUTO: 0.02 K/UL (ref 0–0.2)
BASOPHILS NFR BLD: 0.4 % (ref 0–1.9)
BILIRUB SERPL-MCNC: 0.5 MG/DL (ref 0.1–1)
BUN SERPL-MCNC: 8 MG/DL (ref 6–20)
CALCIUM SERPL-MCNC: 8.6 MG/DL (ref 8.7–10.5)
CHLORIDE SERPL-SCNC: 105 MMOL/L (ref 95–110)
CO2 SERPL-SCNC: 24 MMOL/L (ref 23–29)
CREAT SERPL-MCNC: 0.7 MG/DL (ref 0.5–1.4)
DIFFERENTIAL METHOD: ABNORMAL
EOSINOPHIL # BLD AUTO: 0 K/UL (ref 0–0.5)
EOSINOPHIL NFR BLD: 0.4 % (ref 0–8)
ERYTHROCYTE [DISTWIDTH] IN BLOOD BY AUTOMATED COUNT: 13.4 % (ref 11.5–14.5)
EST. GFR  (AFRICAN AMERICAN): >60 ML/MIN/1.73 M^2
EST. GFR  (NON AFRICAN AMERICAN): >60 ML/MIN/1.73 M^2
GLUCOSE SERPL-MCNC: 100 MG/DL (ref 70–110)
HCT VFR BLD AUTO: 39.8 % (ref 37–48.5)
HGB BLD-MCNC: 12.1 G/DL (ref 12–16)
IMM GRANULOCYTES # BLD AUTO: 0.01 K/UL (ref 0–0.04)
IMM GRANULOCYTES NFR BLD AUTO: 0.2 % (ref 0–0.5)
LYMPHOCYTES # BLD AUTO: 0.7 K/UL (ref 1–4.8)
LYMPHOCYTES NFR BLD: 13 % (ref 18–48)
MAGNESIUM SERPL-MCNC: 1.6 MG/DL (ref 1.6–2.6)
MCH RBC QN AUTO: 27.6 PG (ref 27–31)
MCHC RBC AUTO-ENTMCNC: 30.4 G/DL (ref 32–36)
MCV RBC AUTO: 91 FL (ref 82–98)
MONOCYTES # BLD AUTO: 0.4 K/UL (ref 0.3–1)
MONOCYTES NFR BLD: 6.4 % (ref 4–15)
NEUTROPHILS # BLD AUTO: 4.5 K/UL (ref 1.8–7.7)
NEUTROPHILS NFR BLD: 79.6 % (ref 38–73)
NRBC BLD-RTO: 0 /100 WBC
PHOSPHATE SERPL-MCNC: 3.9 MG/DL (ref 2.7–4.5)
PLATELET # BLD AUTO: 154 K/UL (ref 150–350)
PMV BLD AUTO: 10.8 FL (ref 9.2–12.9)
POTASSIUM SERPL-SCNC: 4.4 MMOL/L (ref 3.5–5.1)
PROT SERPL-MCNC: 6.7 G/DL (ref 6–8.4)
RBC # BLD AUTO: 4.39 M/UL (ref 4–5.4)
SODIUM SERPL-SCNC: 138 MMOL/L (ref 136–145)
WBC # BLD AUTO: 5.63 K/UL (ref 3.9–12.7)

## 2020-06-18 PROCEDURE — 84100 ASSAY OF PHOSPHORUS: CPT

## 2020-06-18 PROCEDURE — 93010 ELECTROCARDIOGRAM REPORT: CPT | Mod: ,,, | Performed by: INTERNAL MEDICINE

## 2020-06-18 PROCEDURE — 99232 SBSQ HOSP IP/OBS MODERATE 35: CPT | Mod: ,,, | Performed by: INTERNAL MEDICINE

## 2020-06-18 PROCEDURE — 83735 ASSAY OF MAGNESIUM: CPT

## 2020-06-18 PROCEDURE — 25000003 PHARM REV CODE 250: Performed by: INTERNAL MEDICINE

## 2020-06-18 PROCEDURE — 99232 PR SUBSEQUENT HOSPITAL CARE,LEVL II: ICD-10-PCS | Mod: ,,, | Performed by: INTERNAL MEDICINE

## 2020-06-18 PROCEDURE — 93005 ELECTROCARDIOGRAM TRACING: CPT

## 2020-06-18 PROCEDURE — 63600175 PHARM REV CODE 636 W HCPCS: Performed by: STUDENT IN AN ORGANIZED HEALTH CARE EDUCATION/TRAINING PROGRAM

## 2020-06-18 PROCEDURE — 25000003 PHARM REV CODE 250: Performed by: STUDENT IN AN ORGANIZED HEALTH CARE EDUCATION/TRAINING PROGRAM

## 2020-06-18 PROCEDURE — 94761 N-INVAS EAR/PLS OXIMETRY MLT: CPT

## 2020-06-18 PROCEDURE — 99233 PR SUBSEQUENT HOSPITAL CARE,LEVL III: ICD-10-PCS | Mod: ,,, | Performed by: INTERNAL MEDICINE

## 2020-06-18 PROCEDURE — 99900035 HC TECH TIME PER 15 MIN (STAT)

## 2020-06-18 PROCEDURE — 80053 COMPREHEN METABOLIC PANEL: CPT

## 2020-06-18 PROCEDURE — 93010 EKG 12-LEAD: ICD-10-PCS | Mod: ,,, | Performed by: INTERNAL MEDICINE

## 2020-06-18 PROCEDURE — 94770 HC EXHALED C02 TEST: CPT

## 2020-06-18 PROCEDURE — 85025 COMPLETE CBC W/AUTO DIFF WBC: CPT

## 2020-06-18 PROCEDURE — 63600175 PHARM REV CODE 636 W HCPCS: Performed by: INTERNAL MEDICINE

## 2020-06-18 PROCEDURE — 99233 SBSQ HOSP IP/OBS HIGH 50: CPT | Mod: ,,, | Performed by: INTERNAL MEDICINE

## 2020-06-18 PROCEDURE — 20600001 HC STEP DOWN PRIVATE ROOM

## 2020-06-18 RX ORDER — OXYCODONE HYDROCHLORIDE 10 MG/1
10 TABLET ORAL
Status: DISCONTINUED | OUTPATIENT
Start: 2020-06-18 | End: 2020-06-20 | Stop reason: HOSPADM

## 2020-06-18 RX ORDER — HYDROMORPHONE HYDROCHLORIDE 1 MG/ML
1 INJECTION, SOLUTION INTRAMUSCULAR; INTRAVENOUS; SUBCUTANEOUS
Status: DISCONTINUED | OUTPATIENT
Start: 2020-06-18 | End: 2020-06-20 | Stop reason: HOSPADM

## 2020-06-18 RX ORDER — DEXAMETHASONE 4 MG/1
8 TABLET ORAL DAILY
Status: DISCONTINUED | OUTPATIENT
Start: 2020-06-18 | End: 2020-06-19

## 2020-06-18 RX ORDER — PANTOPRAZOLE SODIUM 40 MG/1
40 TABLET, DELAYED RELEASE ORAL DAILY
Status: DISCONTINUED | OUTPATIENT
Start: 2020-06-18 | End: 2020-06-20 | Stop reason: HOSPADM

## 2020-06-18 RX ORDER — ONDANSETRON 2 MG/ML
4 INJECTION INTRAMUSCULAR; INTRAVENOUS EVERY 6 HOURS PRN
Status: DISCONTINUED | OUTPATIENT
Start: 2020-06-18 | End: 2020-06-20 | Stop reason: HOSPADM

## 2020-06-18 RX ORDER — ONDANSETRON 8 MG/1
8 TABLET, ORALLY DISINTEGRATING ORAL 4 TIMES DAILY
Status: DISCONTINUED | OUTPATIENT
Start: 2020-06-18 | End: 2020-06-20 | Stop reason: HOSPADM

## 2020-06-18 RX ORDER — ONDANSETRON 8 MG/1
8 TABLET, ORALLY DISINTEGRATING ORAL EVERY 8 HOURS
Status: DISCONTINUED | OUTPATIENT
Start: 2020-06-18 | End: 2020-06-18

## 2020-06-18 RX ORDER — PROCHLORPERAZINE EDISYLATE 5 MG/ML
5 INJECTION INTRAMUSCULAR; INTRAVENOUS EVERY 6 HOURS PRN
Status: COMPLETED | OUTPATIENT
Start: 2020-06-18 | End: 2020-06-18

## 2020-06-18 RX ORDER — ONDANSETRON 8 MG/1
8 TABLET, ORALLY DISINTEGRATING ORAL EVERY 8 HOURS
Status: CANCELLED | OUTPATIENT
Start: 2020-06-18

## 2020-06-18 RX ORDER — PROCHLORPERAZINE MALEATE 5 MG
10 TABLET ORAL EVERY 6 HOURS
Status: CANCELLED | OUTPATIENT
Start: 2020-06-18

## 2020-06-18 RX ORDER — PROMETHAZINE HYDROCHLORIDE 12.5 MG/1
12.5 TABLET ORAL EVERY 8 HOURS
Status: DISCONTINUED | OUTPATIENT
Start: 2020-06-18 | End: 2020-06-18

## 2020-06-18 RX ORDER — HYDROMORPHONE HYDROCHLORIDE 1 MG/ML
0.5 INJECTION, SOLUTION INTRAMUSCULAR; INTRAVENOUS; SUBCUTANEOUS
Status: DISCONTINUED | OUTPATIENT
Start: 2020-06-18 | End: 2020-06-18

## 2020-06-18 RX ORDER — PROMETHAZINE HYDROCHLORIDE 12.5 MG/1
12.5 TABLET ORAL EVERY 6 HOURS
Status: DISCONTINUED | OUTPATIENT
Start: 2020-06-18 | End: 2020-06-19

## 2020-06-18 RX ADMIN — PROMETHAZINE HYDROCHLORIDE 12.5 MG: 12.5 TABLET ORAL at 06:06

## 2020-06-18 RX ADMIN — ONDANSETRON 8 MG: 8 TABLET, ORALLY DISINTEGRATING ORAL at 05:06

## 2020-06-18 RX ADMIN — OLANZAPINE 5 MG: 2.5 TABLET, FILM COATED ORAL at 08:06

## 2020-06-18 RX ADMIN — DEXAMETHASONE 8 MG: 4 TABLET ORAL at 10:06

## 2020-06-18 RX ADMIN — HYDROMORPHONE HYDROCHLORIDE 1 MG: 1 INJECTION, SOLUTION INTRAMUSCULAR; INTRAVENOUS; SUBCUTANEOUS at 10:06

## 2020-06-18 RX ADMIN — CALCIUM CARBONATE (ANTACID) CHEW TAB 500 MG 500 MG: 500 CHEW TAB at 06:06

## 2020-06-18 RX ADMIN — PROMETHAZINE HYDROCHLORIDE 12.5 MG: 12.5 TABLET ORAL at 03:06

## 2020-06-18 RX ADMIN — PANTOPRAZOLE SODIUM 40 MG: 40 TABLET, DELAYED RELEASE ORAL at 10:06

## 2020-06-18 RX ADMIN — POLYETHYLENE GLYCOL 3350 17 G: 17 POWDER, FOR SOLUTION ORAL at 08:06

## 2020-06-18 RX ADMIN — OXYCODONE HYDROCHLORIDE 10 MG: 10 TABLET ORAL at 03:06

## 2020-06-18 RX ADMIN — ONDANSETRON 8 MG: 8 TABLET, ORALLY DISINTEGRATING ORAL at 12:06

## 2020-06-18 RX ADMIN — ENOXAPARIN SODIUM 90 MG: 100 INJECTION SUBCUTANEOUS at 08:06

## 2020-06-18 RX ADMIN — OXYCODONE HYDROCHLORIDE 10 MG: 10 TABLET ORAL at 08:06

## 2020-06-18 RX ADMIN — PROCHLORPERAZINE EDISYLATE 5 MG: 5 INJECTION INTRAMUSCULAR; INTRAVENOUS at 09:06

## 2020-06-18 RX ADMIN — OXYCODONE HYDROCHLORIDE 10 MG: 10 TABLET ORAL at 06:06

## 2020-06-18 RX ADMIN — ONDANSETRON 8 MG: 8 TABLET, ORALLY DISINTEGRATING ORAL at 08:06

## 2020-06-18 RX ADMIN — PROCHLORPERAZINE EDISYLATE 10 MG: 5 INJECTION INTRAMUSCULAR; INTRAVENOUS at 06:06

## 2020-06-18 RX ADMIN — LORAZEPAM 0.5 MG: 0.5 TABLET ORAL at 06:06

## 2020-06-18 RX ADMIN — ONDANSETRON HYDROCHLORIDE 8 MG: 2 SOLUTION INTRAMUSCULAR; INTRAVENOUS at 06:06

## 2020-06-18 RX ADMIN — OXYCODONE HYDROCHLORIDE 10 MG: 10 TABLET ORAL at 10:06

## 2020-06-18 RX ADMIN — CALCIUM CARBONATE (ANTACID) CHEW TAB 500 MG 500 MG: 500 CHEW TAB at 08:06

## 2020-06-18 NOTE — ASSESSMENT & PLAN NOTE
Patient complains of constipation in the past couple of days but she passes gas. She was started on dilauded PCA pump.    -- senna/doc BID  -- miralax BID  -- got one dose of methylnatrexone and had bowel movements.  -- consider fleet enema prn  .

## 2020-06-18 NOTE — SUBJECTIVE & OBJECTIVE
Interval History: Patient had bowel movements after methynaltrexone yesterday. Still nauseas and vomited once this morning. See hospital course    Oncology Treatment Plan:   [No treatment plan]    Medications:  Continuous Infusions:    Scheduled Meds:   dexAMETHasone  8 mg Oral Daily    enoxaparin  90 mg Subcutaneous Daily    fentaNYL  1 patch Transdermal Q72H    OLANZapine  5 mg Oral Nightly    ondansetron  8 mg Oral QID    pantoprazole  40 mg Oral Daily    polyethylene glycol  17 g Oral BID    promethazine  12.5 mg Oral Q6H    scopolamine  1 patch Transdermal Q3 Days    senna-docusate 8.6-50 mg  2 tablet Oral BID     PRN Meds:acetaminophen, calcium carbonate, Dextrose 10% Bolus, Dextrose 10% Bolus, glucagon (human recombinant), glucose, glucose, HYDROmorphone, insulin aspart U-100, LORazepam, naloxone, oxyCODONE, sodium chloride 0.9%     Review of Systems   Constitutional: Positive for activity change, appetite change and unexpected weight change. Negative for fatigue and fever.   HENT: Negative for congestion and sore throat.    Eyes: Negative for visual disturbance.   Respiratory: Negative for cough and shortness of breath.    Cardiovascular: Negative for chest pain and leg swelling.   Gastrointestinal: Positive for abdominal pain, constipation, nausea and vomiting. Negative for blood in stool and diarrhea.   Genitourinary: Negative for difficulty urinating, dysuria, flank pain and hematuria.   Musculoskeletal: Positive for back pain. Negative for myalgias.   Neurological: Negative for dizziness, tremors, numbness and headaches.   Psychiatric/Behavioral: Negative for confusion.     Objective:     Vital Signs (Most Recent):  Temp: 98 °F (36.7 °C) (06/18/20 0729)  Pulse: 85 (06/18/20 0820)  Resp: 14 (06/18/20 0820)  BP: 139/68 (06/18/20 0729)  SpO2: 96 % (06/18/20 0820) Vital Signs (24h Range):  Temp:  [98 °F (36.7 °C)-98.7 °F (37.1 °C)] 98 °F (36.7 °C)  Pulse:  [76-90] 85  Resp:  [14-18] 14  SpO2:  [91  %-96 %] 96 %  BP: (121-155)/(61-71) 139/68     Weight: 57.1 kg (125 lb 14.1 oz)  Body mass index is 23.02 kg/m².  Body surface area is 1.58 meters squared.      Intake/Output Summary (Last 24 hours) at 6/18/2020 1113  Last data filed at 6/18/2020 0830  Gross per 24 hour   Intake 646 ml   Output 350 ml   Net 296 ml       Physical Exam  Vitals signs and nursing note reviewed.   HENT:      Head: Normocephalic and atraumatic.   Eyes:      Extraocular Movements: Extraocular movements intact.      Pupils: Pupils are equal, round, and reactive to light.   Cardiovascular:      Rate and Rhythm: Normal rate and regular rhythm.      Pulses: Normal pulses.      Heart sounds: Normal heart sounds.   Pulmonary:      Effort: Pulmonary effort is normal. No respiratory distress.      Breath sounds: Normal breath sounds. No wheezing or rales.   Abdominal:      General: Abdomen is flat. Bowel sounds are normal. There is no distension.      Palpations: Abdomen is soft.      Tenderness: There is no abdominal tenderness.   Neurological:      Mental Status: She is alert and oriented to person, place, and time.         Significant Labs:   All pertinent labs from the last 24 hours have been reviewed.    Diagnostic Results:  I have reviewed all pertinent imaging results/findings within the past 24 hours.

## 2020-06-18 NOTE — PHYSICIAN QUERY
PT Name: Quyen Moody  MR #: [id number]     Physician Query Form - Documentation Clarification      CDS: Corinna Marinelli RN     Contact information:[email or web address] or (cell) [phone]    This form is a permanent document in the medical record.     Query Date: June 18, 2020    By submitting this query, we are merely seeking further clarification of documentation. Please utilize your independent clinical judgment when addressing the question(s) below.    The Medical record reflects the following:    Supporting Clinical Findings Location in Medical Record   Cancer of head of pancreas  Patient have stage IV unresectable pancreatic cancer with lung mets. Enrolled in [employer] trial.  Patient of Dr. Barrett.  Ca-19 on 6/15 [id number] which significantly increased compared to previous one 2880 on 5/15     -- CT CAP showed progression of malignancy with bilateral pulmonary nodules, and scattered peritoneal soft tissue opacities. Also right adnexal mass increased from 4 cm to 10 cm.   -- after goals of care discussion with patient, she decided to be discharged with home hospice once pain and n/v are controlled.  -- Code status changed to DNR   Hem/Onc PN 6/17    Impression:  History of metastatic pancreatic cancer with obstructing pancreatic head mass status-post common bile duct stent placement, multiple new and enlarged bilateral pulmonary nodules, and scattered peritoneal soft tissue opacities.  Overall findings are concerning for progression of disease.  Interval development small volume ascites and a small left pleural effusion.  Continued interval increase in size of a right adnexal mass which now measures 10.3 cm in maximal dimension, previously 4.2 cm.  Further evaluation as clinically indicated.  Both primary ovarian neoplasm and peritoneal implant should be considered.   CT Chest 6/16                                                                            Doctor, please further specify the right adnexal  mass.    Provider Use Only      [ X ] Right Adnexal Mass is metastasis from primary pancreatic cancer      [  ] Right Adnexal Mass, Unspecified      [  ]  Other:_____________________                                                                                                             [  ] Clinically Undetermined

## 2020-06-18 NOTE — ASSESSMENT & PLAN NOTE
Patient came with intractable n/v, abdominal and back pain. Patient was unable to get her pain meds d/t n/v.    -- Scheduled sublingual zofran 8 mg qid.  -- phenergan 12 mg PO q6h .  -- dexamethasone 8 mg PO daily.  -- resume home zyprexa and lorazepam  -- palliative care consult, appreciate reccs  -- EKG with qtc 442

## 2020-06-18 NOTE — RESPIRATORY THERAPY
Rapid Response Respiratory Therapy ETCO2 Check     Time of visit: 820     Code Status: DNR   : 1960  Bed: 808/808 A:   MRN: [id number]    SITUATION     Evaluated patient for: ETCO2 Compliance     BACKGROUND     Patient has a past medical history of Allergic rhinitis, CKD (chronic kidney disease) stage 3, GFR 30-59 ml/min, Hyperlipidemia, Lung metastasis, Pancreas cancer, and Skin rash.    ASSESSMENT     Is the ETCO2 monitor on? (Yes/No)  yes   Is the patient wearing a cannula? (Yes/No) yes  Are ETCO2 orders placed? (Yes/No) yes  Is the patient on a PCA pump? (Yes/No) yes  ETCO2 monitored: ETCO2 (mmHg): 24 mmHg  O2 Device/Concentration: room air  Pulse: 85 Respiratory rate: 14 Temperature: Temp: 98 °F (36.7 °C) BP: BP: 139/68 SpO2: 96  Level of Consciousness: Level of Consciousness (AVPU): alert  All Lung Field Breath Sounds: All Lung Fields Breath Sounds: clear, equal bilaterally  Ambu at bedside: Ambu bag with the patient?: Yes, Adult Ambu    INTERVENTIONS/RECOMMENDATIONS     Continue POC    PHYSICIAN ESCALATION     Physician Escalation (Yes/No): no      Discussed plan of care primary RT, Salma Lazo RRT    FOLLOW-UP     Please call back the Rapid Response RT, Laila Mccann, RRT at x [phone] for any questions or concerns.

## 2020-06-18 NOTE — PROGRESS NOTES
[facility]  Hematology/Oncology  Progress Note    Patient Name: Quyen Moody  Admission Date: 6/16/2020  Hospital Length of Stay: 2 days  Code Status: DNR     Subjective:     HPI:  Ms. Moody is 59 years old female, patient of Dr. Barrett, with past medical history of stage IV pancreatic cancer in [employer] trial and  left UE DVT on lovenox. Presented as direct admit after she called oncology clinic for severe abdominal pain and intractable nausea and vomiting. Patient states that for the past couple of days she was unable to keep anything in her stomach including pain meds. She has been complaining of mild abdominal in the past weeks but this morning it was 10/10 and associated with back pain. Her pain regimen was changed on 6/9 to methadone 5 BID and dilauded 8 mg PO q3h prn for breakthrough pain. Patient endorses 6-8 lbs weight loss in the past week, constipation, urinary incontinence, and chills. She denies fever, night sweats, cough, sob, chest pain, and urinary syptoms.    Oncologic History:  She has had intermittent upper abdominal pain since early June.  She presented to her PCP who originally ordered an US and CT.  US was negative and CT showed some haziness at the pancreatic head.  She saw GI who performed EUS.  On EUS, a 3x4cm pancreatic head mass was seen with possible invasion into the SMA and portal vein.  FNA path s/w pancreatic adenocarcinoma.  CA 19-9 level at outside hospital was >1000 per the patient.  She endorses nausea and inability to tolerate much PO.  She has lost about 10lbs over the last few weeks and is noticing her skin turning yellow.  Denies pruritis.  She is passing gas but has not had a BM in a few days, she attributes this to narcotic use.  She recently started taking stool softeners.  No previous cardiac or stroke history.  Surgical history significant for open appendectomy when she was in her 20s  - 8/7/2019 - 1/9/2020: Ellis+Abraxane and TTF on PANOVA-3, LFTs  elevated, but improved.  - Based on CT 12/2019 scans, Dr. Hallman felt pt was potentially resectable after chemoXRT given good response to current therapy.  However, subsequent Chest CT   showed new and growing micronodules in the in lungs c/w met disease in light of rising tumor markers.    - 1/23/2020: Changed to FOLFIRINOX and con't TTF on PANOVA as this seems to be achieving good local control of the primary tumor.     Hospital Course:  Patient was admitted for intractable n/v and severe back and abdominal pain. She was started on dilaudid PCA pump, antiemetics, and aggressive bowel regimen. CT CAP showed progression of malignancy with bilateral pulmonary nodules, and scattered peritoneal soft tissue opacities. Also right adnexal mass increased from 4 cm to 10 cm. Goal of discussions with patient concluded that she wants to go to home hospice and code to be DNR. Patient had bowel movements after she got methyl naltrexone.  Antiemetics changed to zofran sublignual and phenergan PO. Patient turned out that she fail fentanyl patch 25 mcg and was planned to get 50 but doesn't remmeber whether she had it or not. PCA dc'd and switched to fentanyl patch 50 mcg, oxycodone 10 mg q3h PRN, and dilauded 1 mg IV q3h PRN for breakthrough pain. Dexamethasone 8 PO daily.     Interval History: Patient had bowel movements after methynaltrexone yesterday. Still nauseas and vomited once this morning. See hospital course    Oncology Treatment Plan:   [No treatment plan]    Medications:  Continuous Infusions:    Scheduled Meds:   dexAMETHasone  8 mg Oral Daily    enoxaparin  90 mg Subcutaneous Daily    fentaNYL  1 patch Transdermal Q72H    OLANZapine  5 mg Oral Nightly    ondansetron  8 mg Oral QID    pantoprazole  40 mg Oral Daily    polyethylene glycol  17 g Oral BID    promethazine  12.5 mg Oral Q6H    scopolamine  1 patch Transdermal Q3 Days    senna-docusate 8.6-50 mg  2 tablet Oral BID     PRN Meds:acetaminophen,  calcium carbonate, Dextrose 10% Bolus, Dextrose 10% Bolus, glucagon (human recombinant), glucose, glucose, HYDROmorphone, insulin aspart U-100, LORazepam, naloxone, oxyCODONE, sodium chloride 0.9%     Review of Systems   Constitutional: Positive for activity change, appetite change and unexpected weight change. Negative for fatigue and fever.   HENT: Negative for congestion and sore throat.    Eyes: Negative for visual disturbance.   Respiratory: Negative for cough and shortness of breath.    Cardiovascular: Negative for chest pain and leg swelling.   Gastrointestinal: Positive for abdominal pain, constipation, nausea and vomiting. Negative for blood in stool and diarrhea.   Genitourinary: Negative for difficulty urinating, dysuria, flank pain and hematuria.   Musculoskeletal: Positive for back pain. Negative for myalgias.   Neurological: Negative for dizziness, tremors, numbness and headaches.   Psychiatric/Behavioral: Negative for confusion.     Objective:     Vital Signs (Most Recent):  Temp: 98 °F (36.7 °C) (06/18/20 0729)  Pulse: 85 (06/18/20 0820)  Resp: 14 (06/18/20 0820)  BP: 139/68 (06/18/20 0729)  SpO2: 96 % (06/18/20 0820) Vital Signs (24h Range):  Temp:  [98 °F (36.7 °C)-98.7 °F (37.1 °C)] 98 °F (36.7 °C)  Pulse:  [76-90] 85  Resp:  [14-18] 14  SpO2:  [91 %-96 %] 96 %  BP: (121-155)/(61-71) 139/68     Weight: 57.1 kg (125 lb 14.1 oz)  Body mass index is 23.02 kg/m².  Body surface area is 1.58 meters squared.      Intake/Output Summary (Last 24 hours) at 6/18/2020 1113  Last data filed at 6/18/2020 0830  Gross per 24 hour   Intake 646 ml   Output 350 ml   Net 296 ml       Physical Exam  Vitals signs and nursing note reviewed.   HENT:      Head: Normocephalic and atraumatic.   Eyes:      Extraocular Movements: Extraocular movements intact.      Pupils: Pupils are equal, round, and reactive to light.   Cardiovascular:      Rate and Rhythm: Normal rate and regular rhythm.      Pulses: Normal pulses.       Heart sounds: Normal heart sounds.   Pulmonary:      Effort: Pulmonary effort is normal. No respiratory distress.      Breath sounds: Normal breath sounds. No wheezing or rales.   Abdominal:      General: Abdomen is flat. Bowel sounds are normal. There is no distension.      Palpations: Abdomen is soft.      Tenderness: There is no abdominal tenderness.   Neurological:      Mental Status: She is alert and oriented to person, place, and time.         Significant Labs:   All pertinent labs from the last 24 hours have been reviewed.    Diagnostic Results:  I have reviewed all pertinent imaging results/findings within the past 24 hours.    Assessment/Plan:     * Abdominal and low back pain  Patient presented with severe abdominal pain and intractable n/v. She was unable to tolerate oral intake and couldn't get her oral pain meds  Seen on 6/9 by paliative, started on methadone 5 BID and increased to TID for better pain control on top of dilaudid oral 8 q 3 hours for breakthrough pain. Patient reports no relief with current pain.  CBC, CMP, PT, PTT, LD are unremarkable.    -- Pal care consult, appreciate assistance  -- Patient turned out that she fail fentanyl patch 25 mcg and was planned to get 50 but doesn't remmeber whether she had it or not. Dilaudid PCA pump dc'd Switched to fentanyl patch 50 mcg, oxycodone 10 q3h prn, and dilaudid 1 q3h prn.  -- aggressive bowel regimen. Got one dose of methylnaltrexone 45 once and had bowel movements  -- Antiemetics      Constipation  Patient complains of constipation in the past couple of days but she passes gas. She was started on dilauded PCA pump.    -- senna/doc BID  -- miralax BID  -- got one dose of methylnatrexone and had bowel movements.  -- consider fleet enema prn  .    Intractable nausea and vomiting  Patient came with intractable n/v, abdominal and back pain. Patient was unable to get her pain meds d/t n/v.    -- Scheduled sublingual zofran 8 mg qid.  -- phenergan 12  mg PO q6h .  -- dexamethasone 8 mg PO daily.  -- resume home zyprexa and lorazepam  -- palliative care consult, appreciate reccs  -- EKG with qtc 442    Cancer of head of pancreas  Patient have stage IV unresectable pancreatic cancer with lung mets. Enrolled in [employer] trial.  Patient of Dr. Barrett.  Ca-19 on 6/15 [id number] which significantly increased compared to previous one 2880 on 5/15    -- CT CAP showed progression of malignancy with bilateral pulmonary nodules, and scattered peritoneal soft tissue opacities. Also right adnexal mass increased from 4 cm to 10 cm.   -- after goals of care discussion with patient, she decided to be discharged with home hospice once pain and n/v are controlled.  -- Code status changed to DNR           Pancho Srivastava MD  Hematology/Oncology  [facility]

## 2020-06-18 NOTE — PROGRESS NOTES
POC reviewed with patient; understanding verbalized. Dilaudid PCA discontinued this shift. Oxy 10 PRN administered with relief. Regular diet; poor appetite. Pt voids in hat; no BMs noted this shift. Several episodes of emesis this shift; scheduled phenergan and zofran administered with relief. Pt. with nonskid footwear on, bed in lowest position, and locked with bed rails up x2. Pt. has call light and personal items within reach. Patient ambulates in room independently. VSS and afebrile this shift. All questions and concerns addressed at this time. Will continue to monitor.

## 2020-06-18 NOTE — ASSESSMENT & PLAN NOTE
Patient presented with severe abdominal pain and intractable n/v. She was unable to tolerate oral intake and couldn't get her oral pain meds  Seen on 6/9 by paliative, started on methadone 5 BID and increased to TID for better pain control on top of dilaudid oral 8 q 3 hours for breakthrough pain. Patient reports no relief with current pain.  CBC, CMP, PT, PTT, LD are unremarkable.    -- Pal care consult, appreciate assistance  -- Patient turned out that she fail fentanyl patch 25 mcg and was planned to get 50 but doesn't remmeber whether she had it or not. Dilaudid PCA pump dc'd Switched to fentanyl patch 50 mcg, oxycodone 10 q3h prn, and dilaudid 1 q3h prn.  -- aggressive bowel regimen. Got one dose of methylnaltrexone 45 once and had bowel movements  -- Antiemetics     The patient has been examined and the H&P has been reviewed:    I concur with the findings and no changes have occurred since H&P was written.    Anesthesia/Surgery risks, benefits and alternative options discussed and understood by patient/family.          Active Hospital Problems    Diagnosis  POA    Nontraumatic tear of left rotator cuff [M75.102]  Yes      Resolved Hospital Problems   No resolved problems to display.

## 2020-06-18 NOTE — PLAN OF CARE
Pt AAOx4 and ambulatory. Dilaudid PCA continued. Pt endorsing abd cramping, given one time dose of methylnaltrexone subq, has had several loose BM's since. Nausea managed by scheduled compazine and zofran. No other complaints at this time. Will continue to monitor.

## 2020-06-18 NOTE — ASSESSMENT & PLAN NOTE
patient seen by carmelo in clinic 6/8/2020   started on methadone 5 BID and increased to TID for better pain control on top of dilaudid oral 8 q 3 hours .  patient reported no pain relief with current regimen, and she has been having nausea and vomintg, she vomited her pain pills yesterday and unable to tolerate oral intake, admitted for pain control.    CT abdomin/Pelvice consistent with disease progression, after GOC discussion with Dr. Stein patient decided to go home with home hospice once pain is under control.  Code status changed to DNR  Primary team will arrange home hospice transition once pain and nausea under control      Recommendations:  - D/C PCA dilaudid pump and encourage patient to ask for oxycodone 10 mg oxycodone every 3 hr PRN for breakthrough pain .  - Conitnue fentanyl patch at 50 mcg.   - aggressive bowel regimen and consider enema, given the good response to 1 dose of Methylnaltrexone 45 mg, would sent patient home with a prescription to use it as needed.  - anti-emitic for nausea/vominting, agree with dexamethasone.

## 2020-06-18 NOTE — PROGRESS NOTES
6/16/20-6/17/20     Protocol: A Phase 1, Multicenter, Open-label, Dose Escalation, Safety, Pharmacodynamic and Pharmacokinetic Study of .02 Given orally on a daily x 28 day schedule in patients with Advanced Solid Tumors or Lymphoma  Protocol # [id number]  IRB # 2018.428  PI: Eliot Barrett MD  Version Date: 5.0    Pt #      End of Study:     Patient presented to hospital with severe nausea and pain. (BANDAR). She was given anti-emitic medications for the nausea and PCA dilaudid pump for the pain. She was currently on a dose hold and her last dose of study drug was 6/14/20.        Pt had restaging scans done while admitted which show progressive disease with growth of most target lesions and new bilateral pulmonary nodules. It was determined by investigator the admission was due to disease progression and unrelated to study drug.  She has decided she will go on hospice once stable enough for discharge.     Patient had labs performed while she is admitted in the hospital as well as EKG.        For most up to date Adverse Events see shadow research chart.       AE's  Weight loss Grade 1 -increased to Grade 2 (started 6/15/2020)  Vomiting Grade 1 (started 6/13/2020)  Increased ALT Grade 1 (started 5/25/2020-resolved as of)  Back pain Grade 3 (started 5/25/2020)  Constipation Grade 2 (started 6/7/2020-resolved as of today)  CA related pain Grade 3 (6/16/20)  Nausea Grade 3 (6/16/20)     Baseline log  Venous Thromboembolism Grade 2  Menopause Grade 1  CA related pain Grade 2  Nausea Grade 2  Insomnia Grade 1  Allergic reaction (skin rash) Grade 2  Increased Alkaline Phosphate Grade 1

## 2020-06-18 NOTE — SUBJECTIVE & OBJECTIVE
Interval History: patient had multiple bowel movement after getting Methylnaltrexone injunction, placed fentnly patch yesterday, feels her pain is better but her nausea not improving with Zofran, started on dexamathasone.   Medications:  Continuous Infusions:  Scheduled Meds:   dexAMETHasone  8 mg Oral Daily    enoxaparin  90 mg Subcutaneous Daily    fentaNYL  1 patch Transdermal Q72H    OLANZapine  5 mg Oral Nightly    ondansetron  8 mg Oral QID    pantoprazole  40 mg Oral Daily    polyethylene glycol  17 g Oral BID    promethazine  12.5 mg Oral Q6H    scopolamine  1 patch Transdermal Q3 Days    senna-docusate 8.6-50 mg  2 tablet Oral BID     PRN Meds:acetaminophen, calcium carbonate, Dextrose 10% Bolus, Dextrose 10% Bolus, glucagon (human recombinant), glucose, glucose, HYDROmorphone, insulin aspart U-100, LORazepam, naloxone, oxyCODONE, sodium chloride 0.9%    Objective:     Vital Signs (Most Recent):  Temp: 98.4 °F (36.9 °C) (06/18/20 1127)  Pulse: 89 (06/18/20 1127)  Resp: 16 (06/18/20 1127)  BP: (!) 143/82 (06/18/20 1127)  SpO2: 95 % (06/18/20 1127) Vital Signs (24h Range):  Temp:  [98 °F (36.7 °C)-98.7 °F (37.1 °C)] 98.4 °F (36.9 °C)  Pulse:  [76-90] 89  Resp:  [14-18] 16  SpO2:  [91 %-96 %] 95 %  BP: (121-155)/(61-82) 143/82     Weight: 57.1 kg (125 lb 14.1 oz)  Body mass index is 23.02 kg/m².    Review of Symptoms  Symptom Assessment (ESAS 0-10 scale)  ESAS 0 1 2 3 4 5 6 7 8 9 10   Pain     4         Dyspnea              Anxiety              Nausea         8     Depression               Anorexia              Fatigue              Insomnia              Restlessness               Agitation                  Physical Exam  Vitals signs and nursing note reviewed.   Constitutional:       Appearance: Normal appearance. She is not ill-appearing or toxic-appearing.   HENT:      Head: Normocephalic and atraumatic.      Nose: Nose normal.   Eyes:      General: No scleral icterus.  Neck:      Musculoskeletal:  Neck supple. No muscular tenderness.   Cardiovascular:      Rate and Rhythm: Normal rate.   Pulmonary:      Effort: Pulmonary effort is normal.   Abdominal:      General: There is no distension.      Tenderness: There is no abdominal tenderness.   Musculoskeletal:         General: No swelling or tenderness.   Skin:     Coloration: Skin is not pale.      Findings: No erythema.   Neurological:      Mental Status: She is alert and oriented to person, place, and time.   Psychiatric:         Mood and Affect: Mood normal.         Behavior: Behavior normal.         Significant Labs:  CBC:   Recent Labs   Lab 06/18/20  0400   WBC 5.63   HGB 12.1   HCT 39.8   MCV 91        BMP:  Recent Labs   Lab 06/18/20  0400         K 4.4      CO2 24   BUN 8   CREATININE 0.7   CALCIUM 8.6*   MG 1.6     LFT:  Lab Results   Component Value Date    AST 28 06/18/2020     (H) 05/15/2020    ALKPHOS 153 (H) 06/18/2020    BILITOT 0.5 06/18/2020     Albumin:   Albumin   Date Value Ref Range Status   06/18/2020 3.1 (L) 3.5 - 5.2 g/dL Final     Protein:   Total Protein   Date Value Ref Range Status   06/18/2020 6.7 6.0 - 8.4 g/dL Final     Lactic acid:   Lab Results   Component Value Date    LACTATE 1.9 03/19/2020    LACTATE 1.1 03/18/2020       Significant Imaging: None

## 2020-06-19 LAB
ALBUMIN SERPL BCP-MCNC: 3.3 G/DL (ref 3.5–5.2)
ALP SERPL-CCNC: 144 U/L (ref 55–135)
ALT SERPL W/O P-5'-P-CCNC: 33 U/L (ref 10–44)
ANION GAP SERPL CALC-SCNC: 10 MMOL/L (ref 8–16)
AST SERPL-CCNC: 25 U/L (ref 10–40)
BASOPHILS # BLD AUTO: 0.01 K/UL (ref 0–0.2)
BASOPHILS NFR BLD: 0.2 % (ref 0–1.9)
BILIRUB SERPL-MCNC: 0.6 MG/DL (ref 0.1–1)
BUN SERPL-MCNC: 7 MG/DL (ref 6–20)
CALCIUM SERPL-MCNC: 9 MG/DL (ref 8.7–10.5)
CHLORIDE SERPL-SCNC: 106 MMOL/L (ref 95–110)
CO2 SERPL-SCNC: 22 MMOL/L (ref 23–29)
CREAT SERPL-MCNC: 0.7 MG/DL (ref 0.5–1.4)
DIFFERENTIAL METHOD: ABNORMAL
EOSINOPHIL # BLD AUTO: 0 K/UL (ref 0–0.5)
EOSINOPHIL NFR BLD: 0.2 % (ref 0–8)
ERYTHROCYTE [DISTWIDTH] IN BLOOD BY AUTOMATED COUNT: 13.4 % (ref 11.5–14.5)
EST. GFR  (AFRICAN AMERICAN): >60 ML/MIN/1.73 M^2
EST. GFR  (NON AFRICAN AMERICAN): >60 ML/MIN/1.73 M^2
GLUCOSE SERPL-MCNC: 93 MG/DL (ref 70–110)
HCT VFR BLD AUTO: 39.8 % (ref 37–48.5)
HGB BLD-MCNC: 12.5 G/DL (ref 12–16)
IMM GRANULOCYTES # BLD AUTO: 0.01 K/UL (ref 0–0.04)
IMM GRANULOCYTES NFR BLD AUTO: 0.2 % (ref 0–0.5)
LYMPHOCYTES # BLD AUTO: 0.9 K/UL (ref 1–4.8)
LYMPHOCYTES NFR BLD: 15.2 % (ref 18–48)
MAGNESIUM SERPL-MCNC: 1.5 MG/DL (ref 1.6–2.6)
MCH RBC QN AUTO: 27.6 PG (ref 27–31)
MCHC RBC AUTO-ENTMCNC: 31.4 G/DL (ref 32–36)
MCV RBC AUTO: 88 FL (ref 82–98)
MONOCYTES # BLD AUTO: 0.6 K/UL (ref 0.3–1)
MONOCYTES NFR BLD: 9.8 % (ref 4–15)
NEUTROPHILS # BLD AUTO: 4.5 K/UL (ref 1.8–7.7)
NEUTROPHILS NFR BLD: 74.4 % (ref 38–73)
NRBC BLD-RTO: 0 /100 WBC
PHOSPHATE SERPL-MCNC: 3.3 MG/DL (ref 2.7–4.5)
PLATELET # BLD AUTO: 172 K/UL (ref 150–350)
PMV BLD AUTO: 11.9 FL (ref 9.2–12.9)
POTASSIUM SERPL-SCNC: 4.1 MMOL/L (ref 3.5–5.1)
PROT SERPL-MCNC: 6.8 G/DL (ref 6–8.4)
RBC # BLD AUTO: 4.53 M/UL (ref 4–5.4)
SODIUM SERPL-SCNC: 138 MMOL/L (ref 136–145)
WBC # BLD AUTO: 5.99 K/UL (ref 3.9–12.7)

## 2020-06-19 PROCEDURE — 25000003 PHARM REV CODE 250: Performed by: STUDENT IN AN ORGANIZED HEALTH CARE EDUCATION/TRAINING PROGRAM

## 2020-06-19 PROCEDURE — 99233 PR SUBSEQUENT HOSPITAL CARE,LEVL III: ICD-10-PCS | Mod: ,,, | Performed by: INTERNAL MEDICINE

## 2020-06-19 PROCEDURE — 80053 COMPREHEN METABOLIC PANEL: CPT

## 2020-06-19 PROCEDURE — 84100 ASSAY OF PHOSPHORUS: CPT

## 2020-06-19 PROCEDURE — 85025 COMPLETE CBC W/AUTO DIFF WBC: CPT

## 2020-06-19 PROCEDURE — 83735 ASSAY OF MAGNESIUM: CPT

## 2020-06-19 PROCEDURE — 63600175 PHARM REV CODE 636 W HCPCS: Performed by: STUDENT IN AN ORGANIZED HEALTH CARE EDUCATION/TRAINING PROGRAM

## 2020-06-19 PROCEDURE — 25000003 PHARM REV CODE 250: Performed by: INTERNAL MEDICINE

## 2020-06-19 PROCEDURE — 99233 SBSQ HOSP IP/OBS HIGH 50: CPT | Mod: ,,, | Performed by: INTERNAL MEDICINE

## 2020-06-19 PROCEDURE — 20600001 HC STEP DOWN PRIVATE ROOM

## 2020-06-19 PROCEDURE — 36415 COLL VENOUS BLD VENIPUNCTURE: CPT

## 2020-06-19 PROCEDURE — 63600175 PHARM REV CODE 636 W HCPCS: Mod: JG | Performed by: STUDENT IN AN ORGANIZED HEALTH CARE EDUCATION/TRAINING PROGRAM

## 2020-06-19 PROCEDURE — 25500020 PHARM REV CODE 255: Performed by: INTERNAL MEDICINE

## 2020-06-19 RX ORDER — MAGNESIUM SULFATE HEPTAHYDRATE 40 MG/ML
2 INJECTION, SOLUTION INTRAVENOUS ONCE
Status: COMPLETED | OUTPATIENT
Start: 2020-06-19 | End: 2020-06-19

## 2020-06-19 RX ORDER — PROMETHAZINE HYDROCHLORIDE 25 MG/1
25 TABLET ORAL EVERY 6 HOURS
Status: DISCONTINUED | OUTPATIENT
Start: 2020-06-19 | End: 2020-06-20 | Stop reason: HOSPADM

## 2020-06-19 RX ORDER — DEXAMETHASONE 4 MG/1
4 TABLET ORAL EVERY 12 HOURS
Status: DISCONTINUED | OUTPATIENT
Start: 2020-06-19 | End: 2020-06-19

## 2020-06-19 RX ORDER — DEXAMETHASONE 4 MG/1
4 TABLET ORAL EVERY 12 HOURS
Status: DISCONTINUED | OUTPATIENT
Start: 2020-06-19 | End: 2020-06-20 | Stop reason: HOSPADM

## 2020-06-19 RX ORDER — OLANZAPINE 10 MG/1
10 TABLET ORAL NIGHTLY
Status: DISCONTINUED | OUTPATIENT
Start: 2020-06-19 | End: 2020-06-20 | Stop reason: HOSPADM

## 2020-06-19 RX ADMIN — CALCIUM CARBONATE (ANTACID) CHEW TAB 500 MG 500 MG: 500 CHEW TAB at 07:06

## 2020-06-19 RX ADMIN — LORAZEPAM 0.5 MG: 0.5 TABLET ORAL at 07:06

## 2020-06-19 RX ADMIN — OLANZAPINE 10 MG: 10 TABLET, FILM COATED ORAL at 08:06

## 2020-06-19 RX ADMIN — ONDANSETRON HYDROCHLORIDE 4 MG: 2 SOLUTION INTRAMUSCULAR; INTRAVENOUS at 02:06

## 2020-06-19 RX ADMIN — PROMETHAZINE HYDROCHLORIDE 12.5 MG: 12.5 TABLET ORAL at 12:06

## 2020-06-19 RX ADMIN — HYDROMORPHONE HYDROCHLORIDE 1 MG: 1 INJECTION, SOLUTION INTRAMUSCULAR; INTRAVENOUS; SUBCUTANEOUS at 07:06

## 2020-06-19 RX ADMIN — HYDROMORPHONE HYDROCHLORIDE 1 MG: 1 INJECTION, SOLUTION INTRAMUSCULAR; INTRAVENOUS; SUBCUTANEOUS at 04:06

## 2020-06-19 RX ADMIN — OXYCODONE HYDROCHLORIDE 10 MG: 10 TABLET ORAL at 10:06

## 2020-06-19 RX ADMIN — ALTEPLASE 2 MG: 2.2 INJECTION, POWDER, LYOPHILIZED, FOR SOLUTION INTRAVENOUS at 06:06

## 2020-06-19 RX ADMIN — IOHEXOL 175 ML: 350 INJECTION, SOLUTION INTRAVENOUS at 01:06

## 2020-06-19 RX ADMIN — OXYCODONE HYDROCHLORIDE 10 MG: 10 TABLET ORAL at 06:06

## 2020-06-19 RX ADMIN — CALCIUM CARBONATE (ANTACID) CHEW TAB 500 MG 500 MG: 500 CHEW TAB at 09:06

## 2020-06-19 RX ADMIN — ONDANSETRON 8 MG: 8 TABLET, ORALLY DISINTEGRATING ORAL at 09:06

## 2020-06-19 RX ADMIN — PROMETHAZINE HYDROCHLORIDE 12.5 MG: 12.5 TABLET ORAL at 05:06

## 2020-06-19 RX ADMIN — DEXAMETHASONE 4 MG: 4 TABLET ORAL at 08:06

## 2020-06-19 RX ADMIN — LORAZEPAM 0.5 MG: 0.5 TABLET ORAL at 12:06

## 2020-06-19 RX ADMIN — OXYCODONE HYDROCHLORIDE 10 MG: 10 TABLET ORAL at 01:06

## 2020-06-19 RX ADMIN — HYDROMORPHONE HYDROCHLORIDE 1 MG: 1 INJECTION, SOLUTION INTRAMUSCULAR; INTRAVENOUS; SUBCUTANEOUS at 11:06

## 2020-06-19 RX ADMIN — MAGNESIUM SULFATE 2 G: 2 INJECTION INTRAVENOUS at 11:06

## 2020-06-19 RX ADMIN — LORAZEPAM 0.5 MG: 0.5 TABLET ORAL at 05:06

## 2020-06-19 RX ADMIN — ENOXAPARIN SODIUM 90 MG: 100 INJECTION SUBCUTANEOUS at 09:06

## 2020-06-19 RX ADMIN — PROMETHAZINE HYDROCHLORIDE 25 MG: 25 TABLET ORAL at 11:06

## 2020-06-19 RX ADMIN — ONDANSETRON 8 MG: 8 TABLET, ORALLY DISINTEGRATING ORAL at 05:06

## 2020-06-19 RX ADMIN — PROMETHAZINE HYDROCHLORIDE 25 MG: 25 TABLET ORAL at 07:06

## 2020-06-19 RX ADMIN — DEXAMETHASONE 4 MG: 4 TABLET ORAL at 11:06

## 2020-06-19 RX ADMIN — HYDROMORPHONE HYDROCHLORIDE 1 MG: 1 INJECTION, SOLUTION INTRAMUSCULAR; INTRAVENOUS; SUBCUTANEOUS at 03:06

## 2020-06-19 RX ADMIN — PANTOPRAZOLE SODIUM 40 MG: 40 TABLET, DELAYED RELEASE ORAL at 09:06

## 2020-06-19 NOTE — ASSESSMENT & PLAN NOTE
Intractable nausea and vomiting  patient seen by carmelo in clinic 6/8/2020   started on methadone 5 BID and increased to TID for better pain control on top of dilaudid oral 8 q 3 hours .  patient reported no pain relief with current regimen, and she has been having nausea and vomintg, she vomited her pain pills yesterday and unable to tolerate oral intake, admitted for pain control.    CT abdomin/Pelvice consistent with disease progression, after GOC discussion with Dr. Stein patient decided to go home with home hospice once pain is under control.  Code status changed to DNR  Primary team will arrange home hospice transition once pain and nausea under control      Recommendations:  - Continue oxycodone 10 mg oxycodone every 3 hr PRN for breakthrough pain .  - Conitnue fentanyl patch at 50 mcg.   - aggressive bowel regimen and consider enema, given the good response to 1 dose of Methylnaltrexone 45 mg, would sent patient home with a prescription to use it as needed.  - anti-emitic for nausea/vominting, agree with dexamethasone.    - Upper GI series to eval for possible mechanical obstruction secondary to tumor burden and consider GI consult.

## 2020-06-19 NOTE — ASSESSMENT & PLAN NOTE
Patient came with intractable n/v, abdominal and back pain. Patient was unable to get her pain meds d/t n/v.    -- Scheduled sublingual zofran 8 mg qid.  -- phenergan 25 mg PO q6h .  -- dexamethasone 4 mg PO BID.  -- increase zyprexa to 10 mg and continue lorazepam  -- palliative care consult, appreciate reccs  -- EKG with qtc 442  - KUB negative for obstruction on 6/19/20; discussing about upper GI series with radiology

## 2020-06-19 NOTE — PROGRESS NOTES
06/19/20 1722   Vital Signs   Temp 98.5 °F (36.9 °C)   Temp src Oral   Pulse 81   Heart Rate Source Monitor   Resp 19   SpO2 95 %   Pulse Oximetry Type Intermittent   O2 Device (Oxygen Therapy) room air   BP (!) 166/85   MAP (mmHg) 118   BP Location Right arm   BP Method Automatic   Patient Position Lying     Blood pressure reported to Mazin Oliveros MD. No additional orders provided. Will continue to monitor.

## 2020-06-19 NOTE — PROGRESS NOTES
Home hospice service arrangements are in progress. Patient has chosen to use [facility], [phone]. Referral has been sent through [facility] and have spoken with agency's liason Jn Garner ([phone] cell) via phone. Agency has obtained insurance verification and authorization. Agency will need to be called and hospice orders with additional chart notes sent when patient is ready for discharge. The hospice nurse will see patient at home on the same day as discharge from the hospital, and will complete consent forms then. Comfort care medications and DME will be ordered by hospice staff for delivery to patient's home. Patient will be returning via family car to her own home address at [address] [place] [address], [id number]; home phone [phone], patient's cell phone [phone]; with assistance as needed from family. Discussed and provided her with written resources for private sitter/caregiver services. Report given to my coworker Mabel Guerrero LCSW, the Oncology Social Worker who is on call this weekend.

## 2020-06-19 NOTE — NURSING
Pt has had nausea and vomiting consistently almost every 2h, especially after administration of pain medication. Emesis is green and thin, between 50 and 100cc at a time. MD called for xray order to check for ileus. KUB x-ray taken and awaiting results and further orders. Will continue to monitor.

## 2020-06-19 NOTE — PROGRESS NOTES
[facility]  Hematology/Oncology  Progress Note    Patient Name: Quyen Moody  Admission Date: 6/16/2020  Hospital Length of Stay: 3 days  Code Status: DNR     Subjective:     HPI:  Ms. Moody is 59 years old female, patient of Dr. Barrett, with past medical history of stage IV pancreatic cancer in Genz[employer] trial and  left UE DVT on lovenox. Presented as direct admit after she called oncology clinic for severe abdominal pain and intractable nausea and vomiting. Patient states that for the past couple of days she was unable to keep anything in her stomach including pain meds. She has been complaining of mild abdominal in the past weeks but this morning it was 10/10 and associated with back pain. Her pain regimen was changed on 6/9 to methadone 5 BID and dilauded 8 mg PO q3h prn for breakthrough pain. Patient endorses 6-8 lbs weight loss in the past week, constipation, urinary incontinence, and chills. She denies fever, night sweats, cough, sob, chest pain, and urinary syptoms.    Oncologic History:  She has had intermittent upper abdominal pain since early June.  She presented to her PCP who originally ordered an US and CT.  US was negative and CT showed some haziness at the pancreatic head.  She saw GI who performed EUS.  On EUS, a 3x4cm pancreatic head mass was seen with possible invasion into the SMA and portal vein.  FNA path s/w pancreatic adenocarcinoma.  CA 19-9 level at outside hospital was >1000 per the patient.  She endorses nausea and inability to tolerate much PO.  She has lost about 10lbs over the last few weeks and is noticing her skin turning yellow.  Denies pruritis.  She is passing gas but has not had a BM in a few days, she attributes this to narcotic use.  She recently started taking stool softeners.  No previous cardiac or stroke history.  Surgical history significant for open appendectomy when she was in her 20s  - 8/7/2019 - 1/9/2020: [place]+Abraxane and TTF on PANOVA-3, LFTs  elevated, but improved.  - Based on CT 12/2019 scans, Dr. Hallman felt pt was potentially resectable after chemoXRT given good response to current therapy.  However, subsequent Chest CT   showed new and growing micronodules in the in lungs c/w met disease in light of rising tumor markers.    - 1/23/2020: Changed to FOLFIRINOX and con't TTF on PANOVA as this seems to be achieving good local control of the primary tumor.     Interval History: Pt is having nausea and vomiting; she had vomited about 6 times yesterday. Her last normal BM was about 2 days ago when she had used methylnaltrexone. No other     Oncology Treatment Plan:   [No treatment plan]    Medications:  Continuous Infusions:  Scheduled Meds:   dexAMETHasone  4 mg Oral Q12H    enoxaparin  90 mg Subcutaneous Daily    fentaNYL  1 patch Transdermal Q72H    magnesium sulfate IVPB  2 g Intravenous Once    OLANZapine  10 mg Oral Nightly    ondansetron  8 mg Oral QID    pantoprazole  40 mg Oral Daily    polyethylene glycol  17 g Oral BID    promethazine  25 mg Oral Q6H    scopolamine  1 patch Transdermal Q3 Days    senna-docusate 8.6-50 mg  2 tablet Oral BID     PRN Meds:acetaminophen, calcium carbonate, Dextrose 10% Bolus, Dextrose 10% Bolus, glucagon (human recombinant), glucose, glucose, HYDROmorphone, insulin aspart U-100, LORazepam, naloxone, ondansetron, oxyCODONE, sodium chloride 0.9%     Review of Systems   Constitutional: Positive for activity change, appetite change and unexpected weight change. Negative for fatigue and fever.   HENT: Negative for congestion and sore throat.    Eyes: Negative for visual disturbance.   Respiratory: Negative for cough and shortness of breath.    Cardiovascular: Negative for chest pain and leg swelling.   Gastrointestinal: Positive for abdominal pain, constipation, nausea and vomiting. Negative for blood in stool and diarrhea.   Genitourinary: Negative for difficulty urinating, dysuria, flank pain and hematuria.    Musculoskeletal: Positive for back pain. Negative for myalgias.   Neurological: Negative for dizziness, tremors, numbness and headaches.   Psychiatric/Behavioral: Negative for confusion.     Objective:     Vital Signs (Most Recent):  Temp: 96.7 °F (35.9 °C) (06/19/20 1100)  Pulse: 86 (06/19/20 1100)  Resp: 19 (06/19/20 1100)  BP: (!) 152/73 (06/19/20 1100)  SpO2: 97 % (06/19/20 1126) Vital Signs (24h Range):  Temp:  [96.7 °F (35.9 °C)-99 °F (37.2 °C)] 96.7 °F (35.9 °C)  Pulse:  [83-90] 86  Resp:  [16-19] 19  SpO2:  [93 %-97 %] 97 %  BP: (149-160)/(71-79) 152/73     Weight: 57.1 kg (125 lb 14.1 oz)  Body mass index is 23.02 kg/m².  Body surface area is 1.58 meters squared.      Intake/Output Summary (Last 24 hours) at 6/19/2020 1134  Last data filed at 6/19/2020 0820  Gross per 24 hour   Intake 540 ml   Output 1120 ml   Net -580 ml       Physical Exam  Vitals signs and nursing note reviewed.   HENT:      Head: Normocephalic and atraumatic.   Eyes:      Extraocular Movements: Extraocular movements intact.      Pupils: Pupils are equal, round, and reactive to light.   Cardiovascular:      Rate and Rhythm: Normal rate and regular rhythm.      Pulses: Normal pulses.      Heart sounds: Normal heart sounds.   Pulmonary:      Effort: Pulmonary effort is normal. No respiratory distress.      Breath sounds: Normal breath sounds. No wheezing or rales.   Abdominal:      General: Abdomen is flat. Bowel sounds are normal. There is no distension.      Palpations: Abdomen is soft.      Tenderness: There is no abdominal tenderness.   Neurological:      Mental Status: She is alert and oriented to person, place, and time.         Significant Labs:   BMP:   Recent Labs   Lab 06/18/20  0400 06/19/20  0452    93    138   K 4.4 4.1    106   CO2 24 22*   BUN 8 7   CREATININE 0.7 0.7   CALCIUM 8.6* 9.0   MG 1.6 1.5*   , CBC:   Recent Labs   Lab 06/18/20  0400 06/19/20  0452   WBC 5.63 5.99   HGB 12.1 12.5   HCT 39.8  39.8    172   , CMP:   Recent Labs   Lab 06/18/20  0400 06/19/20  0452    138   K 4.4 4.1    106   CO2 24 22*    93   BUN 8 7   CREATININE 0.7 0.7   CALCIUM 8.6* 9.0   PROT 6.7 6.8   ALBUMIN 3.1* 3.3*   BILITOT 0.5 0.6   ALKPHOS 153* 144*   AST 28 25   ALT 32 33   ANIONGAP 9 10   EGFRNONAA >60.0 >60.0    and All pertinent labs from the last 24 hours have been reviewed.    Diagnostic Results:  I have reviewed all pertinent imaging results/findings within the past 24 hours.  I have reviewed and interpreted all pertinent imaging results/findings within the past 24 hours.    Assessment/Plan:     * Abdominal and low back pain  Patient presented with severe abdominal pain and intractable n/v. She was unable to tolerate oral intake and couldn't get her oral pain meds  Seen on 6/9 by paliative, started on methadone 5 BID and increased to TID for better pain control on top of dilaudid oral 8 q 3 hours for breakthrough pain. Patient reports no relief with current pain.  CBC, CMP, PT, PTT, LD are unremarkable.    -- Pal care consult, appreciate assistance  -- Patient turned out that she fail fentanyl patch 25 mcg and was planned to get 50 but doesn't remmeber whether she had it or not. Dilaudid PCA pump dc'd Switched to fentanyl patch 50 mcg, oxycodone 10 q3h prn, and dilaudid 1 q3h prn.  -- aggressive bowel regimen. Got one dose of methylnaltrexone 45 once and had bowel movements  -- Antiemetics      Constipation  Patient complains of constipation in the past couple of days but she passes gas. She was started on dilauded PCA pump.    -- senna/doc BID, miralax BID; enocurage pt to use bowel regimen   -- got one dose of methylnatrexone and had bowel movements.  -- consider fleet enema prn  .    Intractable nausea and vomiting  Patient came with intractable n/v, abdominal and back pain. Patient was unable to get her pain meds d/t n/v.    -- Scheduled sublingual zofran 8 mg qid.  -- phenergan 25 mg PO  q6h .  -- dexamethasone 4 mg PO BID.  -- increase zyprexa to 10 mg and continue lorazepam  -- palliative care consult, appreciate reccs  -- EKG with qtc 442  - KUB negative for obstruction on 6/19/20; discussing about upper GI series with radiology     Cancer of head of pancreas  Patient have stage IV unresectable pancreatic cancer with lung mets. Enrolled in Genz[employer] trial.  Patient of Dr. Barrett.  Ca-19 on 6/15 [id number] which significantly increased compared to previous one 2880 on 5/15    -- CT CAP showed progression of malignancy with bilateral pulmonary nodules, and scattered peritoneal soft tissue opacities. Also right adnexal mass increased from 4 cm to 10 cm.   -- after goals of care discussion with patient, she decided to be discharged with home hospice once pain and n/v are controlled.  -- Code status changed to DNR         Discussed with Dr. Alexsander Brooks MD  Hematology/Oncology Fellow, PGY V  [facility]

## 2020-06-19 NOTE — SUBJECTIVE & OBJECTIVE
Interval History: Pt is having nausea and vomiting; she had vomited about 6 times yesterday. Her last normal BM was about 2 days ago when she had used methylnaltrexone. No other     Oncology Treatment Plan:   [No treatment plan]    Medications:  Continuous Infusions:  Scheduled Meds:   dexAMETHasone  4 mg Oral Q12H    enoxaparin  90 mg Subcutaneous Daily    fentaNYL  1 patch Transdermal Q72H    magnesium sulfate IVPB  2 g Intravenous Once    OLANZapine  10 mg Oral Nightly    ondansetron  8 mg Oral QID    pantoprazole  40 mg Oral Daily    polyethylene glycol  17 g Oral BID    promethazine  25 mg Oral Q6H    scopolamine  1 patch Transdermal Q3 Days    senna-docusate 8.6-50 mg  2 tablet Oral BID     PRN Meds:acetaminophen, calcium carbonate, Dextrose 10% Bolus, Dextrose 10% Bolus, glucagon (human recombinant), glucose, glucose, HYDROmorphone, insulin aspart U-100, LORazepam, naloxone, ondansetron, oxyCODONE, sodium chloride 0.9%     Review of Systems   Constitutional: Positive for activity change, appetite change and unexpected weight change. Negative for fatigue and fever.   HENT: Negative for congestion and sore throat.    Eyes: Negative for visual disturbance.   Respiratory: Negative for cough and shortness of breath.    Cardiovascular: Negative for chest pain and leg swelling.   Gastrointestinal: Positive for abdominal pain, constipation, nausea and vomiting. Negative for blood in stool and diarrhea.   Genitourinary: Negative for difficulty urinating, dysuria, flank pain and hematuria.   Musculoskeletal: Positive for back pain. Negative for myalgias.   Neurological: Negative for dizziness, tremors, numbness and headaches.   Psychiatric/Behavioral: Negative for confusion.     Objective:     Vital Signs (Most Recent):  Temp: 96.7 °F (35.9 °C) (06/19/20 1100)  Pulse: 86 (06/19/20 1100)  Resp: 19 (06/19/20 1100)  BP: (!) 152/73 (06/19/20 1100)  SpO2: 97 % (06/19/20 1126) Vital Signs (24h Range):  Temp:   [96.7 °F (35.9 °C)-99 °F (37.2 °C)] 96.7 °F (35.9 °C)  Pulse:  [83-90] 86  Resp:  [16-19] 19  SpO2:  [93 %-97 %] 97 %  BP: (149-160)/(71-79) 152/73     Weight: 57.1 kg (125 lb 14.1 oz)  Body mass index is 23.02 kg/m².  Body surface area is 1.58 meters squared.      Intake/Output Summary (Last 24 hours) at 6/19/2020 1134  Last data filed at 6/19/2020 0820  Gross per 24 hour   Intake 540 ml   Output 1120 ml   Net -580 ml       Physical Exam  Vitals signs and nursing note reviewed.   HENT:      Head: Normocephalic and atraumatic.   Eyes:      Extraocular Movements: Extraocular movements intact.      Pupils: Pupils are equal, round, and reactive to light.   Cardiovascular:      Rate and Rhythm: Normal rate and regular rhythm.      Pulses: Normal pulses.      Heart sounds: Normal heart sounds.   Pulmonary:      Effort: Pulmonary effort is normal. No respiratory distress.      Breath sounds: Normal breath sounds. No wheezing or rales.   Abdominal:      General: Abdomen is flat. Bowel sounds are normal. There is no distension.      Palpations: Abdomen is soft.      Tenderness: There is no abdominal tenderness.   Neurological:      Mental Status: She is alert and oriented to person, place, and time.         Significant Labs:   BMP:   Recent Labs   Lab 06/18/20  0400 06/19/20  0452    93    138   K 4.4 4.1    106   CO2 24 22*   BUN 8 7   CREATININE 0.7 0.7   CALCIUM 8.6* 9.0   MG 1.6 1.5*   , CBC:   Recent Labs   Lab 06/18/20  0400 06/19/20  0452   WBC 5.63 5.99   HGB 12.1 12.5   HCT 39.8 39.8    172   , CMP:   Recent Labs   Lab 06/18/20  0400 06/19/20  0452    138   K 4.4 4.1    106   CO2 24 22*    93   BUN 8 7   CREATININE 0.7 0.7   CALCIUM 8.6* 9.0   PROT 6.7 6.8   ALBUMIN 3.1* 3.3*   BILITOT 0.5 0.6   ALKPHOS 153* 144*   AST 28 25   ALT 32 33   ANIONGAP 9 10   EGFRNONAA >60.0 >60.0    and All pertinent labs from the last 24 hours have been reviewed.    Diagnostic Results:  I  have reviewed all pertinent imaging results/findings within the past 24 hours.  I have reviewed and interpreted all pertinent imaging results/findings within the past 24 hours.

## 2020-06-19 NOTE — ASSESSMENT & PLAN NOTE
Patient have stage IV unresectable pancreatic cancer with lung mets. Enrolled in [employer] trial.  Patient of Dr. Barrett.  Ca-19 on 6/15 [id number] which significantly increased compared to previous one 2880 on 5/15    -- CT CAP showed progression of malignancy with bilateral pulmonary nodules, and scattered peritoneal soft tissue opacities. Also right adnexal mass increased from 4 cm to 10 cm.   -- after goals of care discussion with patient, she decided to be discharged with home hospice once pain and n/v are controlled.  -- Code status changed to DNR

## 2020-06-19 NOTE — SUBJECTIVE & OBJECTIVE
Interval History: pain well managed with fentanyl patch and oxy PRN, patient continued to have nausea and vomiting after meals, KUB was unremarkable. Will proceed with Upper GI series.    Medications:  Continuous Infusions:  Scheduled Meds:   dexAMETHasone  4 mg Oral Q12H    enoxaparin  90 mg Subcutaneous Daily    fentaNYL  1 patch Transdermal Q72H    magnesium sulfate IVPB  2 g Intravenous Once    OLANZapine  10 mg Oral Nightly    ondansetron  8 mg Oral QID    pantoprazole  40 mg Oral Daily    polyethylene glycol  17 g Oral BID    promethazine  25 mg Oral Q6H    scopolamine  1 patch Transdermal Q3 Days    senna-docusate 8.6-50 mg  2 tablet Oral BID     PRN Meds:acetaminophen, calcium carbonate, Dextrose 10% Bolus, Dextrose 10% Bolus, glucagon (human recombinant), glucose, glucose, HYDROmorphone, insulin aspart U-100, LORazepam, naloxone, ondansetron, oxyCODONE, sodium chloride 0.9%    Objective:     Vital Signs (Most Recent):  Temp: 98.1 °F (36.7 °C) (06/19/20 0738)  Pulse: 90 (06/19/20 0738)  Resp: 19 (06/19/20 0738)  BP: (!) 160/79 (06/19/20 0738)  SpO2: 96 % (06/19/20 0738) Vital Signs (24h Range):  Temp:  [97.6 °F (36.4 °C)-99 °F (37.2 °C)] 98.1 °F (36.7 °C)  Pulse:  [83-90] 90  Resp:  [16-19] 19  SpO2:  [93 %-96 %] 96 %  BP: (143-160)/(71-82) 160/79     Weight: 57.1 kg (125 lb 14.1 oz)  Body mass index is 23.02 kg/m².      Physical Exam  Vitals signs and nursing note reviewed.   Constitutional:       Appearance: Normal appearance. She is not ill-appearing or toxic-appearing.   HENT:      Head: Normocephalic and atraumatic.      Nose: Nose normal.   Eyes:      General: No scleral icterus.  Neck:      Musculoskeletal: Neck supple. No muscular tenderness.   Cardiovascular:      Rate and Rhythm: Normal rate.   Pulmonary:      Effort: Pulmonary effort is normal.   Abdominal:      General: There is no distension.      Tenderness: There is no abdominal tenderness.   Musculoskeletal:         General: No  swelling or tenderness.   Skin:     Coloration: Skin is not pale.      Findings: No erythema.   Neurological:      Mental Status: She is alert and oriented to person, place, and time.   Psychiatric:         Mood and Affect: Mood normal.         Behavior: Behavior normal.         Significant Labs:  CBC:   Recent Labs   Lab 06/19/20  0452   WBC 5.99   HGB 12.5   HCT 39.8   MCV 88        BMP:  Recent Labs   Lab 06/19/20 0452   GLU 93      K 4.1      CO2 22*   BUN 7   CREATININE 0.7   CALCIUM 9.0   MG 1.5*     LFT:  Lab Results   Component Value Date    AST 25 06/19/2020     (H) 05/15/2020    ALKPHOS 144 (H) 06/19/2020    BILITOT 0.6 06/19/2020     Albumin:   Albumin   Date Value Ref Range Status   06/19/2020 3.3 (L) 3.5 - 5.2 g/dL Final     Protein:   Total Protein   Date Value Ref Range Status   06/19/2020 6.8 6.0 - 8.4 g/dL Final     Lactic acid:   Lab Results   Component Value Date    LACTATE 1.9 03/19/2020    LACTATE 1.1 03/18/2020       Significant Imaging: I have reviewed all pertinent imaging results/findings within the past 24 hours.

## 2020-06-19 NOTE — PLAN OF CARE
POC reviewed with patient. Patient remained awake, alert, and oriented. Assessment completed. All PRN/scheduled medication administered as ordered. Electrolyte replacements administered. Small bowel study completed today; VS stable; tolerating a regular diet; poor intake. Fall/safety reviewed with patient; Activity done independently; ambulated to bathroom; No BM  noted today.Side rails up x2; call bell in place; bed in lowest, locked position; skid proof socks on; no evidence of skin breakdown; care plan explained to patient; no additional complaints at this time. Will continue routine plan of care.

## 2020-06-19 NOTE — ASSESSMENT & PLAN NOTE
Patient complains of constipation in the past couple of days but she passes gas. She was started on dilauded PCA pump.    -- senna/doc BID, miralax BID; enocurage pt to use bowel regimen   -- got one dose of methylnatrexone and had bowel movements.  -- consider fleet enema prn  .

## 2020-06-19 NOTE — PROGRESS NOTES
[facility]  Palliative Medicine  Progress Note    Patient Name: Quyen Moody  MRN: [id number]  Admission Date: 6/16/2020  Hospital Length of Stay: 3 days  Code Status: DNR   Attending Provider: Stacie Beltre MD  Consulting Provider: MAUREEN Zamorano  Primary Care Physician: Eliot Barrett MD  Principal Problem:Low back pain    Patient information was obtained from patient, past medical records and ER records.      Assessment/Plan:     * Abdominal and low back pain  Intractable nausea and vomiting  patient seen by carmelo in clinic 6/8/2020   started on methadone 5 BID and increased to TID for better pain control on top of dilaudid oral 8 q 3 hours .  patient reported no pain relief with current regimen, and she has been having nausea and vomintg, she vomited her pain pills yesterday and unable to tolerate oral intake, admitted for pain control.    CT abdomin/Pelvice consistent with disease progression, after GOC discussion with Dr. Stein patient decided to go home with home hospice once pain is under control.  Code status changed to DNR  Primary team will arrange home hospice transition once pain and nausea under control      Recommendations:  - Continue oxycodone 10 mg oxycodone every 3 hr PRN for breakthrough pain .  - Conitnue fentanyl patch at 50 mcg.   - aggressive bowel regimen and consider enema, given the good response to 1 dose of Methylnaltrexone 45 mg, would sent patient home with a prescription to use it as needed.  - anti-emitic for nausea/vominting, agree with dexamethasone.    - Upper GI series to eval for possible mechanical obstruction secondary to tumor burden and consider GI consult.         I will follow-up with patient. Please contact us if you have any additional questions.    Subjective:     Chief Complaint: No chief complaint on file.      HPI:   59 years old female patient with medical history of stage 4 pancreatic cancer wtih lung mets on Genzada trial trial, came as a  direct admit due to increase low back burning bone pain for 2 weeks, associated with nausea, vomiting and decrease appetite, andrei was seen by palliative care clinic on 06/08/2020 started on methadone 5 mg bid which was increased to TID and conitnued her hydromorphone 8 mg po q 3 hours for breakthrough pain. Denies fever, chills, abdominal pain and diarrhea, has chronic constipation on miralax and recently started on senna. paliative care consulted for pain management.     Hospital Course:  No notes on file    Interval History: pain well managed with fentanyl patch and oxy PRN, patient continued to have nausea and vomiting after meals, KUB was unremarkable. Will proceed with Upper GI series.    Medications:  Continuous Infusions:  Scheduled Meds:   dexAMETHasone  4 mg Oral Q12H    enoxaparin  90 mg Subcutaneous Daily    fentaNYL  1 patch Transdermal Q72H    magnesium sulfate IVPB  2 g Intravenous Once    OLANZapine  10 mg Oral Nightly    ondansetron  8 mg Oral QID    pantoprazole  40 mg Oral Daily    polyethylene glycol  17 g Oral BID    promethazine  25 mg Oral Q6H    scopolamine  1 patch Transdermal Q3 Days    senna-docusate 8.6-50 mg  2 tablet Oral BID     PRN Meds:acetaminophen, calcium carbonate, Dextrose 10% Bolus, Dextrose 10% Bolus, glucagon (human recombinant), glucose, glucose, HYDROmorphone, insulin aspart U-100, LORazepam, naloxone, ondansetron, oxyCODONE, sodium chloride 0.9%    Objective:     Vital Signs (Most Recent):  Temp: 98.1 °F (36.7 °C) (06/19/20 0738)  Pulse: 90 (06/19/20 0738)  Resp: 19 (06/19/20 0738)  BP: (!) 160/79 (06/19/20 0738)  SpO2: 96 % (06/19/20 0738) Vital Signs (24h Range):  Temp:  [97.6 °F (36.4 °C)-99 °F (37.2 °C)] 98.1 °F (36.7 °C)  Pulse:  [83-90] 90  Resp:  [16-19] 19  SpO2:  [93 %-96 %] 96 %  BP: (143-160)/(71-82) 160/79     Weight: 57.1 kg (125 lb 14.1 oz)  Body mass index is 23.02 kg/m².      Physical Exam  Vitals signs and nursing note reviewed.    Constitutional:       Appearance: Normal appearance. She is not ill-appearing or toxic-appearing.   HENT:      Head: Normocephalic and atraumatic.      Nose: Nose normal.   Eyes:      General: No scleral icterus.  Neck:      Musculoskeletal: Neck supple. No muscular tenderness.   Cardiovascular:      Rate and Rhythm: Normal rate.   Pulmonary:      Effort: Pulmonary effort is normal.   Abdominal:      General: There is no distension.      Tenderness: There is no abdominal tenderness.   Musculoskeletal:         General: No swelling or tenderness.   Skin:     Coloration: Skin is not pale.      Findings: No erythema.   Neurological:      Mental Status: She is alert and oriented to person, place, and time.   Psychiatric:         Mood and Affect: Mood normal.         Behavior: Behavior normal.         Significant Labs:  CBC:   Recent Labs   Lab 06/19/20  0452   WBC 5.99   HGB 12.5   HCT 39.8   MCV 88        BMP:  Recent Labs   Lab 06/19/20  0452   GLU 93      K 4.1      CO2 22*   BUN 7   CREATININE 0.7   CALCIUM 9.0   MG 1.5*     LFT:  Lab Results   Component Value Date    AST 25 06/19/2020     (H) 05/15/2020    ALKPHOS 144 (H) 06/19/2020    BILITOT 0.6 06/19/2020     Albumin:   Albumin   Date Value Ref Range Status   06/19/2020 3.3 (L) 3.5 - 5.2 g/dL Final     Protein:   Total Protein   Date Value Ref Range Status   06/19/2020 6.8 6.0 - 8.4 g/dL Final     Lactic acid:   Lab Results   Component Value Date    LACTATE 1.9 03/19/2020    LACTATE 1.1 03/18/2020       Significant Imaging: I have reviewed all pertinent imaging results/findings within the past 24 hours.          > 50% of 35 min visit spent in chart review, face to face discussion of goals of care,  symptom assessment, coordination of care and emotional support.    MAUREEN Zamorano  Palliative Medicine  [facility]

## 2020-06-19 NOTE — DISCHARGE INSTRUCTIONS
:  [facility], [phone], to provide home hospice services beginning with the nurse admission assessment visit on the same day as discharge from the hospital. Hospice staff will order comfort care medications and medical equipment for delivery to home.   Katrin Anderson, MSW, hospitals[facility]  [phone]

## 2020-06-19 NOTE — ASSESSMENT & PLAN NOTE
Patient presented with severe abdominal pain and intractable n/v. She was unable to tolerate oral intake and couldn't get her oral pain meds  Seen on 6/9 by paliative, started on methadone 5 BID and increased to TID for better pain control on top of dilaudid oral 8 q 3 hours for breakthrough pain. Patient reports no relief with current pain.  CBC, CMP, PT, PTT, LD are unremarkable.    -- Pal care consult, appreciate assistance  -- Patient turned out that she fail fentanyl patch 25 mcg and was planned to get 50 but doesn't remmeber whether she had it or not. Dilaudid PCA pump dc'd Switched to fentanyl patch 50 mcg, oxycodone 10 q3h prn, and dilaudid 1 q3h prn.  -- aggressive bowel regimen. Got one dose of methylnaltrexone 45 once and had bowel movements  -- Antiemetics

## 2020-06-20 VITALS
RESPIRATION RATE: 17 BRPM | DIASTOLIC BLOOD PRESSURE: 88 MMHG | HEART RATE: 99 BPM | TEMPERATURE: 99 F | HEIGHT: 62 IN | WEIGHT: 125.88 LBS | SYSTOLIC BLOOD PRESSURE: 170 MMHG | OXYGEN SATURATION: 95 % | BODY MASS INDEX: 23.17 KG/M2

## 2020-06-20 LAB
ALBUMIN SERPL BCP-MCNC: 3.3 G/DL (ref 3.5–5.2)
ALP SERPL-CCNC: 144 U/L (ref 55–135)
ALT SERPL W/O P-5'-P-CCNC: 37 U/L (ref 10–44)
ANION GAP SERPL CALC-SCNC: 8 MMOL/L (ref 8–16)
AST SERPL-CCNC: 27 U/L (ref 10–40)
BASOPHILS # BLD AUTO: 0.01 K/UL (ref 0–0.2)
BASOPHILS NFR BLD: 0.2 % (ref 0–1.9)
BILIRUB SERPL-MCNC: 0.5 MG/DL (ref 0.1–1)
BUN SERPL-MCNC: 8 MG/DL (ref 6–20)
CALCIUM SERPL-MCNC: 8.9 MG/DL (ref 8.7–10.5)
CHLORIDE SERPL-SCNC: 103 MMOL/L (ref 95–110)
CO2 SERPL-SCNC: 23 MMOL/L (ref 23–29)
CREAT SERPL-MCNC: 0.7 MG/DL (ref 0.5–1.4)
DIFFERENTIAL METHOD: ABNORMAL
EOSINOPHIL # BLD AUTO: 0 K/UL (ref 0–0.5)
EOSINOPHIL NFR BLD: 0 % (ref 0–8)
ERYTHROCYTE [DISTWIDTH] IN BLOOD BY AUTOMATED COUNT: 13.3 % (ref 11.5–14.5)
EST. GFR  (AFRICAN AMERICAN): >60 ML/MIN/1.73 M^2
EST. GFR  (NON AFRICAN AMERICAN): >60 ML/MIN/1.73 M^2
GLUCOSE SERPL-MCNC: 109 MG/DL (ref 70–110)
HCT VFR BLD AUTO: 43.7 % (ref 37–48.5)
HGB BLD-MCNC: 13.7 G/DL (ref 12–16)
IMM GRANULOCYTES # BLD AUTO: 0.01 K/UL (ref 0–0.04)
IMM GRANULOCYTES NFR BLD AUTO: 0.2 % (ref 0–0.5)
LYMPHOCYTES # BLD AUTO: 0.6 K/UL (ref 1–4.8)
LYMPHOCYTES NFR BLD: 11.2 % (ref 18–48)
MAGNESIUM SERPL-MCNC: 1.5 MG/DL (ref 1.6–2.6)
MCH RBC QN AUTO: 27.7 PG (ref 27–31)
MCHC RBC AUTO-ENTMCNC: 31.4 G/DL (ref 32–36)
MCV RBC AUTO: 88 FL (ref 82–98)
MONOCYTES # BLD AUTO: 0.3 K/UL (ref 0.3–1)
MONOCYTES NFR BLD: 4.5 % (ref 4–15)
NEUTROPHILS # BLD AUTO: 4.8 K/UL (ref 1.8–7.7)
NEUTROPHILS NFR BLD: 83.9 % (ref 38–73)
NRBC BLD-RTO: 0 /100 WBC
PHOSPHATE SERPL-MCNC: 4 MG/DL (ref 2.7–4.5)
PLATELET # BLD AUTO: 174 K/UL (ref 150–350)
PMV BLD AUTO: 10.8 FL (ref 9.2–12.9)
POTASSIUM SERPL-SCNC: 4.5 MMOL/L (ref 3.5–5.1)
PROT SERPL-MCNC: 6.8 G/DL (ref 6–8.4)
RBC # BLD AUTO: 4.95 M/UL (ref 4–5.4)
SODIUM SERPL-SCNC: 134 MMOL/L (ref 136–145)
WBC # BLD AUTO: 5.72 K/UL (ref 3.9–12.7)

## 2020-06-20 PROCEDURE — 99239 PR HOSPITAL DISCHARGE DAY,>30 MIN: ICD-10-PCS | Mod: ,,, | Performed by: INTERNAL MEDICINE

## 2020-06-20 PROCEDURE — 36415 COLL VENOUS BLD VENIPUNCTURE: CPT

## 2020-06-20 PROCEDURE — 80053 COMPREHEN METABOLIC PANEL: CPT

## 2020-06-20 PROCEDURE — 84100 ASSAY OF PHOSPHORUS: CPT

## 2020-06-20 PROCEDURE — 25000003 PHARM REV CODE 250: Performed by: INTERNAL MEDICINE

## 2020-06-20 PROCEDURE — 99239 HOSP IP/OBS DSCHRG MGMT >30: CPT | Mod: ,,, | Performed by: INTERNAL MEDICINE

## 2020-06-20 PROCEDURE — 25000003 PHARM REV CODE 250: Performed by: STUDENT IN AN ORGANIZED HEALTH CARE EDUCATION/TRAINING PROGRAM

## 2020-06-20 PROCEDURE — 85025 COMPLETE CBC W/AUTO DIFF WBC: CPT

## 2020-06-20 PROCEDURE — 99232 PR SUBSEQUENT HOSPITAL CARE,LEVL II: ICD-10-PCS | Mod: ,,, | Performed by: INTERNAL MEDICINE

## 2020-06-20 PROCEDURE — 83735 ASSAY OF MAGNESIUM: CPT

## 2020-06-20 PROCEDURE — 63600175 PHARM REV CODE 636 W HCPCS: Performed by: STUDENT IN AN ORGANIZED HEALTH CARE EDUCATION/TRAINING PROGRAM

## 2020-06-20 PROCEDURE — 99232 SBSQ HOSP IP/OBS MODERATE 35: CPT | Mod: ,,, | Performed by: INTERNAL MEDICINE

## 2020-06-20 RX ORDER — FENTANYL 50 UG/1
1 PATCH TRANSDERMAL
Qty: 2 PATCH | Refills: 0 | Status: SHIPPED | OUTPATIENT
Start: 2020-06-20 | End: 2020-06-20

## 2020-06-20 RX ORDER — MAGNESIUM SULFATE HEPTAHYDRATE 40 MG/ML
2 INJECTION, SOLUTION INTRAVENOUS ONCE
Status: COMPLETED | OUTPATIENT
Start: 2020-06-20 | End: 2020-06-20

## 2020-06-20 RX ORDER — POLYETHYLENE GLYCOL 3350 17 G/17G
17 POWDER, FOR SOLUTION ORAL 2 TIMES DAILY
Qty: 10 EACH | Refills: 0 | Status: SHIPPED | OUTPATIENT
Start: 2020-06-20

## 2020-06-20 RX ORDER — HEPARIN 100 UNIT/ML
100 SYRINGE INTRAVENOUS
Status: DISCONTINUED | OUTPATIENT
Start: 2020-06-20 | End: 2020-06-20 | Stop reason: HOSPADM

## 2020-06-20 RX ORDER — PROMETHAZINE HYDROCHLORIDE 25 MG/1
25 TABLET ORAL EVERY 6 HOURS
Qty: 15 TABLET | Refills: 0 | Status: SHIPPED | OUTPATIENT
Start: 2020-06-20

## 2020-06-20 RX ORDER — FENTANYL 50 UG/1
1 PATCH TRANSDERMAL
Qty: 5 PATCH | Refills: 0 | Status: SHIPPED | OUTPATIENT
Start: 2020-06-20 | End: 2020-08-03 | Stop reason: CLARIF

## 2020-06-20 RX ORDER — ONDANSETRON 8 MG/1
8 TABLET, ORALLY DISINTEGRATING ORAL 4 TIMES DAILY
Qty: 12 TABLET | Refills: 0 | Status: SHIPPED | OUTPATIENT
Start: 2020-06-20

## 2020-06-20 RX ORDER — ENOXAPARIN SODIUM 100 MG/ML
90 INJECTION SUBCUTANEOUS DAILY
Qty: 30 ML | Refills: 6 | Status: SHIPPED | OUTPATIENT
Start: 2020-06-20 | End: 2020-08-03

## 2020-06-20 RX ORDER — DEXAMETHASONE 4 MG/1
4 TABLET ORAL EVERY 12 HOURS
Qty: 20 TABLET | Refills: 0 | Status: SHIPPED | OUTPATIENT
Start: 2020-06-20 | End: 2020-06-30

## 2020-06-20 RX ORDER — OXYCODONE HYDROCHLORIDE 10 MG/1
10 TABLET ORAL
Qty: 40 TABLET | Refills: 0 | Status: SHIPPED | OUTPATIENT
Start: 2020-06-20 | End: 2020-08-03 | Stop reason: CLARIF

## 2020-06-20 RX ORDER — AMOXICILLIN 250 MG
2 CAPSULE ORAL 2 TIMES DAILY
Qty: 8 TABLET | Refills: 0 | Status: SHIPPED | OUTPATIENT
Start: 2020-06-20

## 2020-06-20 RX ORDER — PANTOPRAZOLE SODIUM 40 MG/1
40 TABLET, DELAYED RELEASE ORAL DAILY
Qty: 30 TABLET | Refills: 11 | Status: SHIPPED | OUTPATIENT
Start: 2020-06-21 | End: 2020-08-03 | Stop reason: CLARIF

## 2020-06-20 RX ADMIN — MAGNESIUM SULFATE 2 G: 2 INJECTION INTRAVENOUS at 09:06

## 2020-06-20 RX ADMIN — PROMETHAZINE HYDROCHLORIDE 25 MG: 25 TABLET ORAL at 05:06

## 2020-06-20 RX ADMIN — ONDANSETRON 8 MG: 8 TABLET, ORALLY DISINTEGRATING ORAL at 09:06

## 2020-06-20 RX ADMIN — OXYCODONE HYDROCHLORIDE 10 MG: 10 TABLET ORAL at 03:06

## 2020-06-20 RX ADMIN — PROMETHAZINE HYDROCHLORIDE 25 MG: 25 TABLET ORAL at 01:06

## 2020-06-20 RX ADMIN — HEPARIN 100 UNITS: 100 SYRINGE at 03:06

## 2020-06-20 RX ADMIN — PANTOPRAZOLE SODIUM 40 MG: 40 TABLET, DELAYED RELEASE ORAL at 09:06

## 2020-06-20 RX ADMIN — ONDANSETRON 8 MG: 8 TABLET, ORALLY DISINTEGRATING ORAL at 02:06

## 2020-06-20 RX ADMIN — DEXAMETHASONE 4 MG: 4 TABLET ORAL at 09:06

## 2020-06-20 RX ADMIN — HYDROMORPHONE HYDROCHLORIDE 1 MG: 1 INJECTION, SOLUTION INTRAMUSCULAR; INTRAVENOUS; SUBCUTANEOUS at 09:06

## 2020-06-20 RX ADMIN — POLYETHYLENE GLYCOL 3350 17 G: 17 POWDER, FOR SOLUTION ORAL at 09:06

## 2020-06-20 RX ADMIN — LORAZEPAM 0.5 MG: 0.5 TABLET ORAL at 05:06

## 2020-06-20 RX ADMIN — OXYCODONE HYDROCHLORIDE 10 MG: 10 TABLET ORAL at 05:06

## 2020-06-20 RX ADMIN — ENOXAPARIN SODIUM 90 MG: 100 INJECTION SUBCUTANEOUS at 09:06

## 2020-06-20 RX ADMIN — HYDROMORPHONE HYDROCHLORIDE 1 MG: 1 INJECTION, SOLUTION INTRAMUSCULAR; INTRAVENOUS; SUBCUTANEOUS at 02:06

## 2020-06-20 NOTE — SUBJECTIVE & OBJECTIVE
"Interval History: Upper GI reviewed. No obstruction. Nausea seems improved over all. Pain controlled. A bit "beside herself" after ativan.   Plans for discharge.     Medications:  Continuous Infusions:  Scheduled Meds:   dexAMETHasone  4 mg Oral Q12H    enoxaparin  90 mg Subcutaneous Daily    fentaNYL  1 patch Transdermal Q72H    OLANZapine  10 mg Oral Nightly    ondansetron  8 mg Oral QID    pantoprazole  40 mg Oral Daily    polyethylene glycol  17 g Oral BID    promethazine  25 mg Oral Q6H    scopolamine  1 patch Transdermal Q3 Days    senna-docusate 8.6-50 mg  2 tablet Oral BID     PRN Meds:acetaminophen, calcium carbonate, Dextrose 10% Bolus, Dextrose 10% Bolus, glucagon (human recombinant), glucose, glucose, HYDROmorphone, insulin aspart U-100, LORazepam, naloxone, ondansetron, oxyCODONE, sodium chloride 0.9%    Objective:     Vital Signs (Most Recent):  Temp: 99 °F (37.2 °C) (06/20/20 0746)  Pulse: 88 (06/20/20 0746)  Resp: 20 (06/20/20 0746)  BP: (!) 159/87 (06/20/20 0746)  SpO2: 95 % (06/20/20 0746) Vital Signs (24h Range):  Temp:  [98.3 °F (36.8 °C)-99 °F (37.2 °C)] 99 °F (37.2 °C)  Pulse:  [] 88  Resp:  [16-20] 20  SpO2:  [94 %-95 %] 95 %  BP: (153-166)/(85-89) 159/87     Weight: 57.1 kg (125 lb 14.1 oz)  Body mass index is 23.02 kg/m².      Physical Exam  Vitals signs and nursing note reviewed.   Constitutional:       Appearance: Normal appearance. She is not ill-appearing or toxic-appearing.   HENT:      Head: Normocephalic and atraumatic.      Nose: Nose normal.   Eyes:      General: No scleral icterus.  Neck:      Musculoskeletal: Neck supple. No muscular tenderness.   Cardiovascular:      Rate and Rhythm: Normal rate.   Pulmonary:      Effort: Pulmonary effort is normal.   Abdominal:      General: There is no distension.      Tenderness: There is no abdominal tenderness.   Musculoskeletal:         General: No swelling or tenderness.   Skin:     Coloration: Skin is not pale.      " Findings: No erythema.   Neurological:      Mental Status: She is alert and oriented to person, place, and time.   Psychiatric:         Mood and Affect: Mood normal.         Behavior: Behavior normal.         Significant Labs:  CBC:   Recent Labs   Lab 06/20/20  0547   WBC 5.72   HGB 13.7   HCT 43.7   MCV 88        BMP:  Recent Labs   Lab 06/20/20  0547      *   K 4.5      CO2 23   BUN 8   CREATININE 0.7   CALCIUM 8.9   MG 1.5*     LFT:  Lab Results   Component Value Date    AST 27 06/20/2020     (H) 05/15/2020    ALKPHOS 144 (H) 06/20/2020    BILITOT 0.5 06/20/2020     Albumin:   Albumin   Date Value Ref Range Status   06/20/2020 3.3 (L) 3.5 - 5.2 g/dL Final     Protein:   Total Protein   Date Value Ref Range Status   06/20/2020 6.8 6.0 - 8.4 g/dL Final     Lactic acid:   Lab Results   Component Value Date    LACTATE 1.9 03/19/2020    LACTATE 1.1 03/18/2020       Significant Imaging: I have reviewed all pertinent imaging results/findings within the past 24 hours.

## 2020-06-20 NOTE — NURSING
Pt discharged to home at this time per Pancho Srivastava MD order. All care will be provided through home hospice once discharged. Med rec completed by MD prior to discharge and copy of current med list given to pt.  All discharge instructions, medications, prescriptions, and follow-up appointments reviewed with pt; copy given and all questions answered to pt's satisfaction. Pt. verbalized understanding with no further questions.PASQUALE portacath flushed with normal saline and 100u/ml heparin flush and de-accessed per protocol.  Pt ambulating in room with steady gait; tolerating a regular diet; voiding without difficulty; pain well-controlled with PO pain meds. All VSS; O2 sats WNL on RA.  Pt left floor via wheelchair; accompanied by pt care tech and her mother; no distress noted.

## 2020-06-20 NOTE — PLAN OF CARE
POC reviewed w/pt. Pt is AAOx4  and ambulates independently. VSS and pt is afebrile. Pt c/o  pain and had IV Dilaudid and Oxy. Pt's nausea was poorly controlled, in spite of scheduled PO Zofran and Promethazine. Pt was given a 1-time dose of IV Compazine and also had IV Zofran. Emesis was thin, watery and green. Appetite is very poor. Pt is oliguric and her output is cloudy and yellow. Port was unable to give blood return, cathflo placed in line at the time of shift change. Personal effects @ bedside. Pt remained safe and free of injury through the night. Bed in low and locked position, bed rails up x2, call bell in reach, non skid socks worn out of bed. Will continue to monitor.

## 2020-06-20 NOTE — PLAN OF CARE
POC reviewed w/pt. Pt is AAOx4  and ambulates independently. Pt was hypertensive and tachycardic during 0400 vitals. Pt is afebrile. Pt c/o  pain and had IV Dilaudid and Oxy and Ativan for nausea. Pt's nausea was better controlled tonight and pt did eat a bit of ice cream and cereal, unfortunately, pt did vomit once after eating.  Emesis contained food. Appetite is slightly better but still poor overall. Pt is oliguric and her output is cloudy and yellow. Port was unable to give blood return once again and pt was changed to a lab draw. Pt expected to D/C today to hospice. Personal effects @ bedside. Pt remained safe and free of injury through the night. Bed in low and locked position, bed rails up x2, call bell in reach, non skid socks worn out of bed. Will continue to monitor.

## 2020-06-20 NOTE — PROGRESS NOTES
[facility]  Palliative Medicine  Progress Note    Patient Name: Quyen Moody  MRN: [id number]  Admission Date: 6/16/2020  Hospital Length of Stay: 4 days  Code Status: DNR   Attending Provider: Stacie Beltre MD  Consulting Provider: Shaka Canada MD  Primary Care Physician: Eliot Barrett MD  Principal Problem:Low back pain    Patient information was obtained from patient, relative(s) and past medical records.      Assessment/Plan:     * Abdominal and low back pain  Palliative Care Encounter / Goals of care discussion:     Narrative:   patient seen by carmelo in clinic 6/8/2020   started on methadone 5 BID and increased to TID for better pain control on top of dilaudid oral 8 q 3 hours .  patient reported no pain relief with current regimen, and she has been having nausea and vomintg, she vomited her pain pills yesterday and unable to tolerate oral intake, admitted for pain control.     1: Psychosocial :   - Marital status:   - Children: no  - POA: mother is next of kin    2: Medicolegal:    - Advance directive: DNR   - Surrogate Decision Maker: mother    3: Support System:  - Spiritual: yes, faithful  - Family: mother at age but supportive     4: Prognostication: progressive disease and aggressive cancer. Poor prognosis    5: Goals of care Decisions / Symptom Management / Recommendations / Plan:     1: Encounter for Palliative Care  - Code Status: DNR  - Upper GI shows no mechanical obstruction  - sx. Appear currently controlled with current Rx.   - plans for home with hospice. Pt. Knows to call [facility] with any issues. She has my number      2: Symptom Management:   - Pain: controlled on Fentanyl 50mcg and Oxycodone 10mg every 3 PRN  - Dyspnea: denies  - Anxiety: denies, occasionally uses ativan    3: pancreatic cancer  - not a candidate for further CTX.      6: Summary and Recommendation:   - ok for discharge with home hospice     - Above was discussed with primary team  "resident    7: Disposition:  - home with hospice    6: Follow up plans:    As needed in [facility] clinic.     Thank you for your consult. Please call [phone] with questions.     Total visit time 25 min  > 50% of 10 min spent in chart review, face to face discussion of symptom assessment, coordination of care with other specialists and disposition planning.     A total of 15 min was spent on advance care planning, goals of care discussion, emotional support, formulating and communicating prognosis and goals of care, exploring burden/benefit of various approaches of treatment.                 I will follow-up with patient. Please contact us if you have any additional questions.    Subjective:     Chief Complaint: No chief complaint on file.      HPI:   59 years old female patient with medical history of stage 4 pancreatic cancer wtih lung mets on Genzada trial trial, came as a direct admit due to increase low back burning bone pain for 2 weeks, associated with nausea, vomiting and decrease appetite, andrei was seen by palliative care clinic on 06/08/2020 started on methadone 5 mg bid which was increased to TID and conitnued her hydromorphone 8 mg po q 3 hours for breakthrough pain. Denies fever, chills, abdominal pain and diarrhea, has chronic constipation on miralax and recently started on senna. paliative care consulted for pain management.     Hospital Course:  No notes on file    Interval History: Upper GI reviewed. No obstruction. Nausea seems improved over all. Pain controlled. A bit "beside herself" after ativan.   Plans for discharge.     Medications:  Continuous Infusions:  Scheduled Meds:   dexAMETHasone  4 mg Oral Q12H    enoxaparin  90 mg Subcutaneous Daily    fentaNYL  1 patch Transdermal Q72H    OLANZapine  10 mg Oral Nightly    ondansetron  8 mg Oral QID    pantoprazole  40 mg Oral Daily    polyethylene glycol  17 g Oral BID    promethazine  25 mg Oral Q6H    scopolamine  1 patch " Transdermal Q3 Days    senna-docusate 8.6-50 mg  2 tablet Oral BID     PRN Meds:acetaminophen, calcium carbonate, Dextrose 10% Bolus, Dextrose 10% Bolus, glucagon (human recombinant), glucose, glucose, HYDROmorphone, insulin aspart U-100, LORazepam, naloxone, ondansetron, oxyCODONE, sodium chloride 0.9%    Objective:     Vital Signs (Most Recent):  Temp: 99 °F (37.2 °C) (06/20/20 0746)  Pulse: 88 (06/20/20 0746)  Resp: 20 (06/20/20 0746)  BP: (!) 159/87 (06/20/20 0746)  SpO2: 95 % (06/20/20 0746) Vital Signs (24h Range):  Temp:  [98.3 °F (36.8 °C)-99 °F (37.2 °C)] 99 °F (37.2 °C)  Pulse:  [] 88  Resp:  [16-20] 20  SpO2:  [94 %-95 %] 95 %  BP: (153-166)/(85-89) 159/87     Weight: 57.1 kg (125 lb 14.1 oz)  Body mass index is 23.02 kg/m².      Physical Exam  Vitals signs and nursing note reviewed.   Constitutional:       Appearance: Normal appearance. She is not ill-appearing or toxic-appearing.   HENT:      Head: Normocephalic and atraumatic.      Nose: Nose normal.   Eyes:      General: No scleral icterus.  Neck:      Musculoskeletal: Neck supple. No muscular tenderness.   Cardiovascular:      Rate and Rhythm: Normal rate.   Pulmonary:      Effort: Pulmonary effort is normal.   Abdominal:      General: There is no distension.      Tenderness: There is no abdominal tenderness.   Musculoskeletal:         General: No swelling or tenderness.   Skin:     Coloration: Skin is not pale.      Findings: No erythema.   Neurological:      Mental Status: She is alert and oriented to person, place, and time.   Psychiatric:         Mood and Affect: Mood normal.         Behavior: Behavior normal.         Significant Labs:  CBC:   Recent Labs   Lab 06/20/20  0547   WBC 5.72   HGB 13.7   HCT 43.7   MCV 88        BMP:  Recent Labs   Lab 06/20/20  0547      *   K 4.5      CO2 23   BUN 8   CREATININE 0.7   CALCIUM 8.9   MG 1.5*     LFT:  Lab Results   Component Value Date    AST 27 06/20/2020      (H) 05/15/2020    ALKPHOS 144 (H) 06/20/2020    BILITOT 0.5 06/20/2020     Albumin:   Albumin   Date Value Ref Range Status   06/20/2020 3.3 (L) 3.5 - 5.2 g/dL Final     Protein:   Total Protein   Date Value Ref Range Status   06/20/2020 6.8 6.0 - 8.4 g/dL Final     Lactic acid:   Lab Results   Component Value Date    LACTATE 1.9 03/19/2020    LACTATE 1.1 03/18/2020       Significant Imaging: I have reviewed all pertinent imaging results/findings within the past 24 hours.          > 50% of 25 min visit spent in chart review, face to face discussion of goals of care,  symptom assessment, coordination of care and emotional support.    Shaka Canada MD  Palliative Medicine  [facility]

## 2020-06-20 NOTE — DISCHARGE SUMMARY
[facility]  Hematology/Oncology  Discharge Summary      Patient Name: Quyen Moody  MRN: [id number]  Admission Date: 6/16/2020  Hospital Length of Stay: 4 days  Discharge Date and Time: 6/20/2020  Attending Physician: Stacie Beltre MD   Discharging Provider: Pancho Srivastava MD  Primary Care Provider: Eliot Barrett MD    HPI: Ms. Moody is 59 years old female, patient of Dr. Barrett, with past medical history of stage IV pancreatic cancer in Genzada trial and  left UE DVT on lovenox. Presented as direct admit after she called oncology clinic for severe abdominal pain and intractable nausea and vomiting. Patient states that for the past couple of days she was unable to keep anything in her stomach including pain meds. She has been complaining of mild abdominal in the past weeks but this morning it was 10/10 and associated with back pain. Her pain regimen was changed on 6/9 to methadone 5 BID and dilauded 8 mg PO q3h prn for breakthrough pain. Patient endorses 6-8 lbs weight loss in the past week, constipation, urinary incontinence, and chills. She denies fever, night sweats, cough, sob, chest pain, and urinary syptoms.    Oncologic History:  She has had intermittent upper abdominal pain since early June.  She presented to her PCP who originally ordered an US and CT.  US was negative and CT showed some haziness at the pancreatic head.  She saw GI who performed EUS.  On EUS, a 3x4cm pancreatic head mass was seen with possible invasion into the SMA and portal vein.  FNA path s/w pancreatic adenocarcinoma.  CA 19-9 level at outside hospital was >1000 per the patient.  She endorses nausea and inability to tolerate much PO.  She has lost about 10lbs over the last few weeks and is noticing her skin turning yellow.  Denies pruritis.  She is passing gas but has not had a BM in a few days, she attributes this to narcotic use.  She recently started taking stool softeners.  No previous cardiac or stroke  history.  Surgical history significant for open appendectomy when she was in her 20s  - 8/7/2019 - 1/9/2020: [place]+Abraxane and TTF on PANOVA-3, LFTs elevated, but improved.  - Based on CT 12/2019 scans, Dr. Hallman felt pt was potentially resectable after chemoXRT given good response to current therapy.  However, subsequent Chest CT   showed new and growing micronodules in the in lungs c/w met disease in light of rising tumor markers.    - 1/23/2020: Changed to FOLFIRINOX and con't TTF on PANOVA as this seems to be achieving good local control of the primary tumor.     * No surgery found *     Hospital Course: Patient was admitted for intractable n/v and severe back and abdominal pain. She was started on dilaudid PCA pump, antiemetics, and aggressive bowel regimen. CT CAP showed progression of malignancy with bilateral pulmonary nodules, and scattered peritoneal soft tissue opacities. Also right adnexal mass increased from 4 cm to 10 cm. Goal of discussions with patient concluded that she wants to go to home hospice and code to be DNR. Patient had bowel movements after she got methyl naltrexone.  Antiemetics changed to zofran sublignual and phenergan PO. Patient turned out that she fail fentanyl patch 25 mcg and was planned to get 50 but doesn't remmeber whether she had it or not. PCA dc'd and switched to fentanyl patch 50 mcg, oxycodone 10 mg q3h PRN, and dilauded 1 mg IV q3h PRN for breakthrough pain. Dexamethasone 8 PO daily initially, now switched to 4 mg BID. Patient pain is controlled and n/v improved. She was discharged home with home hospice in good condition.    Vitals:    06/20/20 0746   BP: (!) 159/87   Pulse: 88   Resp: 20   Temp: 99 °F (37.2 °C)     Physical Exam  Vitals signs and nursing note reviewed.   HENT:      Head: Normocephalic and atraumatic.   Eyes:      Extraocular Movements: Extraocular movements intact.      Pupils: Pupils are equal, round, and reactive to light.   Cardiovascular:      Rate  and Rhythm: Normal rate and regular rhythm.      Pulses: Normal pulses.      Heart sounds: Normal heart sounds.   Pulmonary:      Effort: Pulmonary effort is normal. No respiratory distress.      Breath sounds: Normal breath sounds. No wheezing or rales.   Abdominal:      General: Abdomen is flat. Bowel sounds are normal. There is no distension.      Palpations: Abdomen is soft.      Tenderness: There is no abdominal tenderness.   Neurological:      Mental Status: She is alert and oriented to person, place, and time.       Consults:   Consults (From admission, onward)        Status Ordering Provider     Inpatient consult to Hospice  Once     Provider:  (Not yet assigned)    Acknowledged JORGE A SHELTON     Inpatient consult to Palliative Care  Once     Provider:  (Not yet assigned)    Completed AMEE BRODERICK            Pending Diagnostic Studies:     Procedure Component Value Units Date/Time    CBC with Automated Differential [[id number]] Collected: 06/19/20 0422    Order Status: Sent Lab Status: In process Updated: 06/19/20 0422    Specimen: Blood     Comprehensive metabolic panel [[id number]] Collected: 06/19/20 0422    Order Status: Sent Lab Status: In process Updated: 06/19/20 0422    Specimen: Blood     Magnesium [[id number]] Collected: 06/19/20 0422    Order Status: Sent Lab Status: In process Updated: 06/19/20 0423    Specimen: Blood     Phosphorus [[id number]] Collected: 06/19/20 0422    Order Status: Sent Lab Status: In process Updated: 06/19/20 0423    Specimen: Blood         Final Active Diagnoses:    Diagnosis Date Noted POA    PRINCIPAL PROBLEM:  Abdominal and low back pain [M54.5] 06/16/2020 Yes    Pancreatic cancer [C25.9] 06/16/2020 Yes    Intractable nausea and vomiting [R11.2] 06/16/2020 Unknown    Constipation [K59.00] 06/16/2020 Unknown    Cancer of head of pancreas [C25.0] 07/15/2019 Yes      Problems Resolved During this Admission:      Discharged Condition: good    Disposition:  Hospice/Home    Follow Up:    Patient Instructions:   No discharge procedures on file.  Medications:  Reconciled Home Medications:      Medication List      START taking these medications    fentaNYL 50 mcg/hr  Commonly known as: DURAGESIC  Place 1 patch onto the skin every 72 hours.     oxyCODONE 10 mg Tab immediate release tablet  Commonly known as: ROXICODONE  Take 1 tablet (10 mg total) by mouth every 3 (three) hours as needed for Pain.     pantoprazole 40 MG tablet  Commonly known as: PROTONIX  Take 1 tablet (40 mg total) by mouth once daily.  Start taking on: June 21, 2020     polyethylene glycol 17 gram Pwpk  Commonly known as: GLYCOLAX  Take 17 g by mouth 2 (two) times daily.     promethazine 25 MG tablet  Commonly known as: PHENERGAN  Take 1 tablet (25 mg total) by mouth every 6 (six) hours.     SENNA PLUS 8.6-50 mg per tablet  Generic drug: senna-docusate 8.6-50 mg  Take 2 tablets by mouth 2 (two) times daily.        CHANGE how you take these medications    dexAMETHasone 4 MG Tab  Commonly known as: DECADRON  Take 1 tablet (4 mg total) by mouth every 12 (twelve) hours. for 10 days  What changed: additional instructions     enoxaparin 100 mg/mL Syrg  Commonly known as: LOVENOX  Inject 0.9 mLs (90 mg total) into the skin once daily.  What changed: how much to take     ondansetron 8 MG Tbdl  Commonly known as: ZOFRAN-ODT  Take 1 tablet (8 mg total) by mouth 4 (four) times daily.  What changed:   · when to take this  · reasons to take this        CONTINUE taking these medications    estradiol-norethindrone 1-0.5 mg per tablet  Commonly known as: ACTIVELLA  Take 0.5 mg by mouth once daily.     HYDROmorphone 8 MG tablet  Commonly known as: DILAUDID  Take 1 tablet (8 mg total) by mouth every 4 (four) hours as needed for Pain.     lidocaine-prilocaine cream  Commonly known as: EMLA  Apply to port 45 minutes prior to access.     LORazepam 0.5 MG tablet  Commonly known as: ATIVAN  Take 1 tablet (0.5 mg total) by  mouth every 6 (six) hours as needed for Anxiety (nausea).     multivitamin per tablet  Commonly known as: THERAGRAN  Take 1 tablet by mouth once daily.     OLANZapine 5 MG tablet  Commonly known as: ZyPREXA  Take 1 tablet (5 mg total) by mouth nightly. To prevent nausea        STOP taking these medications    INV gz17-6.02 300 mg capsule     INV gz17-6.02 75 mg capsule     INV peptamen jim hp Soln solution     methadone 5 MG tablet  Commonly known as: DOLOPHINE     ondansetron 8 MG tablet  Commonly known as: ZOFRAN     SENNA 8.6 mg tablet  Generic drug: marleny Srivastava MD  Hematology/Oncology  [facility]

## 2020-06-20 NOTE — PROGRESS NOTES
Received an on-call request from onc team to assist patient with discharge home today with home hospice services.  Hospice orders and pt information uploaded in [facility] per previous initiated hospice referral.  [facility] on call and nurse will plan to meet patient for consents and admission at 2:30 pm this afternoon.  Advised hospice agency rep of hospice orders including pain medication regimen in orders.  Pt is not in need of any home equipment delivery prior to this hospital discharge. Mom will be transporting pt home at time of discharge today.  Discharge plan reviewed with onc team all parties in agreement with discharge plan.  Contact information for the hospice agency and this SWer given to pts Mom.

## 2020-06-20 NOTE — ASSESSMENT & PLAN NOTE
Palliative Care Encounter / Goals of care discussion:     Narrative:   patient seen by carmelo in clinic 6/8/2020   started on methadone 5 BID and increased to TID for better pain control on top of dilaudid oral 8 q 3 hours .  patient reported no pain relief with current regimen, and she has been having nausea and vomintg, she vomited her pain pills yesterday and unable to tolerate oral intake, admitted for pain control.     1: Psychosocial :   - Marital status:   - Children: no  - POA: mother is next of kin    2: Medicolegal:    - Advance directive: DNR   - Surrogate Decision Maker: mother    3: Support System:  - Spiritual: yes, faithful  - Family: mother at age but supportive     4: Prognostication: progressive disease and aggressive cancer. Poor prognosis    5: Goals of care Decisions / Symptom Management / Recommendations / Plan:     1: Encounter for Palliative Care  - Code Status: DNR  - Upper GI shows no mechanical obstruction  - sx. Appear currently controlled with current Rx.   - plans for home with hospice. Pt. Knows to call [facility] with any issues. She has my number      2: Symptom Management:   - Pain: controlled on Fentanyl 50mcg and Oxycodone 10mg every 3 PRN  - Dyspnea: denies  - Anxiety: denies, occasionally uses ativan    3: pancreatic cancer  - not a candidate for further CTX.      6: Summary and Recommendation:   - ok for discharge with home hospice     - Above was discussed with primary team resident    7: Disposition:  - home with hospice    6: Follow up plans:    [facility]     Thank you for your consult. Please call [phone] with questions.     Total visit time 25 min  > 50% of 10 min spent in chart review, face to face discussion of symptom assessment, coordination of care with other specialists and disposition planning.     A total of 15 min was spent on advance care planning, goals of care discussion, emotional support, formulating and communicating prognosis  and goals of care, exploring burden/benefit of various approaches of treatment.

## 2020-06-20 NOTE — PROGRESS NOTES
06/20/20 1501   Vital Signs   Temp 98.5 °F (36.9 °C)   Temp src Oral   Pulse 99   Heart Rate Source Monitor   Resp 17   SpO2 95 %   BP (!) 170/88   MAP (mmHg) 121   BP Location Right arm   Patient Position Lying     Blood pressure reported to Pancho Srivastava MD. No additional orders given, but PRN pain medication given. Will continue to monitor.

## 2020-07-06 ENCOUNTER — TELEPHONE (OUTPATIENT)
Dept: PALLIATIVE MEDICINE | Facility: CLINIC | Age: 60
End: 2020-07-06

## 2020-07-15 NOTE — ED NOTES
Hospital Medicine at bedside.    Provider Procedure Text (A): After obtaining clear surgical margins the defect was repaired by another provider.

## 2020-08-03 ENCOUNTER — HOSPITAL ENCOUNTER (EMERGENCY)
Facility: HOSPITAL | Age: 60
Discharge: HOSPICE | End: 2020-08-03
Attending: EMERGENCY MEDICINE
Payer: COMMERCIAL

## 2020-08-03 VITALS
TEMPERATURE: 98 F | HEIGHT: 62 IN | OXYGEN SATURATION: 97 % | BODY MASS INDEX: 23 KG/M2 | HEART RATE: 121 BPM | WEIGHT: 125 LBS | DIASTOLIC BLOOD PRESSURE: 56 MMHG | RESPIRATION RATE: 19 BRPM | SYSTOLIC BLOOD PRESSURE: 102 MMHG

## 2020-08-03 DIAGNOSIS — C25.9 PANCREATIC CANCER METASTASIZED TO LUNG: ICD-10-CM

## 2020-08-03 DIAGNOSIS — C78.00 PANCREATIC CANCER METASTASIZED TO LUNG: ICD-10-CM

## 2020-08-03 DIAGNOSIS — E86.0 DEHYDRATION: ICD-10-CM

## 2020-08-03 DIAGNOSIS — C78.01: Primary | ICD-10-CM

## 2020-08-03 DIAGNOSIS — Z51.5 HOSPICE CARE: ICD-10-CM

## 2020-08-03 DIAGNOSIS — C80.1: Primary | ICD-10-CM

## 2020-08-03 DIAGNOSIS — K11.20 PAROTITIS NOT DUE TO MUMPS: ICD-10-CM

## 2020-08-03 DIAGNOSIS — N17.9 ACUTE RENAL FAILURE, UNSPECIFIED ACUTE RENAL FAILURE TYPE: ICD-10-CM

## 2020-08-03 LAB
ALBUMIN SERPL BCP-MCNC: 2 G/DL (ref 3.5–5.2)
ALP SERPL-CCNC: 460 U/L (ref 55–135)
ALT SERPL W/O P-5'-P-CCNC: 40 U/L (ref 10–44)
ANION GAP SERPL CALC-SCNC: 16 MMOL/L (ref 8–16)
AST SERPL-CCNC: 30 U/L (ref 10–40)
BASOPHILS # BLD AUTO: 0.07 K/UL (ref 0–0.2)
BASOPHILS NFR BLD: 0.5 % (ref 0–1.9)
BILIRUB SERPL-MCNC: 1.5 MG/DL (ref 0.1–1)
BNP SERPL-MCNC: 50 PG/ML (ref 0–99)
BUN SERPL-MCNC: 54 MG/DL (ref 6–20)
CALCIUM SERPL-MCNC: 8.6 MG/DL (ref 8.7–10.5)
CHLORIDE SERPL-SCNC: 75 MMOL/L (ref 95–110)
CO2 SERPL-SCNC: 32 MMOL/L (ref 23–29)
CREAT SERPL-MCNC: 1.4 MG/DL (ref 0.5–1.4)
DIFFERENTIAL METHOD: ABNORMAL
EOSINOPHIL # BLD AUTO: 0 K/UL (ref 0–0.5)
EOSINOPHIL NFR BLD: 0.1 % (ref 0–8)
ERYTHROCYTE [DISTWIDTH] IN BLOOD BY AUTOMATED COUNT: 14.5 % (ref 11.5–14.5)
EST. GFR  (AFRICAN AMERICAN): 47.1 ML/MIN/1.73 M^2
EST. GFR  (NON AFRICAN AMERICAN): 40.9 ML/MIN/1.73 M^2
GLUCOSE SERPL-MCNC: 111 MG/DL (ref 70–110)
HCT VFR BLD AUTO: 43 % (ref 37–48.5)
HGB BLD-MCNC: 14.1 G/DL (ref 12–16)
IMM GRANULOCYTES # BLD AUTO: 0.33 K/UL (ref 0–0.04)
IMM GRANULOCYTES NFR BLD AUTO: 2.4 % (ref 0–0.5)
INR PPP: 1.1 (ref 0.8–1.2)
LACTATE SERPL-SCNC: 1.9 MMOL/L (ref 0.5–2.2)
LYMPHOCYTES # BLD AUTO: 0.4 K/UL (ref 1–4.8)
LYMPHOCYTES NFR BLD: 2.5 % (ref 18–48)
MCH RBC QN AUTO: 27.6 PG (ref 27–31)
MCHC RBC AUTO-ENTMCNC: 32.8 G/DL (ref 32–36)
MCV RBC AUTO: 84 FL (ref 82–98)
MONOCYTES # BLD AUTO: 0.8 K/UL (ref 0.3–1)
MONOCYTES NFR BLD: 6.1 % (ref 4–15)
NEUTROPHILS # BLD AUTO: 12.2 K/UL (ref 1.8–7.7)
NEUTROPHILS NFR BLD: 88.4 % (ref 38–73)
NRBC BLD-RTO: 0 /100 WBC
PLATELET # BLD AUTO: 238 K/UL (ref 150–350)
PMV BLD AUTO: 9.4 FL (ref 9.2–12.9)
POTASSIUM SERPL-SCNC: 3.6 MMOL/L (ref 3.5–5.1)
PROT SERPL-MCNC: 7 G/DL (ref 6–8.4)
PROTHROMBIN TIME: 12.4 SEC (ref 9–12.5)
RBC # BLD AUTO: 5.11 M/UL (ref 4–5.4)
SARS-COV-2 RDRP RESP QL NAA+PROBE: NEGATIVE
SODIUM SERPL-SCNC: 123 MMOL/L (ref 136–145)
TROPONIN I SERPL DL<=0.01 NG/ML-MCNC: 0.02 NG/ML (ref 0–0.03)
TROPONIN I SERPL DL<=0.01 NG/ML-MCNC: 0.04 NG/ML (ref 0–0.03)
WBC # BLD AUTO: 13.82 K/UL (ref 3.9–12.7)

## 2020-08-03 PROCEDURE — 63600175 PHARM REV CODE 636 W HCPCS: Performed by: EMERGENCY MEDICINE

## 2020-08-03 PROCEDURE — 87040 BLOOD CULTURE FOR BACTERIA: CPT

## 2020-08-03 PROCEDURE — 83605 ASSAY OF LACTIC ACID: CPT

## 2020-08-03 PROCEDURE — 96365 THER/PROPH/DIAG IV INF INIT: CPT | Mod: 59

## 2020-08-03 PROCEDURE — U0002 COVID-19 LAB TEST NON-CDC: HCPCS

## 2020-08-03 PROCEDURE — 99497 PR ADVNCD CARE PLAN 30 MIN: ICD-10-PCS | Mod: ,,, | Performed by: INTERNAL MEDICINE

## 2020-08-03 PROCEDURE — 63600175 PHARM REV CODE 636 W HCPCS: Performed by: STUDENT IN AN ORGANIZED HEALTH CARE EDUCATION/TRAINING PROGRAM

## 2020-08-03 PROCEDURE — 99291 CRITICAL CARE FIRST HOUR: CPT | Mod: GV,,, | Performed by: EMERGENCY MEDICINE

## 2020-08-03 PROCEDURE — 99213 OFFICE O/P EST LOW 20 MIN: CPT | Mod: ,,, | Performed by: INTERNAL MEDICINE

## 2020-08-03 PROCEDURE — 96366 THER/PROPH/DIAG IV INF ADDON: CPT

## 2020-08-03 PROCEDURE — 99213 PR OFFICE/OUTPT VISIT, EST, LEVL III, 20-29 MIN: ICD-10-PCS | Mod: ,,, | Performed by: INTERNAL MEDICINE

## 2020-08-03 PROCEDURE — 99497 ADVNCD CARE PLAN 30 MIN: CPT | Mod: ,,, | Performed by: INTERNAL MEDICINE

## 2020-08-03 PROCEDURE — 99285 EMERGENCY DEPT VISIT HI MDM: CPT | Mod: 25

## 2020-08-03 PROCEDURE — 83880 ASSAY OF NATRIURETIC PEPTIDE: CPT

## 2020-08-03 PROCEDURE — 99292 PR CRITICAL CARE, ADDL 30 MIN: ICD-10-PCS | Mod: GV,,, | Performed by: EMERGENCY MEDICINE

## 2020-08-03 PROCEDURE — 85025 COMPLETE CBC W/AUTO DIFF WBC: CPT

## 2020-08-03 PROCEDURE — 99292 CRITICAL CARE ADDL 30 MIN: CPT | Mod: GV,,, | Performed by: EMERGENCY MEDICINE

## 2020-08-03 PROCEDURE — 86735 MUMPS ANTIBODY: CPT | Mod: 91

## 2020-08-03 PROCEDURE — 84484 ASSAY OF TROPONIN QUANT: CPT

## 2020-08-03 PROCEDURE — 84484 ASSAY OF TROPONIN QUANT: CPT | Mod: 91

## 2020-08-03 PROCEDURE — 99291 PR CRITICAL CARE, E/M 30-74 MINUTES: ICD-10-PCS | Mod: GV,,, | Performed by: EMERGENCY MEDICINE

## 2020-08-03 PROCEDURE — 80053 COMPREHEN METABOLIC PANEL: CPT

## 2020-08-03 PROCEDURE — 96361 HYDRATE IV INFUSION ADD-ON: CPT

## 2020-08-03 PROCEDURE — 25000003 PHARM REV CODE 250: Performed by: STUDENT IN AN ORGANIZED HEALTH CARE EDUCATION/TRAINING PROGRAM

## 2020-08-03 PROCEDURE — 25500020 PHARM REV CODE 255: Performed by: EMERGENCY MEDICINE

## 2020-08-03 PROCEDURE — 85610 PROTHROMBIN TIME: CPT

## 2020-08-03 PROCEDURE — 96375 TX/PRO/DX INJ NEW DRUG ADDON: CPT

## 2020-08-03 PROCEDURE — 25000003 PHARM REV CODE 250: Performed by: EMERGENCY MEDICINE

## 2020-08-03 PROCEDURE — 96367 TX/PROPH/DG ADDL SEQ IV INF: CPT

## 2020-08-03 RX ORDER — MORPHINE SULFATE 60 MG/1
60 TABLET, FILM COATED, EXTENDED RELEASE ORAL 3 TIMES DAILY
COMMUNITY

## 2020-08-03 RX ORDER — HYDROMORPHONE HYDROCHLORIDE 2 MG/1
2 TABLET ORAL EVERY 4 HOURS PRN
COMMUNITY

## 2020-08-03 RX ORDER — HYDROMORPHONE HYDROCHLORIDE 1 MG/ML
1 INJECTION, SOLUTION INTRAMUSCULAR; INTRAVENOUS; SUBCUTANEOUS
Status: COMPLETED | OUTPATIENT
Start: 2020-08-03 | End: 2020-08-03

## 2020-08-03 RX ORDER — MORPHINE SULFATE 30 MG/1
25 TABLET ORAL EVERY 4 HOURS PRN
COMMUNITY
End: 2020-08-03 | Stop reason: CLARIF

## 2020-08-03 RX ORDER — OMEPRAZOLE 40 MG/1
40 CAPSULE, DELAYED RELEASE ORAL DAILY
COMMUNITY

## 2020-08-03 RX ORDER — GABAPENTIN 100 MG/1
100 CAPSULE ORAL 3 TIMES DAILY
COMMUNITY

## 2020-08-03 RX ORDER — AMOXICILLIN AND CLAVULANATE POTASSIUM 875; 125 MG/1; MG/1
1 TABLET, FILM COATED ORAL EVERY 12 HOURS
Qty: 10 TABLET | Refills: 0 | Status: SHIPPED | OUTPATIENT
Start: 2020-08-03 | End: 2020-08-08

## 2020-08-03 RX ADMIN — PIPERACILLIN SODIUM AND TAZOBACTAM SODIUM 4.5 G: 4; .5 INJECTION, POWDER, LYOPHILIZED, FOR SOLUTION INTRAVENOUS at 09:08

## 2020-08-03 RX ADMIN — SODIUM CHLORIDE 1000 ML: 0.9 INJECTION, SOLUTION INTRAVENOUS at 11:08

## 2020-08-03 RX ADMIN — VANCOMYCIN HYDROCHLORIDE 1250 MG: 1.25 INJECTION, POWDER, LYOPHILIZED, FOR SOLUTION INTRAVENOUS at 10:08

## 2020-08-03 RX ADMIN — HYDROMORPHONE HYDROCHLORIDE 1 MG: 1 INJECTION, SOLUTION INTRAMUSCULAR; INTRAVENOUS; SUBCUTANEOUS at 09:08

## 2020-08-03 RX ADMIN — SODIUM CHLORIDE 1000 ML: 0.9 INJECTION, SOLUTION INTRAVENOUS at 08:08

## 2020-08-03 RX ADMIN — IOHEXOL 75 ML: 350 INJECTION, SOLUTION INTRAVENOUS at 10:08

## 2020-08-03 NOTE — ED NOTES
istat results     Na- 121   K- 3.2  Cl- 79  ica- 0.92  Glucose- 110  Bun- 45  Creat- 1.5  hct- 40%

## 2020-08-03 NOTE — ED NOTES
Spoke with richard, states dispatch is about 30-45 minutes out. Patient and family updated on plan of care.

## 2020-08-03 NOTE — CONSULTS
"[facility]  Palliative Medicine  Consult Note    Patient Name: Quyen Moody  MRN: [id number]  Admission Date: 8/3/2020  Hospital Length of Stay: 0 days  Code Status: Prior   Attending Provider: Renita Meehan MD  Consulting Provider: Shaka Canaad MD  Primary Care Physician: Eliot Barrett MD  Principal Problem:<principal problem not specified>    Patient information was obtained from patient, spouse/SO, past medical records and ER records.      Inpatient consult to Palliative Care  Consult performed by: Shaka Canada MD  Consult ordered by: Renita Meehan MD        Assessment/Plan:     Palliative care encounter  Palliative Care Encounter / Goals of care discussion:     Narrative:   Quyen Moody is a 59 y.o. female patient with advanced pancreatic cancer now presents with profound dehydration, parotitis and dyspnea. CT findings indicate lymphangiomatosis and progression of disease     1: Psychosocial :   - Marital status:   - Children: no  - POA: mother is POA    2: Medicolegal:    - Advance directive: on file   - Surrogate Decision Maker: mother    3: Support System:  - Spiritual: somewhat  - Family: supportive     4: Prognostication: progression of disease. Poor prognosis with likely limited life expectancy.     5: Goals of care Decisions / Symptom Management / Recommendations / Plan:     1: Encounter for Palliative Care  - Code Status: DNR    - Initial Assessment: Ms. Napier asks me if "this is it?". She has been preparing for death for a while she states but feels like she is close now. We discussed the fact that her CT scan appears worse and that even though a PNA is always a consideration it seems as though her cancer has gotten worse.     - Insight in Disease and Illness trajectory: Quyen is aware of her very limited life expectancy    - Goals of Care: discussed options for care going forward. Expressed that admission and ABX would be an option, the same going home with " some fluids and oral ABX as well as admission to an inpatient hospice, which I favor.   Discussed choices with mother and sister and all agree to admit Quyen to an inpatient hospice unit for comfort focused care, IV Fluids and oral ABX.   Should her sx. Improve there is always an option to return home. Should she get worse she will have best support for end of life.     - Approach to treatment: admit to inpatient hospice with trial IVF and oral ABX.     2: Symptom Management:   - Pain: Appears reasonably well controlled.   - Dyspnea: feels better with O2. PRN oral Morphine may be beneficial for breathlessness  - Anxiety: can use low doses of ATivan (1mg orally every 4 hours) for severe anxiety    3: Acute respiratory failure  - likely due to progression of cancer with lymphangiosis.   - supplemental oxygen and morphine as needed    4: Pancreatic cancer  - stage IV  - progression per CT scan    6: Summary and Recommendation:   - admit to inpatient hospice at [facility] as discussed   - suggest IVF (NS 1L/24 hrs bolus over 4 hours)   - Oral Augmentin for 3-4 days     - Above was discussed with Milli Meehan and Enoch.   - Also spoke with Quyen's mother and sister who both agree with plan    7: Disposition:  - inpatient hospice.     6: Follow up plans:    At hospice    Thank you for your consult. Please call [phone] with questions.     Total visit time 45 min  > 50% of 15 min spent in chart review, face to face discussion of symptom assessment, coordination of care with other specialists and disposition planning.     A total of 30 min was spent on advance care planning, goals of care discussion, emotional support, formulating and communicating prognosis and goals of care, exploring burden/benefit of various approaches of treatment.                 Thank you for your consult. I will follow-up with patient. Please contact us if you have any additional questions.    Subjective:     HPI:   Quyen  Fransisco is a 59 years old female patient with medical history of stage 4 pancreatic cancer, metastatic to the lungs who was last discharged on hospice at home after a brief inpatient stay. She has required 24 hr care with sitters but her sx. Have been fairly well controlled until recently when she noted increasing fatigue, painful swelling in her parotid glands bilaterally as well as dyspnea. She presented to the ED for evaluation.     Imaging shows extensive infiltration over the right lung, mostly compatible with focal edema and Lymphangiomatosis which are new compared to prior.     I am being asked to discuss treatment options and goals of care.      Hospital Course:  No notes on file    Interval History:     Past Medical History:   Diagnosis Date    Allergic rhinitis 3/3/2014    CKD (chronic kidney disease) stage 3, GFR 30-59 ml/min 12/26/2015    Hyperlipidemia 3/3/2014    Lung metastasis     Pancreas cancer     Skin rash 8/10/2019       Past Surgical History:   Procedure Laterality Date    ERCP N/A 7/16/2019    Procedure: ERCP (ENDOSCOPIC RETROGRADE CHOLANGIOPANCREATOGRAPHY);  Surgeon: Chema Harris MD;  Location: [address]);  Service: Endoscopy;  Laterality: N/A;    ERCP N/A 12/24/2019    Procedure: ERCP (ENDOSCOPIC RETROGRADE CHOLANGIOPANCREATOGRAPHY);  Surgeon: Chema Harris MD;  Location: [facility] ([address] [place] [address]);  Service: Endoscopy;  Laterality: N/A;    Exporatory lap      INSERTION OF TUNNELED CENTRAL VENOUS CATHETER (CVC) WITH SUBCUTANEOUS PORT Right 8/12/2019    Procedure: [id number];  Surgeon: Cesar Hallman MD;  Location: [address];  Service: General;  Laterality: Right;  right IJ       Review of patient's allergies indicates:   Allergen Reactions    Sulfa (sulfonamide antibiotics) Swelling and Hives     tongue         Medications:  Continuous Infusions:  Scheduled Meds:  PRN Meds:    Family History     Problem Relation (Age of Onset)    Diabetes Mother, Father,  Brother        Tobacco Use    Smoking status: Former Smoker     Start date: 12/11/1999    Smokeless tobacco: Never Used   Substance and Sexual Activity    Alcohol use: Not Currently     Alcohol/week: 1.0 standard drinks     Types: 1 Glasses of wine per week     Frequency: 4 or more times a week     Drinks per session: 1 or 2     Binge frequency: Never     Comment: last drink a month ago     Drug use: No    Sexual activity: Yes       Review of Systems   Constitutional: Positive for activity change, diaphoresis, fatigue and unexpected weight change.   HENT: Positive for ear pain and facial swelling.    Respiratory: Positive for chest tightness and shortness of breath.    Cardiovascular: Negative for chest pain and leg swelling.   Gastrointestinal: Negative for abdominal distention and abdominal pain.   Genitourinary: Negative for dysuria and flank pain.   Musculoskeletal: Negative for arthralgias and back pain.   Neurological: Positive for dizziness and weakness.   Hematological: Negative for adenopathy.   Psychiatric/Behavioral: Negative for agitation and behavioral problems.     Objective:     Vital Signs (Most Recent):  Temp: 98.5 °F (36.9 °C) (08/03/20 0825)  Pulse: (!) 118 (08/03/20 1216)  Resp: (!) 24 (08/03/20 1216)  BP: 120/70 (08/03/20 1216)  SpO2: 100 % (08/03/20 1216) Vital Signs (24h Range):  Temp:  [98.5 °F (36.9 °C)] 98.5 °F (36.9 °C)  Pulse:  [118-134] 118  Resp:  [21-32] 24  SpO2:  [85 %-100 %] 100 %  BP: ()/(63-96) 120/70     Weight: 56.7 kg (125 lb)  Body mass index is 22.86 kg/m².    Physical Exam  Constitutional:       General: She is not in acute distress.     Appearance: She is ill-appearing.   HENT:      Head:      Comments: Parotitis bilaterally     Nose: No congestion.   Cardiovascular:      Heart sounds: No murmur. No friction rub.   Pulmonary:      Effort: Pulmonary effort is normal.      Breath sounds: Rales present.   Abdominal:      General: Abdomen is flat.      Palpations:  Abdomen is soft.   Musculoskeletal:         General: No swelling.   Neurological:      General: No focal deficit present.      Mental Status: She is alert.         Advance Care Planning   Review of Symptoms    Symptom Assessment (ESAS 0-10 Scale)  Pain:  5  Dyspnea:  3  Anxiety:  5  Nausea:  0  Depression:  0  Anorexia:  5  Fatigue:  6  Insomnia:  1  Restlessness:  0  Agitation:  0     CAM / Delirium:  Negative  Constipation:  Negative  Diarrhea:  Negative    Bowel Management Plan (BMP):  Yes      Pain Assessment:  Location(s): face      ECOG Performance Status Grade:  3 - Confined to bed or chair 50% of waking hours    Advanced Directives:  Living Will: No    LaPOST:  Yes    Do Not Resuscitate Status:  Yes    Medical Power of : Yes     Agent's Name:  Mother.     Decision Making:  Patient answered questions and Family answered questions    Living Arrangements:  Lives alone    Psychosocial/Cultural:  Has 24 hr sitters and family to take care     Time-Based Charting:  No         Significant Labs: All pertinent labs within the past 24 hours have been reviewed.  CBC:   Recent Labs   Lab 08/03/20  0910   WBC 13.82*   HGB 14.1   HCT 43.0   MCV 84        BMP:  Recent Labs   Lab 08/03/20  0910   *   *   K 3.6   CL 75*   CO2 32*   BUN 54*   CREATININE 1.4   CALCIUM 8.6*     LFT:  Lab Results   Component Value Date    AST 30 08/03/2020     (H) 05/15/2020    ALKPHOS 460 (H) 08/03/2020    BILITOT 1.5 (H) 08/03/2020     Albumin:   Albumin   Date Value Ref Range Status   08/03/2020 2.0 (L) 3.5 - 5.2 g/dL Final     Protein:   Total Protein   Date Value Ref Range Status   08/03/2020 7.0 6.0 - 8.4 g/dL Final     Lactic acid:   Lab Results   Component Value Date    LACTATE 1.9 08/03/2020    LACTATE 1.9 03/19/2020       Significant Imaging: I have reviewed all pertinent imaging results/findings within the past 24 hours.      > 50% of 45 min visit spent in chart review, face to face discussion of  goals of care,  symptom assessment, coordination of care and emotional support.    Shaka Canada MD  Palliative Medicine  [facility]

## 2020-08-03 NOTE — PLAN OF CARE
[facility]  Department of Hospital Medicine  [address] [place] [address]  [phone] after hours  [phone] fax    HOSPICE  ORDERS    08/03/2020    Admit to Hospice:   Inpatient Service/Passages    Diagnoses: Metastatic cancer    Hospice Qualifying Diagnoses: Metastatic cancer       Patient has a life expectancy < 6 months due to:  1) Primary Hospice Diagnosis: metastatic cancer         2) Comorbid Conditions Contributing to Decline:  Pulmonary edema, ARF  Vital Signs: Routine per Hospice Protocol.    Code Status: DNR/DNI    Allergies:   Review of patient's allergies indicates:   Allergen Reactions    Sulfa (sulfonamide antibiotics) Swelling and Hives     tongue         Diet: regular diet     Activities: As tolerated    Nursing: Per Hospice Routine.      Routine Skin for Bedridden Patients: Apply moisture barrier cream to all skin folds and wet areas in perineal area daily and after baths and all bowel movements.    Oxygen: Titrate to comfort       Medications:   Start Normal saline 100cc/hr x 1L QD  Augmentin 875 mg BID x 5 days     Quyen Moody   Home Medication Instructions MÓNICA:[id number]    Printed on:08/03/20 1213   Medication Information                      Morphine 60 mg TID                Omeprazole 4mg QD           OLANZapine (ZYPREXA) 5 MG tablet  Take 1 tablet (5 mg total) by mouth nightly. To prevent nausea                 Gabapentin 100mg TID            Nortriptyline 100mg QHS              promethazine (PHENERGAN) 25 MG tablet  Take 1 tablet (25 mg total) by mouth every 6 (six) hours.           senna-docusate 8.6-50 mg (PERICOLACE) 8.6-50 mg per tablet  Take 2 tablets by mouth 2 (two) times daily.                                                                                                                  Future Orders:  Hospice Medical Director may dictate new orders for comfortable care measures & sign death  certificate.        _________________________________  Merle Kendall MD  08/03/2020

## 2020-08-03 NOTE — ED NOTES
Patient placed on continuous cardiac monitor, automatic blood pressure cuff and continuous pulse oximeter.call bell placed within reach of patient. Bed locked and in lowest position. Side rails up x 2. Patient voices no other needs at this time.

## 2020-08-03 NOTE — ED TRIAGE NOTES
Patient here for facial swelling that began yesterday. Left side began swelling yesterday and right side today. Patient reports difficulty swallowing and pain. History of pancreatic cancer. Reports not on chemo. Denies fever. Reports feeling short of breath. Patient o2 sats 85% in triage, patient 95% on 2L NC. Patient hard to understand. Patient states this has not happened before.

## 2020-08-03 NOTE — ED NOTES
Patient assisted onto and off of bedpan.  Urine specimen collected and held.  Diaper changed.  Patient was repositioned for comfort.  Side rails up X 2.  Call bell in reach.  Denies any further needs at this time.

## 2020-08-03 NOTE — ED PROVIDER NOTES
Encounter Date: 8/3/2020       History     Chief Complaint   Patient presents with    Facial Swelling     and pain, on hospice for pancreatic cancer     58 y/o F w PMH metastatic pancreatic cancer comes due to SOB and parotid and neck swelling. Symptoms started yesterday, acutely with swelling of temporomandibular joint that progressed to neck. Associated with pain, nausea, dysphagia, dry mouth, SOB, difficulty talking. Took Benadryl with no relief. Denies taking new medications or food, ear pain, discharge, tooth ache or recent dental procedure, chest pain, vomiting, cough, abdominal pain, diarrhea. Patient in palliative care, discussed goal of treatment on arrival and she referred willingness to be tested/evaluated and managed for current presentation.    The history is provided by the patient.     Review of patient's allergies indicates:   Allergen Reactions    Sulfa (sulfonamide antibiotics) Swelling and Hives     tongue       Past Medical History:   Diagnosis Date    Allergic rhinitis 3/3/2014    CKD (chronic kidney disease) stage 3, GFR 30-59 ml/min 12/26/2015    Hyperlipidemia 3/3/2014    Lung metastasis     Pancreas cancer     Skin rash 8/10/2019     Past Surgical History:   Procedure Laterality Date    ERCP N/A 7/16/2019    Procedure: ERCP (ENDOSCOPIC RETROGRADE CHOLANGIOPANCREATOGRAPHY);  Surgeon: Chema Harris MD;  Location: [address]);  Service: Endoscopy;  Laterality: N/A;    ERCP N/A 12/24/2019    Procedure: ERCP (ENDOSCOPIC RETROGRADE CHOLANGIOPANCREATOGRAPHY);  Surgeon: Chema Harris MD;  Location: [address]);  Service: Endoscopy;  Laterality: N/A;    Exporatory lap      INSERTION OF TUNNELED CENTRAL VENOUS CATHETER (CVC) WITH SUBCUTANEOUS PORT Right 8/12/2019    Procedure: QSFTOESNP-GEUB-C-CATH;  Surgeon: Cesar Hallman MD;  Location: [address];  Service: General;  Laterality: Right;  right IJ     Family History   Problem Relation Age of Onset    Diabetes  Mother     Diabetes Father     Diabetes Brother     Heart disease Neg Hx      Social History     Tobacco Use    Smoking status: Former Smoker     Start date: 12/11/1999    Smokeless tobacco: Never Used   Substance Use Topics    Alcohol use: Not Currently     Alcohol/week: 1.0 standard drinks     Types: 1 Glasses of wine per week     Frequency: 4 or more times a week     Drinks per session: 1 or 2     Binge frequency: Never     Comment: last drink a month ago     Drug use: No     Review of Systems   Constitutional: Negative.    HENT: Positive for facial swelling, trouble swallowing and voice change.    Eyes: Negative.    Respiratory: Positive for shortness of breath.    Cardiovascular: Negative.    Gastrointestinal: Negative.    Endocrine: Negative.    Genitourinary: Negative.    Musculoskeletal: Negative.    Skin: Negative.    Neurological: Negative.    Hematological: Positive for adenopathy.   Psychiatric/Behavioral: Negative.        Physical Exam     Initial Vitals [08/03/20 0825]   BP Pulse Resp Temp SpO2   107/69 (!) 134 (!) 24 98.5 °F (36.9 °C) (!) 85 %      MAP       --         Physical Exam    Constitutional: She appears distressed.   HENT:   Head:       Temperomandibular joint swelling and pain with parotid gland tenderness and swelling.    Neck:   Tonsil gland inflammation    Cardiovascular:   Tachycardia   Pulmonary/Chest: Breath sounds normal. She is in respiratory distress.   Abdominal: Soft. Bowel sounds are normal.   Musculoskeletal: Normal range of motion.   Lymphadenopathy:     She has cervical adenopathy.   Skin: Skin is dry.   Psychiatric: Thought content normal.         ED Course   Procedures  Labs Reviewed   CBC W/ AUTO DIFFERENTIAL - Abnormal; Notable for the following components:       Result Value    WBC 13.82 (*)     Immature Granulocytes 2.4 (*)     Gran # (ANC) 12.2 (*)     Immature Grans (Abs) 0.33 (*)     Lymph # 0.4 (*)     Gran% 88.4 (*)     Lymph% 2.5 (*)     All other  components within normal limits   COMPREHENSIVE METABOLIC PANEL - Abnormal; Notable for the following components:    Sodium 123 (*)     Chloride 75 (*)     CO2 32 (*)     Glucose 111 (*)     BUN, Bld 54 (*)     Calcium 8.6 (*)     Albumin 2.0 (*)     Total Bilirubin 1.5 (*)     Alkaline Phosphatase 460 (*)     eGFR if  47.1 (*)     eGFR if non  40.9 (*)     All other components within normal limits   TROPONIN I - Abnormal; Notable for the following components:    Troponin I 0.044 (*)     All other components within normal limits   CULTURE, BLOOD   CULTURE, BLOOD   PROTIME-INR   B-TYPE NATRIURETIC PEPTIDE   LACTIC ACID, PLASMA   SARS-COV-2 RNA AMPLIFICATION, QUAL   MUMPS VIRUS ANTIBODIES, IGG AND IGM   APTT   ISTAT CHEM8          Imaging Results           CTA Chest Non Coronary (Final result)  Result time 08/03/20 11:49:09    Final result by Ermelinda Raya MD (08/03/20 11:49:09)                 Impression:      1. I detect no pulmonary thromboembolism in the pulmonary trunk, left or right pulmonary arteries. Lobar, inter lobar and segmental arteries are less well seen due to patient respiratory motion artifact; there is a central low-attenuation region in a subsegmental branch of the posterior segmental artery of the right upper lobe (axial series 3, images 38 and 37) concerning for subsegmental pulmonary thromboembolism noting that streaming artifact might present in a similar fashion.    2.  Pre-existing pulmonary nodules have enlarged since 06/16/2020..  Innumerable new nodules have developed.  Findings are concerning for progressive metastatic disease.    3.  There is been interval development of extensive reticulation throughout the right lung, extending along the bronchovascular bundles and interlobular septa.  There also innumerable sites of subsegmental parenchymal consolidation most notable in the right lower lobe and middle lobe. In some regions this reticulation has a  nodular appearance (lateral segment of the right middle lobe axial series 3, image 44). Radiographic appearance is concerning for lymphangitic carcinomatosis.  This abnormal soft tissue is inseparable from the abnormal soft tissue investing the bronchial trunk to the basal segments of the right lower lobe and the central bronchovascular branches of that trunk, resulting in compression of the right inferior pulmonary vein as discussed above. In light of this venous compression, pulmonary edema may contribute to the right pulmonary abnormalities observed.    4.  Small amount of dependent pleural fluid in each hemithorax.  Small amount of perihepatic fluid.    5.  Pneumobilia as observed previously.    6.  Additional findings above.    This report was flagged in Epic as abnormal..      Electronically signed by: Ermelinda Raya MD  Date:    08/03/2020  Time:    11:49             Narrative:    EXAMINATION:  CTA CHEST NON CORONARY    CLINICAL HISTORY:  Chest pain, PE suspected, high prob;    TECHNIQUE:  During intravenous bolus injection of 75 ml of Omnipaque 350 contrast medium via the left upper extremity and using low dose technique, the chest was surveyed from above the pulmonary apices through the posterior costophrenic angles without gating.  Data was reconstructed for multiplanar images in axial, sagittal and coronal planes and for maximal intensity projection images in the axial plane.    All CT scans at this facility use dose modulation, iterative reconstruction and/or weight based dosing when appropriate to reduce radiation dose to as low as reasonably achievable.    Xray dose: DLP = 530.30 mGy-cm.    COMPARISON:  Chest radiograph: 08/03/2020.    CT of the chest, abdomen and pelvis: 06/16/2020.    FINDINGS:  Additional history: Metastatic pancreatic carcinoma.  Shortness of breath.    Device: Port in the right anterior chest wall has attached catheter with its tip in the right brachiocephalic vein similar to  06/16/2020.    Base of Neck: Please see separate report for the dedicated CT of the neck.    Aorta: Left-sided aortic arch with 3 arterial branches.  The aorta maintains normal caliber, contour and course. There is modest calcification of the thoracic aorta.  No coronary artery calcification identified noting that the presence of contrast medium could obscure radiographic appearance of coronary calcific atherosclerosis.    Heart/pericardium: Normal size.  No pericardial fluid or calcification.    Pulmonary arteries:    -Pulmonary arteries distribute normally.    -Pulmonary arteries are sufficiently opacified for diagnostic assessment of central pulmonary arteries.  I detect no pulmonary thromboembolism in the pulmonary trunk, left or right pulmonary arteries.  Lobar, inter lobar and segmental arteries are less well seen due to patient respiratory motion artifact; there is a central low-attenuation region in a subsegmental branch of the posterior segmental artery of the right upper lobe (axial series 3, images 38 and 37) concerning for subsegmental pulmonary thromboembolism noting that streaming artifact might present in a similar fashion.    -There is  no convincing evidence of pulmonary infarct.    Pulmonary veins, left atrium:    There are four pulmonary veins that return to the left atrium.  The right inferior pulmonary vein is surrounded by abnormal soft tissue and compressed; this may produce pulmonary edema contributing to the parenchymal opacification in the right lower lobe.    Helen/Mediastinum:    -Mediastinal lymph nodes and left hilar lymph nodes appear normal size.    -Although I do not detect convincing evidence of lymph node enlargement in the right hilum, there is abundant soft tissue in the right infrahilar area especially surrounding the basal segments of the right lower lobe.  This soft tissue surrounds and compresses the right inferior pulmonary vein.    Pleura: There is a small amount of  dependent fluid in each pleural space new since 04/23/2020.No pleural calcification.  No pneumothorax.    Esophagus: Normal.    Upper Abdomen: Small amount of perihepatic fluid.  Pneumobilia similar to 06/16/2020.    Thoracic soft tissues: Normal. Both breasts are present.    Bones: No acute fracture. No suspicious lytic or sclerotic lesion.    Airways: Patent.    Lungs:    -The patient has known multiple pulmonary nodules.  Pre-existing nodules have enlarged in the interim.  Innumerable new nodules have developed.    A. Left lung: On 06/16/2020 the largest soft tissue opacity in the left lung was located in the superior segment of the lingula, measured 1.9 cm (06/16/2020 axial series 2, image 73); on today's study at this same location there is abundant soft tissue along the pleural margin measuring at least 3.2 cm in long axis (08/03/2020, axial series 3, image 58).    B. Right lung: In the anterior segment of the right upper lobe there was a slender nodule, 0.5 x 0.3 cm on 04/23/2020 (04/23/2020, axial series 2, image 39).  Today this lesion measures at least 0.9 by 0.7 cm (08/03/2020 axial series 3, image 36).    -There is been interval development of extensive reticulation throughout the right lung, extending along the bronchovascular bundles and interlobular septa.  There also innumerable sites of subsegmental parenchymal consolidation most notable in the right lower lobe and middle lobe. In some regions this reticulation has a nodular appearance (lateral segment of the right middle lobe axial series 3, image 44).  Radiographic appearance is concerning for lymphangitic carcinomatosis.  This abnormal soft tissue is inseparable from the abnormal soft tissue investing the bronchial trunk to the basal segments of the right lower lobe and the central bronchovascular branches of that trunk, resulting in compression of the right inferior pulmonary vein as discussed above.  In light of this venous compression,  pulmonary edema may contribute to the right pulmonary abnormalities observed.                                CT Soft Tissue Neck With Contrast (Final result)  Result time 08/03/20 11:46:14    Final result by Nina Ray MD (08/03/20 11:46:14)                 Impression:      Bilateral parotid gland enlargement diffuse edematous change with adjacent fat stranding which can be seen in parotiditis.    Increased number of lymph nodes within the neck, not enlarged by CT criteria.    Chronically occluded right internal jugular vein which has been better demonstrated on prior ultrasound.    This report was flagged in Epic as abnormal.    Electronically signed by resident: Tono Canas  Date:    08/03/2020  Time:    11:05    Electronically signed by: Nina Ray MD  Date:    08/03/2020  Time:    11:46             Narrative:    EXAMINATION:  CT SOFT TISSUE NECK WITH CONTRAST    CLINICAL HISTORY:  Tonsil/adenoid disorder;h/o metastatic cancer, here with bilateral parotitis and cervical LA;.    COMPARISON:  PET CT from April 2020, ultrasound upper extremity veins right from May 21, 2020.    TECHNIQUE:  Axial images of the neck were acquired after administration of 75 cc of Omnipaque 350. reformatted coronal and sagittal images then obtained    FINDINGS:  The airway is patent.  No radiopaque foreign bodies appreciated.  No acute fracture or dislocation.    Multiple bilateral subcentimeter lymph nodes present, increased in number.    Right thyroid calcification noted.  Mild edema adjacent to the submandibular glands.    Bilateral parotid gland enlargement with diffuse edematous change and adjacent fat stranding which can be seen in parotiditis.  There is some mild edema along the right lower neck.  The inferior right jugular vein is difficult to identify, correlating with recent ultrasound is chronically occluded.  On today's study the subclavian vein is not well opacified which may relate to timing of contrast and  patency not well evaluated.    Visualized paranasal sinuses are clear.    Right upper chest wall port with catheter tip terminating within the brachiocephalic vein.    Refer to CT chest noncoronary study for lung evaluation.                               X-Ray Chest AP Portable (Final result)  Result time 08/03/20 09:25:45    Final result by Cesar Medellin MD (08/03/20 09:25:45)                 Impression:      Scattered though extensive bilateral, right greater than left, pulmonary infiltrates and small effusions as noted.      Electronically signed by: Cesar Medellin  Date:    08/03/2020  Time:    09:25             Narrative:    EXAMINATION:  XR CHEST AP PORTABLE    CLINICAL HISTORY:  hypoxia, h/o metastatic pancreatic cancer;    TECHNIQUE:  Single frontal view of the chest was performed.    COMPARISON:  March 18, 2020 chest radiograph and CT chest, abdomen, and pelvis of June 16, 2020    FINDINGS:  Interval placement right jugular venous omar catheter, tip superimposing superior vena caval soft tissues.  Normal heart size.  In the setting of small bilateral pleural effusions, scattered though extensive, right more so than left interstitial infiltrates with some additional areas right lower lung zone-cardiophrenic airspace infiltrates.  These are new to earlier exams.  These findings could be on the basis of infection and/or atelectasis and or edema.  The numerous subcentimeter CT demonstrable left and right pulmonary nodules are not well defined on the AP portable exam.  Reconfirmed biliary stent.                              X-Rays:   Independently Interpreted Readings:   Chest X-Ray: There is an infiltrate in the RML and RLL.     Medical Decision Making:   Initial Assessment:   58 y/o w metastatic pancreatic cancer with sudden onset of SOB, temperomandibilar joint swelling that radiates to neck a/w pain dyspagia, difficulty talking, nausea. Denies ear pain/discharge, tooth ache, chest pain. Patient hypoxemic  Sat 85, tachypneic RR 24, tachycardic  on arrival. Started O2 and Sat 98% with IV fluids. Due to history of malignancy and DVT patient high risk for hypercoagulability and presenting with sudden onset of hypoxemia, tachycardia PE is likely, even in the absence of chest pain. Pneumothorax and pneumonia are also in the differential, along with reactive lymphadenopathy due to cervical/tonsil/poarotid swelling. Ordered CXR, CTA, CMP, CBC, Mumps, COVID, infectious markers.    Differential Diagnosis:   1)PE: hx of DVT and malignancy with sudden onset of hypoxemia Sat 89%, tachycardia. High risk of hypercoagulability   2) Pneumothorax: Sudden onset of hypoxemia, tachycardia.   3) Pneumonia: Hx of malignancy, sudden onset of hypoxemia, tachycardia, lymphadenopathy.   4) Reactive lymphadenopathy: Hx of malignancy with sudden onset of parotid, cervical and tonsil adenopathy in the setting of hypoxemia, tachycardia.  5) Bacterial sialadenitis: patient with hx of malignancy with acute onset of parotitis, looks dehydrated and presents with tachycardia, hypoxemia.   Independently Interpreted Test(s):   I have ordered and independently interpreted X-rays - see prior notes.  Clinical Tests:   Lab Tests: Ordered and Reviewed  Radiological Study: Ordered and Reviewed  ED Management:  Started IVF and O2.   CXR remarkable for right middle/lower lobe infiltrate.   Patient presenting with tachycardia, hypoxemia and possible source of infection +severe sepsis: started zosyn and vancomycin and continued fluids. Later on CT scan reports lymphangitic carcinosis and pulmonary edema. Patient presenting with ARF in the setting of dehydration, for which diuretics will not be beneficial. Palliative doctor consulted and assessed patient at bedside and agrees that best treatment plan for patient will be transfer to  Hospice in [place] where she has been taken care of. Spoke with sister and agree with plan. Patient will be benefit of  hydration and will d/c with augmentin for CAP coverage.     Other:   I discussed test(s) with the performing physician.  I have discussed this case with another health care provider.              Attending Attestation:   Physician Attestation Statement for Resident:  As the supervising MD   Physician Attestation Statement: I have personally seen and examined this patient.   I agree with the above history. -: This is a 59-year-old woman with a history of metastatic pancreatic cancer, who is on home hospice who presents with acute onset shortness of breath, facial swelling, and uncontrolled pain.  On exam, the patient is hypoxic to 85% on room air, which improved with 2 L nasal cannula.  On my exam she is cachectic, ill-appearing, gray, and is tachypneic.  She has bilateral parotid gland enlargement that is tender, as well as tender cervical lymphadenopathy.  She has not plethoric, I have low suspicion for SVC syndrome.  I am concerned about possible pneumonia versus increased tumor burden versus pulmonary embolism, versus a separate process within the neck causing her to be more short of breath.  Given this we obtained a CTA of the chest and CT with contrast of the neck which by my independent interpretation is notable for possible subsegmental pulmonary embolism, but more concerning is notable for lymphangitic carcinomatosis, causing obstruction of a pulmonary vein which is leading to focal pulmonary edema.  I do not think there are any advanced therapies that she would be a candidate for.  Her labs are also notable for acute kidney injury, hyponatremia, and signs of organ dysfunction.  I discussed the patient with her palliative medicine doctor, and he helpfully saw and evaluated the patient at bedside, and together they decided on inpatient hospice.  We have administered IV fluids, supplemental oxygen and pain medicine for comfort.  Given possible concomitant pneumonia, the patient was also given Zosyn.  The  patient was seen and evaluated and accepted by [facility], and orders have been placed for IV fluids and pain medicine for comfort.  She will be transported to [facility] on discharge from the emergency department.   As the supervising MD I agree with the above PE.    As the supervising MD I agree with the above treatment, course, plan, and disposition.        Attending Critical Care:   Critical Care Times:   Direct Patient Care (initial evaluation, reassessments, and time considering the case)................................................................60 minutes.   Additional History from reviewing old medical records or taking additional history from the family, EMS, PCP, etc.......................20 minutes.   Ordering, Reviewing, and Interpreting Diagnostic Studies...............................................................................................................20 minutes.   Documentation..................................................................................................................................................................................5 minutes.   Consultation with other Physicians. .................................................................................................................................................15 minutes.   Consultation with the patient's family directly relating to the patient's condition, care, and DNR status (when patient unable)......10 minutes.   ==============================================================  · Total Critical Care Time - exclusive of procedural time: 130 minutes.  ==============================================================  Critical care was necessary to treat or prevent imminent or life-threatening deterioration of the following conditions: renal failure, sepsis and respiratory failure.   The following critical care procedures were done by me (see procedure notes): pulse oximetry.   Critical care was  time spent personally by me on the following activities: obtaining history from patient or relative, examination of patient, review of old charts, ordering lab, x-rays, and/or EKG, development of treatment plan with patient or relative, ordering and performing treatments and interventions, evaluation of patient's response to treatment, discussion with consultants, interpretation of cardiac measurements, re-evaluation of patient's conition and end of life discussions.   Critical Care Condition: critical               ED Course as of Aug 03 1453   Mon Aug 03, 2020   0947 CBC: leukocytosis   ]XRAY: middle/lower lobe infiltrate     [SM]   1105 Spoke with daughter who is power of  and notified that if patient needs admission of IP pneumonia for IV Abx.     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

## 2020-08-03 NOTE — PLAN OF CARE
[facility]  Department of Hospital Medicine  [address] [place] [address]  [phone] after hours  [phone] fax    HOSPICE  ORDERS    08/03/2020    Admit to Hospice: Inpatient Service/Passage  Diagnoses: metastatic cancer   Active Hospital Problems    Diagnosis  POA    Palliative care encounter [Z51.5]  Not Applicable      Resolved Hospital Problems   No resolved problems to display.       Hospice Qualifying Diagnoses:metastatic cancer       Patient has a life expectancy < 6 months due to:  1) Primary Hospice Diagnosis: metastatic cancer   Comorbid Conditions Contributing to Decline:  Pulmonary edema , ARF  Vital Signs: Routine per Hospice Protocol.    Code Status: DNR/DNI    Allergies:   Review of patient's allergies indicates:   Allergen Reactions    Sulfa (sulfonamide antibiotics) Swelling and Hives     tongue         Diet: as per hospice   Activities: As tolerated    Nursing: Per Hospice Routine.     Routine Skin for Bedridden Patients: Apply moisture barrier cream to all skin folds and   wet areas in perineal area daily and after baths and all bowel movements.      Oxygen: according to need for comfort     Other Miscellaneous Care:     Medications:   Start Normal saline 100cc/hr x 1L QD  Augmentin 875 mg BID x 5 days     FransiscoQuyen   Home Medication Instructions MÓNICA:[id number]    Printed on:08/03/20 1418   Medication Information                      amoxicillin-clavulanate 875-125mg (AUGMENTIN) 875-125 mg per tablet  Take 1 tablet by mouth every 12 (twelve) hours. for 5 days             gabapentin (NEURONTIN) 100 MG capsule  Take 100 mg by mouth 3 (three) times daily.                        HYDROmorphone (DILAUDID) 8 MG tablet  Take 1 tablet (8 mg total) by mouth every 4 (four) hours as needed for Pain.                                       morphine (MS CONTIN) 60 MG 12 hr tablet  Take 60 mg by mouth 3 (three) times daily.             nortriptyline HCl  (NORTRIPTYLINE ORAL)  Take 100 mg by mouth nightly as needed.             OLANZapine (ZYPREXA) 5 MG tablet  Take 1 tablet (5 mg total) by mouth nightly. To prevent nausea             omeprazole (PRILOSEC) 40 MG capsule  Take 40 mg by mouth once daily.             ondansetron (ZOFRAN-ODT) 8 MG TbDL  Take 1 tablet (8 mg total) by mouth 4 (four) times daily.                          promethazine (PHENERGAN) 25 MG tablet  Take 1 tablet (25 mg total) by mouth every 6 (six) hours.             senna-docusate 8.6-50 mg (PERICOLACE) 8.6-50 mg per tablet  Take 2 tablets by mouth 2 (two) times daily.                     Future Orders:  Hospice Medical Director may dictate new orders for comfortable care measures & sign death certificate.        _________________________________  Merle Kendall MD  08/03/2020

## 2020-08-03 NOTE — SUBJECTIVE & OBJECTIVE
Interval History:     Past Medical History:   Diagnosis Date    Allergic rhinitis 3/3/2014    CKD (chronic kidney disease) stage 3, GFR 30-59 ml/min 12/26/2015    Hyperlipidemia 3/3/2014    Lung metastasis     Pancreas cancer     Skin rash 8/10/2019       Past Surgical History:   Procedure Laterality Date    ERCP N/A 7/16/2019    Procedure: ERCP (ENDOSCOPIC RETROGRADE CHOLANGIOPANCREATOGRAPHY);  Surgeon: Chema Harris MD;  [facility]  Service: Endoscopy;  Laterality: N/A;    ERCP N/A 12/24/2019    Procedure: ERCP (ENDOSCOPIC RETROGRADE CHOLANGIOPANCREATOGRAPHY);  Surgeon: Chema Harris MD;  Location: [facility] ENDO (2ND FLR);  Service: Endoscopy;  Laterality: N/A;    Exporatory lap      INSERTION OF TUNNELED CENTRAL VENOUS CATHETER (CVC) WITH SUBCUTANEOUS PORT Right 8/12/2019    Procedure: [id number];  Surgeon: Cesar Hallman MD;  Location: [facility] OR [facility];  Service: General;  Laterality: Right;  right IJ       Review of patient's allergies indicates:   Allergen Reactions    Sulfa (sulfonamide antibiotics) Swelling and Hives     tongue         Medications:  Continuous Infusions:  Scheduled Meds:  PRN Meds:    Family History     Problem Relation (Age of Onset)    Diabetes Mother, Father, Brother        Tobacco Use    Smoking status: Former Smoker     Start date: 12/11/1999    Smokeless tobacco: Never Used   Substance and Sexual Activity    Alcohol use: Not Currently     Alcohol/week: 1.0 standard drinks     Types: 1 Glasses of wine per week     Frequency: 4 or more times a week     Drinks per session: 1 or 2     Binge frequency: Never     Comment: last drink a month ago     Drug use: No    Sexual activity: Yes       Review of Systems   Constitutional: Positive for activity change, diaphoresis, fatigue and unexpected weight change.   HENT: Positive for ear pain and facial swelling.    Respiratory: Positive for chest tightness and shortness of breath.    Cardiovascular:  Negative for chest pain and leg swelling.   Gastrointestinal: Negative for abdominal distention and abdominal pain.   Genitourinary: Negative for dysuria and flank pain.   Musculoskeletal: Negative for arthralgias and back pain.   Neurological: Positive for dizziness and weakness.   Hematological: Negative for adenopathy.   Psychiatric/Behavioral: Negative for agitation and behavioral problems.     Objective:     Vital Signs (Most Recent):  Temp: 98.5 °F (36.9 °C) (08/03/20 0825)  Pulse: (!) 118 (08/03/20 1216)  Resp: (!) 24 (08/03/20 1216)  BP: 120/70 (08/03/20 1216)  SpO2: 100 % (08/03/20 1216) Vital Signs (24h Range):  Temp:  [98.5 °F (36.9 °C)] 98.5 °F (36.9 °C)  Pulse:  [118-134] 118  Resp:  [21-32] 24  SpO2:  [85 %-100 %] 100 %  BP: ()/(63-96) 120/70     Weight: 56.7 kg (125 lb)  Body mass index is 22.86 kg/m².    Physical Exam  Constitutional:       General: She is not in acute distress.     Appearance: She is ill-appearing.   HENT:      Head:      Comments: Parotitis bilaterally     Nose: No congestion.   Cardiovascular:      Heart sounds: No murmur. No friction rub.   Pulmonary:      Effort: Pulmonary effort is normal.      Breath sounds: Rales present.   Abdominal:      General: Abdomen is flat.      Palpations: Abdomen is soft.   Musculoskeletal:         General: No swelling.   Neurological:      General: No focal deficit present.      Mental Status: She is alert.         Advance Care Planning   Review of Symptoms    Symptom Assessment (ESAS 0-10 Scale)  Pain:  5  Dyspnea:  3  Anxiety:  5  Nausea:  0  Depression:  0  Anorexia:  5  Fatigue:  6  Insomnia:  1  Restlessness:  0  Agitation:  0     CAM / Delirium:  Negative  Constipation:  Negative  Diarrhea:  Negative    Bowel Management Plan (BMP):  Yes      Pain Assessment:  Location(s): face      ECOG Performance Status Grade:  3 - Confined to bed or chair 50% of waking hours    Advanced Directives:  Living Will: No    LaPOST:  Yes    Do Not  Resuscitate Status:  Yes    Medical Power of : Yes     Agent's Name:  Mother.     Decision Making:  Patient answered questions and Family answered questions    Living Arrangements:  Lives alone    Psychosocial/Cultural:  Has 24 hr sitters and family to take care     Time-Based Charting:  No         Significant Labs: All pertinent labs within the past 24 hours have been reviewed.  CBC:   Recent Labs   Lab 08/03/20  0910   WBC 13.82*   HGB 14.1   HCT 43.0   MCV 84        BMP:  Recent Labs   Lab 08/03/20  0910   *   *   K 3.6   CL 75*   CO2 32*   BUN 54*   CREATININE 1.4   CALCIUM 8.6*     LFT:  Lab Results   Component Value Date    AST 30 08/03/2020     (H) 05/15/2020    ALKPHOS 460 (H) 08/03/2020    BILITOT 1.5 (H) 08/03/2020     Albumin:   Albumin   Date Value Ref Range Status   08/03/2020 2.0 (L) 3.5 - 5.2 g/dL Final     Protein:   Total Protein   Date Value Ref Range Status   08/03/2020 7.0 6.0 - 8.4 g/dL Final     Lactic acid:   Lab Results   Component Value Date    LACTATE 1.9 08/03/2020    LACTATE 1.9 03/19/2020       Significant Imaging: I have reviewed all pertinent imaging results/findings within the past 24 hours.

## 2020-08-03 NOTE — ASSESSMENT & PLAN NOTE
"Palliative Care Encounter / Goals of care discussion:     Narrative:   Quyen Moody is a 59 y.o. female patient with advanced pancreatic cancer now presents with profound dehydration, parotitis and dyspnea. CT findings indicate lymphangiomatosis and progression of disease     1: Psychosocial :   - Marital status:   - Children: no  - POA: mother is POA    2: Medicolegal:    - Advance directive: on file   - Surrogate Decision Maker: mother    3: Support System:  - Spiritual: somewhat  - Family: supportive     4: Prognostication: progression of disease. Poor prognosis with likely limited life expectancy.     5: Goals of care Decisions / Symptom Management / Recommendations / Plan:     1: Encounter for Palliative Care  - Code Status: DNR    - Initial Assessment: Ms. Napier asks me if "this is it?". She has been preparing for death for a while she states but feels like she is close now. We discussed the fact that her CT scan appears worse and that even though a PNA is always a consideration it seems as though her cancer has gotten worse.     - Insight in Disease and Illness trajectory: Quyen is aware of her very limited life expectancy    - Goals of Care: discussed options for care going forward. Expressed that admission and ABX would be an option, the same going home with some fluids and oral ABX as well as admission to an inpatient hospice, which I favor.   Discussed choices with mother and sister and all agree to admit Quyen to an inpatient hospice unit for comfort focused care, IV Fluids and oral ABX.   Should her sx. Improve there is always an option to return home. Should she get worse she will have best support for end of life.     - Approach to treatment: admit to inpatient hospice with trial IVF and oral ABX.     2: Symptom Management:   - Pain: Appears reasonably well controlled.   - Dyspnea: feels better with O2. PRN oral Morphine may be beneficial for breathlessness  - Anxiety: can use low doses of ATivan " (1mg orally every 4 hours) for severe anxiety    3: Acute respiratory failure  - likely due to progression of cancer with lymphangiosis.   - supplemental oxygen and morphine as needed    4: Pancreatic cancer  - stage IV  - progression per CT scan    6: Summary and Recommendation:   - admit to inpatient hospice at [place] [facility] as discussed   - suggest IVF (NS 1L/24 hrs bolus over 4 hours)   - Oral Augmentin for 3-4 days     - Above was discussed with Milli Meehan and Enoch.   - Also spoke with Quyen's mother and sister who both agree with plan    7: Disposition:  - inpatient hospice.     6: Follow up plans:    At hospice    Thank you for your consult. Please call [phone] with questions.     Total visit time 45 min  > 50% of 15 min spent in chart review, face to face discussion of symptom assessment, coordination of care with other specialists and disposition planning.     A total of 30 min was spent on advance care planning, goals of care discussion, emotional support, formulating and communicating prognosis and goals of care, exploring burden/benefit of various approaches of treatment.

## 2020-08-03 NOTE — ED NOTES
ED  phoned pt's POA  who chose [facility] to care for pt. Pt's POA is not available to sign Patient Choice Form at this time due to visitation rules.    SW will send referral to [facility].     - Amarilis Ennis LMSW   [phone]

## 2020-08-03 NOTE — ED NOTES
Patient assisted off of bedpan by family member at bedside.  Family member upset and requesting to know when transportation will arrive for patient.

## 2020-08-04 NOTE — ED NOTES
Report received from Shun RN. Care assumed. Pt resting comfortably and independently repositioned in stretcher with bed locked in lowest position for safety. Respirations even and unlabored and visible chest rise noted. Patient offered bathroom assistance and denies need at this time. Pt and pt's family member at bedside aware of POC for transfer to [facility] and aware transportation has been arranged. Understanding verbalized. Pt denies needs at this time. Family at bedside. Side rails raised x2, bed locked and low, call bell within reach.

## 2020-08-04 NOTE — ED NOTES
Pt transported to [facility]. Personal belongings sent to facility with patient. Pt on 2L oxygen per [place] for transport. Pt AAOx4, no apparent distress noted at this time.

## 2020-08-07 LAB
MUV IGG SER IA-ACNC: 6
MUV IGG SER QL IA: POSITIVE
MUV IGM SER IA-ACNC: 0.14 (ref 0–0.79)
MUV IGM SER QL IA: NEGATIVE

## 2020-08-08 LAB
BACTERIA BLD CULT: NORMAL
BACTERIA BLD CULT: NORMAL

## 2020-08-10 ENCOUNTER — RESEARCH ENCOUNTER (OUTPATIENT)
Dept: RESEARCH | Facility: HOSPITAL | Age: 60
End: 2020-08-10
